# Patient Record
Sex: MALE | Race: WHITE | NOT HISPANIC OR LATINO | URBAN - METROPOLITAN AREA
[De-identification: names, ages, dates, MRNs, and addresses within clinical notes are randomized per-mention and may not be internally consistent; named-entity substitution may affect disease eponyms.]

---

## 2023-08-10 ENCOUNTER — HOSPITAL ENCOUNTER (OUTPATIENT)
Dept: DATA CONVERSION | Facility: HOSPITAL | Age: 58
Discharge: HOME | End: 2023-08-10

## 2023-08-10 DIAGNOSIS — N18.30 CHRONIC KIDNEY DISEASE, STAGE 3 UNSPECIFIED (MULTI): ICD-10-CM

## 2023-08-10 LAB
ALBUMIN SERPL-MCNC: 3.8 GM/DL (ref 3.5–5)
ANION GAP SERPL CALCULATED.3IONS-SCNC: 14 MMOL/L (ref 0–19)
BUN SERPL-MCNC: 26 MG/DL (ref 8–25)
BUN/CREAT SERPL: 13.7 RATIO (ref 8–21)
CALCIUM SERPL-MCNC: 9.6 MG/DL (ref 8.5–10.4)
CHLORIDE SERPL-SCNC: 103 MMOL/L (ref 97–107)
CO2 SERPL-SCNC: 23 MMOL/L (ref 24–31)
CREAT SERPL-MCNC: 1.9 MG/DL (ref 0.4–1.6)
DEPRECATED RDW RBC AUTO: 44.3 FL (ref 37–54)
ERYTHROCYTE [DISTWIDTH] IN BLOOD BY AUTOMATED COUNT: 13.8 % (ref 11.7–15)
GFR SERPL CREATININE-BSD FRML MDRD: 40 ML/MIN/1.73 M2
GLUCOSE SERPL-MCNC: 188 MG/DL (ref 65–99)
HCT VFR BLD AUTO: 43.9 % (ref 41–50)
HGB BLD-MCNC: 14.6 GM/DL (ref 13.5–16.5)
MCH RBC QN AUTO: 29.5 PG (ref 26–34)
MCHC RBC AUTO-ENTMCNC: 33.3 % (ref 31–37)
MCV RBC AUTO: 88.7 FL (ref 80–100)
NRBC BLD-RTO: 0 /100 WBC
PHOSPHATE SERPL-MCNC: 3.4 MG/DL (ref 2.5–4.5)
PLATELET # BLD AUTO: 260 K/UL (ref 150–450)
PMV BLD AUTO: 10.9 CU (ref 7–12.6)
POTASSIUM SERPL-SCNC: 4.6 MMOL/L (ref 3.4–5.1)
PTH-INTACT SERPL-MCNC: 53 PG/ML (ref 15–65)
RBC # BLD AUTO: 4.95 M/UL (ref 4.5–5.5)
SODIUM SERPL-SCNC: 139 MMOL/L (ref 133–145)
WBC # BLD AUTO: 8.4 K/UL (ref 4.5–11)

## 2023-08-31 PROBLEM — M19.029 ARTHRITIS OF ELBOW: Status: ACTIVE | Noted: 2023-08-31

## 2023-08-31 PROBLEM — E11.51 PERIPHERAL CIRCULATORY DISORDER DUE TO TYPE 2 DIABETES MELLITUS (MULTI): Status: ACTIVE | Noted: 2023-08-31

## 2023-08-31 PROBLEM — I20.9 ANGINA PECTORIS (CMS-HCC): Status: ACTIVE | Noted: 2023-08-31

## 2023-08-31 PROBLEM — R19.5 OCCULT BLOOD IN STOOLS: Status: ACTIVE | Noted: 2023-08-31

## 2023-08-31 PROBLEM — E78.2 MIXED HYPERLIPIDEMIA: Status: ACTIVE | Noted: 2023-08-31

## 2023-08-31 PROBLEM — E66.01 SEVERE OBESITY (BMI >= 40) (MULTI): Status: ACTIVE | Noted: 2023-08-31
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
## 2023-08-31 PROBLEM — R60.0 LEG EDEMA: Status: ACTIVE | Noted: 2018-05-22

## 2023-08-31 PROBLEM — R06.00 DYSPNEA: Status: ACTIVE | Noted: 2023-08-31

## 2023-08-31 PROBLEM — Z95.5 HISTORY OF CORONARY ARTERY STENT PLACEMENT: Status: ACTIVE | Noted: 2023-08-31

## 2023-08-31 PROBLEM — E11.65 HYPERGLYCEMIA DUE TO TYPE 2 DIABETES MELLITUS (MULTI): Status: ACTIVE | Noted: 2023-08-31

## 2023-08-31 PROBLEM — M79.603 PAIN IN UPPER LIMB: Status: ACTIVE | Noted: 2023-08-31

## 2023-08-31 PROBLEM — I25.10 ARTERIOSCLEROSIS OF CORONARY ARTERY: Status: ACTIVE | Noted: 2021-11-02

## 2023-08-31 PROBLEM — N18.30 CHRONIC KIDNEY DISEASE, STAGE 3 (MULTI): Status: ACTIVE | Noted: 2023-08-31

## 2023-08-31 PROBLEM — I82.409 DEEP VENOUS THROMBOSIS OF LOWER EXTREMITY (MULTI): Status: ACTIVE | Noted: 2023-08-31

## 2023-08-31 PROBLEM — M54.9 BACKACHE: Status: ACTIVE | Noted: 2023-08-31

## 2023-08-31 PROBLEM — R21 RASH: Status: ACTIVE | Noted: 2023-08-31

## 2023-08-31 PROBLEM — I89.0 LYMPHEDEMA: Status: ACTIVE | Noted: 2018-05-22

## 2023-08-31 PROBLEM — R00.2 PALPITATIONS: Status: ACTIVE | Noted: 2023-08-31

## 2023-08-31 PROBLEM — T14.8XXA BLISTER: Status: ACTIVE | Noted: 2023-08-31

## 2023-08-31 PROBLEM — A41.9 SEPSIS (MULTI): Status: ACTIVE | Noted: 2023-08-31

## 2023-08-31 PROBLEM — E11.621 DIABETIC FOOT ULCER (MULTI): Status: ACTIVE | Noted: 2022-12-01

## 2023-08-31 PROBLEM — R94.4 KIDNEY FUNCTION TEST ABNORMAL: Status: ACTIVE | Noted: 2018-05-22

## 2023-08-31 PROBLEM — L97.509 DIABETIC FOOT ULCER (MULTI): Status: ACTIVE | Noted: 2022-12-01

## 2023-08-31 PROBLEM — E16.2 HYPOGLYCEMIA: Status: ACTIVE | Noted: 2023-08-31

## 2023-08-31 PROBLEM — E87.1 HYPONATREMIA: Status: ACTIVE | Noted: 2023-08-31

## 2023-08-31 PROBLEM — L03.116 CELLULITIS OF LEFT LOWER EXTREMITY: Status: ACTIVE | Noted: 2018-09-14

## 2023-08-31 PROBLEM — Z86.711 HISTORY OF PULMONARY EMBOLISM: Status: ACTIVE | Noted: 2023-08-31

## 2023-08-31 PROBLEM — R07.89 CHEST WALL PAIN: Status: ACTIVE | Noted: 2023-08-31

## 2023-08-31 PROBLEM — S81.809A OPEN WOUND, LOWER LEG: Status: ACTIVE | Noted: 2023-08-31

## 2023-08-31 PROBLEM — L03.213 PERIORBITAL CELLULITIS: Status: ACTIVE | Noted: 2023-08-31

## 2023-08-31 PROBLEM — E87.6 HYPOKALEMIA: Status: ACTIVE | Noted: 2023-08-31

## 2023-08-31 PROBLEM — I25.10 ATHEROSCLEROSIS OF CORONARY ARTERY: Status: ACTIVE | Noted: 2023-08-31

## 2023-08-31 PROBLEM — E78.5 DYSLIPIDEMIA: Status: ACTIVE | Noted: 2023-08-31

## 2023-08-31 PROBLEM — R07.89 ATYPICAL CHEST PAIN: Status: ACTIVE | Noted: 2023-08-31

## 2023-08-31 PROBLEM — E78.1 HYPERTRIGLYCERIDEMIA: Status: ACTIVE | Noted: 2022-05-10

## 2023-08-31 PROBLEM — I10 PRIMARY HYPERTENSION: Status: ACTIVE | Noted: 2018-05-22

## 2023-08-31 PROBLEM — M25.519 SHOULDER PAIN: Status: ACTIVE | Noted: 2023-08-31

## 2023-08-31 PROBLEM — I50.9 HEART FAILURE (MULTI): Status: ACTIVE | Noted: 2023-08-31

## 2023-08-31 PROBLEM — R09.02 HYPOXIA: Status: ACTIVE | Noted: 2023-08-31

## 2023-08-31 PROBLEM — G47.33 OSA TREATED WITH BIPAP: Status: ACTIVE | Noted: 2023-08-31

## 2023-08-31 PROBLEM — I87.2 PERIPHERAL VENOUS INSUFFICIENCY: Status: ACTIVE | Noted: 2023-08-31

## 2023-08-31 PROBLEM — K92.1 HEMATOCHEZIA: Status: ACTIVE | Noted: 2023-08-31

## 2023-08-31 PROBLEM — I25.10 CVD (CARDIOVASCULAR DISEASE): Status: ACTIVE | Noted: 2023-08-31

## 2023-08-31 PROBLEM — E11.610 DIABETIC NEUROPATHIC ARTHROPATHY (MULTI): Status: ACTIVE | Noted: 2023-08-31

## 2023-08-31 PROBLEM — R21 PAPULAR ERUPTION: Status: ACTIVE | Noted: 2023-08-31

## 2023-08-31 PROBLEM — Z98.61 HISTORY OF PERCUTANEOUS TRANSLUMINAL CORONARY ANGIOPLASTY: Status: ACTIVE | Noted: 2023-08-31

## 2023-08-31 PROBLEM — D64.9 ANEMIA: Status: ACTIVE | Noted: 2018-05-22

## 2023-08-31 PROBLEM — I70.91 GENERALIZED ATHEROSCLEROSIS: Status: ACTIVE | Noted: 2023-08-31

## 2023-08-31 PROBLEM — I25.118 ATHEROSCLEROTIC HEART DISEASE OF NATIVE CORONARY ARTERY WITH OTHER FORMS OF ANGINA PECTORIS (CMS-HCC): Status: ACTIVE | Noted: 2023-08-31

## 2023-08-31 PROBLEM — K60.2 ANAL FISSURE: Status: ACTIVE | Noted: 2023-08-31

## 2023-08-31 PROBLEM — M25.529 ELBOW JOINT PAIN: Status: ACTIVE | Noted: 2023-08-31

## 2023-08-31 PROBLEM — N18.9 CHRONIC RENAL FAILURE SYNDROME: Status: ACTIVE | Noted: 2023-08-31

## 2023-08-31 PROBLEM — L02.01 ABSCESS OF FACE: Status: ACTIVE | Noted: 2023-08-31

## 2023-08-31 PROBLEM — I73.9 PERIPHERAL VASCULAR DISEASE (CMS-HCC): Status: ACTIVE | Noted: 2023-08-31

## 2023-08-31 PROBLEM — R79.89 ABNORMAL TSH: Status: ACTIVE | Noted: 2018-05-22

## 2023-08-31 PROBLEM — I63.9 CEREBROVASCULAR ACCIDENT (MULTI): Status: ACTIVE | Noted: 2023-08-31

## 2023-08-31 PROBLEM — M75.50 BURSITIS OF SHOULDER: Status: ACTIVE | Noted: 2023-08-31

## 2023-08-31 PROBLEM — I49.3 VENTRICULAR PREMATURE COMPLEX: Status: ACTIVE | Noted: 2023-08-31

## 2023-08-31 PROBLEM — M10.9 GOUT: Status: ACTIVE | Noted: 2018-05-22

## 2023-08-31 PROBLEM — E11.9 TYPE 2 DIABETES MELLITUS (MULTI): Status: ACTIVE | Noted: 2018-05-22

## 2023-08-31 PROBLEM — R25.1 TREMOR: Status: ACTIVE | Noted: 2023-08-31

## 2023-08-31 RX ORDER — DULAGLUTIDE 0.75 MG/.5ML
INJECTION, SOLUTION SUBCUTANEOUS
COMMUNITY
Start: 2022-09-07

## 2023-08-31 RX ORDER — TORSEMIDE 100 MG/1
0.5 TABLET ORAL EVERY OTHER DAY
COMMUNITY
Start: 2019-02-05

## 2023-08-31 RX ORDER — INSULIN HUMAN 500 [IU]/ML
INJECTION, SOLUTION SUBCUTANEOUS
COMMUNITY
Start: 2020-02-17

## 2023-08-31 RX ORDER — POTASSIUM CHLORIDE 750 MG/1
1 TABLET, FILM COATED, EXTENDED RELEASE ORAL DAILY
COMMUNITY
End: 2024-02-20 | Stop reason: WASHOUT

## 2023-08-31 RX ORDER — POTASSIUM CHLORIDE 20 MEQ/1
1 TABLET, EXTENDED RELEASE ORAL DAILY
COMMUNITY
End: 2024-02-20 | Stop reason: WASHOUT

## 2023-08-31 RX ORDER — POTASSIUM CHLORIDE 750 MG/1
1 TABLET, FILM COATED, EXTENDED RELEASE ORAL DAILY
COMMUNITY
Start: 2020-05-21

## 2023-08-31 RX ORDER — DILTIAZEM HYDROCHLORIDE 240 MG/1
1 CAPSULE, EXTENDED RELEASE ORAL DAILY
COMMUNITY
End: 2023-10-18 | Stop reason: SDUPTHER

## 2023-08-31 RX ORDER — NITROGLYCERIN 0.4 MG/1
1 TABLET SUBLINGUAL AS NEEDED
COMMUNITY
Start: 2019-08-08

## 2023-08-31 RX ORDER — GLUCOSAM/CHONDRO/HERB 149/HYAL 750-100 MG
1 TABLET ORAL 3 TIMES DAILY
COMMUNITY

## 2023-08-31 RX ORDER — PEN NEEDLE, DIABETIC 30 GX3/16"
NEEDLE, DISPOSABLE MISCELLANEOUS 2 TIMES DAILY
COMMUNITY
Start: 2020-11-03

## 2023-08-31 RX ORDER — PROPRANOLOL HYDROCHLORIDE 120 MG/1
1 CAPSULE, EXTENDED RELEASE ORAL DAILY
COMMUNITY
End: 2024-02-21 | Stop reason: SDUPTHER

## 2023-08-31 RX ORDER — ALLOPURINOL 100 MG/1
1.5 TABLET ORAL DAILY
COMMUNITY
Start: 2018-05-22

## 2023-08-31 RX ORDER — CALCITRIOL 0.5 UG/1
1 CAPSULE ORAL DAILY
COMMUNITY
Start: 2018-05-22

## 2023-08-31 RX ORDER — ISOSORBIDE MONONITRATE 10 MG/1
1 TABLET ORAL 2 TIMES DAILY
COMMUNITY
End: 2023-10-02 | Stop reason: DRUGHIGH

## 2023-08-31 RX ORDER — ACETAMINOPHEN 500 MG
1 TABLET ORAL EVERY 4 HOURS PRN
COMMUNITY
Start: 2020-05-21

## 2023-08-31 RX ORDER — DAPAGLIFLOZIN 10 MG/1
1 TABLET, FILM COATED ORAL DAILY
COMMUNITY

## 2023-08-31 RX ORDER — SODIUM BICARBONATE 650 MG/1
1 TABLET ORAL 4 TIMES DAILY
COMMUNITY
Start: 2018-05-22

## 2023-08-31 RX ORDER — DILTIAZEM HYDROCHLORIDE 180 MG/1
1 CAPSULE, EXTENDED RELEASE ORAL DAILY
COMMUNITY
Start: 2019-02-05 | End: 2024-02-20 | Stop reason: WASHOUT

## 2023-08-31 RX ORDER — CLOPIDOGREL BISULFATE 75 MG/1
1 TABLET ORAL DAILY
COMMUNITY
Start: 2020-05-21 | End: 2023-11-27 | Stop reason: SDUPTHER

## 2023-08-31 RX ORDER — ISOSORBIDE MONONITRATE 30 MG/1
1 TABLET, EXTENDED RELEASE ORAL DAILY
COMMUNITY
Start: 2020-05-21 | End: 2023-10-02 | Stop reason: DRUGHIGH

## 2023-08-31 RX ORDER — ISOSORBIDE MONONITRATE 60 MG/1
1 TABLET, EXTENDED RELEASE ORAL DAILY
COMMUNITY
End: 2023-10-02 | Stop reason: DRUGHIGH

## 2023-08-31 RX ORDER — ROSUVASTATIN CALCIUM 20 MG/1
1 TABLET, COATED ORAL DAILY
COMMUNITY
Start: 2020-05-21

## 2023-08-31 RX ORDER — FUROSEMIDE 20 MG/1
1 TABLET ORAL EVERY OTHER DAY
COMMUNITY

## 2023-08-31 RX ORDER — ACETAMINOPHEN 500 MG
1 TABLET ORAL EVERY 6 HOURS
COMMUNITY
End: 2024-02-20 | Stop reason: WASHOUT

## 2023-10-02 DIAGNOSIS — I10 HYPERTENSION, UNSPECIFIED TYPE: Primary | ICD-10-CM

## 2023-10-03 RX ORDER — ISOSORBIDE MONONITRATE 60 MG/1
60 TABLET, EXTENDED RELEASE ORAL DAILY
Qty: 90 TABLET | Refills: 2 | Status: SHIPPED | OUTPATIENT
Start: 2023-10-03 | End: 2023-10-18 | Stop reason: SDUPTHER

## 2023-10-18 DIAGNOSIS — I10 HYPERTENSION, UNSPECIFIED TYPE: ICD-10-CM

## 2023-10-18 DIAGNOSIS — I10 PRIMARY HYPERTENSION: Primary | ICD-10-CM

## 2023-10-18 NOTE — TELEPHONE ENCOUNTER
10/18/2023 PATIENT SAYS HE CALLED ON MONDAY 10/16/23 IN REQUEST FOR ISOSORBIDE, PROPRANOLOL, AND DILTIAZEM.

## 2023-10-19 RX ORDER — DILTIAZEM HYDROCHLORIDE 240 MG/1
240 CAPSULE, EXTENDED RELEASE ORAL DAILY
Qty: 90 CAPSULE | Refills: 3 | Status: SHIPPED | OUTPATIENT
Start: 2023-10-19 | End: 2024-10-13

## 2023-10-19 RX ORDER — ISOSORBIDE MONONITRATE 60 MG/1
60 TABLET, EXTENDED RELEASE ORAL DAILY
Qty: 90 TABLET | Refills: 2 | Status: SHIPPED | OUTPATIENT
Start: 2023-10-19 | End: 2024-07-15

## 2023-11-27 DIAGNOSIS — I25.10 CORONARY ARTERY DISEASE INVOLVING NATIVE CORONARY ARTERY OF NATIVE HEART WITHOUT ANGINA PECTORIS: Primary | ICD-10-CM

## 2023-11-27 RX ORDER — CLOPIDOGREL BISULFATE 75 MG/1
75 TABLET ORAL DAILY
Qty: 90 TABLET | Refills: 3 | Status: SHIPPED | OUTPATIENT
Start: 2023-11-27 | End: 2024-11-21

## 2023-12-11 ENCOUNTER — LAB (OUTPATIENT)
Dept: LAB | Facility: LAB | Age: 58
End: 2023-12-11
Payer: MEDICARE

## 2023-12-11 DIAGNOSIS — I10 ESSENTIAL (PRIMARY) HYPERTENSION: Primary | ICD-10-CM

## 2023-12-11 DIAGNOSIS — N18.32 CHRONIC KIDNEY DISEASE, STAGE 3B (MULTI): ICD-10-CM

## 2023-12-11 LAB
ALBUMIN SERPL-MCNC: 4.1 G/DL (ref 3.5–5)
ANION GAP SERPL CALC-SCNC: 14 MMOL/L
BUN SERPL-MCNC: 26 MG/DL (ref 8–25)
CALCIUM SERPL-MCNC: 9.4 MG/DL (ref 8.5–10.4)
CHLORIDE SERPL-SCNC: 104 MMOL/L (ref 97–107)
CO2 SERPL-SCNC: 24 MMOL/L (ref 24–31)
CREAT SERPL-MCNC: 2.1 MG/DL (ref 0.4–1.6)
ERYTHROCYTE [DISTWIDTH] IN BLOOD BY AUTOMATED COUNT: 13.4 % (ref 11.5–14.5)
GFR SERPL CREATININE-BSD FRML MDRD: 36 ML/MIN/1.73M*2
GLUCOSE SERPL-MCNC: 182 MG/DL (ref 65–99)
HCT VFR BLD AUTO: 47.7 % (ref 41–52)
HGB BLD-MCNC: 15.3 G/DL (ref 13.5–17.5)
MCH RBC QN AUTO: 29.7 PG (ref 26–34)
MCHC RBC AUTO-ENTMCNC: 32.1 G/DL (ref 32–36)
MCV RBC AUTO: 92 FL (ref 80–100)
NRBC BLD-RTO: 0 /100 WBCS (ref 0–0)
PHOSPHATE SERPL-MCNC: 4.2 MG/DL (ref 2.5–4.5)
PLATELET # BLD AUTO: 303 X10*3/UL (ref 150–450)
POTASSIUM SERPL-SCNC: 4.7 MMOL/L (ref 3.4–5.1)
RBC # BLD AUTO: 5.16 X10*6/UL (ref 4.5–5.9)
SODIUM SERPL-SCNC: 142 MMOL/L (ref 133–145)
WBC # BLD AUTO: 10.3 X10*3/UL (ref 4.4–11.3)

## 2023-12-11 PROCEDURE — 85027 COMPLETE CBC AUTOMATED: CPT

## 2023-12-11 PROCEDURE — 36415 COLL VENOUS BLD VENIPUNCTURE: CPT

## 2023-12-11 PROCEDURE — 80069 RENAL FUNCTION PANEL: CPT

## 2023-12-11 PROCEDURE — 83970 ASSAY OF PARATHORMONE: CPT

## 2023-12-12 LAB — PTH-INTACT SERPL-MCNC: 229.4 PG/ML (ref 18.5–88)

## 2024-01-31 RX ORDER — CEPHALEXIN 500 MG/1
CAPSULE ORAL
COMMUNITY
Start: 2023-03-28 | End: 2024-02-20 | Stop reason: WASHOUT

## 2024-02-05 ENCOUNTER — APPOINTMENT (OUTPATIENT)
Dept: CARDIOLOGY | Facility: CLINIC | Age: 59
End: 2024-02-05
Payer: MEDICARE

## 2024-02-20 PROBLEM — M79.89 SYMPTOM OF LEG SWELLING: Status: ACTIVE | Noted: 2024-02-20

## 2024-02-20 PROBLEM — J18.9 PNEUMONIA: Status: ACTIVE | Noted: 2024-02-20

## 2024-02-20 PROBLEM — Z95.5 PRESENCE OF CORONARY ANGIOPLASTY IMPLANT AND GRAFT: Status: ACTIVE | Noted: 2023-03-24

## 2024-02-20 PROBLEM — R73.09 BLOOD GLUCOSE ABNORMAL: Status: ACTIVE | Noted: 2024-02-20

## 2024-02-20 PROBLEM — I26.99 PULMONARY EMBOLISM (MULTI): Status: ACTIVE | Noted: 2023-03-24

## 2024-02-20 PROBLEM — E66.01 MORBID OBESITY (MULTI): Status: ACTIVE | Noted: 2023-03-24

## 2024-02-20 PROBLEM — I26.09 ACUTE COR PULMONALE (MULTI): Status: ACTIVE | Noted: 2024-02-20

## 2024-02-20 PROBLEM — I87.2 VENOUS INSUFFICIENCY (CHRONIC) (PERIPHERAL): Status: ACTIVE | Noted: 2023-03-24

## 2024-02-20 PROBLEM — N28.9 KIDNEY DISEASE: Status: ACTIVE | Noted: 2024-02-20

## 2024-02-20 PROBLEM — E87.20 ACIDOSIS, UNSPECIFIED: Status: ACTIVE | Noted: 2023-03-24

## 2024-02-20 RX ORDER — ALBUTEROL SULFATE 90 UG/1
2 AEROSOL, METERED RESPIRATORY (INHALATION) EVERY 6 HOURS PRN
COMMUNITY

## 2024-02-21 ENCOUNTER — OFFICE VISIT (OUTPATIENT)
Dept: CARDIOLOGY | Facility: CLINIC | Age: 59
End: 2024-02-21
Payer: MEDICARE

## 2024-02-21 ENCOUNTER — APPOINTMENT (OUTPATIENT)
Dept: CARDIOLOGY | Facility: CLINIC | Age: 59
End: 2024-02-21
Payer: MEDICARE

## 2024-02-21 VITALS
OXYGEN SATURATION: 98 % | TEMPERATURE: 98.9 F | WEIGHT: 264 LBS | HEIGHT: 66 IN | BODY MASS INDEX: 42.43 KG/M2 | DIASTOLIC BLOOD PRESSURE: 87 MMHG | HEART RATE: 78 BPM | SYSTOLIC BLOOD PRESSURE: 140 MMHG | RESPIRATION RATE: 18 BRPM

## 2024-02-21 DIAGNOSIS — E78.1 HYPERTRIGLYCERIDEMIA: ICD-10-CM

## 2024-02-21 DIAGNOSIS — E78.5 DYSLIPIDEMIA: ICD-10-CM

## 2024-02-21 DIAGNOSIS — I20.9 ANGINA PECTORIS (CMS-HCC): ICD-10-CM

## 2024-02-21 DIAGNOSIS — I25.10 ARTERIOSCLEROSIS OF CORONARY ARTERY: ICD-10-CM

## 2024-02-21 DIAGNOSIS — I50.23 ACUTE ON CHRONIC SYSTOLIC HEART FAILURE (MULTI): ICD-10-CM

## 2024-02-21 DIAGNOSIS — E78.2 MIXED HYPERLIPIDEMIA: Primary | ICD-10-CM

## 2024-02-21 PROCEDURE — 1036F TOBACCO NON-USER: CPT | Performed by: INTERNAL MEDICINE

## 2024-02-21 PROCEDURE — 3077F SYST BP >= 140 MM HG: CPT | Performed by: INTERNAL MEDICINE

## 2024-02-21 PROCEDURE — 3079F DIAST BP 80-89 MM HG: CPT | Performed by: INTERNAL MEDICINE

## 2024-02-21 PROCEDURE — 99214 OFFICE O/P EST MOD 30 MIN: CPT | Performed by: INTERNAL MEDICINE

## 2024-02-21 ASSESSMENT — PATIENT HEALTH QUESTIONNAIRE - PHQ9
2. FEELING DOWN, DEPRESSED OR HOPELESS: NOT AT ALL
SUM OF ALL RESPONSES TO PHQ9 QUESTIONS 1 AND 2: 0
1. LITTLE INTEREST OR PLEASURE IN DOING THINGS: NOT AT ALL

## 2024-02-21 ASSESSMENT — PAIN SCALES - GENERAL: PAINLEVEL: 0-NO PAIN

## 2024-02-21 NOTE — PROGRESS NOTES
History of present illness:  58 year old male patient here today following up on hx of CAD/HFpEF/atypical chest pains. Patient had saddle PE 02/2018 that was treated with TPA. Nuclear stress test in 04/2018 showed mild inferior wall ischemia and possible lateral ischemia with EF of 73%. Patient had borderline 3 vessels disease, FFR of LAD was negative and we proceeded with cardiac catheterization on 08/05/19 with PCI to LCX with 2 DARA.  Patient returns to my office for follow-up.  Has been feeling overall good.  Denies any chest pain shortness of breath or palpitations.  Has not used any nitroglycerin for several years now.  Past Medical History:   Diagnosis Date    Acute cor pulmonale (CMS/[facility]) 02/20/2024    Angina pectoris (CMS/[facility]) 08/31/2023    Atherosclerotic heart disease of native coronary artery with other forms of angina pectoris (CMS/[facility]) 08/31/2023    Atypical chest pain 08/31/2023    Cerebrovascular accident (CMS/[facility]) 08/31/2023    Chronic kidney disease, stage 3 (CMS/[facility]) 08/31/2023    Deep venous thrombosis of lower extremity (CMS/[facility]) 08/31/2023    Heart failure (CMS/[facility]) 08/31/2023    Lymphedema 05/22/2018    Mixed hyperlipidemia 08/31/2023    Palpitations 08/31/2023    Peripheral vascular disease (CMS/[facility]) 08/31/2023    Presence of coronary angioplasty implant and graft 03/24/2023    Primary hypertension 05/22/2018    Pulmonary embolism (CMS/[facility]) 03/24/2023    Type 2 diabetes mellitus (CMS/[facility]) 05/22/2018    Venous insufficiency (chronic) (peripheral) 03/24/2023    Ventricular premature complex 08/31/2023       History reviewed. No pertinent surgical history.    No Known Allergies     reports that he has never smoked. He has never been exposed to tobacco smoke. He has never used smokeless tobacco. He reports that he does not drink alcohol and does not use drugs.    Family History   Problem Relation Name Age of Onset    Heart disease Father      Other (double bypass with valve replacement) Father          Patient's Medications   New Prescriptions    No medications on file   Previous Medications    ACETAMINOPHEN (TYLENOL) 500 MG TABLET    Take 1 tablet (500 mg) by mouth every 4 hours if needed (pain).    ALBUTEROL 90 MCG/ACTUATION INHALER    Inhale 2 puffs every 6 hours if needed for shortness of breath.    ALLOPURINOL (ZYLOPRIM) 100 MG TABLET    Take 1.5 tablets (150 mg) by mouth once daily.    APIXABAN (ELIQUIS) 5 MG TABLET    Take 1 tablet (5 mg) by mouth 2 times a day.    ASPIRIN-SOD BICARB-CITRIC ACID (JAMILA-SELTZER) 324 MG EFFERVESCENT TABLET    Sodium Bicarbonate (Antacid)    BLOOD SUGAR DIAGNOSTIC STRIP    One Touch Ultra Test strips -  3 times per day sc 3X per meal for 90 days    CALCITRIOL (ROCALTROL) 0.5 MCG CAPSULE    Take 1 capsule (0.5 mcg) by mouth once daily. For 30 days    CLOPIDOGREL (PLAVIX) 75 MG TABLET    Take 1 tablet (75 mg) by mouth once daily. For 90    DAPAGLIFLOZIN PROPANEDIOL (FARXIGA) 10 MG    Take 1 tablet (10 mg) by mouth once daily. For 90    DILTIAZEM ER (TIAZAC) 240 MG 24 HR CAPSULE    Take 1 capsule (240 mg) by mouth once daily. on an empty stomach in the morning Orally Once a day for 90 days    DULAGLUTIDE (TRULICITY) 0.75 MG/0.5 ML PEN INJECTOR    Inject under the skin 1 (one) time per week.  as directed Subcutaneous weekly, 1 box for 30 days    FUROSEMIDE (LASIX) 20 MG TABLET    Take 1 tablet (20 mg) by mouth every other day.    INSULIN REGULAR (HUMULIN R U-500, CONC, KWIKPEN) 500 UNIT/ML (3 ML) CONCENTRATED INJECTION    Inject under the skin.  start 100units bid, may need up to 150 units bid as directed Subcutaneous bid for 30 days    ISOSORBIDE MONONITRATE ER (IMDUR) 60 MG 24 HR TABLET    Take 1 tablet (60 mg) by mouth once daily. For 90    NITROGLYCERIN (NITROSTAT) 0.4 MG SL TABLET    Place 1 tablet (0.4 mg) under the tongue if needed for chest pain. For 30 days    OMEGA 3-DHA-EPA-FISH OIL (FISH OIL) 1,000 MG (120 MG-180 MG) CAPSULE    Take 1 capsule (1,000 mg) by mouth 3  "times a day.    PEN NEEDLE, DIABETIC 32 GAUGE X 5/32\" NEEDLE    2 times a day.  as directed sc bid for 90 days    POTASSIUM CHLORIDE CR 10 MEQ ER TABLET    Take 1 tablet (10 mEq) by mouth once daily. With food    PROPRANOLOL XL (INNOPRAN XL) 120 MG 24 HR CAPSULE    Take 1 capsule (120 mg) by mouth once daily. For 90 days    ROSUVASTATIN (CRESTOR) 20 MG TABLET    Take 1 tablet (20 mg) by mouth once daily. For 90    SODIUM BICARBONATE 650 MG TABLET    Take 1 tablet (650 mg) by mouth 4 times a day. For 30    TORSEMIDE (DEMADEX) 100 MG TABLET    Take 0.5 tablets (50 mg) by mouth every other day. For 90 days   Modified Medications    No medications on file   Discontinued Medications    ACETAMINOPHEN (TYLENOL) 500 MG TABLET    Take 1 tablet (500 mg) by mouth every 6 hours.    CEPHALEXIN (KEFLEX) 500 MG CAPSULE        DILTIAZEM HCL ORAL    Take 1 capsule by mouth once daily. ER Beads 240 MG    DILTIAZEM XR (DILT-XR) 180 MG 24 HR CAPSULE    Take 1 capsule (180 mg) by mouth once daily.    OXYCODONE HCL ER PO    oxyCODONE HCl    POTASSIUM CHLORIDE CR 10 MEQ ER TABLET    Take 1 tablet (10 mEq) by mouth once daily. WF Oral for 30    POTASSIUM CHLORIDE CR 20 MEQ ER TABLET    Take 1 tablet (20 mEq) by mouth once daily. WITH FOOD Orally Once a day for 90 days    PROPRANOLOL LA (INDERAL LA) 120 MG 24 HR CAPSULE    Take 1 capsule (120 mg) by mouth once daily.    VALSARTAN ORAL    Valsartan       Objective   Physical Exam  General: Patient in no acute distress   HEENT: Atraumatic normocephalic.  Neck: Supple, jugular venous pressure within normal limit.  No bruits  Lungs: Clear to auscultation bilaterally  Cardiovascular: Regular rate and rhythm, normal heart sounds, no murmurs rubs or gallops  Abdomen: Soft nontender nondistended.  Normal bowel sounds.  Extremities: Warm to touch, no edema.      Lab Review   No visits with results within 2 Month(s) from this visit.   Latest known visit with results is:   Lab on 12/11/2023 "   Component Date Value    Glucose 12/11/2023 182 (H)     Sodium 12/11/2023 142     Potassium 12/11/2023 4.7     Chloride 12/11/2023 104     Bicarbonate 12/11/2023 24     Urea Nitrogen 12/11/2023 26 (H)     Creatinine 12/11/2023 2.10 (H)     eGFR 12/11/2023 36 (L)     Calcium 12/11/2023 9.4     Phosphorus 12/11/2023 4.2     Albumin 12/11/2023 4.1     Anion Gap 12/11/2023 14     Parathyroid Hormone, Int* 12/11/2023 229.4 (H)     WBC 12/11/2023 10.3     nRBC 12/11/2023 0.0     RBC 12/11/2023 5.16     Hemoglobin 12/11/2023 15.3     Hematocrit 12/11/2023 47.7     MCV 12/11/2023 92     MCH 12/11/2023 29.7     MCHC 12/11/2023 32.1     RDW 12/11/2023 13.4     Platelets 12/11/2023 303         Assessment/Plan   Patient Active Problem List   Diagnosis    Severe obesity (BMI >= 40) (CMS/[facility])    History of coronary artery stent placement    History of pulmonary embolism    Hyperglycemia due to type 2 diabetes mellitus (CMS/HCC)    Peripheral circulatory disorder due to type 2 diabetes mellitus (CMS/HCC)    Hypokalemia    Hyponatremia    Hypoxia    Leg edema    Lymphedema    Occult blood in stools    TANO treated with BiPAP    Mixed hyperlipidemia    Hypertriglyceridemia    Palpitations    Rash    Papular eruption    Periorbital cellulitis    Venous insufficiency (chronic) (peripheral)    CVD (cardiovascular disease)    Primary hypertension    Peripheral vascular disease (CMS/[facility])    Sepsis (CMS/[facility])    Dyspnea    Pain in upper limb    Shoulder pain    Type 2 diabetes mellitus (CMS/HCC)    Hypoglycemia    Ventricular premature complex    Diabetic neuropathic arthropathy (CMS/HCC)    Dyslipidemia    Elbow joint pain    Gout    Heart failure (CMS/HCC)    Hematochezia    History of percutaneous transluminal coronary angioplasty    Generalized atherosclerosis    Arthritis of elbow    Backache    Blister    Bursitis of shoulder    Cellulitis of left lower extremity    Cerebrovascular accident (CMS/HCC)    Chronic kidney disease, stage  3 (CMS/HCC)    Chronic renal failure syndrome    Deep venous thrombosis of lower extremity (CMS/HCC)    Diabetic foot ulcer (CMS/HCC)    Abnormal TSH    Kidney function test abnormal    Abscess of face    Accidental fall    Anal fissure    Anemia, unspecified    Angina pectoris (CMS/HCC)    Atypical chest pain    Chest wall pain    Atherosclerosis of coronary artery    Atherosclerotic heart disease of native coronary artery with other forms of angina pectoris (CMS/HCC)    Arteriosclerosis of coronary artery    Tremor    Open wound, lower leg    Acute cor pulmonale (CMS/HCC)    Blood glucose abnormal    Kidney disease    Morbid obesity (CMS/HCC)    Pneumonia    Pulmonary embolism (CMS/HCC)    Symptom of leg swelling    Acidosis, unspecified    Presence of coronary angioplasty implant and graft      58 year old male patient here today following up on hx of CAD/HFpEF/atypical chest pains. Patient had saddle PE 02/2018 that was treated with TPA. Nuclear stress test in 04/2018 showed mild inferior wall ischemia and possible lateral ischemia with EF of 73%. Patient had borderline 3 vessels disease, FFR of LAD was negative and we proceeded with cardiac catheterization on 08/05/19 with PCI to LCX with 2 DARA.  Patient returns to my office for follow-up.  Has been feeling overall good.  Denies any chest pain shortness of breath or palpitations.  Has not used any nitroglycerin for several years now.  He would like to stay on the clopidogrel Eliquis instead of aspirin Eliquis since it is more protective less risk of GI bleeding and he has been tolerating it fine.  Will continue current management.  Has a stage IIIb chronic kidney disease.  Following up with Dr. Chance.  Will check lipid panel to make sure his LDL at target last LDL was 60 otherwise he stable from my standpoint we will follow-up in 6 months.      Vicki Hendrickson MD

## 2024-02-27 ENCOUNTER — LAB (OUTPATIENT)
Dept: LAB | Facility: LAB | Age: 59
End: 2024-02-27
Payer: MEDICARE

## 2024-02-27 DIAGNOSIS — E78.2 MIXED HYPERLIPIDEMIA: ICD-10-CM

## 2024-02-27 LAB
CHOLEST SERPL-MCNC: 115 MG/DL (ref 133–200)
CHOLEST/HDLC SERPL: 4 {RATIO}
HDLC SERPL-MCNC: 29 MG/DL
LDLC SERPL CALC-MCNC: 55 MG/DL (ref 65–130)
TRIGL SERPL-MCNC: 154 MG/DL (ref 40–150)

## 2024-02-27 PROCEDURE — 80061 LIPID PANEL: CPT

## 2024-02-27 PROCEDURE — 36415 COLL VENOUS BLD VENIPUNCTURE: CPT

## 2024-05-06 ENCOUNTER — LAB (OUTPATIENT)
Dept: LAB | Facility: LAB | Age: 59
End: 2024-05-06
Payer: MEDICARE

## 2024-05-06 DIAGNOSIS — N18.30 CHRONIC KIDNEY DISEASE, STAGE 3 UNSPECIFIED (MULTI): Primary | ICD-10-CM

## 2024-05-06 LAB
ALBUMIN SERPL-MCNC: 3.8 G/DL (ref 3.5–5)
ANION GAP SERPL CALC-SCNC: 17 MMOL/L
BUN SERPL-MCNC: 23 MG/DL (ref 8–25)
CALCIUM SERPL-MCNC: 9.1 MG/DL (ref 8.5–10.4)
CHLORIDE SERPL-SCNC: 104 MMOL/L (ref 97–107)
CO2 SERPL-SCNC: 22 MMOL/L (ref 24–31)
CREAT SERPL-MCNC: 1.8 MG/DL (ref 0.4–1.6)
EGFRCR SERPLBLD CKD-EPI 2021: 43 ML/MIN/1.73M*2
ERYTHROCYTE [DISTWIDTH] IN BLOOD BY AUTOMATED COUNT: 13.3 % (ref 11.5–14.5)
GLUCOSE SERPL-MCNC: 156 MG/DL (ref 65–99)
HCT VFR BLD AUTO: 42.1 % (ref 41–52)
HGB BLD-MCNC: 14.3 G/DL (ref 13.5–17.5)
MCH RBC QN AUTO: 30.2 PG (ref 26–34)
MCHC RBC AUTO-ENTMCNC: 34 G/DL (ref 32–36)
MCV RBC AUTO: 89 FL (ref 80–100)
NRBC BLD-RTO: 0 /100 WBCS (ref 0–0)
PHOSPHATE SERPL-MCNC: 3.3 MG/DL (ref 2.5–4.5)
PLATELET # BLD AUTO: 257 X10*3/UL (ref 150–450)
POTASSIUM SERPL-SCNC: 4.2 MMOL/L (ref 3.4–5.1)
RBC # BLD AUTO: 4.73 X10*6/UL (ref 4.5–5.9)
SODIUM SERPL-SCNC: 143 MMOL/L (ref 133–145)
WBC # BLD AUTO: 7.7 X10*3/UL (ref 4.4–11.3)

## 2024-05-06 PROCEDURE — 85027 COMPLETE CBC AUTOMATED: CPT

## 2024-05-06 PROCEDURE — 80069 RENAL FUNCTION PANEL: CPT

## 2024-05-06 PROCEDURE — 36415 COLL VENOUS BLD VENIPUNCTURE: CPT

## 2024-05-06 PROCEDURE — 83970 ASSAY OF PARATHORMONE: CPT

## 2024-05-07 LAB — PTH-INTACT SERPL-MCNC: 97.2 PG/ML (ref 18.5–88)

## 2024-07-01 DIAGNOSIS — I10 HYPERTENSION, UNSPECIFIED TYPE: ICD-10-CM

## 2024-07-01 RX ORDER — ISOSORBIDE MONONITRATE 60 MG/1
60 TABLET, EXTENDED RELEASE ORAL DAILY
Qty: 90 TABLET | Refills: 3 | Status: SHIPPED | OUTPATIENT
Start: 2024-07-01 | End: 2025-06-26

## 2024-07-01 NOTE — TELEPHONE ENCOUNTER
Refill- 90 days    Pharmacy-  [id number] - [place], [place] - [address] AT [facility] Phone: [phone]   Fax: [phone]        Medication-    Dispensed Days Supply Quantity Provider Pharmacy    ISOSORBIDE MONONITRATE 60MG ER TABS 04/14/2024 90 90 each Vicki Hendrickson MD [facility] #...

## 2024-07-01 NOTE — TELEPHONE ENCOUNTER
Please send the attached prescription to the patient's pharmacy as soon as possible. Thank you!    Requested Prescriptions     Pending Prescriptions Disp Refills    isosorbide mononitrate ER (Imdur) 60 mg 24 hr tablet 90 tablet 3     Sig: Take 1 tablet (60 mg) by mouth once daily. For 90

## 2024-07-18 ENCOUNTER — PRE-ADMISSION TESTING (OUTPATIENT)
Dept: PREADMISSION TESTING | Facility: HOSPITAL | Age: 59
End: 2024-07-18
Payer: MEDICARE

## 2024-07-18 ENCOUNTER — LAB (OUTPATIENT)
Dept: LAB | Facility: LAB | Age: 59
End: 2024-07-18
Payer: MEDICARE

## 2024-07-18 VITALS
HEART RATE: 80 BPM | OXYGEN SATURATION: 99 % | WEIGHT: 287.2 LBS | SYSTOLIC BLOOD PRESSURE: 144 MMHG | HEIGHT: 66 IN | TEMPERATURE: 98.6 F | DIASTOLIC BLOOD PRESSURE: 86 MMHG | BODY MASS INDEX: 46.16 KG/M2

## 2024-07-18 DIAGNOSIS — I50.9 EDEMA DUE TO CONGESTIVE HEART FAILURE (MULTI): ICD-10-CM

## 2024-07-18 DIAGNOSIS — Z01.818 PREOP TESTING: ICD-10-CM

## 2024-07-18 DIAGNOSIS — Z01.818 PREOP TESTING: Primary | ICD-10-CM

## 2024-07-18 LAB
ATRIAL RATE: 71 BPM
BNP SERPL-MCNC: 69 PG/ML (ref 0–99)
P AXIS: 40 DEGREES
P OFFSET: 145 MS
P ONSET: 79 MS
PR INTERVAL: 292 MS
Q ONSET: 225 MS
QRS COUNT: 12 BEATS
QRS DURATION: 74 MS
QT INTERVAL: 382 MS
QTC CALCULATION(BAZETT): 415 MS
QTC FREDERICIA: 404 MS
R AXIS: -21 DEGREES
T AXIS: 37 DEGREES
T OFFSET: 416 MS
VENTRICULAR RATE: 71 BPM

## 2024-07-18 PROCEDURE — 93010 ELECTROCARDIOGRAM REPORT: CPT | Performed by: INTERNAL MEDICINE

## 2024-07-18 PROCEDURE — 93005 ELECTROCARDIOGRAM TRACING: CPT

## 2024-07-18 PROCEDURE — 36415 COLL VENOUS BLD VENIPUNCTURE: CPT

## 2024-07-18 PROCEDURE — 83880 ASSAY OF NATRIURETIC PEPTIDE: CPT

## 2024-07-18 PROCEDURE — 99204 OFFICE O/P NEW MOD 45 MIN: CPT | Performed by: NURSE PRACTITIONER

## 2024-07-18 ASSESSMENT — ENCOUNTER SYMPTOMS
GASTROINTESTINAL NEGATIVE: 1
WOUND: 1
ENDOCRINE NEGATIVE: 1
CONSTITUTIONAL NEGATIVE: 1
CARDIOVASCULAR NEGATIVE: 1
RESPIRATORY NEGATIVE: 1
MUSCULOSKELETAL NEGATIVE: 1
NEUROLOGICAL NEGATIVE: 1
NECK NEGATIVE: 1
EYES NEGATIVE: 1

## 2024-07-18 ASSESSMENT — DUKE ACTIVITY SCORE INDEX (DASI)
CAN YOU DO MODERATE WORK AROUND THE HOUSE LIKE VACUUMING, SWEEPING FLOORS OR CARRYING GROCERIES: YES
CAN YOU DO YARD WORK LIKE RAKING LEAVES, WEEDING OR PUSHING A MOWER: YES
CAN YOU WALK INDOORS, SUCH AS AROUND YOUR HOUSE: YES
CAN YOU DO LIGHT WORK AROUND THE HOUSE LIKE DUSTING OR WASHING DISHES: YES
DASI METS SCORE: 7.3
CAN YOU PARTICIPATE IN STRENOUS SPORTS LIKE SWIMMING, SINGLES TENNIS, FOOTBALL, BASKETBALL, OR SKIING: NO
CAN YOU CLIMB A FLIGHT OF STAIRS OR WALK UP A HILL: YES
CAN YOU HAVE SEXUAL RELATIONS: YES
CAN YOU TAKE CARE OF YOURSELF (EAT, DRESS, BATHE, OR USE TOILET): YES
TOTAL_SCORE: 36.7
CAN YOU DO HEAVY WORK AROUND THE HOUSE LIKE SCRUBBING FLOORS OR LIFTING AND MOVING HEAVY FURNITURE: YES
CAN YOU RUN A SHORT DISTANCE: NO
CAN YOU WALK A BLOCK OR TWO ON LEVEL GROUND: YES
CAN YOU PARTICIPATE IN MODERATE RECREATIONAL ACTIVITIES LIKE GOLF, BOWLING, DANCING, DOUBLES TENNIS OR THROWING A BASEBALL OR FOOTBALL: NO

## 2024-07-18 ASSESSMENT — PAIN - FUNCTIONAL ASSESSMENT: PAIN_FUNCTIONAL_ASSESSMENT: 0-10

## 2024-07-18 ASSESSMENT — PAIN SCALES - GENERAL: PAINLEVEL_OUTOF10: 0 - NO PAIN

## 2024-07-18 NOTE — PREPROCEDURE INSTRUCTIONS
"   Medication List            Accurate as of July 18, 2024  9:35 AM. Always use your most recent med list.                acetaminophen 500 mg tablet  Commonly known as: Tylenol     allopurinol 100 mg tablet  Commonly known as: Zyloprim  Medication Adjustments for Surgery: Take morning of surgery with sip of water, no other fluids     aspirin-sod bicarb-citric acid 324 mg effervescent tablet  Commonly known as: Alva-Frontier  Medication Adjustments for Surgery: Stop 7 days before surgery     blood sugar diagnostic strip     calcitriol 0.5 mcg capsule  Commonly known as: Rocaltrol     clopidogrel 75 mg tablet  Commonly known as: Plavix  Take 1 tablet (75 mg) by mouth once daily. For 90  Notes to patient: STOP 5 DAYS BEFORE PROCEDURE PER DR. LUNA INSTRUCTIONS     dilTIAZem  mg 24 hr capsule  Commonly known as: Tiazac  Take 1 capsule (240 mg) by mouth once daily. on an empty stomach in the morning Orally Once a day for 90 days  Medication Adjustments for Surgery: Take morning of surgery with sip of water, no other fluids     Eliquis 5 mg tablet  Generic drug: apixaban  Medication Adjustments for Surgery: Stop 2 days before surgery     HumuLIN R U-500 (Conc) Kwikpen 500 unit/mL (3 mL) CONCENTRATED injection  Generic drug: insulin regular  Medication Adjustments for Surgery: Continue until night before surgery     isosorbide mononitrate ER 60 mg 24 hr tablet  Commonly known as: Imdur  Take 1 tablet (60 mg) by mouth once daily. For 90  Medication Adjustments for Surgery: Take morning of surgery with sip of water, no other fluids     nitroglycerin 0.4 mg SL tablet  Commonly known as: Nitrostat     pen needle, diabetic 32 gauge x 5/32\" needle     potassium chloride CR 10 mEq ER tablet  Commonly known as: Klor-Con  Medication Adjustments for Surgery: Continue until night before surgery     propranolol  mg 24 hr capsule  Commonly known as: Innopran XL  Take 1 capsule (120 mg) by mouth once daily. For 90 " days  Medication Adjustments for Surgery: Take morning of surgery with sip of water, no other fluids     rosuvastatin 20 mg tablet  Commonly known as: Crestor  Medication Adjustments for Surgery: Continue until night before surgery     sodium bicarbonate 650 mg tablet  Medication Adjustments for Surgery: Continue until night before surgery     torsemide 100 mg tablet  Commonly known as: Demadex  Medication Adjustments for Surgery: Continue until night before surgery                FOLLOW PRINTED INSTRUCTIONS PROVIDED BY DR. LUNA              NPO Instructions:    Do not eat any food after midnight the night before your surgery/procedure.    Additional Instructions:     Day of Surgery:  If you have diabetes, please check your fasting blood sugar upon awakening.  If fasting blood sugar is <80 mg/dl, drink 100 ml of apple juice, time limit of 2 hours before  Wear  comfortable loose fitting clothing  Do not use moisturizers, creams, lotions or perfume  All jewelry and valuables should be left at home    PAT DISCHARGE INSTRUCTIONS    Please call the Same Day Surgery (SDS) Department of the hospital where your procedure will be performed after 2:00 PM the day before your surgery. If you are scheduled on a Monday, or a Tuesday following a Monday holiday, you will need to call on the last business day prior to your surgery.    [facility]  [id number] [place] [address], [id number] [phone]  Second Floor      Please let your surgeon know if:      You develop any open sores, shingles, burning or painful urination as these may increase your risk of an infection.   You no longer wish to have the surgery.   Any other personal circumstances change that may lead to the need to cancel or defer this surgery-such as being sick or getting admitted to any hospital within one week of your planned procedure.    Your contact details change, such as a change of address or phone number.    Starting  now:     Please DO NOT drink alcohol or smoke for 24 hours before surgery. It is well known that quitting smoking can make a huge difference to your health and recovery from surgery. The longer you abstain from smoking, the better your chances of a healthy recovery. If you need help with quitting, call 1-800-QUIT-NOW to be connected to a trained counselor who will discuss the best methods to help you quit.     Before your surgery:    Please stop all supplements 7 days prior to surgery. Or as directed by your surgeon.   Please stop taking NSAID pain medicine such as Advil and Motrin 7 days before surgery.    If you develop any fever, cough, cold, rashes, cuts, scratches, scrapes, urinary symptoms or infection anywhere on your body (including teeth and gums) prior to surgery, please call your surgeon’s office as soon as possible. This may require treatment to reduce the chance of cancellation on the day of surgery.    The day before your surgery:   DIET- Please follow the diet instructions at the top of your packet.   Get a good night’s rest.  Use the special soap for bathing if you have been instructed to use one.    Scheduled surgery times may change and you will be notified if this occurs - please check your personal voicemail for any updates.     On the morning of surgery:   Wear comfortable, loose fitting clothes which open in the front. Please do not wear moisturizers, creams, lotions, makeup or perfume.    Please bring with you to surgery:   Photo ID and insurance card   Current list of medicines and allergies   Pacemaker/ Defibrillator/Heart stent cards   CPAP machine and mask    Slings/ splints/ crutches   A copy of your complete advanced directive/DHPOA.    Please do NOT bring with you to surgery:   All jewelry and valuables should be left at home.   Prosthetic devices such as contact lenses, hearing aids, dentures, eyelash extensions, hairpins and body piercings must be removed prior to going in to the  surgical suite.    After outpatient surgery:   A responsible adult MUST accompany you at the time of discharge and stay with you for 24 hours after your surgery. You may NOT drive yourself home after surgery.    Do not drive, operate machinery, make critical decisions or do activities that require co-ordination or balance until after a night’s sleep.   Do not drink alcoholic beverages for 24 hours.   Instructions for resuming your medications will be provided by your surgeon.    CALL YOUR DOCTOR AFTER SURGERY IF YOU HAVE:     Chills and/or a fever of 101° F or higher.    Redness, swelling, pus or drainage from your surgical wound or a bad smell from the wound.    Lightheadedness, fainting or confusion.    Persistent vomiting (throwing up) and are not able to eat or drink for 12 hours.    Three or more loose, watery bowel movements in 24 hours (diarrhea).   Difficulty or pain while urinating( after non-urological surgery)    Pain and swelling in your legs, especially if it is only on one side.    Difficulty breathing or are breathing faster than normal.    Any new concerning symptoms.

## 2024-07-18 NOTE — CPM/PAT H&P
CPM/PAT Evaluation       Name: Kevin Dc (Kevin Dc)  /Age: 1965/59 y.o.     Visit Type:   In-Person       Chief Complaint: Routine colonoscopy, history of hematochezia, anal fissure    HPI 59-year-old male referred to preadmission testing for evaluation in advance of his colonoscopy scheduled for 2024 with Dr. Pearce.     Past Medical History:   Diagnosis Date    Acute cor pulmonale (Multi) 2024    Angina pectoris (CMS-HCC) 2023    Atherosclerotic heart disease of native coronary artery with other forms of angina pectoris (CMS-HCC) 2023    Atypical chest pain 2023    Cerebrovascular accident (Multi) 2023    Chronic kidney disease, stage 3 (Multi) 2023    Deep venous thrombosis of lower extremity (Multi) 2023    Heart failure (Multi) 2023    Lymphedema 2018    Mixed hyperlipidemia 2023    Palpitations 2023    Peripheral vascular disease (CMS-HCC) 2023    Presence of coronary angioplasty implant and graft 2023    Primary hypertension 2018    Pulmonary embolism (Multi) 2023    Type 2 diabetes mellitus (Multi) 2018    Venous insufficiency (chronic) (peripheral) 2023    Ventricular premature complex 2023       History reviewed. No pertinent surgical history.    Patient  reports that he is not currently sexually active.    Family History   Problem Relation Name Age of Onset    Heart disease Father      Other (double bypass with valve replacement) Father         No Known Allergies    Prior to Admission medications    Medication Sig Start Date End Date Taking? Authorizing Provider   acetaminophen (Tylenol) 500 mg tablet Take 1 tablet (500 mg) by mouth every 4 hours if needed (pain). 20   Historical Provider, MD   allopurinol (Zyloprim) 100 mg tablet Take 1.5 tablets (150 mg) by mouth once daily. 18   Historical Provider, MD   apixaban (Eliquis) 5 mg tablet Take 1 tablet (5 mg)  "by mouth 2 times a day. 5/22/18   Historical Provider, MD   aspirin-sod bicarb-citric acid ([place]) 324 mg effervescent tablet Sodium Bicarbonate (Antacid)    Historical Provider, MD   blood sugar diagnostic strip One Touch Ultra Test strips -  3 times per day sc 3X per meal for 90 days 8/3/20   Historical Provider, MD   calcitriol (Rocaltrol) 0.5 mcg capsule Take 1 capsule (0.5 mcg) by mouth once daily. For 30 days 5/22/18   Historical Provider, MD   clopidogrel (Plavix) 75 mg tablet Take 1 tablet (75 mg) by mouth once daily. For 90 11/27/23 11/21/24  Vicki Hendrickson MD   dapagliflozin propanediol (Farxiga) 10 mg Take 1 tablet (10 mg) by mouth once daily. For 90    Historical Provider, MD   dilTIAZem ER (Tiazac) 240 mg 24 hr capsule Take 1 capsule (240 mg) by mouth once daily. on an empty stomach in the morning Orally Once a day for 90 days 10/19/23 10/13/24  Vicki Hendrickson MD   dulaglutide (Trulicity) 0.75 mg/0.5 mL pen injector Inject under the skin 1 (one) time per week.  as directed Subcutaneous weekly, 1 box for 30 days 9/7/22   Historical Provider, MD   furosemide (Lasix) 20 mg tablet Take 1 tablet (20 mg) by mouth every other day.    Historical Provider, MD   insulin regular (HumuLIN R U-500, Conc, Kwikpen) 500 unit/mL (3 mL) CONCENTRATED injection Inject under the skin.  start 100units bid, may need up to 150 units bid as directed Subcutaneous bid for 30 days 2/17/20   Historical Provider, MD   isosorbide mononitrate ER (Imdur) 60 mg 24 hr tablet Take 1 tablet (60 mg) by mouth once daily. For 90 7/1/24 6/26/25  Vicki Hendrickson MD   nitroglycerin (Nitrostat) 0.4 mg SL tablet Place 1 tablet (0.4 mg) under the tongue if needed for chest pain. For 30 days 8/8/19   Historical Provider, MD   pen needle, diabetic 32 gauge x 5/32\" needle 2 times a day.  as directed sc bid for 90 days 11/3/20   Historical Provider, MD   potassium chloride CR 10 mEq ER tablet Take 1 tablet (10 mEq) by mouth once daily. With " food 5/21/20   Historical Provider, MD   propranolol XL (Innopran XL) 120 mg 24 hr capsule Take 1 capsule (120 mg) by mouth once daily. For 90 days 10/19/23 10/13/24  Vicki Hendrickson MD   rosuvastatin (Crestor) 20 mg tablet Take 1 tablet (20 mg) by mouth once daily. For 90 5/21/20   Historical Provider, MD   sodium bicarbonate 650 mg tablet Take 1 tablet (650 mg) by mouth 4 times a day. For 30 5/22/18   Historical Provider, MD   torsemide (Demadex) 100 mg tablet Take 0.5 tablets (50 mg) by mouth every other day. For 90 days 2/5/19   Historical Provider, MD   albuterol 90 mcg/actuation inhaler Inhale 2 puffs every 6 hours if needed for shortness of breath.  7/18/24  Historical Provider, MD   omega 3-dha-epa-fish oil (Fish OiL) 1,000 mg (120 mg-180 mg) capsule Take 1 capsule (1,000 mg) by mouth 3 times a day.  7/18/24  Historical Provider, MD        PAT ROS:   Constitutional:   neg    Neuro/Psych:   neg    Eyes:   neg    Ears:   neg    Nose:   neg    Mouth:   neg    Throat:   neg    Neck:   neg    Cardio:    Lymphedema  neg    Respiratory:   neg    Endocrine:   neg    GI:   neg    :   neg    Musculoskeletal:   neg    Hematologic:   Skin:   sores/wound   rash  Posterior neck, bilateral lower extremity lymphedema-currently not under treatment due to cost, has not open ulcer right lower extremity     Physical Exam  Constitutional:       Appearance: He is obese.   HENT:      Head: Normocephalic.      Nose: Nose normal.      Mouth/Throat:      Mouth: Mucous membranes are dry.      Pharynx: Oropharynx is clear.   Eyes:      Extraocular Movements: Extraocular movements intact.      Conjunctiva/sclera: Conjunctivae normal.      Pupils: Pupils are equal, round, and reactive to light.   Cardiovascular:      Rate and Rhythm: Normal rate.      Comments: Unable to palpate dorsalis pedis or posterior tibialis pulses due to severe lymphedema  Pulmonary:      Effort: Pulmonary effort is normal.      Breath sounds: Normal breath  sounds.   Abdominal:      General: Bowel sounds are normal.      Palpations: Abdomen is soft.   Musculoskeletal:      Cervical back: Normal range of motion and neck supple.      Right lower leg: Edema present.      Left lower leg: Edema present.      Comments: Bilateral lower extremity lymphedema,    Skin:     General: Skin is warm and dry.      Capillary Refill: Capillary refill takes 2 to 3 seconds.      Findings: Lesion and rash present.   Neurological:      Mental Status: He is oriented to person, place, and time.   Psychiatric:         Mood and Affect: Mood normal.     Right lower extremity anterior open stage I weepy ulcer with surrounding erythema, bilateral lower extremities with orange peel skin and venous stasis demarcation,    PAT AIRWAY:   Airway:     Mallampati::  III    TM distance::  >3 FB    Neck ROM::  Full      Visit Vitals  /86   Pulse 80   Temp 37 °C (98.6 °F) (Temporal)     Vitals:    07/18/24 0914   Weight: 130 kg (287 lb 3.2 oz)       DASI Risk Score      Flowsheet Row Most Recent Value   DASI SCORE 36.7   METS Score (Will be calculated only when all the questions are answered) 7.3          Caprini DVT Assessment    No data to display       Modified Frailty Index    No data to display       CHADS2 Stroke Risk  Current as of 19 minutes ago        N/A 3 to 100%: High Risk   2 to < 3%: Medium Risk   0 to < 2%: Low Risk     Last Change: N/A          This score determines the patient's risk of having a stroke if the patient has atrial fibrillation.        This score is not applicable to this patient. Components are not calculated.          Revised Cardiac Risk Index    No data to display       Apfel Simplified Score    No data to display       Risk Analysis Index Results This Encounter    No data found in the last 1 encounters.       Stop Bang Score      Flowsheet Row Most Recent Value   Do you snore loudly? 1   Do you often feel tired or fatigued after your sleep? 0   Has anyone ever  observed you stop breathing in your sleep? 1   Do you have or are you being treated for high blood pressure? 1   Recent BMI (Calculated) 46.4   Is BMI greater than 35 kg/m2? 1=Yes   Age older than 50 years old? 1=Yes   Is your neck circumference greater than 17 inches (Male) or 16 inches (Female)? 1   Gender - Male 1=Yes   STOP-BANG Total Score 7            Assessment and Plan   59-year-old male with multiple comorbidities presents to preadmission testing for evaluation.  He has a past medical history of coronary artery disease status post coronary stents times 2/2018, hyperlipidemia, palpitation, venous insufficiency hypertension, peripheral vascular disease, dyslipidemia, heart failure, angina, acute cor pulmonale, pulmonary embolism, DVT, severe obesity, type 2 diabetes, hyponatremia, diabetic foot ulcer, hematochezia, anal fissure, stage III chronic kidney failure, anemia, sepsis, cellulitis lower extremity, abscess of face, gout, CVA, tremor, pneumonia, TANO treated with BiPAP, lymphedema,    Neuro:  No diagnosis or significant findings on chart review or clinical presentation and evaluation.    HEENT/Airway:  No diagnosis or significant findings on chart review or clinical presentation and evaluation.     Cardiovascular:  Denies dizziness, chest pain, pressure, palpitations, SOB, lightheadedness, LE swelling  Blood pressure is stable today  History of congestive heart failure his weight is up 10 kg from 5 months ago.  He has chronic bilateral lower extremity lymphedema and currently has an ulcer on the right lower extremity  Hypertension-continue Imdur 60 mg daily, Nitrostat as instructed if needed, propranolol  mg daily, diltiazem  mg daily,  Hyperlipidemia-continue Crestor 20 mg as prescribed  Congestive heart failure-torsemide 100 mg as prescribed  CAD status post coronary stents-continue Plavix 75 mg but hold 5 to 7 days prior to surgery advised to call his cardiologist for  recommendations  Pulmonary embolism/DVT continue Eliquis as prescribed, instructed to hold for 3 days prior to surgery  Venous insufficiency      WAGNER: 1.4% risk for postoperative MACE  EKG -July 18, 2024 sinus rhythm with first-degree AV block and inferior infarct of unknown age    Pulmonary:  TANO uses BiPAP encouraged to bring day of procedure    ARISCAT: <26 points, 1.6% risk of in-hospital postoperative pulmonary complication  PRODIGY: High risk for opioid induced respiratory depression  Discussed smoking cessation and deep breathing handout given    Renal:   Stage III chronic kidney disease-followed by Dr. Chance nephrologist    Pt at High risk for perioperative ODILON based on Dynamic Predictive Scoring Tool for Perioperative ODILON  Lab Results   Component Value Date    GLUCOSE 156 (H) 05/06/2024    CALCIUM 9.1 05/06/2024     05/06/2024    K 4.2 05/06/2024    CO2 22 (L) 05/06/2024     05/06/2024    BUN 23 05/06/2024    CREATININE 1.80 (H) 05/06/2024       Endocrine:  Diabetes-continue to check blood sugars, continue insulin as prescribed  States blood sugar at home today was 140 and yesterday was 155    Lab Results   Component Value Date    HGBA1C 11.2 (H) 03/25/2023       Hematologic:  No diagnosis or significant findings on chart review or clinical presentation and evaluation.  Lab Results   Component Value Date    WBC 7.7 05/06/2024    HGB 14.3 05/06/2024    HCT 42.1 05/06/2024    MCV 89 05/06/2024     05/06/2024     Pt supplied education/VTE handout    Gastrointestinal:   No diagnosis or significant findings on chart review or clinical presentation and evaluation.     :  No diagnosis or significant findings on chart review or clinical presentation and evaluation.     Infectious disease:   No diagnosis or significant findings on chart review or clinical presentation and evaluation.     Musculoskeletal:   No diagnosis or significant findings on chart review or clinical presentation and  evaluation.     Preoperative risk assessment  ASA III  NSQIP - Predicted length of stay days 0  PAT Testing -EKG , bnp  5/2004 renal panel and CBC reviewed  Preoperative medications reviewed and patient instructed by registered nurse    *CLEARED FOR SURGERY pending cardiology evaluation and PENDING LABS/EKG-LABS REVIEWED, STABLE. OK TO PROCEED    Anesthesia:  No anesthesia complications    Denies- dental issues  Smoker-denies  Drugs-denies  ETOH-denies  Denies personal/family issues with Anesthesia    Secure messaging:  July 18, 2024   Hi Dr Hendrickson, I had the opportunity to evaluate Mr. Redmond today and preadmission testing in advance of his colonoscopy scheduled for 725.  I know he last saw you in February 2024 and he is due to see you again in September.  Today blood pressure is 144/86, heart rate is 80.  He is up 10 kilograms from February 2024.  His EKG is showing sinus rhythm with first-degree AV block and inferior infarct of unknown age. I have added on Karen for scheduling with you ASAP for cardiac risk assessment and recommendations. Thanks Kaylee    Face to Face patient contact time 45 minutes    ULYSSES Johnson 7/18/2024 10:33 AM

## 2024-07-25 ENCOUNTER — ANESTHESIA (OUTPATIENT)
Dept: GASTROENTEROLOGY | Facility: HOSPITAL | Age: 59
End: 2024-07-25
Payer: MEDICARE

## 2024-07-25 ENCOUNTER — HOSPITAL ENCOUNTER (OUTPATIENT)
Dept: GASTROENTEROLOGY | Facility: HOSPITAL | Age: 59
Discharge: HOME | End: 2024-07-25
Payer: MEDICARE

## 2024-07-25 ENCOUNTER — ANESTHESIA EVENT (OUTPATIENT)
Dept: GASTROENTEROLOGY | Facility: HOSPITAL | Age: 59
End: 2024-07-25
Payer: MEDICARE

## 2024-07-25 VITALS
SYSTOLIC BLOOD PRESSURE: 117 MMHG | RESPIRATION RATE: 18 BRPM | TEMPERATURE: 97 F | OXYGEN SATURATION: 95 % | DIASTOLIC BLOOD PRESSURE: 64 MMHG | HEART RATE: 72 BPM

## 2024-07-25 DIAGNOSIS — Z86.010 PERSONAL HISTORY OF COLONIC POLYPS: ICD-10-CM

## 2024-07-25 DIAGNOSIS — Z80.0 FAMILY HISTORY OF MALIGNANT NEOPLASM OF GASTROINTESTINAL TRACT: ICD-10-CM

## 2024-07-25 DIAGNOSIS — Z79.01 LONG TERM (CURRENT) USE OF ANTICOAGULANTS: ICD-10-CM

## 2024-07-25 LAB — GLUCOSE BLD MANUAL STRIP-MCNC: 141 MG/DL (ref 74–99)

## 2024-07-25 PROCEDURE — A45380 PR COLONOSCOPY,BIOPSY: Performed by: ANESTHESIOLOGIST ASSISTANT

## 2024-07-25 PROCEDURE — 7100000009 HC PHASE TWO TIME - INITIAL BASE CHARGE

## 2024-07-25 PROCEDURE — 7100000002 HC RECOVERY ROOM TIME - EACH INCREMENTAL 1 MINUTE

## 2024-07-25 PROCEDURE — 3700000002 HC GENERAL ANESTHESIA TIME - EACH INCREMENTAL 1 MINUTE

## 2024-07-25 PROCEDURE — 2500000004 HC RX 250 GENERAL PHARMACY W/ HCPCS (ALT 636 FOR OP/ED): Performed by: ANESTHESIOLOGIST ASSISTANT

## 2024-07-25 PROCEDURE — 7100000010 HC PHASE TWO TIME - EACH INCREMENTAL 1 MINUTE

## 2024-07-25 PROCEDURE — 82947 ASSAY GLUCOSE BLOOD QUANT: CPT

## 2024-07-25 PROCEDURE — A45380 PR COLONOSCOPY,BIOPSY: Performed by: ANESTHESIOLOGY

## 2024-07-25 PROCEDURE — 3700000001 HC GENERAL ANESTHESIA TIME - INITIAL BASE CHARGE

## 2024-07-25 PROCEDURE — 45378 DIAGNOSTIC COLONOSCOPY: CPT | Performed by: INTERNAL MEDICINE

## 2024-07-25 PROCEDURE — 7100000001 HC RECOVERY ROOM TIME - INITIAL BASE CHARGE

## 2024-07-25 RX ORDER — LIDOCAINE HYDROCHLORIDE 10 MG/ML
0.1 INJECTION INFILTRATION; PERINEURAL ONCE
OUTPATIENT
Start: 2024-07-25 | End: 2024-07-25

## 2024-07-25 RX ORDER — SODIUM CHLORIDE, SODIUM LACTATE, POTASSIUM CHLORIDE, CALCIUM CHLORIDE 600; 310; 30; 20 MG/100ML; MG/100ML; MG/100ML; MG/100ML
100 INJECTION, SOLUTION INTRAVENOUS CONTINUOUS
OUTPATIENT
Start: 2024-07-25

## 2024-07-25 RX ORDER — HYDRALAZINE HYDROCHLORIDE 20 MG/ML
5 INJECTION INTRAMUSCULAR; INTRAVENOUS EVERY 30 MIN PRN
OUTPATIENT
Start: 2024-07-25

## 2024-07-25 RX ORDER — SODIUM CHLORIDE, SODIUM LACTATE, POTASSIUM CHLORIDE, CALCIUM CHLORIDE 600; 310; 30; 20 MG/100ML; MG/100ML; MG/100ML; MG/100ML
INJECTION, SOLUTION INTRAVENOUS CONTINUOUS PRN
Status: DISCONTINUED | OUTPATIENT
Start: 2024-07-25 | End: 2024-07-25

## 2024-07-25 RX ORDER — ONDANSETRON HYDROCHLORIDE 2 MG/ML
4 INJECTION, SOLUTION INTRAVENOUS ONCE AS NEEDED
OUTPATIENT
Start: 2024-07-25

## 2024-07-25 RX ORDER — ALBUTEROL SULFATE 0.83 MG/ML
2.5 SOLUTION RESPIRATORY (INHALATION) ONCE AS NEEDED
OUTPATIENT
Start: 2024-07-25

## 2024-07-25 RX ORDER — PROPOFOL 10 MG/ML
INJECTION, EMULSION INTRAVENOUS AS NEEDED
Status: DISCONTINUED | OUTPATIENT
Start: 2024-07-25 | End: 2024-07-25

## 2024-07-25 SDOH — HEALTH STABILITY: MENTAL HEALTH: CURRENT SMOKER: 0

## 2024-07-25 ASSESSMENT — PAIN - FUNCTIONAL ASSESSMENT
PAIN_FUNCTIONAL_ASSESSMENT: 0-10

## 2024-07-25 ASSESSMENT — PAIN SCALES - GENERAL
PAINLEVEL_OUTOF10: 0 - NO PAIN

## 2024-07-25 NOTE — DISCHARGE INSTRUCTIONS
Instructions  Patient Instructions after a Colonoscopy      The anesthetics, sedatives or narcotics which were given to you today will be acting in your body for the next 24 hours, so you might feel a little sleepy or groggy.  This feeling should slowly wear off. Carefully read and follow the instructions.      You received sedation today:  - Do not drive or operate any machinery or power tools of any kind.   - No alcoholic beverages today, not even beer or wine.  - Do not make any important decisions or sign any legal documents.  - No over the counter medications that contain alcohol or that may cause drowsiness.  - Do not make any important decisions or sign any legal documents.     While it is common to experience mild to moderate abdominal distention, gas, or belching after your procedure, if any of these symptoms occur following discharge from the GI Lab or within one week of having your procedure, call the [facility] to be advised whether a visit to your nearest Urgent Care or Emergency Department is indicated.  Take this paper with you if you go.      - If you develop an allergic reaction to the medications that were given during your procedure such as difficulty breathing, rash, hives, severe nausea, vomiting or lightheadedness.- If you experience chest pain, shortness of breath, severe abdominal pain, fevers and chills.     -If you develop signs and symptoms of bleeding such as blood in your spit, if your stools turn black, tarry, or bloody     - If you have not urinated within 8 hours following your procedure.- If your IV site becomes painful, red, inflamed, or looks infected.     If you received a biopsy/polypectomy/sphincterotomy the following instructions apply below:     - Do not use Aspirin containing products, non-steroidal medications or anti-coagulants for one week following your procedure. (Examples of these types of medications are: Advil, Arthrotec, Aleve, Coumadin, Ecotrin,  Heparin, Ibuprofen, Indocin, Motrin, Naprosyn, Nuprin, Plavix, Vioxx, and Voltarin, or their generic forms.  This list is not all-inclusive.  Check with your physician or pharmacist before resuming medications.)      - Eat a soft diet today.  Avoid foods that are poorly digested for the next 24 hours.  These foods would include: nuts, beans, lettuce, red meats, and fried foods.       - Start with liquids and advance your diet as tolerated, gradually work up to eating solids.      - Do not have a Barium Study or Enema for one week.     Your physician recommends the additional following instructions:     Colonoscopy: Resume your previous diet, continue your present medications, a repeated colonoscopy will be determined based on pathology result. Return to normal activity tomorrow.

## 2024-07-25 NOTE — ANESTHESIA PREPROCEDURE EVALUATION
"Patient: Kevin Dc \"partha\"    Procedure Information       Date/Time: 07/25/24 1430    Scheduled providers: Jorge A Post MD; Johnny Fishman DO    Procedure: COLONOSCOPY    Location: [facility]          Past Medical History:   Diagnosis Date    Acute cor pulmonale (Multi) 02/20/2024    Anal fissure     Angina pectoris (CMS-HCC) 08/31/2023    Atherosclerotic heart disease of native coronary artery with other forms of angina pectoris (CMS-HCC) 08/31/2023    Atypical chest pain 08/31/2023    Cerebral vascular accident (Multi)     Cerebrovascular accident (Multi) 08/31/2023    CHF (congestive heart failure) (Multi)     Chronic kidney disease, stage 3 (Multi) 08/31/2023    CKD (chronic kidney disease)     Clotting disorder (Multi)     Deep venous thrombosis of lower extremity (Multi) 08/31/2023    Facial abscess     Heart failure (Multi) 08/31/2023    Hematochezia     Lymphedema 05/22/2018    Mixed hyperlipidemia 08/31/2023    Obesity     Palpitations 08/31/2023    Peripheral vascular disease (CMS-HCC) 08/31/2023    Peripheral vascular disease (CMS-[facility])     Presence of coronary angioplasty implant and graft 03/24/2023    Primary hypertension 05/22/2018    Pulmonary embolism (Multi) 03/24/2023    Sleep apnea     Type 2 diabetes mellitus (Multi) 05/22/2018    Venous insufficiency     Venous insufficiency (chronic) (peripheral) 03/24/2023    Ventricular premature complex 08/31/2023      Relevant Problems   Cardiac   (+) Acute cor pulmonale (Multi)   (+) Angina pectoris (CMS-HCC)   (+) Arteriosclerosis of coronary artery   (+) Atherosclerosis of coronary artery   (+) Atherosclerotic heart disease of native coronary artery with other forms of angina pectoris (CMS-HCC)   (+) Atypical chest pain   (+) Chest wall pain   (+) Hypertriglyceridemia   (+) Mixed hyperlipidemia   (+) Peripheral circulatory disorder due to type 2 diabetes mellitus (Multi)   (+) Peripheral vascular disease (CMS-HCC)   (+) " Primary hypertension   (+) Stented coronary artery   (+) Ventricular premature complex      Pulmonary   (+) TANO treated with BiPAP   (+) Pneumonia   (+) Pulmonary embolism (Multi)      Neuro   (+) Cerebrovascular accident (Multi)   (+) Diabetic neuropathic arthropathy (Multi)      /Renal   (+) Chronic renal impairment, stage 3 (moderate) (Multi)   (+) Hyponatremia      Endocrine   (+) Diabetic neuropathic arthropathy (Multi)   (+) Morbid obesity (Multi)   (+) Type 2 diabetes mellitus (Multi)      Hematology   (+) Anemia, unspecified   (+) Anticoagulant long-term use   (+) Deep venous thrombosis of lower extremity (Multi)      Skin   (+) Papular eruption   (+) Rash     Past Surgical History:   Procedure Laterality Date    CARDIAC CATHETERIZATION      CAROTID STENT      COLONOSCOPY        Clinical information reviewed:   Tobacco  Allergies  Meds   Med Hx  Surg Hx   Fam Hx  Soc Hx        NPO Detail:  No data recorded     Physical Exam    Airway  Mallampati: III  TM distance: >3 FB  Neck ROM: full     Cardiovascular    Dental    Pulmonary    Abdominal            Anesthesia Plan    History of general anesthesia?: yes  History of complications of general anesthesia?: no    ASA 4     MAC     The patient is not a current smoker.  Patient was not previously instructed to abstain from smoking on day of procedure.  Patient did not smoke on day of procedure.    intravenous induction   Anesthetic plan and risks discussed with patient.    Plan discussed with attending.

## 2024-07-25 NOTE — POST-PROCEDURE NOTE
VSS. IV DISCONTINUED AS PER PROTOCOL.  NO N/V AT THIS TIME.WILL CALL FAMILY TO PICK HIM UP. DISCHARGE INSTRUCTIONS REVIEWED AND UNDERSTOOD. UMU. PO WELL.  1600 IV D/C'D . DISCHARGE INSTRUCTIONS REVIEWED AND UNDERSTOOD.  NO NOTED DISTRESS.

## 2024-07-28 ENCOUNTER — HOSPITAL ENCOUNTER (INPATIENT)
Facility: HOSPITAL | Age: 59
LOS: 3 days | Discharge: HOME | End: 2024-08-03
Attending: STUDENT IN AN ORGANIZED HEALTH CARE EDUCATION/TRAINING PROGRAM | Admitting: INTERNAL MEDICINE
Payer: MEDICARE

## 2024-07-28 DIAGNOSIS — I89.0 LYMPHEDEMA: ICD-10-CM

## 2024-07-28 DIAGNOSIS — L03.119 CELLULITIS OF LOWER EXTREMITY, UNSPECIFIED LATERALITY: Primary | ICD-10-CM

## 2024-07-28 DIAGNOSIS — R22.2 LOCALIZED SWELLING, MASS AND LUMP, TRUNK: ICD-10-CM

## 2024-07-28 DIAGNOSIS — L03.116 CELLULITIS OF LEFT LOWER LIMB: ICD-10-CM

## 2024-07-28 DIAGNOSIS — Z79.01 ANTICOAGULANT LONG-TERM USE: ICD-10-CM

## 2024-07-28 DIAGNOSIS — Z86.718 PERSONAL HISTORY OF OTHER VENOUS THROMBOSIS AND EMBOLISM: ICD-10-CM

## 2024-07-28 DIAGNOSIS — I20.9 ANGINA PECTORIS (CMS-HCC): ICD-10-CM

## 2024-07-28 DIAGNOSIS — M79.605 ACUTE LEG PAIN, LEFT: ICD-10-CM

## 2024-07-28 DIAGNOSIS — R09.89 OTHER SPECIFIED SYMPTOMS AND SIGNS INVOLVING THE CIRCULATORY AND RESPIRATORY SYSTEMS: ICD-10-CM

## 2024-07-28 DIAGNOSIS — S81.802A WOUND OF LEFT LOWER EXTREMITY, INITIAL ENCOUNTER: ICD-10-CM

## 2024-07-28 DIAGNOSIS — L03.115 CELLULITIS OF RIGHT LOWER LIMB: ICD-10-CM

## 2024-07-28 DIAGNOSIS — Z79.4 TYPE 2 DIABETES MELLITUS WITH OTHER SKIN ULCER, WITH LONG-TERM CURRENT USE OF INSULIN (MULTI): ICD-10-CM

## 2024-07-28 DIAGNOSIS — E11.622 TYPE 2 DIABETES MELLITUS WITH OTHER SKIN ULCER, WITH LONG-TERM CURRENT USE OF INSULIN (MULTI): ICD-10-CM

## 2024-07-28 DIAGNOSIS — R22.43 LOCALIZED SWELLING, MASS AND LUMP, LOWER LIMB, BILATERAL: ICD-10-CM

## 2024-07-28 DIAGNOSIS — Z78.9 DECREASED ACTIVITIES OF DAILY LIVING (ADL): ICD-10-CM

## 2024-07-28 PROBLEM — L03.90 CELLULITIS: Status: ACTIVE | Noted: 2024-07-28

## 2024-07-28 LAB
ALBUMIN SERPL-MCNC: 3.1 G/DL (ref 3.5–5)
ALP BLD-CCNC: 101 U/L (ref 35–125)
ALT SERPL-CCNC: 40 U/L (ref 5–40)
ANION GAP BLDV CALCULATED.4IONS-SCNC: 11 MMOL/L (ref 10–25)
ANION GAP SERPL CALC-SCNC: 14 MMOL/L
APTT PPP: 34.5 SECONDS (ref 22–32.5)
AST SERPL-CCNC: 27 U/L (ref 5–40)
BASE EXCESS BLDV CALC-SCNC: -2.1 MMOL/L (ref -2–3)
BASOPHILS # BLD AUTO: 0.03 X10*3/UL (ref 0–0.1)
BASOPHILS NFR BLD AUTO: 0.3 %
BILIRUB SERPL-MCNC: 0.8 MG/DL (ref 0.1–1.2)
BODY TEMPERATURE: 37 DEGREES CELSIUS
BUN SERPL-MCNC: 38 MG/DL (ref 8–25)
CA-I BLDV-SCNC: 1.17 MMOL/L (ref 1.1–1.33)
CALCIUM SERPL-MCNC: 8.8 MG/DL (ref 8.5–10.4)
CHLORIDE BLDV-SCNC: 104 MMOL/L (ref 98–107)
CHLORIDE SERPL-SCNC: 101 MMOL/L (ref 97–107)
CO2 SERPL-SCNC: 20 MMOL/L (ref 24–31)
CREAT SERPL-MCNC: 2.2 MG/DL (ref 0.4–1.6)
EGFRCR SERPLBLD CKD-EPI 2021: 33 ML/MIN/1.73M*2
EOSINOPHIL # BLD AUTO: 0.23 X10*3/UL (ref 0–0.7)
EOSINOPHIL NFR BLD AUTO: 2.3 %
ERYTHROCYTE [DISTWIDTH] IN BLOOD BY AUTOMATED COUNT: 13.6 % (ref 11.5–14.5)
GLUCOSE BLDV-MCNC: 280 MG/DL (ref 74–99)
GLUCOSE SERPL-MCNC: 252 MG/DL (ref 65–99)
HCO3 BLDV-SCNC: 22.3 MMOL/L (ref 22–26)
HCT VFR BLD AUTO: 36.4 % (ref 41–52)
HCT VFR BLD EST: 35 % (ref 41–52)
HGB BLD-MCNC: 12.3 G/DL (ref 13.5–17.5)
HGB BLDV-MCNC: 11.8 G/DL (ref 13.5–17.5)
IMM GRANULOCYTES # BLD AUTO: 0.24 X10*3/UL (ref 0–0.7)
IMM GRANULOCYTES NFR BLD AUTO: 2.4 % (ref 0–0.9)
INHALED O2 CONCENTRATION: 21 %
INR PPP: 1.2 (ref 0.9–1.2)
LACTATE BLDV-SCNC: 1.4 MMOL/L (ref 0.4–2)
LYMPHOCYTES # BLD AUTO: 1.39 X10*3/UL (ref 1.2–4.8)
LYMPHOCYTES NFR BLD AUTO: 13.8 %
MCH RBC QN AUTO: 29.9 PG (ref 26–34)
MCHC RBC AUTO-ENTMCNC: 33.8 G/DL (ref 32–36)
MCV RBC AUTO: 88 FL (ref 80–100)
MONOCYTES # BLD AUTO: 0.93 X10*3/UL (ref 0.1–1)
MONOCYTES NFR BLD AUTO: 9.3 %
NEUTROPHILS # BLD AUTO: 7.22 X10*3/UL (ref 1.2–7.7)
NEUTROPHILS NFR BLD AUTO: 71.9 %
NRBC BLD-RTO: 0 /100 WBCS (ref 0–0)
OXYHGB MFR BLDV: 61.9 % (ref 45–75)
PCO2 BLDV: 36 MM HG (ref 41–51)
PH BLDV: 7.4 PH (ref 7.33–7.43)
PLATELET # BLD AUTO: 476 X10*3/UL (ref 150–450)
PO2 BLDV: 41 MM HG (ref 35–45)
POTASSIUM BLDV-SCNC: 3.5 MMOL/L (ref 3.5–5.3)
POTASSIUM SERPL-SCNC: 3.4 MMOL/L (ref 3.4–5.1)
PROT SERPL-MCNC: 7.4 G/DL (ref 5.9–7.9)
PROTHROMBIN TIME: 12.2 SECONDS (ref 9.3–12.7)
RBC # BLD AUTO: 4.12 X10*6/UL (ref 4.5–5.9)
SAO2 % BLDV: 64 % (ref 45–75)
SODIUM BLDV-SCNC: 134 MMOL/L (ref 136–145)
SODIUM SERPL-SCNC: 135 MMOL/L (ref 133–145)
WBC # BLD AUTO: 10 X10*3/UL (ref 4.4–11.3)

## 2024-07-28 PROCEDURE — 85730 THROMBOPLASTIN TIME PARTIAL: CPT | Performed by: STUDENT IN AN ORGANIZED HEALTH CARE EDUCATION/TRAINING PROGRAM

## 2024-07-28 PROCEDURE — 84132 ASSAY OF SERUM POTASSIUM: CPT | Performed by: STUDENT IN AN ORGANIZED HEALTH CARE EDUCATION/TRAINING PROGRAM

## 2024-07-28 PROCEDURE — 96366 THER/PROPH/DIAG IV INF ADDON: CPT

## 2024-07-28 PROCEDURE — 2500000004 HC RX 250 GENERAL PHARMACY W/ HCPCS (ALT 636 FOR OP/ED): Mod: JZ | Performed by: STUDENT IN AN ORGANIZED HEALTH CARE EDUCATION/TRAINING PROGRAM

## 2024-07-28 PROCEDURE — 85610 PROTHROMBIN TIME: CPT | Performed by: STUDENT IN AN ORGANIZED HEALTH CARE EDUCATION/TRAINING PROGRAM

## 2024-07-28 PROCEDURE — 99285 EMERGENCY DEPT VISIT HI MDM: CPT | Mod: 25

## 2024-07-28 PROCEDURE — 85025 COMPLETE CBC W/AUTO DIFF WBC: CPT | Performed by: STUDENT IN AN ORGANIZED HEALTH CARE EDUCATION/TRAINING PROGRAM

## 2024-07-28 PROCEDURE — 36415 COLL VENOUS BLD VENIPUNCTURE: CPT | Performed by: STUDENT IN AN ORGANIZED HEALTH CARE EDUCATION/TRAINING PROGRAM

## 2024-07-28 PROCEDURE — 96365 THER/PROPH/DIAG IV INF INIT: CPT

## 2024-07-28 RX ORDER — CEFAZOLIN SODIUM 2 G/100ML
2 INJECTION, SOLUTION INTRAVENOUS ONCE
Status: COMPLETED | OUTPATIENT
Start: 2024-07-28 | End: 2024-07-28

## 2024-07-28 RX ADMIN — CEFAZOLIN SODIUM 2 G: 2 INJECTION, SOLUTION INTRAVENOUS at 21:15

## 2024-07-28 ASSESSMENT — PAIN - FUNCTIONAL ASSESSMENT: PAIN_FUNCTIONAL_ASSESSMENT: 0-10

## 2024-07-28 ASSESSMENT — PAIN SCALES - GENERAL: PAINLEVEL_OUTOF10: 0 - NO PAIN

## 2024-07-29 ENCOUNTER — APPOINTMENT (OUTPATIENT)
Dept: RADIOLOGY | Facility: HOSPITAL | Age: 59
End: 2024-07-29
Payer: MEDICARE

## 2024-07-29 ENCOUNTER — APPOINTMENT (OUTPATIENT)
Dept: CARDIOLOGY | Facility: HOSPITAL | Age: 59
End: 2024-07-29
Payer: MEDICARE

## 2024-07-29 LAB
ANION GAP SERPL CALC-SCNC: 10 MMOL/L
BUN SERPL-MCNC: 36 MG/DL (ref 8–25)
CALCIUM SERPL-MCNC: 8.3 MG/DL (ref 8.5–10.4)
CHLORIDE SERPL-SCNC: 108 MMOL/L (ref 97–107)
CK SERPL-CCNC: 57 U/L (ref 24–195)
CO2 SERPL-SCNC: 20 MMOL/L (ref 24–31)
CREAT SERPL-MCNC: 2.1 MG/DL (ref 0.4–1.6)
EGFRCR SERPLBLD CKD-EPI 2021: 35 ML/MIN/1.73M*2
ERYTHROCYTE [DISTWIDTH] IN BLOOD BY AUTOMATED COUNT: 13.6 % (ref 11.5–14.5)
EST. AVERAGE GLUCOSE BLD GHB EST-MCNC: 151 MG/DL
GLUCOSE BLD MANUAL STRIP-MCNC: 156 MG/DL (ref 74–99)
GLUCOSE BLD MANUAL STRIP-MCNC: 187 MG/DL (ref 74–99)
GLUCOSE BLD MANUAL STRIP-MCNC: 192 MG/DL (ref 74–99)
GLUCOSE BLD MANUAL STRIP-MCNC: 193 MG/DL (ref 74–99)
GLUCOSE SERPL-MCNC: 181 MG/DL (ref 65–99)
HBA1C MFR BLD: 6.9 %
HCT VFR BLD AUTO: 33.6 % (ref 41–52)
HGB BLD-MCNC: 10.9 G/DL (ref 13.5–17.5)
MCH RBC QN AUTO: 29.2 PG (ref 26–34)
MCHC RBC AUTO-ENTMCNC: 32.4 G/DL (ref 32–36)
MCV RBC AUTO: 90 FL (ref 80–100)
NRBC BLD-RTO: 0 /100 WBCS (ref 0–0)
PLATELET # BLD AUTO: 416 X10*3/UL (ref 150–450)
POTASSIUM SERPL-SCNC: 3.5 MMOL/L (ref 3.4–5.1)
RBC # BLD AUTO: 3.73 X10*6/UL (ref 4.5–5.9)
SODIUM SERPL-SCNC: 138 MMOL/L (ref 133–145)
WBC # BLD AUTO: 9.5 X10*3/UL (ref 4.4–11.3)

## 2024-07-29 PROCEDURE — 93306 TTE W/DOPPLER COMPLETE: CPT | Performed by: INTERNAL MEDICINE

## 2024-07-29 PROCEDURE — 93971 EXTREMITY STUDY: CPT | Performed by: RADIOLOGY

## 2024-07-29 PROCEDURE — 82374 ASSAY BLOOD CARBON DIOXIDE: CPT | Performed by: STUDENT IN AN ORGANIZED HEALTH CARE EDUCATION/TRAINING PROGRAM

## 2024-07-29 PROCEDURE — 93922 UPR/L XTREMITY ART 2 LEVELS: CPT

## 2024-07-29 PROCEDURE — 87040 BLOOD CULTURE FOR BACTERIA: CPT | Mod: TRILAB | Performed by: NURSE PRACTITIONER

## 2024-07-29 PROCEDURE — 36415 COLL VENOUS BLD VENIPUNCTURE: CPT | Performed by: NURSE PRACTITIONER

## 2024-07-29 PROCEDURE — 93306 TTE W/DOPPLER COMPLETE: CPT

## 2024-07-29 PROCEDURE — 36415 COLL VENOUS BLD VENIPUNCTURE: CPT | Performed by: STUDENT IN AN ORGANIZED HEALTH CARE EDUCATION/TRAINING PROGRAM

## 2024-07-29 PROCEDURE — 2500000001 HC RX 250 WO HCPCS SELF ADMINISTERED DRUGS (ALT 637 FOR MEDICARE OP): Performed by: STUDENT IN AN ORGANIZED HEALTH CARE EDUCATION/TRAINING PROGRAM

## 2024-07-29 PROCEDURE — 2500000002 HC RX 250 W HCPCS SELF ADMINISTERED DRUGS (ALT 637 FOR MEDICARE OP, ALT 636 FOR OP/ED): Performed by: STUDENT IN AN ORGANIZED HEALTH CARE EDUCATION/TRAINING PROGRAM

## 2024-07-29 PROCEDURE — 83036 HEMOGLOBIN GLYCOSYLATED A1C: CPT | Performed by: STUDENT IN AN ORGANIZED HEALTH CARE EDUCATION/TRAINING PROGRAM

## 2024-07-29 PROCEDURE — 93970 EXTREMITY STUDY: CPT

## 2024-07-29 PROCEDURE — 87205 SMEAR GRAM STAIN: CPT | Mod: TRILAB,WESLAB | Performed by: NURSE PRACTITIONER

## 2024-07-29 PROCEDURE — 2500000004 HC RX 250 GENERAL PHARMACY W/ HCPCS (ALT 636 FOR OP/ED): Performed by: STUDENT IN AN ORGANIZED HEALTH CARE EDUCATION/TRAINING PROGRAM

## 2024-07-29 PROCEDURE — G0378 HOSPITAL OBSERVATION PER HR: HCPCS

## 2024-07-29 PROCEDURE — 85027 COMPLETE CBC AUTOMATED: CPT | Performed by: STUDENT IN AN ORGANIZED HEALTH CARE EDUCATION/TRAINING PROGRAM

## 2024-07-29 PROCEDURE — 82550 ASSAY OF CK (CPK): CPT | Performed by: NURSE PRACTITIONER

## 2024-07-29 PROCEDURE — 80048 BASIC METABOLIC PNL TOTAL CA: CPT | Performed by: STUDENT IN AN ORGANIZED HEALTH CARE EDUCATION/TRAINING PROGRAM

## 2024-07-29 PROCEDURE — 87070 CULTURE OTHR SPECIMN AEROBIC: CPT | Mod: TRILAB,WESLAB | Performed by: NURSE PRACTITIONER

## 2024-07-29 PROCEDURE — 82947 ASSAY GLUCOSE BLOOD QUANT: CPT

## 2024-07-29 RX ORDER — DILTIAZEM HYDROCHLORIDE 120 MG/1
240 CAPSULE, COATED, EXTENDED RELEASE ORAL DAILY
Status: DISCONTINUED | OUTPATIENT
Start: 2024-07-29 | End: 2024-08-03 | Stop reason: HOSPADM

## 2024-07-29 RX ORDER — CEFAZOLIN SODIUM 2 G/100ML
2 INJECTION, SOLUTION INTRAVENOUS ONCE
Status: DISCONTINUED | OUTPATIENT
Start: 2024-07-29 | End: 2024-07-29

## 2024-07-29 RX ORDER — SODIUM BICARBONATE 650 MG/1
650 TABLET ORAL 4 TIMES DAILY
Status: DISCONTINUED | OUTPATIENT
Start: 2024-07-29 | End: 2024-08-03 | Stop reason: HOSPADM

## 2024-07-29 RX ORDER — DEXTROSE 50 % IN WATER (D50W) INTRAVENOUS SYRINGE
25
Status: DISCONTINUED | OUTPATIENT
Start: 2024-07-29 | End: 2024-08-03 | Stop reason: HOSPADM

## 2024-07-29 RX ORDER — CLOPIDOGREL BISULFATE 75 MG/1
75 TABLET ORAL DAILY
Status: DISCONTINUED | OUTPATIENT
Start: 2024-07-29 | End: 2024-08-03 | Stop reason: HOSPADM

## 2024-07-29 RX ORDER — TALC
3 POWDER (GRAM) TOPICAL NIGHTLY PRN
Status: DISCONTINUED | OUTPATIENT
Start: 2024-07-29 | End: 2024-08-03 | Stop reason: HOSPADM

## 2024-07-29 RX ORDER — GUAIFENESIN 600 MG/1
600 TABLET, EXTENDED RELEASE ORAL EVERY 12 HOURS PRN
Status: DISCONTINUED | OUTPATIENT
Start: 2024-07-29 | End: 2024-08-03 | Stop reason: HOSPADM

## 2024-07-29 RX ORDER — INSULIN LISPRO 100 [IU]/ML
0-15 INJECTION, SOLUTION INTRAVENOUS; SUBCUTANEOUS
Status: DISCONTINUED | OUTPATIENT
Start: 2024-07-29 | End: 2024-08-03 | Stop reason: HOSPADM

## 2024-07-29 RX ORDER — ACETAMINOPHEN 160 MG/5ML
650 SOLUTION ORAL EVERY 4 HOURS PRN
Status: DISCONTINUED | OUTPATIENT
Start: 2024-07-29 | End: 2024-08-03 | Stop reason: HOSPADM

## 2024-07-29 RX ORDER — ROSUVASTATIN CALCIUM 20 MG/1
20 TABLET, COATED ORAL DAILY
Status: DISCONTINUED | OUTPATIENT
Start: 2024-07-29 | End: 2024-08-03 | Stop reason: HOSPADM

## 2024-07-29 RX ORDER — POLYETHYLENE GLYCOL 3350 17 G/17G
17 POWDER, FOR SOLUTION ORAL DAILY
Status: DISCONTINUED | OUTPATIENT
Start: 2024-07-29 | End: 2024-08-03 | Stop reason: HOSPADM

## 2024-07-29 RX ORDER — INSULIN GLARGINE 100 [IU]/ML
50 INJECTION, SOLUTION SUBCUTANEOUS EVERY 24 HOURS
Status: DISCONTINUED | OUTPATIENT
Start: 2024-07-29 | End: 2024-07-29

## 2024-07-29 RX ORDER — ALLOPURINOL 300 MG/1
150 TABLET ORAL DAILY
Status: DISCONTINUED | OUTPATIENT
Start: 2024-07-29 | End: 2024-08-03 | Stop reason: HOSPADM

## 2024-07-29 RX ORDER — POTASSIUM CHLORIDE 750 MG/1
10 TABLET, FILM COATED, EXTENDED RELEASE ORAL
Status: DISCONTINUED | OUTPATIENT
Start: 2024-07-29 | End: 2024-08-03 | Stop reason: HOSPADM

## 2024-07-29 RX ORDER — DEXTROSE 50 % IN WATER (D50W) INTRAVENOUS SYRINGE
12.5
Status: DISCONTINUED | OUTPATIENT
Start: 2024-07-29 | End: 2024-08-03 | Stop reason: HOSPADM

## 2024-07-29 RX ORDER — ACETAMINOPHEN 500 MG
500 TABLET ORAL EVERY 4 HOURS PRN
Status: DISCONTINUED | OUTPATIENT
Start: 2024-07-29 | End: 2024-07-29 | Stop reason: SDUPTHER

## 2024-07-29 RX ORDER — ISOSORBIDE MONONITRATE 60 MG/1
60 TABLET, EXTENDED RELEASE ORAL DAILY
Status: DISCONTINUED | OUTPATIENT
Start: 2024-07-29 | End: 2024-08-03 | Stop reason: HOSPADM

## 2024-07-29 RX ORDER — TORSEMIDE 100 MG/1
50 TABLET ORAL EVERY OTHER DAY
Status: DISCONTINUED | OUTPATIENT
Start: 2024-07-29 | End: 2024-08-03 | Stop reason: HOSPADM

## 2024-07-29 RX ORDER — INSULIN GLARGINE 100 [IU]/ML
50 INJECTION, SOLUTION SUBCUTANEOUS DAILY
Status: DISCONTINUED | OUTPATIENT
Start: 2024-07-31 | End: 2024-08-03 | Stop reason: HOSPADM

## 2024-07-29 RX ORDER — ACETAMINOPHEN 650 MG/1
650 SUPPOSITORY RECTAL EVERY 4 HOURS PRN
Status: DISCONTINUED | OUTPATIENT
Start: 2024-07-29 | End: 2024-08-03 | Stop reason: HOSPADM

## 2024-07-29 RX ORDER — ACETAMINOPHEN 325 MG/1
650 TABLET ORAL EVERY 4 HOURS PRN
Status: DISCONTINUED | OUTPATIENT
Start: 2024-07-29 | End: 2024-08-03 | Stop reason: HOSPADM

## 2024-07-29 RX ORDER — NITROGLYCERIN 0.4 MG/1
0.4 TABLET SUBLINGUAL EVERY 5 MIN PRN
Status: DISCONTINUED | OUTPATIENT
Start: 2024-07-29 | End: 2024-08-03 | Stop reason: HOSPADM

## 2024-07-29 RX ORDER — INSULIN GLARGINE 100 [IU]/ML
50 INJECTION, SOLUTION SUBCUTANEOUS NIGHTLY
Status: DISCONTINUED | OUTPATIENT
Start: 2024-07-29 | End: 2024-07-29

## 2024-07-29 RX ORDER — CALCITRIOL 0.25 UG/1
0.5 CAPSULE ORAL DAILY
Status: DISCONTINUED | OUTPATIENT
Start: 2024-07-29 | End: 2024-08-03 | Stop reason: HOSPADM

## 2024-07-29 RX ADMIN — APIXABAN 5 MG: 5 TABLET, FILM COATED ORAL at 17:07

## 2024-07-29 RX ADMIN — INSULIN LISPRO 3 UNITS: 100 INJECTION, SOLUTION INTRAVENOUS; SUBCUTANEOUS at 09:43

## 2024-07-29 RX ADMIN — ALLOPURINOL 150 MG: 300 TABLET ORAL at 09:17

## 2024-07-29 RX ADMIN — SODIUM BICARBONATE 650 MG TABLET 650 MG: at 13:07

## 2024-07-29 RX ADMIN — SODIUM BICARBONATE 650 MG TABLET 650 MG: at 21:42

## 2024-07-29 RX ADMIN — PIPERACILLIN SODIUM AND TAZOBACTAM SODIUM 3.38 G: 3; .375 INJECTION, SOLUTION INTRAVENOUS at 02:47

## 2024-07-29 RX ADMIN — CALCITRIOL CAPSULES 0.25 MCG 0.5 MCG: 0.25 CAPSULE ORAL at 09:18

## 2024-07-29 RX ADMIN — INSULIN LISPRO 3 UNITS: 100 INJECTION, SOLUTION INTRAVENOUS; SUBCUTANEOUS at 17:07

## 2024-07-29 RX ADMIN — PIPERACILLIN SODIUM AND TAZOBACTAM SODIUM 3.38 G: 3; .375 INJECTION, SOLUTION INTRAVENOUS at 21:42

## 2024-07-29 RX ADMIN — CLOPIDOGREL BISULFATE 75 MG: 75 TABLET ORAL at 09:18

## 2024-07-29 RX ADMIN — TORSEMIDE 50 MG: 100 TABLET ORAL at 09:18

## 2024-07-29 RX ADMIN — APIXABAN 5 MG: 5 TABLET, FILM COATED ORAL at 06:11

## 2024-07-29 RX ADMIN — INSULIN LISPRO 3 UNITS: 100 INJECTION, SOLUTION INTRAVENOUS; SUBCUTANEOUS at 13:07

## 2024-07-29 RX ADMIN — ISOSORBIDE MONONITRATE 60 MG: 60 TABLET, EXTENDED RELEASE ORAL at 09:17

## 2024-07-29 RX ADMIN — SODIUM BICARBONATE 650 MG TABLET 650 MG: at 06:11

## 2024-07-29 RX ADMIN — ROSUVASTATIN CALCIUM 20 MG: 20 TABLET, COATED ORAL at 09:17

## 2024-07-29 RX ADMIN — POTASSIUM CHLORIDE 10 MEQ: 750 TABLET, EXTENDED RELEASE ORAL at 11:15

## 2024-07-29 RX ADMIN — DILTIAZEM HYDROCHLORIDE 240 MG: 120 CAPSULE, COATED, EXTENDED RELEASE ORAL at 09:17

## 2024-07-29 RX ADMIN — PIPERACILLIN SODIUM AND TAZOBACTAM SODIUM 3.38 G: 3; .375 INJECTION, SOLUTION INTRAVENOUS at 15:46

## 2024-07-29 RX ADMIN — PIPERACILLIN SODIUM AND TAZOBACTAM SODIUM 3.38 G: 3; .375 INJECTION, SOLUTION INTRAVENOUS at 09:18

## 2024-07-29 RX ADMIN — PROPRANOLOL HYDROCHLORIDE 60 MG: 40 TABLET ORAL at 06:25

## 2024-07-29 RX ADMIN — SODIUM BICARBONATE 650 MG TABLET 650 MG: at 17:07

## 2024-07-29 SDOH — SOCIAL STABILITY: SOCIAL INSECURITY: ARE THERE ANY APPARENT SIGNS OF INJURIES/BEHAVIORS THAT COULD BE RELATED TO ABUSE/NEGLECT?: NO

## 2024-07-29 SDOH — ECONOMIC STABILITY: HOUSING INSECURITY: AT ANY TIME IN THE PAST 12 MONTHS, WERE YOU HOMELESS OR LIVING IN A SHELTER (INCLUDING NOW)?: NO

## 2024-07-29 SDOH — SOCIAL STABILITY: SOCIAL INSECURITY
WITHIN THE LAST YEAR, HAVE YOU BEEN RAPED OR FORCED TO HAVE ANY KIND OF SEXUAL ACTIVITY BY YOUR PARTNER OR EX-PARTNER?: NO

## 2024-07-29 SDOH — HEALTH STABILITY: PHYSICAL HEALTH
HOW OFTEN DO YOU NEED TO HAVE SOMEONE HELP YOU WHEN YOU READ INSTRUCTIONS, PAMPHLETS, OR OTHER WRITTEN MATERIAL FROM YOUR DOCTOR OR PHARMACY?: NEVER

## 2024-07-29 SDOH — SOCIAL STABILITY: SOCIAL INSECURITY: ARE YOU OR HAVE YOU BEEN THREATENED OR ABUSED PHYSICALLY, EMOTIONALLY, OR SEXUALLY BY ANYONE?: NO

## 2024-07-29 SDOH — ECONOMIC STABILITY: FOOD INSECURITY: WITHIN THE PAST 12 MONTHS, THE FOOD YOU BOUGHT JUST DIDN'T LAST AND YOU DIDN'T HAVE MONEY TO GET MORE.: NEVER TRUE

## 2024-07-29 SDOH — ECONOMIC STABILITY: FOOD INSECURITY: WITHIN THE PAST 12 MONTHS, YOU WORRIED THAT YOUR FOOD WOULD RUN OUT BEFORE YOU GOT MONEY TO BUY MORE.: NEVER TRUE

## 2024-07-29 SDOH — ECONOMIC STABILITY: INCOME INSECURITY: HOW HARD IS IT FOR YOU TO PAY FOR THE VERY BASICS LIKE FOOD, HOUSING, MEDICAL CARE, AND HEATING?: NOT HARD AT ALL

## 2024-07-29 SDOH — ECONOMIC STABILITY: HOUSING INSECURITY: IN THE LAST 12 MONTHS, WAS THERE A TIME WHEN YOU WERE NOT ABLE TO PAY THE MORTGAGE OR RENT ON TIME?: NO

## 2024-07-29 SDOH — SOCIAL STABILITY: SOCIAL INSECURITY: WITHIN THE LAST YEAR, HAVE YOU BEEN AFRAID OF YOUR PARTNER OR EX-PARTNER?: NO

## 2024-07-29 SDOH — ECONOMIC STABILITY: INCOME INSECURITY: IN THE LAST 12 MONTHS, WAS THERE A TIME WHEN YOU WERE NOT ABLE TO PAY THE MORTGAGE OR RENT ON TIME?: NO

## 2024-07-29 SDOH — ECONOMIC STABILITY: FOOD INSECURITY: WITHIN THE PAST 12 MONTHS, YOU WORRIED THAT YOUR FOOD WOULD RUN OUT BEFORE YOU GOT THE MONEY TO BUY MORE.: NEVER TRUE

## 2024-07-29 SDOH — SOCIAL STABILITY: SOCIAL INSECURITY: WITHIN THE LAST YEAR, HAVE YOU BEEN HUMILIATED OR EMOTIONALLY ABUSED IN OTHER WAYS BY YOUR PARTNER OR EX-PARTNER?: NO

## 2024-07-29 SDOH — ECONOMIC STABILITY: INCOME INSECURITY: IN THE PAST 12 MONTHS HAS THE ELECTRIC, GAS, OIL, OR WATER COMPANY THREATENED TO SHUT OFF SERVICES IN YOUR HOME?: NO

## 2024-07-29 SDOH — SOCIAL STABILITY: SOCIAL INSECURITY
WITHIN THE LAST YEAR, HAVE TO BEEN RAPED OR FORCED TO HAVE ANY KIND OF SEXUAL ACTIVITY BY YOUR PARTNER OR EX-PARTNER?: NO

## 2024-07-29 SDOH — SOCIAL STABILITY: SOCIAL INSECURITY
WITHIN THE LAST YEAR, HAVE YOU BEEN KICKED, HIT, SLAPPED, OR OTHERWISE PHYSICALLY HURT BY YOUR PARTNER OR EX-PARTNER?: NO

## 2024-07-29 SDOH — ECONOMIC STABILITY: FOOD INSECURITY: HOW HARD IS IT FOR YOU TO PAY FOR THE VERY BASICS LIKE FOOD, HOUSING, MEDICAL CARE, AND HEATING?: NOT HARD AT ALL

## 2024-07-29 SDOH — SOCIAL STABILITY: SOCIAL INSECURITY: DO YOU FEEL ANYONE HAS EXPLOITED OR TAKEN ADVANTAGE OF YOU FINANCIALLY OR OF YOUR PERSONAL PROPERTY?: NO

## 2024-07-29 SDOH — ECONOMIC STABILITY: HOUSING INSECURITY: IN THE PAST 12 MONTHS, HOW MANY TIMES HAVE YOU MOVED WHERE YOU WERE LIVING?: 0

## 2024-07-29 SDOH — SOCIAL STABILITY: SOCIAL INSECURITY: ABUSE: ADULT

## 2024-07-29 SDOH — ECONOMIC STABILITY: INCOME INSECURITY: IN THE PAST 12 MONTHS, HAS THE ELECTRIC, GAS, OIL, OR WATER COMPANY THREATENED TO SHUT OFF SERVICE IN YOUR HOME?: NO

## 2024-07-29 SDOH — SOCIAL STABILITY: SOCIAL INSECURITY: HAS ANYONE EVER THREATENED TO HURT YOUR FAMILY OR YOUR PETS?: NO

## 2024-07-29 SDOH — SOCIAL STABILITY: SOCIAL INSECURITY: HAVE YOU HAD ANY THOUGHTS OF HARMING ANYONE ELSE?: NO

## 2024-07-29 SDOH — ECONOMIC STABILITY: TRANSPORTATION INSECURITY
IN THE PAST 12 MONTHS, HAS LACK OF TRANSPORTATION KEPT YOU FROM MEETINGS, WORK, OR FROM GETTING THINGS NEEDED FOR DAILY LIVING?: NO

## 2024-07-29 SDOH — ECONOMIC STABILITY: TRANSPORTATION INSECURITY: IN THE PAST 12 MONTHS, HAS LACK OF TRANSPORTATION KEPT YOU FROM MEDICAL APPOINTMENTS OR FROM GETTING MEDICATIONS?: NO

## 2024-07-29 SDOH — SOCIAL STABILITY: SOCIAL INSECURITY: DO YOU FEEL UNSAFE GOING BACK TO THE PLACE WHERE YOU ARE LIVING?: NO

## 2024-07-29 SDOH — ECONOMIC STABILITY: TRANSPORTATION INSECURITY
IN THE PAST 12 MONTHS, HAS THE LACK OF TRANSPORTATION KEPT YOU FROM MEDICAL APPOINTMENTS OR FROM GETTING MEDICATIONS?: NO

## 2024-07-29 SDOH — SOCIAL STABILITY: SOCIAL INSECURITY: DOES ANYONE TRY TO KEEP YOU FROM HAVING/CONTACTING OTHER FRIENDS OR DOING THINGS OUTSIDE YOUR HOME?: NO

## 2024-07-29 SDOH — SOCIAL STABILITY: SOCIAL INSECURITY: HAVE YOU HAD THOUGHTS OF HARMING ANYONE ELSE?: NO

## 2024-07-29 ASSESSMENT — COGNITIVE AND FUNCTIONAL STATUS - GENERAL
PATIENT BASELINE BEDBOUND: NO
MOBILITY SCORE: 23
DAILY ACTIVITIY SCORE: 24
CLIMB 3 TO 5 STEPS WITH RAILING: A LITTLE

## 2024-07-29 ASSESSMENT — PAIN SCALES - GENERAL
PAINLEVEL_OUTOF10: 0 - NO PAIN
PAINLEVEL_OUTOF10: 6

## 2024-07-29 ASSESSMENT — LIFESTYLE VARIABLES
SKIP TO QUESTIONS 9-10: 1
PRESCIPTION_ABUSE_PAST_12_MONTHS: NO
AUDIT-C TOTAL SCORE: 0
AUDIT-C TOTAL SCORE: 0
HOW OFTEN DO YOU HAVE 6 OR MORE DRINKS ON ONE OCCASION: NEVER
HOW OFTEN DO YOU HAVE A DRINK CONTAINING ALCOHOL: NEVER
SUBSTANCE_ABUSE_PAST_12_MONTHS: NO
HOW MANY STANDARD DRINKS CONTAINING ALCOHOL DO YOU HAVE ON A TYPICAL DAY: PATIENT DOES NOT DRINK

## 2024-07-29 ASSESSMENT — ACTIVITIES OF DAILY LIVING (ADL)
ADEQUATE_TO_COMPLETE_ADL: YES
BATHING: INDEPENDENT
FEEDING YOURSELF: INDEPENDENT
PATIENT'S MEMORY ADEQUATE TO SAFELY COMPLETE DAILY ACTIVITIES?: YES
TOILETING: INDEPENDENT
WALKS IN HOME: INDEPENDENT
JUDGMENT_ADEQUATE_SAFELY_COMPLETE_DAILY_ACTIVITIES: YES
HEARING - LEFT EAR: FUNCTIONAL
GROOMING: INDEPENDENT
LACK_OF_TRANSPORTATION: NO
HEARING - RIGHT EAR: FUNCTIONAL
LACK_OF_TRANSPORTATION: NO
DRESSING YOURSELF: INDEPENDENT

## 2024-07-29 ASSESSMENT — PATIENT HEALTH QUESTIONNAIRE - PHQ9
2. FEELING DOWN, DEPRESSED OR HOPELESS: NOT AT ALL
1. LITTLE INTEREST OR PLEASURE IN DOING THINGS: NOT AT ALL
SUM OF ALL RESPONSES TO PHQ9 QUESTIONS 1 & 2: 0

## 2024-07-29 ASSESSMENT — PAIN - FUNCTIONAL ASSESSMENT: PAIN_FUNCTIONAL_ASSESSMENT: 0-10

## 2024-07-29 ASSESSMENT — ENCOUNTER SYMPTOMS
EYES NEGATIVE: 1
MUSCULOSKELETAL NEGATIVE: 1
RESPIRATORY NEGATIVE: 1
CONSTITUTIONAL NEGATIVE: 1
ENDOCRINE NEGATIVE: 1
PSYCHIATRIC NEGATIVE: 1
COLOR CHANGE: 1
GASTROINTESTINAL NEGATIVE: 1
NEUROLOGICAL NEGATIVE: 1

## 2024-07-29 ASSESSMENT — PAIN DESCRIPTION - DESCRIPTORS: DESCRIPTORS: ACHING

## 2024-07-29 NOTE — H&P
"History Of Present Illness  Kevin Dc \"partha\" is a 59 y.o. male with past medical history of T2DM, pulmonary embolism, DVT, CVA, on Eliquis, cellulitis of the lower extremities, CAD with stents, lymphedema of the lower extremities and other comorbidities presented to the ED with complaints of worsening edema, redness and pain of the lower extremities.  He reports couple months of lower extremity swelling which has worsened in the recent weeks.  He endorses intermittent lower extremity severe pain.  He discussed financial constraints making him unable to get care for his lower extremity edema.  Patient reports recently he did take a break from Eliquis due to getting a colonoscopy but he is back on it.  He reports partial compliance with his diabetes medication.  Home meds include long-acting insulin U-500 110 units a.m., 115 units p.m., he does report becoming hypoglycemic requiring medical attention in the past.  He does take propranolol 120 daily he is unsure why.  Patient denies fever, chills, nausea, vomiting, diarrhea, chest pain, shortness of breath.    In the ED on admission vitals unremarkable, labs notable for creatinine 2.2, BUN 38, GFR 33, glucose 252, hemoglobin 12.3, platelets 476, no imaging or cultures were drawn.  He was given 2 g of Ancef in the ED.    Patient admitted for bilateral lower extremity cellulitis.       Past Medical History  Past Medical History:   Diagnosis Date    Acute cor pulmonale (Multi) 02/20/2024    Anal fissure     Angina pectoris (CMS-[facility]) 08/31/2023    Atherosclerotic heart disease of native coronary artery with other forms of angina pectoris (CMS-HCC) 08/31/2023    Atypical chest pain 08/31/2023    Cerebral vascular accident (Multi)     Cerebrovascular accident (Multi) 08/31/2023    CHF (congestive heart failure) (Multi)     Chronic kidney disease, stage 3 (Multi) 08/31/2023    CKD (chronic kidney disease)     Clotting disorder (Multi)     Deep venous thrombosis of " lower extremity (Multi) 08/31/2023    Facial abscess     Heart failure (Multi) 08/31/2023    Hematochezia     Lymphedema 05/22/2018    Mixed hyperlipidemia 08/31/2023    Obesity     Palpitations 08/31/2023    Peripheral vascular disease (CMS-HCC) 08/31/2023    Peripheral vascular disease (CMS-[facility])     Presence of coronary angioplasty implant and graft 03/24/2023    Primary hypertension 05/22/2018    Pulmonary embolism (Multi) 03/24/2023    Sleep apnea     Type 2 diabetes mellitus (Multi) 05/22/2018    Venous insufficiency     Venous insufficiency (chronic) (peripheral) 03/24/2023    Ventricular premature complex 08/31/2023       Surgical History  Past Surgical History:   Procedure Laterality Date    CARDIAC CATHETERIZATION      CAROTID STENT      COLONOSCOPY          Social History  He reports that he has never smoked. He has never been exposed to tobacco smoke. He has never used smokeless tobacco. He reports that he does not drink alcohol and does not use drugs.    Family History  Family History   Problem Relation Name Age of Onset    Heart disease Father      Other (double bypass with valve replacement) Father          Allergies  Patient has no known allergies.    Review of Systems  Bilateral lower extremity swelling, pain and redness  Denies chest pain, shortness of breath, fever, chills, nausea, vomiting, diarrhea, headache.  Physical Exam  HENT:      Head: Normocephalic and atraumatic.      Nose: Nose normal.      Mouth/Throat:      Mouth: Mucous membranes are moist.   Eyes:      Extraocular Movements: Extraocular movements intact.      Pupils: Pupils are equal, round, and reactive to light.   Cardiovascular:      Rate and Rhythm: Normal rate and regular rhythm.      Pulses: Normal pulses.      Heart sounds: Normal heart sounds. No murmur heard.     No gallop.   Pulmonary:      Effort: No respiratory distress.      Breath sounds: No wheezing or rales.   Abdominal:      Tenderness: There is no abdominal  "tenderness. There is no guarding.   Musculoskeletal:         General: Swelling and tenderness present.      Cervical back: Normal range of motion and neck supple.      Right lower leg: Edema present.      Left lower leg: Edema present.      Comments: Severe lymphedema, pitting edema of the bilateral lower extremities with erythema, scaling.   Skin:     General: Skin is warm and dry.   Neurological:      Mental Status: He is alert and oriented to person, place, and time.   Psychiatric:         Mood and Affect: Mood normal.         Behavior: Behavior normal.          Last Recorded Vitals  Blood pressure 151/85, pulse 84, temperature 36.7 °C (98.1 °F), temperature source Temporal, resp. rate 17, height 1.676 m (5' 6\"), weight 122 kg (269 lb 6.4 oz), SpO2 92%.    Relevant Results  Scheduled medications  allopurinol, 150 mg, oral, Daily  apixaban, 5 mg, oral, BID  calcitriol, 0.5 mcg, oral, Daily  clopidogrel, 75 mg, oral, Daily  dilTIAZem ER, 240 mg, oral, Daily  isosorbide mononitrate ER, 60 mg, oral, Daily  piperacillin-tazobactam, 3.375 g, intravenous, q6h  polyethylene glycol, 17 g, oral, Daily  potassium chloride CR, 10 mEq, oral, Daily with breakfast  rosuvastatin, 20 mg, oral, Daily  sodium bicarbonate, 650 mg, oral, 4x daily  torsemide, 50 mg, oral, Every other day      Continuous medications     PRN medications  PRN medications: acetaminophen **OR** acetaminophen **OR** acetaminophen, guaiFENesin, melatonin, nitroglycerin    Results for orders placed or performed during the hospital encounter of 07/28/24 (from the past 24 hour(s))   CBC and Auto Differential   Result Value Ref Range    WBC 10.0 4.4 - 11.3 x10*3/uL    nRBC 0.0 0.0 - 0.0 /100 WBCs    RBC 4.12 (L) 4.50 - 5.90 x10*6/uL    Hemoglobin 12.3 (L) 13.5 - 17.5 g/dL    Hematocrit 36.4 (L) 41.0 - 52.0 %    MCV 88 80 - 100 fL    MCH 29.9 26.0 - 34.0 pg    MCHC 33.8 32.0 - 36.0 g/dL    RDW 13.6 11.5 - 14.5 %    Platelets 476 (H) 150 - 450 x10*3/uL    " Neutrophils % 71.9 40.0 - 80.0 %    Immature Granulocytes %, Automated 2.4 (H) 0.0 - 0.9 %    Lymphocytes % 13.8 13.0 - 44.0 %    Monocytes % 9.3 2.0 - 10.0 %    Eosinophils % 2.3 0.0 - 6.0 %    Basophils % 0.3 0.0 - 2.0 %    Neutrophils Absolute 7.22 1.20 - 7.70 x10*3/uL    Immature Granulocytes Absolute, Automated 0.24 0.00 - 0.70 x10*3/uL    Lymphocytes Absolute 1.39 1.20 - 4.80 x10*3/uL    Monocytes Absolute 0.93 0.10 - 1.00 x10*3/uL    Eosinophils Absolute 0.23 0.00 - 0.70 x10*3/uL    Basophils Absolute 0.03 0.00 - 0.10 x10*3/uL   Comprehensive metabolic panel   Result Value Ref Range    Glucose 252 (H) 65 - 99 mg/dL    Sodium 135 133 - 145 mmol/L    Potassium 3.4 3.4 - 5.1 mmol/L    Chloride 101 97 - 107 mmol/L    Bicarbonate 20 (L) 24 - 31 mmol/L    Urea Nitrogen 38 (H) 8 - 25 mg/dL    Creatinine 2.20 (H) 0.40 - 1.60 mg/dL    eGFR 33 (L) >60 mL/min/1.73m*2    Calcium 8.8 8.5 - 10.4 mg/dL    Albumin 3.1 (L) 3.5 - 5.0 g/dL    Alkaline Phosphatase 101 35 - 125 U/L    Total Protein 7.4 5.9 - 7.9 g/dL    AST 27 5 - 40 U/L    Bilirubin, Total 0.8 0.1 - 1.2 mg/dL    ALT 40 5 - 40 U/L    Anion Gap 14 <=19 mmol/L   APTT   Result Value Ref Range    aPTT 34.5 (H) 22.0 - 32.5 seconds   Protime-INR   Result Value Ref Range    Protime 12.2 9.3 - 12.7 seconds    INR 1.2 0.9 - 1.2   BLOOD GAS VENOUS FULL PANEL   Result Value Ref Range    POCT pH, Venous 7.40 7.33 - 7.43 pH    POCT pCO2, Venous 36 (L) 41 - 51 mm Hg    POCT pO2, Venous 41 35 - 45 mm Hg    POCT SO2, Venous 64 45 - 75 %    POCT Oxy Hemoglobin, Venous 61.9 45.0 - 75.0 %    POCT Hematocrit Calculated, Venous 35.0 (L) 41.0 - 52.0 %    POCT Sodium, Venous 134 (L) 136 - 145 mmol/L    POCT Potassium, Venous 3.5 3.5 - 5.3 mmol/L    POCT Chloride, Venous 104 98 - 107 mmol/L    POCT Ionized Calicum, Venous 1.17 1.10 - 1.33 mmol/L    POCT Glucose, Venous 280 (H) 74 - 99 mg/dL    POCT Lactate, Venous 1.4 0.4 - 2.0 mmol/L    POCT Base Excess, Venous -2.1 (L) -2.0 - 3.0  mmol/L    POCT HCO3 Calculated, Venous 22.3 22.0 - 26.0 mmol/L    POCT Hemoglobin, Venous 11.8 (L) 13.5 - 17.5 g/dL    POCT Anion Gap, Venous 11.0 10.0 - 25.0 mmol/L    Patient Temperature 37.0 degrees Celsius    FiO2 21 %   CBC   Result Value Ref Range    WBC 9.5 4.4 - 11.3 x10*3/uL    nRBC 0.0 0.0 - 0.0 /100 WBCs    RBC 3.73 (L) 4.50 - 5.90 x10*6/uL    Hemoglobin 10.9 (L) 13.5 - 17.5 g/dL    Hematocrit 33.6 (L) 41.0 - 52.0 %    MCV 90 80 - 100 fL    MCH 29.2 26.0 - 34.0 pg    MCHC 32.4 32.0 - 36.0 g/dL    RDW 13.6 11.5 - 14.5 %    Platelets 416 150 - 450 x10*3/uL     Colonoscopy Screening; High Risk Patient; mother with colon cancer    Result Date: 7/25/2024  Table formatting from the original result was not included. Impression Scattered diverticulosis in the descending colon and sigmoid colon Findings Few small and medium, scattered diverticula in the descending colon and sigmoid colon  Recommendation Follow up with PCP No further screening colonoscopies necessary Indication Personal history of colonic polyps, Family history of malignant neoplasm of gastrointestinal tract, Long term (current) use of anticoagulants Staff Staff Role Jorge A Post MD Proceduralist Medications See Anesthesia Record. Preprocedure A history and physical has been performed, and patient medication allergies have been reviewed. The patient's tolerance of previous anesthesia has been reviewed. The risks and benefits of the procedure and the sedation options and risks were discussed with the Patient All questions were answered and informed consent obtained. Details of the Procedure The patient underwent monitored anesthesia care, which was administered by an anesthesia professional. The patient's blood pressure, ECG, ETCO2, heart rate, level of consciousness, oxygen and respirations were monitored throughout the procedure. A digital rectal exam was not performed. The scope was introduced through the anus and advanced to the terminal ileum.  Retroflexion was performed in the rectum. Bowel prep was adequate. The procedure was not difficult. The patient tolerated the procedure well. There were no apparent adverse events. Events Procedure Events Event Event Time ENDO SCOPE IN TIME 7/25/2024  2:43 PM ENDO CECUM REACHED 7/25/2024  2:49 PM ENDO SCOPE OUT TIME 7/25/2024  2:59 PM Specimens No specimens collected Procedure Location [facility] [address] [address] [facility] [id number] [phone] Referring Provider Jorge A Post MD Procedure Provider Jorge A Post MD     ECG 12 Lead    Result Date: 7/18/2024  Sinus rhythm with 1st degree AV block Inferior infarct , age undetermined Abnormal ECG No previous ECGs available Confirmed by Channing Calderon ([id number]) on 7/18/2024 10:52:21 AM       Echo from 8/2/2019   Summary     Normal left ventricular systolic function. Estimated ejection  fraction is about 55-60%. Trace TR.     Moderate left ventricular hypertrophy seen .  Grade 1 diastolic  dysfunction seen. Mild  mitral    leaflet calcification seen.  Signed 08/02/2019 03:51 PM  Channing Calderon MD         Assessment/Plan   Principal Problem:    Cellulitis  Active Problems:    History of coronary artery stent placement    History of pulmonary embolism    Lymphedema    Type 2 diabetes mellitus (Multi)    Chronic renal impairment, stage 3 (moderate) (Multi)    Cellulitis of the bilateral lower extremities  Lymphedema of the bilateral lower extremities  Status post 2 g Ancef ED  Started Zosyn  Recommend checking echo.  Ordered and pending  Accu-Cheks  Follow blood cultures  JERED lower extremity  Ultrasound lower extremity  Infectious disease consult  Wound consult  Consider diuretics    Type 2 diabetes mellitus  Reports noncompliance  Diabetes education, Diabetic diet  A1c pending  Home long-acting insulin reported per patient does not make sense  He has reported multiple episodes of hypoglycemia requiring medical attention  Starting Lantus  50 units nightly  Insulin sliding scale  Monitor closely    CKD stage III  Creatinine 2.2, EGFR 33  Avoid/caution nephrotoxic agents  Continue to monitor    Reported history of CAD with stents  Not on GDMT  Propranolol 120 mg held, starting 60 mg  Patient unsure why he is taking propranolol    History of DVT, PE, CVA  History of CVA  DVT prophylaxis  Continue Eliquis    GI prophylaxis: PPI    CODE STATUS full code    I spent 45 minutes in the professional and overall care of this patient.        Zackery Sue MD

## 2024-07-29 NOTE — ED PROVIDER NOTES
HPI   Chief Complaint   Patient presents with    Leg Swelling     Pt presents to the ED via ems for c/o bilateral LLE.  Pt states this is ongoing for the past 3 years and started to get worse over the past few days.  Pt also noticed weeping, denies fevers or pain.        Patient presents with chronic BLE edema/lymphedema with worsening redness and pain of LLE.  Notes he tries to take care of the wounds himself as best he can.  Notes he is able to walk with increased pain of LLE.  No appreciable fever/chills.  No recent falls or injuries.  Notes he has had infections in his legs before.  Notes increased weeping but does not appreciate any hemorrhage or purulent discharge.  States he gets infrequent sharp/stabbing pains throughout his distal LLE.  Denies any other significant systemic/make symptoms or complaints.              Patient History   Past Medical History:   Diagnosis Date    Acute cor pulmonale (Multi) 02/20/2024    Anal fissure     Angina pectoris (CMS-HCC) 08/31/2023    Atherosclerotic heart disease of native coronary artery with other forms of angina pectoris (CMS-HCC) 08/31/2023    Atypical chest pain 08/31/2023    Cerebral vascular accident (Multi)     Cerebrovascular accident ([facility]) 08/31/2023    CHF (congestive heart failure) (Multi)     Chronic kidney disease, stage 3 (Multi) 08/31/2023    CKD (chronic kidney disease)     Clotting disorder (Multi)     Deep venous thrombosis of lower extremity (Multi) 08/31/2023    Facial abscess     Heart failure (Multi) 08/31/2023    Hematochezia     Lymphedema 05/22/2018    Mixed hyperlipidemia 08/31/2023    Obesity     Palpitations 08/31/2023    Peripheral vascular disease (CMS-HCC) 08/31/2023    Peripheral vascular disease (CMS-HCC)     Presence of coronary angioplasty implant and graft 03/24/2023    Primary hypertension 05/22/2018    Pulmonary embolism (Multi) 03/24/2023    Sleep apnea     Type 2 diabetes mellitus (Multi) 05/22/2018    Venous insufficiency      Venous insufficiency (chronic) (peripheral) 03/24/2023    Ventricular premature complex 08/31/2023     Past Surgical History:   Procedure Laterality Date    CARDIAC CATHETERIZATION      CAROTID STENT      COLONOSCOPY       Family History   Problem Relation Name Age of Onset    Heart disease Father      Other (double bypass with valve replacement) Father       Social History     Tobacco Use    Smoking status: Never     Passive exposure: Never    Smokeless tobacco: Never   Vaping Use    Vaping status: Never Used   Substance Use Topics    Alcohol use: Never    Drug use: Never       Physical Exam   ED Triage Vitals [07/28/24 2017]   Temperature Heart Rate Respirations BP   36.3 °C (97.4 °F) 89 18 117/70      Pulse Ox Temp src Heart Rate Source Patient Position   96 % -- -- --      BP Location FiO2 (%)     -- --       Physical Exam  Vitals and nursing note reviewed.   Constitutional:       General: He is not in acute distress.     Appearance: Normal appearance. He is normal weight. He is not ill-appearing.   HENT:      Nose: Nose normal.      Mouth/Throat:      Mouth: Mucous membranes are moist.      Pharynx: Oropharynx is clear.   Eyes:      General: No scleral icterus.     Pupils: Pupils are equal, round, and reactive to light.   Cardiovascular:      Rate and Rhythm: Normal rate.      Pulses:           Radial pulses are 2+ on the right side and 2+ on the left side.      Comments: BLE edema without any appreciable pitting, warm to touch, difficult to assess DP/PT due to body habitus and pain of L foot  Pulmonary:      Effort: Pulmonary effort is normal.   Musculoskeletal:         General: Tenderness present.      Right lower leg: Swelling present. No tenderness. Edema present.      Left lower leg: Swelling and tenderness present. Edema present.      Right ankle: Swelling present. No deformity. No tenderness.      Left ankle: Swelling present. No deformity. Tenderness present.      Right foot: Swelling present. No  tenderness or bony tenderness.      Left foot: Swelling and tenderness present. No deformity or bony tenderness.      Comments: Severe bilateral lymphedema with erythema, worsening erythema and skin breakdown with clear bullae and serous weeping, moderate tenderness palpation of distal LLE, left foot erythematous and moderately tenderness to palpation, no appreciable gross deformities or traumatic injuries, neurovascular intact distally   Skin:     General: Skin is warm and dry.      Findings: Erythema and wound present.      Comments: Clear bullae with rupture and weeping of LLE, scattered superficial cutaneous wounds   Neurological:      General: No focal deficit present.      Mental Status: He is alert and oriented to person, place, and time.           ED Course & MDM   ED Course as of 07/28/24 2355   Sun Jul 28, 2024 2030 ESS on presentation in setting of BLE edema and pain of LLE with weeping wounds [BC]   2055 CBC and Auto Differential(!)  2 g drop hemoglobin compared to prior labs 2 months ago, no leukocytosis [BC]   2055 BLOOD GAS VENOUS FULL PANEL(!)  unremarkable and noncontributory to Patient condition/symptoms   [BC]   2056 Protime-INR  No coagulopathy in the setting of AC medications.   [BC]   2156 Comprehensive metabolic panel(!)  Hyperglycemia without evidence of AGMA concerning for DKA, moderate ODILON [BC]   2157 Protime-INR  No coagulopathy in the setting of sepsis assessment.   [BC]   2157 APTT(!)  Pancho elevated without concern for significant coagulopathy in the setting of sepsis assessment [BC]      ED Course User Index  [BC] Calvin Thomson MD         Diagnoses as of 07/28/24 2355   Cellulitis of lower extremity, unspecified laterality   Wound of left lower extremity, initial encounter   Acute leg pain, left   Lymphedema   Type 2 diabetes mellitus with other skin ulcer, with long-term current use of insulin (Multi)                       [place] Coma Scale Score: 15                         Medical Decision Making  Patient presented to the ED for worsening BLE lymphedema with overlying skin changes with weeping wounds and clear bullae, pain of LLE with concerning PMHx of BLE lymphedema, PE on Eliquis, DM 2, CVD, HTN, HLD, CKD 3, DVT.  I personally reviewed and interpreted VS and labs which are as stated above in the ED course.    Assessment/evaluation cellulitis of baseline BLE lymphedema with weeping wounds, poor wound care.  No concerning history, clinical evidence/work-up, or exam findings for the concerning differentials of necrotizing fasciitis, deep space infection/abscess, trauma/fracture.  These conditions have been thoroughly evaluated and determined to be sufficiently unlikely to be the etiology of patient's presenting symptoms.    After receiving an appropriate exam, clinical work-up, and necessary interventions/treatment, Patient is appropriate for hospital admission to [facility] at this time due to pain control/management, further interrogation/management of symptoms, and unable to care for self or manage presenting symptoms/conditions at home/living situation.  Patient was encouraged to ask any questions or for clarification of today's ED encounter.  Patient is agreeable to plan of care.    Per Chart Review: Nothing pertinent to this ED encounter.      Parts of this chart have been completed using voice recognition software. Please excuse any errors of transcription.    Problems Addressed:  Acute leg pain, left: acute illness or injury  Cellulitis of lower extremity, unspecified laterality: acute illness or injury  Lymphedema: chronic illness or injury  Wound of left lower extremity, initial encounter: chronic illness or injury    Amount and/or Complexity of Data Reviewed  Labs: ordered. Decision-making details documented in ED Course.        Procedure  Procedures     Calvin Thomson MD  07/28/24 5950

## 2024-07-29 NOTE — CONSULTS
"Inpatient consult to Infectious Diseases  Consult performed by: Ana Rodriguez, APRN-CNP  Consult ordered by: Zackery Sue MD  Reason for consult: left leg cellulitis        Primary MD: No primary care provider on file.        History Of Present Illness  Kevin Dc \"partha\" is a 59 y.o. male past medical history of type 2 diabetes mellitus, bilateral lower extremity lymphedema.  He presented to the emergency room with worsening bilateral lower extremity edema, erythema, pain.  He reports slow progressive worsening over the past few months.  He reports he used to see specialist at lymphedema clinic however unable to afford.  He is afebrile, denies chills.  He denies shortness of breath cough or chest pain.  Labs with no leukocytosis.  Chronic Kidney disease.  ReSound with no evidence of DVT.  He Was admitted for further evaluation and management.  He is on IV Zosyn.    Past Medical History  He has a past medical history of Acute cor pulmonale (Multi) (02/20/2024), Anal fissure, Angina pectoris (CMS-HCC) (08/31/2023), Atherosclerotic heart disease of native coronary artery with other forms of angina pectoris (CMS-HCC) (08/31/2023), Atypical chest pain (08/31/2023), Cerebral vascular accident (Multi), Cerebrovascular accident ([facility]) (08/31/2023), CHF (congestive heart failure) (Multi), Chronic kidney disease, stage 3 (Multi) (08/31/2023), CKD (chronic kidney disease), Clotting disorder (Multi), Deep venous thrombosis of lower extremity (Multi) (08/31/2023), Facial abscess, Heart failure (Multi) (08/31/2023), Hematochezia, Lymphedema (05/22/2018), Mixed hyperlipidemia (08/31/2023), Obesity, Palpitations (08/31/2023), Peripheral vascular disease (CMS-HCC) (08/31/2023), Peripheral vascular disease (CMS-HCC), Presence of coronary angioplasty implant and graft (03/24/2023), Primary hypertension (05/22/2018), Pulmonary embolism (Multi) (03/24/2023), Sleep apnea, Type 2 diabetes mellitus (Multi) (05/22/2018), Venous " insufficiency, Venous insufficiency (chronic) (peripheral) (03/24/2023), and Ventricular premature complex (08/31/2023).    Surgical History  He has a past surgical history that includes Cardiac catheterization; Carotid stent; and Colonoscopy.     Social History     Occupational History    Not on file   Tobacco Use    Smoking status: Never     Passive exposure: Never    Smokeless tobacco: Never   Vaping Use    Vaping status: Never Used   Substance and Sexual Activity    Alcohol use: Never    Drug use: Never    Sexual activity: Not Currently     Travel History   Travel since 06/29/24    No documented travel since 06/29/24              Family History  Family History   Problem Relation Name Age of Onset    Heart disease Father      Other (double bypass with valve replacement) Father       Allergies  Patient has no known allergies.     Immunization History   Administered Date(s) Administered    Influenza, injectable, quadrivalent 11/06/2018    Influenza, seasonal, injectable 10/14/2014    Pfizer Purple Cap SARS-CoV-2 06/08/2021, 06/29/2021    Pneumococcal polysaccharide vaccine, 23-valent, age 2 years and older (PNEUMOVAX 23) 05/11/2020, 05/21/2020     Medications  Home medications:  Medications Prior to Admission   Medication Sig Dispense Refill Last Dose    acetaminophen (Tylenol) 500 mg tablet Take 1 tablet (500 mg) by mouth every 4 hours if needed (pain).       allopurinol (Zyloprim) 100 mg tablet Take 1.5 tablets (150 mg) by mouth once daily.       apixaban (Eliquis) 5 mg tablet Take 1 tablet (5 mg) by mouth 2 times a day.       aspirin-sod bicarb-citric acid (Alva-Coleman) 324 mg effervescent tablet Take 1 tablet (324 mg) by mouth every 8 hours if needed for headaches or mild pain (1 - 3).       blood sugar diagnostic strip One Touch Ultra Test strips -  3 times per day sc 3X per meal for 90 days       calcitriol (Rocaltrol) 0.5 mcg capsule Take 1 capsule (0.5 mcg) by mouth once daily. For 30 days        "clopidogrel (Plavix) 75 mg tablet Take 1 tablet (75 mg) by mouth once daily. For 90 90 tablet 3     dilTIAZem ER (Tiazac) 240 mg 24 hr capsule Take 1 capsule (240 mg) by mouth once daily. on an empty stomach in the morning Orally Once a day for 90 days 90 capsule 3     insulin regular (HumuLIN R U-500, Conc, Kwikpen) 500 unit/mL (3 mL) CONCENTRATED injection Inject 110 Units under the skin 2 times a day. 110 UNITS AT LUNCH  AT DINNER       isosorbide mononitrate ER (Imdur) 60 mg 24 hr tablet Take 1 tablet (60 mg) by mouth once daily. For 90 90 tablet 3     nitroglycerin (Nitrostat) 0.4 mg SL tablet Place 1 tablet (0.4 mg) under the tongue if needed for chest pain. For 30 days       pen needle, diabetic 32 gauge x 5/32\" needle 2 times a day.  as directed sc bid for 90 days       potassium chloride CR 10 mEq ER tablet Take 1 tablet (10 mEq) by mouth once daily. With food       propranolol XL (Innopran XL) 120 mg 24 hr capsule Take 1 capsule (120 mg) by mouth once daily. For 90 days 90 capsule 3     rosuvastatin (Crestor) 20 mg tablet Take 1 tablet (20 mg) by mouth once daily. For 90       sodium bicarbonate 650 mg tablet Take 1 tablet (650 mg) by mouth 4 times a day. For 30       torsemide (Demadex) 100 mg tablet Take 0.5 tablets (50 mg) by mouth every other day. For 90 days        Current medications:  Scheduled medications  allopurinol, 150 mg, oral, Daily  apixaban, 5 mg, oral, BID  calcitriol, 0.5 mcg, oral, Daily  clopidogrel, 75 mg, oral, Daily  dilTIAZem ER, 240 mg, oral, Daily  insulin glargine, 50 Units, subcutaneous, Nightly  insulin lispro, 0-15 Units, subcutaneous, TID  isosorbide mononitrate ER, 60 mg, oral, Daily  piperacillin-tazobactam, 3.375 g, intravenous, q6h  polyethylene glycol, 17 g, oral, Daily  potassium chloride CR, 10 mEq, oral, Daily with breakfast  propranolol, 60 mg, oral, Daily  rosuvastatin, 20 mg, oral, Daily  sodium bicarbonate, 650 mg, oral, 4x daily  torsemide, 50 mg, oral, " "Every other day      Continuous medications     PRN medications  PRN medications: acetaminophen **OR** acetaminophen **OR** acetaminophen, dextrose, dextrose, glucagon, guaiFENesin, melatonin, nitroglycerin    Review of Systems   Constitutional: Negative.    HENT: Negative.     Eyes: Negative.    Respiratory: Negative.     Cardiovascular:  Positive for leg swelling.   Gastrointestinal: Negative.    Endocrine: Negative.    Genitourinary: Negative.    Musculoskeletal: Negative.    Skin:  Positive for color change and rash.   Neurological: Negative.    Psychiatric/Behavioral: Negative.          Objective  Range of Vitals (last 24 hours)  Heart Rate:  [74-89]   Temp:  [36.3 °C (97.4 °F)-37.1 °C (98.8 °F)]   Resp:  [17-18]   BP: (117-151)/(60-85)   Height:  [167.6 cm (5' 6\")-170 cm (5' 6.93\")]   Weight:  [122 kg (269 lb 6.4 oz)-126 kg (278 lb)]   SpO2:  [92 %-97 %]   Daily Weight  07/29/24 : 122 kg (269 lb 6.4 oz)    Body mass index is 43.48 kg/m².     Physical Exam  Constitutional:       Appearance: Normal appearance.   HENT:      Head: Normocephalic and atraumatic.      Nose: Nose normal.      Mouth/Throat:      Mouth: Mucous membranes are moist.      Pharynx: Oropharynx is clear.   Eyes:      General: No scleral icterus.  Cardiovascular:      Rate and Rhythm: Normal rate and regular rhythm.   Pulmonary:      Effort: Pulmonary effort is normal.      Breath sounds: Normal breath sounds.   Abdominal:      General: Bowel sounds are normal.      Palpations: Abdomen is soft.   Musculoskeletal:         General: Normal range of motion.      Cervical back: Normal range of motion and neck supple.      Right lower leg: Edema present.      Left lower leg: Edema present.   Skin:     General: Skin is warm and dry.      Comments: Bilateral leg erythema, weeping   Neurological:      General: No focal deficit present.      Mental Status: He is alert and oriented to person, place, and time. Mental status is at baseline.   Psychiatric:  " "       Mood and Affect: Mood normal.         Behavior: Behavior normal.          Relevant Results  Outside Hospital Results  No  Labs  Results from last 72 hours   Lab Units 07/29/24  0404 07/28/24 2028   WBC AUTO x10*3/uL 9.5 10.0   HEMOGLOBIN g/dL 10.9* 12.3*   HEMATOCRIT % 33.6* 36.4*   PLATELETS AUTO x10*3/uL 416 476*   NEUTROS PCT AUTO %  --  71.9   LYMPHS PCT AUTO %  --  13.8   MONOS PCT AUTO %  --  9.3   EOS PCT AUTO %  --  2.3     Results from last 72 hours   Lab Units 07/29/24  0404 07/28/24 2028   SODIUM mmol/L 138 135   POTASSIUM mmol/L 3.5 3.4   CHLORIDE mmol/L 108* 101   CO2 mmol/L 20* 20*   BUN mg/dL 36* 38*   CREATININE mg/dL 2.10* 2.20*   GLUCOSE mg/dL 181* 252*   CALCIUM mg/dL 8.3* 8.8   ANION GAP mmol/L 10 14   EGFR mL/min/1.73m*2 35* 33*     Results from last 72 hours   Lab Units 07/28/24 2028   ALK PHOS U/L 101   BILIRUBIN TOTAL mg/dL 0.8   PROTEIN TOTAL g/dL 7.4   ALT U/L 40   AST U/L 27   ALBUMIN g/dL 3.1*     Estimated Creatinine Clearance: 46.1 mL/min (A) (by C-G formula based on SCr of 2.1 mg/dL (H)).  CRP   Date Value Ref Range Status   10/16/2021 3.2 (H) 0 - 2.0 MG/DL Final     Comment:     Performed at [address] [address]     No results found for: \"HIV1X2\", \"HIVCONF\", \"VVFLVN8CA\"  No results found for: \"HEPCABINIT\", \"HEPCAB\", \"HCVPCRQUANT\"  Microbiology  Reviewed        Imaging  Reviewed  Assessment/Plan   Type 2 diabetes mellitus  Lymphedema-risk factor for cellulitis  Bilateral  lower extremity cellulitis   Acute kidney injury on CKD stage III      IV Zosyn  Monitor temperature and WBC  Monitor renal function  CK level  Monitor response to therapy  Leg elevation  Supportive care  Leg Wound culture   Follow up blood culture   Podiatry consulted  Further recommendations based on workup    Total time spent caring for the patient today was 35 minutes. This includes time spent before the visit reviewing the chart, time spent during the visit, and time spent after the " visit on documentation.           Ana Rodriguez, APRN-CNP

## 2024-07-29 NOTE — PROGRESS NOTES
07/29/24 0941   Discharge Planning   Living Arrangements Alone   Support Systems Family members;Friends/neighbors   Assistance Needed Patient reports he is independent of ADLs and IADLs.   Type of Residence Private residence   Number of Stairs to Enter Residence 3   Number of Stairs Within Residence 0   Do you have animals or pets at home? No   Who is requesting discharge planning? Provider   Home or Post Acute Services None   Expected Discharge Disposition Home   Does the patient need discharge transport arranged? No   Financial Resource Strain   How hard is it for you to pay for the very basics like food, housing, medical care, and heating? Not hard   Housing Stability   In the last 12 months, was there a time when you were not able to pay the mortgage or rent on time? N   In the past 12 months, how many times have you moved where you were living? 0   At any time in the past 12 months, were you homeless or living in a shelter (including now)? N   Transportation Needs   In the past 12 months, has lack of transportation kept you from medical appointments or from getting medications? no   In the past 12 months, has lack of transportation kept you from meetings, work, or from getting things needed for daily living? No     Patient reports he lives at home alone and is independent of all ADLs and IADLs. He currently denies any home going needs or concerns at this time. Patient plans to return home upon discharge. However, is open to home care if indicated upon discharge. Care Transitions is available as needed.

## 2024-07-29 NOTE — PROGRESS NOTES
"Occupational Therapy                 Therapy Communication Note    Patient Name: Kevin Dc \"partha\"  MRN: [id number]  Today's Date: 7/29/2024     Discipline: Occupational Therapy    Missed Visit Reason:      Missed Time: Cancel    Comment: Per RN, podiatry consult to be placed for wound on foot. Will cancel OT eval for AM.  "

## 2024-07-29 NOTE — CARE PLAN
The patient's goals for the shift include pain    The clinical goals for the shift include antibiotics, reduce swelling    Problem: Pain - Adult  Goal: Verbalizes/displays adequate comfort level or baseline comfort level  Outcome: Progressing     Problem: Safety - Adult  Goal: Free from fall injury  Outcome: Progressing     Problem: Discharge Planning  Goal: Discharge to home or other facility with appropriate resources  Outcome: Progressing     Problem: Chronic Conditions and Co-morbidities  Goal: Patient's chronic conditions and co-morbidity symptoms are monitored and maintained or improved  Outcome: Progressing     Problem: Diabetes  Goal: Achieve decreasing blood glucose levels by end of shift  Outcome: Progressing  Goal: Increase stability of blood glucose readings by end of shift  Outcome: Progressing  Goal: Decrease in ketones present in urine by end of shift  Outcome: Progressing  Goal: Maintain electrolyte levels within acceptable range throughout shift  Outcome: Progressing  Goal: Maintain glucose levels >70mg/dl to <250mg/dl throughout shift  Outcome: Progressing  Goal: No changes in neurological exam by end of shift  Outcome: Progressing  Goal: Learn about and adhere to nutrition recommendations by end of shift  Outcome: Progressing  Goal: Vital signs within normal range for age by end of shift  Outcome: Progressing  Goal: Increase self care and/or family involovement by end of shift  Outcome: Progressing  Goal: Receive DSME education by end of shift  Outcome: Progressing

## 2024-07-29 NOTE — CARE PLAN
Problem: Pain - Adult  Goal: Verbalizes/displays adequate comfort level or baseline comfort level  Outcome: Progressing  Flowsheets (Taken 7/29/2024 0209)  Verbalizes/displays adequate comfort level or baseline comfort level:   Assess pain using appropriate pain scale   Encourage patient to monitor pain and request assistance   Administer analgesics based on type and severity of pain and evaluate response   The patient's goals for the shift include pain    The clinical goals for the shift include vss

## 2024-07-29 NOTE — PROGRESS NOTES
partha Dc is a 59 y.o. male on day 0 of admission presenting with Cellulitis.      Subjective   Patient seen examined. Patient is resting in litter A&Ox4. No documented concerns/events overnight from nursing. Patient is afebrile. Patient reports intermittent pain in bilateral lower extremities and left heel. No wound to left heel noted during physical exam. Denies CP, SOB,  Nausea, Fever, Chills, Night sweats.        Objective     Last Recorded Vitals  /68 (BP Location: Left arm, Patient Position: Lying)   Pulse 81   Temp 37 °C (98.6 °F) (Oral)   Resp 18   Wt 122 kg (269 lb 6.4 oz)   SpO2 96%   Intake/Output last 3 Shifts:    Intake/Output Summary (Last 24 hours) at 7/29/2024 1111  Last data filed at 7/29/2024 1058  Gross per 24 hour   Intake 150 ml   Output 600 ml   Net -450 ml       Admission Weight  Weight: 126 kg (278 lb) (07/28/24 2017)    Daily Weight  07/29/24 : 122 kg (269 lb 6.4 oz)    Image Results  Lower extremity venous duplex bilateral  Narrative: Interpreted By:  Eusebio Souza,   STUDY:  [facility] LOWER EXTREMITY VENOUS DUPLEX BILATERAL;  7/29/2024 7:55 am      INDICATION:  Signs/Symptoms:Bilateral lower extremity lymphedema with cellulitis.      COMPARISON:  None.      ACCESSION NUMBER(S):  [id number]      ORDERING CLINICIAN:  GIOVANNA ARECHIGA      TECHNIQUE:  Vascular ultrasound of the bilateral lower extremities was performed.  Real-time compression views as well as Gray scale, color Doppler and  spectral Doppler waveform analysis was performed.      FINDINGS:  Evaluation of the visualized portions of the bilateral common femoral  vein, proximal, mid, and distal femoral vein, and popliteal vein were  performed.  Calf veins could not be adequately evaluated secondary to  swelling and limited acoustic windows.      The evaluated veins demonstrate normal compressibility. There is  intact venous flow demonstrating normal respiratory variability and  normal augmentation of flow with calf  compression. Therefore, there  is no ultrasonographic evidence for deep vein thrombosis within the  evaluated veins.      Impression: No sonographic evidence for deep vein thrombosis within the evaluated  veins of the lower extremities bilaterally.      MACRO:  None.      Signed by: Eusebio Souza 7/29/2024 8:27 AM  Dictation workstation:   [id number]  JERED without exercise  Preliminary Cardiology Report                 [facility]  [address], [place] [address]              Phone [phone]                 Preliminary Vascular Lab Report     VASC US ANKLE BRACHIAL INDEX (JERED) WITHOUT EXERCISE       Patient Name:     ADRIENNE Pradhan Physician:  [id number] Arely Guillen MD  Study Date:       7/29/2024          Ordering Provider:  [id number] ARECHIGA  MRN/PID:          [id number]           Fellow:  Accession#:       [id number]       Technologist:       SURAJ LOOMIS RDMS  YOB: 1965          Technologist 2:  Gender:           M                  Encounter#:         [id number]  Admission Status: Inpatient          Location Performed: [facility]       Diagnosis/ICD: Lymphedema, not elsewhere classified-I89.0; Cellulitus of right                 lower limb-L03.115; Cellulitus of left lower limb-L03.116       PRELIMINARY CONCLUSIONS:     Right Lower PVR: Normal digital perfusion noted. Multiphasic flow is noted in the right common femoral artery, right popliteal artery, right posterior tibial artery and right dorsalis pedis artery. Extreme swelling to lower legs.  Left Lower PVR: Normal digital perfusion noted. Multiphasic flow is noted in the left common femoral artery, left popliteal artery, left posterior tibial artery and left dorsalis pedis artery. Extreme swelling to lower legs.     Imaging & Doppler Findings:     RIGHT Lower PVR                Pressures Ratios  Right Posterior Tibial (Ankle) 255 mmHg  1.71  Right Dorsalis Pedis (Ankle)   255 mmHg   1.71  Right Digit (Great Toe)        255 mmHg  1.71          LEFT Lower PVR                Pressures Ratios  Left Posterior Tibial (Ankle) 255 mmHg  1.71  Left Dorsalis Pedis (Ankle)   255 mmHg  1.71  Left Digit (Great Toe)        172 mmHg  1.15                             Right     Left  Brachial Pressure 149 mmHg 139 mmHg          VASCULAR PRELIMINARY REPORT  completed by Ling Warner on 7/29/2024 at 8:24:25 AM       ** Final **      Physical Exam  Constitutional:       General: He is not in acute distress.  HENT:      Head: Normocephalic.      Mouth/Throat:      Mouth: Mucous membranes are moist.      Pharynx: Oropharynx is clear.   Cardiovascular:      Rate and Rhythm: Normal rate and regular rhythm.      Pulses: Normal pulses.      Heart sounds: Normal heart sounds.   Pulmonary:      Effort: Pulmonary effort is normal.      Breath sounds: Normal breath sounds.   Abdominal:      General: Bowel sounds are normal.      Palpations: Abdomen is soft.   Musculoskeletal:         General: Swelling and tenderness present.      Cervical back: Normal range of motion.      Right lower leg: Edema present.      Left lower leg: Edema present.      Comments: Severe lymphedema with pitting edema of the bilateral lower extremities with erythema, and scaling    Skin:     Capillary Refill: Capillary refill takes less than 2 seconds.   Neurological:      Mental Status: He is alert and oriented to person, place, and time.   Psychiatric:         Mood and Affect: Mood normal.       Relevant Results  Scheduled medications  allopurinol, 150 mg, oral, Daily  apixaban, 5 mg, oral, BID  calcitriol, 0.5 mcg, oral, Daily  clopidogrel, 75 mg, oral, Daily  dilTIAZem ER, 240 mg, oral, Daily  insulin glargine, 50 Units, subcutaneous, Nightly  insulin lispro, 0-15 Units, subcutaneous, TID  isosorbide mononitrate ER, 60 mg, oral, Daily  piperacillin-tazobactam, 3.375 g, intravenous, q6h  polyethylene glycol, 17 g, oral, Daily  potassium chloride  CR, 10 mEq, oral, Daily with breakfast  propranolol, 60 mg, oral, Daily  rosuvastatin, 20 mg, oral, Daily  sodium bicarbonate, 650 mg, oral, 4x daily  torsemide, 50 mg, oral, Every other day      Continuous medications     PRN medications  PRN medications: acetaminophen **OR** acetaminophen **OR** acetaminophen, dextrose, dextrose, glucagon, guaiFENesin, melatonin, nitroglycerin     Results for orders placed or performed during the hospital encounter of 07/28/24 (from the past 24 hour(s))   CBC and Auto Differential   Result Value Ref Range    WBC 10.0 4.4 - 11.3 x10*3/uL    nRBC 0.0 0.0 - 0.0 /100 WBCs    RBC 4.12 (L) 4.50 - 5.90 x10*6/uL    Hemoglobin 12.3 (L) 13.5 - 17.5 g/dL    Hematocrit 36.4 (L) 41.0 - 52.0 %    MCV 88 80 - 100 fL    MCH 29.9 26.0 - 34.0 pg    MCHC 33.8 32.0 - 36.0 g/dL    RDW 13.6 11.5 - 14.5 %    Platelets 476 (H) 150 - 450 x10*3/uL    Neutrophils % 71.9 40.0 - 80.0 %    Immature Granulocytes %, Automated 2.4 (H) 0.0 - 0.9 %    Lymphocytes % 13.8 13.0 - 44.0 %    Monocytes % 9.3 2.0 - 10.0 %    Eosinophils % 2.3 0.0 - 6.0 %    Basophils % 0.3 0.0 - 2.0 %    Neutrophils Absolute 7.22 1.20 - 7.70 x10*3/uL    Immature Granulocytes Absolute, Automated 0.24 0.00 - 0.70 x10*3/uL    Lymphocytes Absolute 1.39 1.20 - 4.80 x10*3/uL    Monocytes Absolute 0.93 0.10 - 1.00 x10*3/uL    Eosinophils Absolute 0.23 0.00 - 0.70 x10*3/uL    Basophils Absolute 0.03 0.00 - 0.10 x10*3/uL   Comprehensive metabolic panel   Result Value Ref Range    Glucose 252 (H) 65 - 99 mg/dL    Sodium 135 133 - 145 mmol/L    Potassium 3.4 3.4 - 5.1 mmol/L    Chloride 101 97 - 107 mmol/L    Bicarbonate 20 (L) 24 - 31 mmol/L    Urea Nitrogen 38 (H) 8 - 25 mg/dL    Creatinine 2.20 (H) 0.40 - 1.60 mg/dL    eGFR 33 (L) >60 mL/min/1.73m*2    Calcium 8.8 8.5 - 10.4 mg/dL    Albumin 3.1 (L) 3.5 - 5.0 g/dL    Alkaline Phosphatase 101 35 - 125 U/L    Total Protein 7.4 5.9 - 7.9 g/dL    AST 27 5 - 40 U/L    Bilirubin, Total 0.8 0.1 - 1.2  mg/dL    ALT 40 5 - 40 U/L    Anion Gap 14 <=19 mmol/L   APTT   Result Value Ref Range    aPTT 34.5 (H) 22.0 - 32.5 seconds   Protime-INR   Result Value Ref Range    Protime 12.2 9.3 - 12.7 seconds    INR 1.2 0.9 - 1.2   BLOOD GAS VENOUS FULL PANEL   Result Value Ref Range    POCT pH, Venous 7.40 7.33 - 7.43 pH    POCT pCO2, Venous 36 (L) 41 - 51 mm Hg    POCT pO2, Venous 41 35 - 45 mm Hg    POCT SO2, Venous 64 45 - 75 %    POCT Oxy Hemoglobin, Venous 61.9 45.0 - 75.0 %    POCT Hematocrit Calculated, Venous 35.0 (L) 41.0 - 52.0 %    POCT Sodium, Venous 134 (L) 136 - 145 mmol/L    POCT Potassium, Venous 3.5 3.5 - 5.3 mmol/L    POCT Chloride, Venous 104 98 - 107 mmol/L    POCT Ionized Calicum, Venous 1.17 1.10 - 1.33 mmol/L    POCT Glucose, Venous 280 (H) 74 - 99 mg/dL    POCT Lactate, Venous 1.4 0.4 - 2.0 mmol/L    POCT Base Excess, Venous -2.1 (L) -2.0 - 3.0 mmol/L    POCT HCO3 Calculated, Venous 22.3 22.0 - 26.0 mmol/L    POCT Hemoglobin, Venous 11.8 (L) 13.5 - 17.5 g/dL    POCT Anion Gap, Venous 11.0 10.0 - 25.0 mmol/L    Patient Temperature 37.0 degrees Celsius    FiO2 21 %   CBC   Result Value Ref Range    WBC 9.5 4.4 - 11.3 x10*3/uL    nRBC 0.0 0.0 - 0.0 /100 WBCs    RBC 3.73 (L) 4.50 - 5.90 x10*6/uL    Hemoglobin 10.9 (L) 13.5 - 17.5 g/dL    Hematocrit 33.6 (L) 41.0 - 52.0 %    MCV 90 80 - 100 fL    MCH 29.2 26.0 - 34.0 pg    MCHC 32.4 32.0 - 36.0 g/dL    RDW 13.6 11.5 - 14.5 %    Platelets 416 150 - 450 x10*3/uL   Basic metabolic panel   Result Value Ref Range    Glucose 181 (H) 65 - 99 mg/dL    Sodium 138 133 - 145 mmol/L    Potassium 3.5 3.4 - 5.1 mmol/L    Chloride 108 (H) 97 - 107 mmol/L    Bicarbonate 20 (L) 24 - 31 mmol/L    Urea Nitrogen 36 (H) 8 - 25 mg/dL    Creatinine 2.10 (H) 0.40 - 1.60 mg/dL    eGFR 35 (L) >60 mL/min/1.73m*2    Calcium 8.3 (L) 8.5 - 10.4 mg/dL    Anion Gap 10 <=19 mmol/L   Hemoglobin A1c   Result Value Ref Range    Hemoglobin A1C 6.9 (H) See below %    Estimated Average Glucose  151 Not Established mg/dL   JERED without exercise   Result Value Ref Range    BSA 2.39 m2   POCT GLUCOSE   Result Value Ref Range    POCT Glucose 187 (H) 74 - 99 mg/dL           Assessment/Plan        Principal Problem:    Cellulitis  Active Problems:    History of coronary artery stent placement    History of pulmonary embolism    Lymphedema    Type 2 diabetes mellitus (Multi)    Chronic renal impairment, stage 3 (moderate) (Multi)    Cellulitis of the bilateral lower extremities  Lymphedema of the bilateral lower extremities  -Status post 2 g Ancef ED  -IV Zosyn  -blood cxs  -F/U JERED lower extremity  -U/S negative for DVT  -ID consult  -Wound consult     Type 2 diabetes mellitus  -Reports noncompliance, Home long-acting insulin reported per patient does not make sense  -Diabetic educator consult   -Diabetic diet  -A1c 6.9  -Lantus 50 units nightly  -SSI  -Monitor closely     CKD stage III  -Creatinine 2.2, EGFR 33  -Avoid/caution nephrotoxic agents  -Continue to monitor  -50 mg Torsemide PO    Reported history of CAD with stents  -Not on GDMT  -Cardiology consult  -Patient unsure why he is taking propranolol, Propranolol 120 mg held, starting 60 mg  -Continue home medications   -Echo scheduled for today    DVT prophylaxis  -Continue Eliquis     GI prophylaxis  -PPI      Plan  Plan discussed with patient and collaborating physician and they are in agreement.             MELANIE BRANTLEY

## 2024-07-30 ENCOUNTER — APPOINTMENT (OUTPATIENT)
Dept: RADIOLOGY | Facility: HOSPITAL | Age: 59
End: 2024-07-30
Payer: MEDICARE

## 2024-07-30 PROBLEM — Z78.9 DECREASED ACTIVITIES OF DAILY LIVING (ADL): Status: ACTIVE | Noted: 2024-07-30

## 2024-07-30 LAB
ANION GAP SERPL CALC-SCNC: 11 MMOL/L
AORTIC VALVE MEAN GRADIENT: 7 MMHG
AORTIC VALVE PEAK VELOCITY: 1.7 M/S
AV PEAK GRADIENT: 11.6 MMHG
AVA (PEAK VEL): 1.98 CM2
AVA (VTI): 1.76 CM2
BUN SERPL-MCNC: 37 MG/DL (ref 8–25)
CALCIUM SERPL-MCNC: 8.5 MG/DL (ref 8.5–10.4)
CHLORIDE SERPL-SCNC: 108 MMOL/L (ref 97–107)
CO2 SERPL-SCNC: 22 MMOL/L (ref 24–31)
CREAT SERPL-MCNC: 2.1 MG/DL (ref 0.4–1.6)
EGFRCR SERPLBLD CKD-EPI 2021: 35 ML/MIN/1.73M*2
EJECTION FRACTION APICAL 4 CHAMBER: 68.5
EJECTION FRACTION: 63 %
ERYTHROCYTE [DISTWIDTH] IN BLOOD BY AUTOMATED COUNT: 13.5 % (ref 11.5–14.5)
GLUCOSE BLD MANUAL STRIP-MCNC: 153 MG/DL (ref 74–99)
GLUCOSE BLD MANUAL STRIP-MCNC: 194 MG/DL (ref 74–99)
GLUCOSE BLD MANUAL STRIP-MCNC: 210 MG/DL (ref 74–99)
GLUCOSE BLD MANUAL STRIP-MCNC: 215 MG/DL (ref 74–99)
GLUCOSE SERPL-MCNC: 225 MG/DL (ref 65–99)
HCT VFR BLD AUTO: 32 % (ref 41–52)
HGB BLD-MCNC: 10.5 G/DL (ref 13.5–17.5)
LEFT VENTRICLE INTERNAL DIMENSION DIASTOLE: 4.33 CM (ref 3.5–6)
LEFT VENTRICULAR OUTFLOW TRACT DIAMETER: 2 CM
LV EJECTION FRACTION BIPLANE: 65 %
MCH RBC QN AUTO: 29.8 PG (ref 26–34)
MCHC RBC AUTO-ENTMCNC: 32.8 G/DL (ref 32–36)
MCV RBC AUTO: 91 FL (ref 80–100)
MITRAL VALVE E/A RATIO: 0.82
NRBC BLD-RTO: 0 /100 WBCS (ref 0–0)
PLATELET # BLD AUTO: 421 X10*3/UL (ref 150–450)
POTASSIUM SERPL-SCNC: 3.6 MMOL/L (ref 3.4–5.1)
RBC # BLD AUTO: 3.52 X10*6/UL (ref 4.5–5.9)
RIGHT VENTRICLE FREE WALL PEAK S': 17.6 CM/S
SODIUM SERPL-SCNC: 141 MMOL/L (ref 133–145)
TRICUSPID ANNULAR PLANE SYSTOLIC EXCURSION: 2.8 CM
WBC # BLD AUTO: 10.9 X10*3/UL (ref 4.4–11.3)

## 2024-07-30 PROCEDURE — 2500000002 HC RX 250 W HCPCS SELF ADMINISTERED DRUGS (ALT 637 FOR MEDICARE OP, ALT 636 FOR OP/ED): Performed by: STUDENT IN AN ORGANIZED HEALTH CARE EDUCATION/TRAINING PROGRAM

## 2024-07-30 PROCEDURE — 97166 OT EVAL MOD COMPLEX 45 MIN: CPT | Mod: GO

## 2024-07-30 PROCEDURE — G0378 HOSPITAL OBSERVATION PER HR: HCPCS

## 2024-07-30 PROCEDURE — 97535 SELF CARE MNGMENT TRAINING: CPT | Mod: GO

## 2024-07-30 PROCEDURE — 73630 X-RAY EXAM OF FOOT: CPT | Mod: LT

## 2024-07-30 PROCEDURE — 2500000004 HC RX 250 GENERAL PHARMACY W/ HCPCS (ALT 636 FOR OP/ED): Performed by: INTERNAL MEDICINE

## 2024-07-30 PROCEDURE — 80048 BASIC METABOLIC PNL TOTAL CA: CPT | Performed by: STUDENT IN AN ORGANIZED HEALTH CARE EDUCATION/TRAINING PROGRAM

## 2024-07-30 PROCEDURE — 97162 PT EVAL MOD COMPLEX 30 MIN: CPT | Mod: GP

## 2024-07-30 PROCEDURE — 2500000004 HC RX 250 GENERAL PHARMACY W/ HCPCS (ALT 636 FOR OP/ED): Performed by: STUDENT IN AN ORGANIZED HEALTH CARE EDUCATION/TRAINING PROGRAM

## 2024-07-30 PROCEDURE — 2500000001 HC RX 250 WO HCPCS SELF ADMINISTERED DRUGS (ALT 637 FOR MEDICARE OP): Performed by: STUDENT IN AN ORGANIZED HEALTH CARE EDUCATION/TRAINING PROGRAM

## 2024-07-30 PROCEDURE — 82947 ASSAY GLUCOSE BLOOD QUANT: CPT

## 2024-07-30 PROCEDURE — 85027 COMPLETE CBC AUTOMATED: CPT | Performed by: STUDENT IN AN ORGANIZED HEALTH CARE EDUCATION/TRAINING PROGRAM

## 2024-07-30 PROCEDURE — 99221 1ST HOSP IP/OBS SF/LOW 40: CPT | Performed by: INTERNAL MEDICINE

## 2024-07-30 PROCEDURE — 73630 X-RAY EXAM OF FOOT: CPT | Mod: LEFT SIDE | Performed by: RADIOLOGY

## 2024-07-30 PROCEDURE — 36415 COLL VENOUS BLD VENIPUNCTURE: CPT | Performed by: STUDENT IN AN ORGANIZED HEALTH CARE EDUCATION/TRAINING PROGRAM

## 2024-07-30 PROCEDURE — 93922 UPR/L XTREMITY ART 2 LEVELS: CPT | Performed by: INTERNAL MEDICINE

## 2024-07-30 RX ADMIN — APIXABAN 5 MG: 5 TABLET, FILM COATED ORAL at 17:38

## 2024-07-30 RX ADMIN — DILTIAZEM HYDROCHLORIDE 240 MG: 120 CAPSULE, COATED, EXTENDED RELEASE ORAL at 08:11

## 2024-07-30 RX ADMIN — ISOSORBIDE MONONITRATE 60 MG: 60 TABLET, EXTENDED RELEASE ORAL at 08:11

## 2024-07-30 RX ADMIN — ACETAMINOPHEN 650 MG: 325 TABLET ORAL at 01:09

## 2024-07-30 RX ADMIN — PIPERACILLIN SODIUM AND TAZOBACTAM SODIUM 3.38 G: 3; .375 INJECTION, SOLUTION INTRAVENOUS at 03:38

## 2024-07-30 RX ADMIN — PROPRANOLOL HYDROCHLORIDE 60 MG: 40 TABLET ORAL at 06:36

## 2024-07-30 RX ADMIN — SODIUM BICARBONATE 650 MG TABLET 650 MG: at 12:00

## 2024-07-30 RX ADMIN — ROSUVASTATIN CALCIUM 20 MG: 20 TABLET, COATED ORAL at 08:11

## 2024-07-30 RX ADMIN — SODIUM BICARBONATE 650 MG TABLET 650 MG: at 06:36

## 2024-07-30 RX ADMIN — APIXABAN 5 MG: 5 TABLET, FILM COATED ORAL at 06:35

## 2024-07-30 RX ADMIN — PIPERACILLIN SODIUM AND TAZOBACTAM SODIUM 3.38 G: 3; .375 INJECTION, SOLUTION INTRAVENOUS at 17:38

## 2024-07-30 RX ADMIN — INSULIN LISPRO 6 UNITS: 100 INJECTION, SOLUTION INTRAVENOUS; SUBCUTANEOUS at 08:16

## 2024-07-30 RX ADMIN — CALCITRIOL CAPSULES 0.25 MCG 0.5 MCG: 0.25 CAPSULE ORAL at 08:12

## 2024-07-30 RX ADMIN — INSULIN LISPRO 3 UNITS: 100 INJECTION, SOLUTION INTRAVENOUS; SUBCUTANEOUS at 17:42

## 2024-07-30 RX ADMIN — ALLOPURINOL 150 MG: 300 TABLET ORAL at 08:11

## 2024-07-30 RX ADMIN — SODIUM BICARBONATE 650 MG TABLET 650 MG: at 17:38

## 2024-07-30 RX ADMIN — SODIUM BICARBONATE 650 MG TABLET 650 MG: at 21:50

## 2024-07-30 RX ADMIN — CLOPIDOGREL BISULFATE 75 MG: 75 TABLET ORAL at 08:11

## 2024-07-30 RX ADMIN — Medication 3 MG: at 01:09

## 2024-07-30 RX ADMIN — PIPERACILLIN SODIUM AND TAZOBACTAM SODIUM 3.38 G: 3; .375 INJECTION, SOLUTION INTRAVENOUS at 09:32

## 2024-07-30 RX ADMIN — POTASSIUM CHLORIDE 10 MEQ: 750 TABLET, EXTENDED RELEASE ORAL at 08:11

## 2024-07-30 RX ADMIN — INSULIN LISPRO 6 UNITS: 100 INJECTION, SOLUTION INTRAVENOUS; SUBCUTANEOUS at 11:56

## 2024-07-30 ASSESSMENT — COGNITIVE AND FUNCTIONAL STATUS - GENERAL
TURNING FROM BACK TO SIDE WHILE IN FLAT BAD: A LITTLE
DRESSING REGULAR LOWER BODY CLOTHING: A LOT
MOVING FROM LYING ON BACK TO SITTING ON SIDE OF FLAT BED WITH BEDRAILS: A LITTLE
PERSONAL GROOMING: A LITTLE
WALKING IN HOSPITAL ROOM: TOTAL
CLIMB 3 TO 5 STEPS WITH RAILING: A LITTLE
CLIMB 3 TO 5 STEPS WITH RAILING: TOTAL
TOILETING: A LITTLE
STANDING UP FROM CHAIR USING ARMS: A LOT
MOBILITY SCORE: 12
MOBILITY SCORE: 23
DAILY ACTIVITIY SCORE: 24
DRESSING REGULAR UPPER BODY CLOTHING: A LITTLE
EATING MEALS: A LITTLE
MOVING TO AND FROM BED TO CHAIR: A LOT
HELP NEEDED FOR BATHING: A LOT
DAILY ACTIVITIY SCORE: 16

## 2024-07-30 ASSESSMENT — PAIN SCALES - GENERAL
PAINLEVEL_OUTOF10: 7
PAINLEVEL_OUTOF10: 8
PAINLEVEL_OUTOF10: 2
PAINLEVEL_OUTOF10: 0 - NO PAIN

## 2024-07-30 ASSESSMENT — ACTIVITIES OF DAILY LIVING (ADL)
BATHING_ASSISTANCE: MODERATE
HOME_MANAGEMENT_TIME_ENTRY: 19
ADL_ASSISTANCE: INDEPENDENT
ADL_ASSISTANCE: INDEPENDENT

## 2024-07-30 ASSESSMENT — PAIN - FUNCTIONAL ASSESSMENT
PAIN_FUNCTIONAL_ASSESSMENT: 0-10
PAIN_FUNCTIONAL_ASSESSMENT: 0-10

## 2024-07-30 NOTE — PROGRESS NOTES
"Occupational Therapy                 Therapy Communication Note    Patient Name: Kevin Dc \"partha\"  MRN: [id number]  Today's Date: 7/30/2024     Discipline: Occupational Therapy    Missed Visit Reason:      Missed Time: Cancel    Comment:  AM cancel pending podiatry consult.  "

## 2024-07-30 NOTE — PROGRESS NOTES
"Occupational Therapy    Evaluation    Patient Name: Kevin Dc \"partha\"  MRN: [id number]  Today's Date: 7/30/2024  Time Calculation  Start Time: 1353  Stop Time: 1442  Time Calculation (min): 49 min    Assessment  IP OT Assessment  OT Assessment: Pt is 60 y/o male admitted for cellulitis . Pt presents w/ decresed ADL skills/ functional mobility, decreased activity tolerance, decreased balance. Recommend OT services to address above deficits.  Prognosis: Good  Barriers to Discharge:  (lives alone, assist of 2 for transfers/ mobility, limited support)  Evaluation/Treatment Tolerance: Patient limited by pain  Medical Staff Made Aware: Yes  End of Session Communication: Bedside nurse  End of Session Patient Position: Up in chair, Alarm on  Plan:  Treatment Interventions: ADL retraining, Functional transfer training, Endurance training, Patient/family training, Equipment evaluation/education, Compensatory technique education  OT Frequency: 3 times per week  OT Discharge Recommendations: Moderate intensity level of continued care  Equipment Recommended upon Discharge: Wheeled walker  OT Recommended Transfer Status: Assist of 2  OT - OK to Discharge: Yes    Subjective   Current Problem:  1. Cellulitis of lower extremity, unspecified laterality  Lower extremity venous duplex bilateral    Lower extremity venous duplex bilateral      2. Wound of left lower extremity, initial encounter        3. Acute leg pain, left        4. Lymphedema  Transthoracic Echo (TTE) Complete    JERED without exercise    Transthoracic Echo (TTE) Complete    JERED without exercise    CANCELED: Lower extremity venous insufficiency bilateral    CANCELED: Lower extremity venous insufficiency bilateral      5. Type 2 diabetes mellitus with other skin ulcer, with long-term current use of insulin (Multi)        6. Localized swelling, mass and lump, trunk  Transthoracic Echo (TTE) Complete    Transthoracic Echo (TTE) Complete      7. Personal history of " other venous thrombosis and embolism  CANCELED: Lower extremity venous insufficiency bilateral    CANCELED: Lower extremity venous insufficiency bilateral      8. Other specified symptoms and signs involving the circulatory and respiratory systems  JERED without exercise    JERED without exercise      9. Anticoagulant long-term use  Lower extremity venous duplex bilateral    Lower extremity venous duplex bilateral      10. Localized swelling, mass and lump, lower limb, bilateral  Lower extremity venous duplex bilateral    Lower extremity venous duplex bilateral      11. Cellulitis of right lower limb  JERED without exercise      12. Cellulitis of left lower limb  JERED without exercise        General:  General  Reason for Referral: decreased ADL's  Referred By: Dr. Zackery Sue  Past Medical History Relevant to Rehab: Lymphedema, DMII, Cellulitis BLE, PE, DVT,CVA, CAD w/ stents  Family/Caregiver Present: No  Co-Treatment: PT  Co-Treatment Reason: Partial co-treat for safety in mobility  Prior to Session Communication: Bedside nurse  Patient Position Received: Bed, 3 rail up, Alarm on  Preferred Learning Style: verbal, visual  General Comment: Cleared to see for OT eval, pt agreeable to therapy.  Precautions:  Hearing/Visual Limitations: reading glasses  Medical Precautions: Fall precautions  Vital Signs:     Pain:  Pain Assessment  Pain Assessment: 0-10  0-10 (Numeric) Pain Score: 8  Pain Type: Acute pain  Pain Location: Leg  Pain Orientation: Right, Left, Lower    Objective   Cognition:  Overall Cognitive Status: Within Functional Limits  Orientation Level: Oriented X4           Home Living:  Type of Home: Mobile home  Lives With: Alone  Home Adaptive Equipment: Cane  Home Layout: One level  Home Access: Stairs to enter with rails  Entrance Stairs-Rails: Both  Entrance Stairs-Number of Steps: 3  Bathroom Shower/Tub: Walk-in shower  Bathroom Toilet: Standard  Bathroom Equipment: Grab bars in shower (suction cup style)    Prior Function:  Level of [place]: Independent with ADLs and functional transfers, Independent with homemaking with ambulation  Receives Help From: Friends (limited support from friends)  ADL Assistance: Independent  Homemaking Assistance: Independent  Ambulatory Assistance: Independent  Hand Dominance: Right  Prior Function Comments: PTA pt indep. w/ ADL's, cooks, cleans and drives  IADL History:     ADL:  Eating Assistance: Stand by  Eating Deficit: Setup (per clinical judgement)  Grooming Assistance: Stand by  Grooming Deficit: Setup, Wash/dry face, Teeth care, Brushing hair (seated EOB)  Bathing Assistance: Moderate  Bathing Deficit: Perineal area, Buttocks, Right lower leg including foot, Left lower leg including foot (per clinical judgement)  UE Dressing Assistance: Stand by  UE Dressing Deficit: Setup (per clinical judgement)  LE Dressing Assistance: Maximal  LE Dressing Deficit: Thread RLE into pants, Thread LLE into pants, Thread RLE into underwear, Thread LLE into underwear, Pull up over hips (per clinical judgement)  Toileting Assistance with Device: Stand by  Toileting Deficit: Setup, Use of bedpan/urinal setup  Functional Assistance: Not performed  Activity Tolerance:  Endurance: Decreased tolerance for upright activites  Activity Tolerance Comments: decreased activity [place] for mobility  Bed Mobility/Transfers: Bed Mobility  Bed Mobility: Yes  Bed Mobility 1  Bed Mobility 1: Supine to sitting  Level of Assistance 1: Minimum assistance  Bed Mobility Comments 1: for LLE, pt able to complete trunk up, scoot to EOB w/ increased effort.    Transfers  Transfer: Yes  Transfer 1  Transfer From 1: Bed to  Transfer to 1: Stand  Technique 1: Sit to stand, Stand to sit  Transfer Device 1: Walker (HDRW)  Transfer Level of Assistance 1: Minimum assistance, +2  Trials/Comments 1: Pt unsteady on his feet, significant pain LLE, reluctant to place weight on LLE. Pulled up on stabilized walker (x2  trials)  Transfers 2  Transfer From 2: Bed to  Transfer to 2: Chair with arms  Technique 2: Stand pivot  Transfer Device 2: Walker (HDRW)  Transfer Level of Assistance 2: Minimum assistance, +2  Trials/Comments 2: Assist for unsteadiness, controlled descent into chair, verbal cues for hand placement, assist for walker management  Modalities:     IADL's:      Vision: Vision - Basic Assessment  Current Vision: Wears glasses only for reading  Sensation:  Light Touch: No apparent deficits  Strength:  Strength Comments: 4/5 BUE's  Perception:     Coordination:  Movements are Fluid and Coordinated: Yes   Hand Function:  Hand Function  Gross Grasp: Functional  Coordination: Functional  Extremities: RUE   RUE : Within Functional Limits and LUE   LUE: Within Functional Limits    Treatment: See ADL/transfer section    Outcome Measures: [facility] Daily Activity  Putting on and taking off regular lower body clothing: A lot  Bathing (including washing, rinsing, drying): A lot  Putting on and taking off regular upper body clothing: A little  Toileting, which includes using toilet, bedpan or urinal: A little  Taking care of personal grooming such as brushing teeth: A little  Eating Meals: A little  Daily Activity - Total Score: 16      Education Documentation  ADL Training, taught by Niharika Huffman, OT at 7/30/2024  3:22 PM.  Learner: Patient  Readiness: Acceptance  Method: Explanation  Response: Needs Reinforcement    Education Comments  No comments found.      Goals:   Encounter Problems       Encounter Problems (Active)       OT Goals       ADL's (Progressing)       Start:  07/30/24    Expected End:  08/13/24       Pt will complete bathing, dressing , toileting and grooming tasks w/ close supervision w/ AE/ compensatory tech for safety and increased independence in self care tasks.         Functional transfers (Progressing)       Start:  07/30/24    Expected End:  08/13/24       Pt will demon bed, chair and toilet/ BSC transfers  w/ close supervision w/ LRD, elev seat heights using B arm supports for safety and increased independence in daily tasks and functional mobility.         Functional endurance (Progressing)       Start:  07/30/24    Expected End:  08/13/24       Pt will tolerate 20 minutes of functional activity to improve functional endurance for daily tasks and functional mobility.

## 2024-07-30 NOTE — CARE PLAN
The patient's goals for the shift include pain    The clinical goals for the shift include atb    Problem: Pain - Adult  Goal: Verbalizes/displays adequate comfort level or baseline comfort level  Outcome: Progressing     Problem: Safety - Adult  Goal: Free from fall injury  Outcome: Progressing     Problem: Discharge Planning  Goal: Discharge to home or other facility with appropriate resources  Outcome: Progressing     Problem: Chronic Conditions and Co-morbidities  Goal: Patient's chronic conditions and co-morbidity symptoms are monitored and maintained or improved  Outcome: Progressing     Problem: Diabetes  Goal: Achieve decreasing blood glucose levels by end of shift  Outcome: Progressing  Goal: Increase stability of blood glucose readings by end of shift  Outcome: Progressing  Goal: Decrease in ketones present in urine by end of shift  Outcome: Progressing  Goal: Maintain electrolyte levels within acceptable range throughout shift  Outcome: Progressing  Goal: Maintain glucose levels >70mg/dl to <250mg/dl throughout shift  Outcome: Progressing  Goal: No changes in neurological exam by end of shift  Outcome: Progressing  Goal: Learn about and adhere to nutrition recommendations by end of shift  Outcome: Progressing  Goal: Vital signs within normal range for age by end of shift  Outcome: Progressing  Goal: Increase self care and/or family involovement by end of shift  Outcome: Progressing  Goal: Receive DSME education by end of shift  Outcome: Progressing

## 2024-07-30 NOTE — CONSULTS
"  Consults  History Of Present Illness:    Kevin Dc \"partha\" is a 59 y.o. male patient came to  reporting left lower extremity edema, lymphedema.  Worsening cellulitis symptoms and complain.  Increasing pain with walking.  Admitted with underlying symptoms.  Consulted for underlying possible CHF.  Patient also has multiple comorbid condition including diabetes type 2, history of PE, CKD stage III.  Now admitted with acute cellulitis of the lower extremities with lymphedema.  No active chest pain tightness.  Patient found having creatinine is about 2.1.  Last Recorded Vitals:  Vitals:    07/29/24 1538 07/29/24 2336 07/30/24 0636 07/30/24 0800   BP: 120/60 128/64 126/68 132/66   BP Location: Left arm Left arm  Left arm   Patient Position: Lying Lying  Lying   Pulse: 77 86 72 73   Resp: 18 16  18   Temp: 37.1 °C (98.8 °F) 36.7 °C (98.1 °F)  36.5 °C (97.7 °F)   TempSrc: Oral Oral  Oral   SpO2: 95% 95%  95%   Weight:       Height:           Past Medical History:  He has a past medical history of Acute cor pulmonale (Multi) (02/20/2024), Anal fissure, Angina pectoris (CMS-HCC) (08/31/2023), Atherosclerotic heart disease of native coronary artery with other forms of angina pectoris (CMS-HCC) (08/31/2023), Atypical chest pain (08/31/2023), Cerebral vascular accident (Multi), Cerebrovascular accident ([facility]) (08/31/2023), CHF (congestive heart failure) (Multi), Chronic kidney disease, stage 3 (Multi) (08/31/2023), CKD (chronic kidney disease), Clotting disorder (Multi), Deep venous thrombosis of lower extremity (Multi) (08/31/2023), Facial abscess, Heart failure (Multi) (08/31/2023), Hematochezia, Lymphedema (05/22/2018), Mixed hyperlipidemia (08/31/2023), Obesity, Palpitations (08/31/2023), Peripheral vascular disease (CMS-HCC) (08/31/2023), Peripheral vascular disease (CMS-HCC), Presence of coronary angioplasty implant and graft (03/24/2023), Primary hypertension (05/22/2018), Pulmonary embolism " (Multi) (03/24/2023), Sleep apnea, Type 2 diabetes mellitus (Multi) (05/22/2018), Venous insufficiency, Venous insufficiency (chronic) (peripheral) (03/24/2023), and Ventricular premature complex (08/31/2023).    Past Surgical History:  He has a past surgical history that includes Cardiac catheterization; Carotid stent; and Colonoscopy.      Social History:  He reports that he has never smoked. He has never been exposed to tobacco smoke. He has never used smokeless tobacco. He reports that he does not drink alcohol and does not use drugs.    Family History:  Family History   Problem Relation Name Age of Onset    Heart disease Father      Other (double bypass with valve replacement) Father          Allergies:  Patient has no known allergies.    Inpatient Medications:  Scheduled medications   Medication Dose Route Frequency    allopurinol  150 mg oral Daily    apixaban  5 mg oral BID    calcitriol  0.5 mcg oral Daily    clopidogrel  75 mg oral Daily    dilTIAZem ER  240 mg oral Daily    [START ON 7/31/2024] insulin glargine  50 Units subcutaneous Daily    insulin lispro  0-15 Units subcutaneous TID    isosorbide mononitrate ER  60 mg oral Daily    piperacillin-tazobactam  3.375 g intravenous q8h    polyethylene glycol  17 g oral Daily    potassium chloride CR  10 mEq oral Daily with breakfast    propranolol  60 mg oral Daily    rosuvastatin  20 mg oral Daily    sodium bicarbonate  650 mg oral 4x daily    torsemide  50 mg oral Every other day     Outpatient Medications:  Current Outpatient Medications   Medication Instructions    acetaminophen (Tylenol) 500 mg tablet 1 tablet, oral, Every 4 hours PRN    allopurinol (Zyloprim) 100 mg tablet 1.5 tablets, oral, Daily    apixaban (Eliquis) 5 mg tablet 1 tablet, oral, 2 times daily    aspirin-sod bicarb-citric acid ([place]) 324 mg effervescent tablet Take 1 tablet (324 mg) by mouth every 8 hours if needed for headaches or mild pain (1 - 3).    blood sugar diagnostic  "strip One Touch Ultra Test strips -  3 times per day sc 3X per meal for 90 days    calcitriol (Rocaltrol) 0.5 mcg capsule 1 capsule, oral, Daily, For 30 days    clopidogrel (PLAVIX) 75 mg, oral, Daily, For 90    dilTIAZem ER (TIAZAC) 240 mg, oral, Daily, on an empty stomach in the morning Orally Once a day for 90 days    HumuLIN R U-500 (Conc) Kwikpen 110 Units, subcutaneous, 2 times daily, 110 UNITS AT LUNCH  AT DINNER<BR>    isosorbide mononitrate ER (IMDUR) 60 mg, oral, Daily, For 90    nitroglycerin (Nitrostat) 0.4 mg SL tablet 1 tablet, sublingual, As needed, For 30 days    pen needle, diabetic 32 gauge x 5/32\" needle 2 times daily,  as directed sc bid for 90 days    potassium chloride CR 10 mEq ER tablet 1 tablet, oral, Daily, With food    propranolol XL (INNOPRAN XL) 120 mg, oral, Daily, For 90 days    rosuvastatin (Crestor) 20 mg tablet 1 tablet, oral, Daily, For 90    sodium bicarbonate 650 mg tablet 1 tablet, oral, 4 times daily, For 30    torsemide (Demadex) 100 mg tablet 0.5 tablets, oral, Every other day, For 90 days       Physical Exam:  HEENT: Normocephalic/atraumatic pupils equal react light  Neck exam mild JVD, no bruit  Lung exam clear to auscultation, few crackles at the bases  Cardiac exam is regular rhythm S1-S2, soft systolic murmur heard.   Abdomen soft nontender, nondistended  Extremities diffuse both leg edema with history of lymphedema.    Neuro exam grossly intact.  Last Labs:  CBC - 7/30/2024:  4:24 AM  10.9 10.5 421    32.0      CMP - 7/30/2024:  4:24 AM  8.5 7.4 27 --- 0.8   3.3 3.1 40 101      PTT - 7/28/2024:  8:28 PM  1.2   12.2 34.5     BNP   Date/Time Value Ref Range Status   07/18/2024 11:02 AM 69 0 - 99 pg/mL Final     LDL Calculated   Date/Time Value Ref Range Status   02/27/2024 02:15 PM 55 (L) 65 - 130 mg/dL Final   11/30/2022 02:35 PM 65 65 - 130 MG/DL Final   10/03/2022 03:21 PM 66 65 - 130 MG/DL Final   05/06/2022 03:05 PM 36 (L) 65 - 130 MG/DL Final          XR foot " left 3+ views  Narrative: Interpreted By:  Michelle Moseley,   STUDY:  XR FOOT LEFT 3+ VIEWS 7/30/2024 10:12 am      INDICATION:  Swelling and tenderness      COMPARISON:  10/16/2021      ACCESSION NUMBER(S):  [id number]      ORDERING CLINICIAN:  TRAMAINE PATE      TECHNIQUE:  Three views of left foot      FINDINGS:  There is narrowing of the tibiotalar articulation appearing unchanged  since the prior examination with narrowing of the joint spaces of the  midfoot and at the hindfoot/midfoot junction. There is exuberant  callus formation about the proximal to mid 3rd metatarsal due to old  healed traumatic injury. A plantar calcaneal spur is present.      There is severe edema of the distal left lower leg and ankle  extending into the foot.      No obvious bony destruction is seen.      Impression: No plain film evidence for osteomyelitis is observed. If clinically  indicated, MRI of the foot could be performed for further assessment.      Signed by: Michelle Moseley 7/30/2024 11:55 AM  Dictation workstation:   [id number]  Transthoracic Echo (TTE) [place] [address]              Phone [phone]    TRANSTHORACIC ECHOCARDIOGRAM REPORT    Patient Name:     ADRIENNE CEDILLO   Reading Physician:   [id number] Vicki Hendrickson MD  Study Date:       7/29/2024            Ordering Provider:   [id number] ARECHIGA  MRN/PID:          [id number]             Fellow:  Accession#:       [id number]         Nurse:  Date of           1965 / 59 years Sonographer:         Kamille Doe RDCS  Birth/Age:  Gender:           M                    Additional Staff:  Height:           167.64 cm            Admit Date:  Weight:           122.02 kg            Admission Status:    Inpatient - Routine  BSA / BMI:        2.27 m2 / 43.42      Department Location: [facility]                    kg/m2  Blood Pressure: 151 /85  mmHg    Study Type:    TRANSTHORACIC ECHO (TTE) COMPLETE  Diagnosis/ICD: Localized swelling, mass and lump, trunk-R22.2  Indication:    Lymphedema  CPT Codes:     Echo Complete w Full Doppler-[id number]    Patient History:  Pertinent History: Hyperlipidemia, CHF and Chest Pain. DM, venous insufficiency,                     CVD, CVA, DVT, CP, PE.    Study Detail: The following Echo studies were performed: M-Mode, Doppler, 2D and                color flow. Technically challenging study due to prominent lung                artifact.       PHYSICIAN INTERPRETATION:  Left Ventricle: The left ventricular systolic function is normal, with a visually estimated ejection fraction of 60-65%. There are no regional wall motion abnormalities. The left ventricular cavity size is normal. Spectral Doppler shows a normal pattern of left ventricular diastolic filling.  Left Atrium: The left atrium is normal in size.  Right Ventricle: The right ventricle is normal in size. There is normal right ventricular global systolic function.  Right Atrium: The right atrium is normal in size.  Aortic Valve: The aortic valve appears structurally normal. The aortic valve dimensionless index is 0.56. There is no evidence of aortic valve regurgitation. The peak instantaneous gradient of the aortic valve is 11.6 mmHg. The mean gradient of the aortic valve is 7.0 mmHg.  Mitral Valve: The mitral valve is abnormal. There is mild mitral annular calcification. There is no evidence of mitral valve regurgitation.  Tricuspid Valve: The tricuspid valve is structurally normal. There is trace tricuspid regurgitation. The right ventricular systolic pressure is unable to be estimated.  Pulmonic Valve: The pulmonic valve is structurally normal. There is physiologic pulmonic valve regurgitation.  Pericardium: There is a trivial pericardial effusion.  Aorta: The aortic root is normal.  Systemic Veins: The inferior vena cava appears mildly dilated. There is less than 50%  IVC collapse with inspiration.       CONCLUSIONS:   1. The left ventricular systolic function is normal, with a visually estimated ejection fraction of 60-65%.   2. There is normal right ventricular global systolic function.   3. No evidence of mitral valve regurgitation.   4. Trace tricuspid regurgitation is visualized.    QUANTITATIVE DATA SUMMARY:  2D MEASUREMENTS:                           Normal Ranges:  LAs:           3.40 cm   (2.7-4.0cm)  IVSd:          1.04 cm   (0.6-1.1cm)  LVPWd:         1.24 cm   (0.6-1.1cm)  LVIDd:         4.33 cm   (3.9-5.9cm)  LVIDs:         3.01 cm  LV Mass Index: 76.4 g/m2  LV % FS        30.5 %    LA VOLUME:                               Normal Ranges:  LA Vol A4C:        41.9 ml   (22+/-6mL/m2)  LA Vol Index A4C:  18.5ml/m2  LA Area A4C:       16.5 cm2  LA Major Axis A4C: 5.5 cm  LA Vol A4C:        39.2 ml    RA VOLUME BY A/L METHOD:                                Normal Ranges:  RA Vol A4C:        27.0 ml    (8.3-19.5ml)  RA Vol Index A4C:  11.9 ml/m2  RA Area A4C:       13.1 cm2  RA Major Axis A4C: 5.4 cm    M-MODE MEASUREMENTS:                   Normal Ranges:  Ao Root: 3.20 cm (2.0-3.7cm)    AORTA MEASUREMENTS:                     Normal Ranges:  Asc Ao, d: 2.80 cm (2.1-3.4cm)    LV SYSTOLIC FUNCTION BY 2D PLANIMETRY (MOD):                       Normal Ranges:  EF-A4C View:    68 % (>=55%)  EF-A2C View:    61 %  EF-Biplane:     65 %  EF-Visual:      63 %  LV EF Reported: 63 %    LV DIASTOLIC FUNCTION:                          Normal Ranges:  MV Peak E:    0.95 m/s  (0.7-1.2 m/s)  MV Peak A:    1.15 m/s  (0.42-0.7 m/s)  E/A Ratio:    0.82      (1.0-2.2)  MV e'         0.076 m/s (>8.0)  MV lateral e' 0.07 m/s  MV medial e'  0.08 m/s  E/e' Ratio:   12.51     (<8.0)    MITRAL VALVE:                  Normal Ranges:  MV DT: 263 msec (150-240msec)    AORTIC VALVE:                                     Normal Ranges:  AoV Vmax:                1.70 m/s  (<=1.7m/s)  AoV Peak PG:              11.6 mmHg (<20mmHg)  AoV Mean P.0 mmHg  (1.7-11.5mmHg)  LVOT Max Cristobal:            1.07 m/s  (<=1.1m/s)  AoV VTI:                 32.00 cm  (18-25cm)  LVOT VTI:                17.90 cm  LVOT Diameter:           2.00 cm   (1.8-2.4cm)  AoV Area, VTI:           1.76 cm2  (2.5-5.5cm2)  AoV Area,Vmax:           1.98 cm2  (2.5-4.5cm2)  AoV Dimensionless Index: 0.56       RIGHT VENTRICLE:  TAPSE: 27.5 mm  RV s'  0.18 m/s    TRICUSPID VALVE/RVSP:                    Normal Ranges:  IVC Diam: 2.37 cm       [id number] Vicki Hendrickson MD  Electronically signed on 2024 at 9:34:56 AM       ** Final **      Principal Problem:    Cellulitis  Active Problems:    History of coronary artery stent placement    History of pulmonary embolism    Lymphedema    Type 2 diabetes mellitus (Multi)    Chronic renal impairment, stage 3 (moderate) (Multi)    Assessment/Plan   59-year-old male patient with a multiple comorbid condition, history of hypertension hyperlipidemia Stru[place] CAD, PE in the past with a cellulitis and lymphedema.  Also CKD and diabetes.  1.  Cellulitis of both lower extremity: Continue current antibiotic also needs a lymphedema pump or management or wound management.  2.  CHF: Will check 2D echocardiogram.  Continue current diuretics.  Start patient on torsemide about 50 mg p.o. daily.  Also continue potassium.   3.  Hypertension: Continue current Imdur 60 mg daily.  Able to Cardizem  mg tablet once a day.  4.  History of PE: Continue current Eliquis.  Also continue Plavix.  4 hyperlipidemia: Continue current Crestor 20 mg once a day.    Critical care time is spent at bedside includes review of diagnostic tests, labs, and radiographs, serial assessments and management of hemodynamics, EKGs, old echoes, cardiac work-up and coordination of care.  Assessment, impression and plans are reflected in the note above as well as the orders.    Code Status:  Full Code  I spent 60 minutes in the professional and overall  care of this patient.  Channing Calderon MD

## 2024-07-30 NOTE — PROGRESS NOTES
Therapy Communication Note    Patient Name: Kevin Dc  MRN: [id number]  Today's Date: 7/30/2024    Discipline: Physical Therapy    Missed Visit Reason:      Missed Time: Cancel    Comment AM Cancel: Pending podiatry consult at this time.

## 2024-07-30 NOTE — PROGRESS NOTES
07/30/24 1106   Discharge Planning   Living Arrangements Alone   Support Systems Family members;Friends/neighbors   Type of Residence Private residence   Home or Post Acute Services None   Expected Discharge Disposition Home   Does the patient need discharge transport arranged? No     Cellulitis of the bilateral lower extremities  Lymphedema of the bilateral lower extremities  Patient is on IV ATB, to be determined PO or IV ATB after discharge. Patient is open to HHC if needed. Wounds: need to be seen by podiatry consult.     PLAN Home with possible HHC if needed.

## 2024-07-30 NOTE — CONSULTS
".Reason For Consult  Kidney disease stage III    History Of Present Illness  Kevin Dc \"partha\" is a 59 y.o. male is very well-known to my practice with underlying chronic kidney disease stage III baseline creatinine usually around 1.8 mg/dL, he is also known to have atherosclerotic heart disease, history of cerebrovascular accident, lymphedema, presented to the emergency room with pain and swelling as well as erythema in both lower extremities especially the left leg he was diagnosed with cellulitis of both lower extremities I was asked see the patient in consultation for his underlying kidney disease patient denies any fever chills denies any nausea vomiting diarrhea shortness of breath     Review of systems  10 Points review of system was done all negative except was positive with history of present illness    Past Medical History  He has a past medical history of Acute cor pulmonale (Multi) (02/20/2024), Anal fissure, Angina pectoris (CMS-HCC) (08/31/2023), Atherosclerotic heart disease of native coronary artery with other forms of angina pectoris (CMS-HCC) (08/31/2023), Atypical chest pain (08/31/2023), Cerebral vascular accident (Multi), Cerebrovascular accident (Multi) (08/31/2023), CHF (congestive heart failure) (Multi), Chronic kidney disease, stage 3 (Multi) (08/31/2023), CKD (chronic kidney disease), Clotting disorder (Multi), Deep venous thrombosis of lower extremity (Multi) (08/31/2023), Facial abscess, Heart failure (Multi) (08/31/2023), Hematochezia, Lymphedema (05/22/2018), Mixed hyperlipidemia (08/31/2023), Obesity, Palpitations (08/31/2023), Peripheral vascular disease (CMS-HCC) (08/31/2023), Peripheral vascular disease (CMS-HCC), Presence of coronary angioplasty implant and graft (03/24/2023), Primary hypertension (05/22/2018), Pulmonary embolism (Multi) (03/24/2023), Sleep apnea, Type 2 diabetes mellitus (Multi) (05/22/2018), Venous insufficiency, Venous insufficiency (chronic) (peripheral) " (03/24/2023), and Ventricular premature complex (08/31/2023).    Surgical History  He has a past surgical history that includes Cardiac catheterization; Carotid stent; and Colonoscopy.     Social History  He reports that he has never smoked. He has never been exposed to tobacco smoke. He has never used smokeless tobacco. He reports that he does not drink alcohol and does not use drugs.    Family History  Family History   Problem Relation Name Age of Onset    Heart disease Father      Other (double bypass with valve replacement) Father          Current Facility-Administered Medications:     acetaminophen (Tylenol) tablet 650 mg, 650 mg, oral, q4h PRN, 650 mg at 07/30/24 0109 **OR** acetaminophen (Tylenol) oral liquid 650 mg, 650 mg, oral, q4h PRN **OR** acetaminophen (Tylenol) suppository 650 mg, 650 mg, rectal, q4h PRN, Zackery Sue MD    allopurinol (Zyloprim) tablet 150 mg, 150 mg, oral, Daily, Zackery Sue MD, 150 mg at 07/30/24 0811    apixaban (Eliquis) tablet 5 mg, 5 mg, oral, BID, Zackery Sue MD, 5 mg at 07/30/24 0635    calcitriol (Rocaltrol) capsule 0.5 mcg, 0.5 mcg, oral, Daily, Zackery Sue MD, 0.5 mcg at 07/30/24 0812    clopidogrel (Plavix) tablet 75 mg, 75 mg, oral, Daily, Zackery Sue MD, 75 mg at 07/30/24 0811    dextrose 50 % injection 12.5 g, 12.5 g, intravenous, q15 min PRN, Zackery Sue MD    dextrose 50 % injection 25 g, 25 g, intravenous, q15 min PRN, Zackery Sue MD    dilTIAZem CD (Cardizem CD) 24 hr capsule 240 mg, 240 mg, oral, Daily, Zackery Sue MD, 240 mg at 07/30/24 0811    glucagon (Glucagen) injection 1 mg, 1 mg, intramuscular, q15 min PRN, Zackery Sue MD    guaiFENesin (Mucinex) 12 hr tablet 600 mg, 600 mg, oral, q12h PRN, Zackery Sue MD    [START ON 7/31/2024] insulin glargine (Lantus) injection 50 Units, 50 Units, subcutaneous, Daily, Zackery Sue MD    insulin lispro (HumaLOG) injection 0-15 Units, 0-15 Units, subcutaneous, TID, Zackery Sue MD, 6 Units at 07/30/24  0816    isosorbide mononitrate ER (Imdur) 24 hr tablet 60 mg, 60 mg, oral, Daily, Zackery Sue MD, 60 mg at 07/30/24 0811    melatonin tablet 3 mg, 3 mg, oral, Nightly PRN, Zackery Sue MD, 3 mg at 07/30/24 0109    nitroglycerin (Nitrostat) SL tablet 0.4 mg, 0.4 mg, sublingual, q5 min PRN, Zackeyr Sue MD    piperacillin-tazobactam (Zosyn) 3.375 g in dextrose (iso) IV 50 mL, 3.375 g, intravenous, q6h, Zackery Sue MD, Stopped at 07/30/24 1002    polyethylene glycol (Glycolax, Miralax) packet 17 g, 17 g, oral, Daily, Zackery Sue MD    potassium chloride CR (Klor-Con) ER tablet 10 mEq, 10 mEq, oral, Daily with breakfast, Zackery Sue MD, 10 mEq at 07/30/24 0811    propranolol (Inderal) tablet 60 mg, 60 mg, oral, Daily, Zackery Sue MD, 60 mg at 07/30/24 0636    rosuvastatin (Crestor) tablet 20 mg, 20 mg, oral, Daily, Zackery Sue MD, 20 mg at 07/30/24 0811    sodium bicarbonate tablet 650 mg, 650 mg, oral, 4x daily, Zackery Sue MD, 650 mg at 07/30/24 0636    torsemide (Demadex) tablet 50 mg, 50 mg, oral, Every other day, Zackery Sue MD, 50 mg at 07/29/24 0918   Allergies  Patient has no known allergies.         Physical Exam  Physical Exam  Constitutional:       General: He is not in acute distress.     Appearance: He is not toxic-appearing.   HENT:      Head: Normocephalic and atraumatic.   Eyes:      Extraocular Movements: Extraocular movements intact.      Pupils: Pupils are equal, round, and reactive to light.   Neck:      Vascular: No carotid bruit.   Cardiovascular:      Rate and Rhythm: Normal rate and regular rhythm.   Pulmonary:      Effort: No respiratory distress.      Breath sounds: No stridor. No wheezing, rhonchi or rales.   Chest:      Chest wall: No tenderness.   Abdominal:      General: There is no distension.      Palpations: There is no mass.      Tenderness: There is no abdominal tenderness. There is no right CVA tenderness, left CVA tenderness or guarding.      Hernia: No hernia is  present.   Musculoskeletal:         General: Swelling and tenderness present.      Cervical back: No rigidity.      Right lower leg: Edema (I believe this is mainly lymphedema) present.      Left lower leg: Edema (I believe this is mainly lymphedema) present.   Lymphadenopathy:      Cervical: No cervical adenopathy.   Skin:     General: Skin is warm and dry.      Coloration: Skin is not jaundiced or pale.      Findings: No bruising or erythema.   Neurological:      General: No focal deficit present.      Mental Status: He is alert and oriented to person, place, and time.   Psychiatric:         Mood and Affect: Mood normal.         Behavior: Behavior normal.              I&O 24HR    Intake/Output Summary (Last 24 hours) at 7/30/2024 1137  Last data filed at 7/30/2024 0151  Gross per 24 hour   Intake 740 ml   Output 1650 ml   Net -910 ml       Vitals 24HR  Heart Rate:  [72-86]   Temp:  [36.5 °C (97.7 °F)-37.1 °C (98.8 °F)]   Resp:  [16-18]   BP: (120-132)/(60-68)   SpO2:  [95 %]     Relevant Results        Results for orders placed or performed during the hospital encounter of 07/28/24 (from the past 96 hour(s))   CBC and Auto Differential   Result Value Ref Range    WBC 10.0 4.4 - 11.3 x10*3/uL    nRBC 0.0 0.0 - 0.0 /100 WBCs    RBC 4.12 (L) 4.50 - 5.90 x10*6/uL    Hemoglobin 12.3 (L) 13.5 - 17.5 g/dL    Hematocrit 36.4 (L) 41.0 - 52.0 %    MCV 88 80 - 100 fL    MCH 29.9 26.0 - 34.0 pg    MCHC 33.8 32.0 - 36.0 g/dL    RDW 13.6 11.5 - 14.5 %    Platelets 476 (H) 150 - 450 x10*3/uL    Neutrophils % 71.9 40.0 - 80.0 %    Immature Granulocytes %, Automated 2.4 (H) 0.0 - 0.9 %    Lymphocytes % 13.8 13.0 - 44.0 %    Monocytes % 9.3 2.0 - 10.0 %    Eosinophils % 2.3 0.0 - 6.0 %    Basophils % 0.3 0.0 - 2.0 %    Neutrophils Absolute 7.22 1.20 - 7.70 x10*3/uL    Immature Granulocytes Absolute, Automated 0.24 0.00 - 0.70 x10*3/uL    Lymphocytes Absolute 1.39 1.20 - 4.80 x10*3/uL    Monocytes Absolute 0.93 0.10 - 1.00 x10*3/uL     Eosinophils Absolute 0.23 0.00 - 0.70 x10*3/uL    Basophils Absolute 0.03 0.00 - 0.10 x10*3/uL   Comprehensive metabolic panel   Result Value Ref Range    Glucose 252 (H) 65 - 99 mg/dL    Sodium 135 133 - 145 mmol/L    Potassium 3.4 3.4 - 5.1 mmol/L    Chloride 101 97 - 107 mmol/L    Bicarbonate 20 (L) 24 - 31 mmol/L    Urea Nitrogen 38 (H) 8 - 25 mg/dL    Creatinine 2.20 (H) 0.40 - 1.60 mg/dL    eGFR 33 (L) >60 mL/min/1.73m*2    Calcium 8.8 8.5 - 10.4 mg/dL    Albumin 3.1 (L) 3.5 - 5.0 g/dL    Alkaline Phosphatase 101 35 - 125 U/L    Total Protein 7.4 5.9 - 7.9 g/dL    AST 27 5 - 40 U/L    Bilirubin, Total 0.8 0.1 - 1.2 mg/dL    ALT 40 5 - 40 U/L    Anion Gap 14 <=19 mmol/L   APTT   Result Value Ref Range    aPTT 34.5 (H) 22.0 - 32.5 seconds   Protime-INR   Result Value Ref Range    Protime 12.2 9.3 - 12.7 seconds    INR 1.2 0.9 - 1.2   BLOOD GAS VENOUS FULL PANEL   Result Value Ref Range    POCT pH, Venous 7.40 7.33 - 7.43 pH    POCT pCO2, Venous 36 (L) 41 - 51 mm Hg    POCT pO2, Venous 41 35 - 45 mm Hg    POCT SO2, Venous 64 45 - 75 %    POCT Oxy Hemoglobin, Venous 61.9 45.0 - 75.0 %    POCT Hematocrit Calculated, Venous 35.0 (L) 41.0 - 52.0 %    POCT Sodium, Venous 134 (L) 136 - 145 mmol/L    POCT Potassium, Venous 3.5 3.5 - 5.3 mmol/L    POCT Chloride, Venous 104 98 - 107 mmol/L    POCT Ionized Calicum, Venous 1.17 1.10 - 1.33 mmol/L    POCT Glucose, Venous 280 (H) 74 - 99 mg/dL    POCT Lactate, Venous 1.4 0.4 - 2.0 mmol/L    POCT Base Excess, Venous -2.1 (L) -2.0 - 3.0 mmol/L    POCT HCO3 Calculated, Venous 22.3 22.0 - 26.0 mmol/L    POCT Hemoglobin, Venous 11.8 (L) 13.5 - 17.5 g/dL    POCT Anion Gap, Venous 11.0 10.0 - 25.0 mmol/L    Patient Temperature 37.0 degrees Celsius    FiO2 21 %   CBC   Result Value Ref Range    WBC 9.5 4.4 - 11.3 x10*3/uL    nRBC 0.0 0.0 - 0.0 /100 WBCs    RBC 3.73 (L) 4.50 - 5.90 x10*6/uL    Hemoglobin 10.9 (L) 13.5 - 17.5 g/dL    Hematocrit 33.6 (L) 41.0 - 52.0 %    MCV 90 80 -  100 fL    MCH 29.2 26.0 - 34.0 pg    MCHC 32.4 32.0 - 36.0 g/dL    RDW 13.6 11.5 - 14.5 %    Platelets 416 150 - 450 x10*3/uL   Basic metabolic panel   Result Value Ref Range    Glucose 181 (H) 65 - 99 mg/dL    Sodium 138 133 - 145 mmol/L    Potassium 3.5 3.4 - 5.1 mmol/L    Chloride 108 (H) 97 - 107 mmol/L    Bicarbonate 20 (L) 24 - 31 mmol/L    Urea Nitrogen 36 (H) 8 - 25 mg/dL    Creatinine 2.10 (H) 0.40 - 1.60 mg/dL    eGFR 35 (L) >60 mL/min/1.73m*2    Calcium 8.3 (L) 8.5 - 10.4 mg/dL    Anion Gap 10 <=19 mmol/L   Hemoglobin A1c   Result Value Ref Range    Hemoglobin A1C 6.9 (H) See below %    Estimated Average Glucose 151 Not Established mg/dL   Creatine Kinase   Result Value Ref Range    Creatine Kinase 57 24 - 195 U/L   JERED without exercise   Result Value Ref Range    BSA 2.39 m2   POCT GLUCOSE   Result Value Ref Range    POCT Glucose 187 (H) 74 - 99 mg/dL   POCT GLUCOSE   Result Value Ref Range    POCT Glucose 192 (H) 74 - 99 mg/dL   Blood Culture    Specimen: Peripheral Venipuncture; Blood culture   Result Value Ref Range    Blood Culture Loaded on Instrument - Culture in progress    Transthoracic Echo (TTE) Complete   Result Value Ref Range    AV pk hansel 1.70 m/s    LVOT diam 2.00 cm    AV mn grad 7.0 mmHg    MV E/A ratio 0.82     Tricuspid annular plane systolic excursion 2.8 cm    LV Biplane EF 65 %    LV EF 63 %    RV free wall pk S' 17.60 cm/s    LVIDd 4.33 cm    AV pk grad 11.6 mmHg    Aortic Valve Area by Continuity of VTI 1.76 cm2    Aortic Valve Area by Continuity of Peak Velocity 1.98 cm2    LV A4C EF 68.5    POCT GLUCOSE   Result Value Ref Range    POCT Glucose 193 (H) 74 - 99 mg/dL   Tissue/Wound Culture/Smear    Specimen: Wound/Tissue; Tissue/Biopsy   Result Value Ref Range    Gram Stain No polymorphonuclear leukocytes seen     Gram Stain No organisms seen    POCT GLUCOSE   Result Value Ref Range    POCT Glucose 156 (H) 74 - 99 mg/dL   CBC   Result Value Ref Range    WBC 10.9 4.4 - 11.3 x10*3/uL     nRBC 0.0 0.0 - 0.0 /100 WBCs    RBC 3.52 (L) 4.50 - 5.90 x10*6/uL    Hemoglobin 10.5 (L) 13.5 - 17.5 g/dL    Hematocrit 32.0 (L) 41.0 - 52.0 %    MCV 91 80 - 100 fL    MCH 29.8 26.0 - 34.0 pg    MCHC 32.8 32.0 - 36.0 g/dL    RDW 13.5 11.5 - 14.5 %    Platelets 421 150 - 450 x10*3/uL   Basic metabolic panel   Result Value Ref Range    Glucose 225 (H) 65 - 99 mg/dL    Sodium 141 133 - 145 mmol/L    Potassium 3.6 3.4 - 5.1 mmol/L    Chloride 108 (H) 97 - 107 mmol/L    Bicarbonate 22 (L) 24 - 31 mmol/L    Urea Nitrogen 37 (H) 8 - 25 mg/dL    Creatinine 2.10 (H) 0.40 - 1.60 mg/dL    eGFR 35 (L) >60 mL/min/1.73m*2    Calcium 8.5 8.5 - 10.4 mg/dL    Anion Gap 11 <=19 mmol/L   POCT GLUCOSE   Result Value Ref Range    POCT Glucose 215 (H) 74 - 99 mg/dL   POCT GLUCOSE   Result Value Ref Range    POCT Glucose 210 (H) 74 - 99 mg/dL          Assessment/Plan     Transthoracic Echo (TTE) Complete    Result Date: 7/30/2024            [place] [address]             Phone [phone] TRANSTHORACIC ECHOCARDIOGRAM REPORT Patient Name:     ADRIENNE Pradhan Physician:   [id number] Vicki Hendrickson MD Study Date:       7/29/2024            Ordering Provider:   [id number] ARECHIGA MRN/PID:          [id number]             Fellow: Accession#:       [id number]         Nurse: Date of           1965 / 59 years Sonographer:         Kamille Doe RDCS Birth/Age: Gender:           M                    Additional Staff: Height:           167.64 cm            Admit Date: Weight:           122.02 kg            Admission Status:    Inpatient - Routine BSA / BMI:        2.27 m2 / 43.42      Department Location: [facility]                   kg/m2 Blood Pressure: 151 /85 mmHg Study Type:    TRANSTHORACIC ECHO (TTE) COMPLETE Diagnosis/ICD: Localized swelling, mass and lump, trunk-R22.2 Indication:    Lymphedema CPT Codes:     Echo  Complete w Full [id number] Patient History: Pertinent History: Hyperlipidemia, CHF and Chest Pain. DM, venous insufficiency,                    CVD, CVA, DVT, CP, PE. Study Detail: The following Echo studies were performed: M-Mode, Doppler, 2D and               color flow. Technically challenging study due to prominent lung               artifact.  PHYSICIAN INTERPRETATION: Left Ventricle: The left ventricular systolic function is normal, with a visually estimated ejection fraction of 60-65%. There are no regional wall motion abnormalities. The left ventricular cavity size is normal. Spectral Doppler shows a normal pattern of left ventricular diastolic filling. Left Atrium: The left atrium is normal in size. Right Ventricle: The right ventricle is normal in size. There is normal right ventricular global systolic function. Right Atrium: The right atrium is normal in size. Aortic Valve: The aortic valve appears structurally normal. The aortic valve dimensionless index is 0.56. There is no evidence of aortic valve regurgitation. The peak instantaneous gradient of the aortic valve is 11.6 mmHg. The mean gradient of the aortic valve is 7.0 mmHg. Mitral Valve: The mitral valve is abnormal. There is mild mitral annular calcification. There is no evidence of mitral valve regurgitation. Tricuspid Valve: The tricuspid valve is structurally normal. There is trace tricuspid regurgitation. The right ventricular systolic pressure is unable to be estimated. Pulmonic Valve: The pulmonic valve is structurally normal. There is physiologic pulmonic valve regurgitation. Pericardium: There is a trivial pericardial effusion. Aorta: The aortic root is normal. Systemic Veins: The inferior vena cava appears mildly dilated. There is less than 50% IVC collapse with inspiration.  CONCLUSIONS:  1. The left ventricular systolic function is normal, with a visually estimated ejection fraction of 60-65%.  2. There is normal right ventricular  global systolic function.  3. No evidence of mitral valve regurgitation.  4. Trace tricuspid regurgitation is visualized. QUANTITATIVE DATA SUMMARY: 2D MEASUREMENTS:                          Normal Ranges: LAs:           3.40 cm   (2.7-4.0cm) IVSd:          1.04 cm   (0.6-1.1cm) LVPWd:         1.24 cm   (0.6-1.1cm) LVIDd:         4.33 cm   (3.9-5.9cm) LVIDs:         3.01 cm LV Mass Index: 76.4 g/m2 LV % FS        30.5 % LA VOLUME:                              Normal Ranges: LA Vol A4C:        41.9 ml   (22+/-6mL/m2) LA Vol Index A4C:  18.5ml/m2 LA Area A4C:       16.5 cm2 LA Major Axis A4C: 5.5 cm LA Vol A4C:        39.2 ml RA VOLUME BY A/L METHOD:                               Normal Ranges: RA Vol A4C:        27.0 ml    (8.3-19.5ml) RA Vol Index A4C:  11.9 ml/m2 RA Area A4C:       13.1 cm2 RA Major Axis A4C: 5.4 cm M-MODE MEASUREMENTS:                  Normal Ranges: Ao Root: 3.20 cm (2.0-3.7cm) AORTA MEASUREMENTS:                    Normal Ranges: Asc Ao, d: 2.80 cm (2.1-3.4cm) LV SYSTOLIC FUNCTION BY 2D PLANIMETRY (MOD):                      Normal Ranges: EF-A4C View:    68 % (>=55%) EF-A2C View:    61 % EF-Biplane:     65 % EF-Visual:      63 % LV EF Reported: 63 % LV DIASTOLIC FUNCTION:                         Normal Ranges: MV Peak E:    0.95 m/s  (0.7-1.2 m/s) MV Peak A:    1.15 m/s  (0.42-0.7 m/s) E/A Ratio:    0.82      (1.0-2.2) MV e'         0.076 m/s (>8.0) MV lateral e' 0.07 m/s MV medial e'  0.08 m/s E/e' Ratio:   12.51     (<8.0) MITRAL VALVE:                 Normal Ranges: MV DT: 263 msec (150-240msec) AORTIC VALVE:                                    Normal Ranges: AoV Vmax:                1.70 m/s  (<=1.7m/s) AoV Peak P.6 mmHg (<20mmHg) AoV Mean P.0 mmHg  (1.7-11.5mmHg) LVOT Max Cristobal:            1.07 m/s  (<=1.1m/s) AoV VTI:                 32.00 cm  (18-25cm) LVOT VTI:                17.90 cm LVOT Diameter:           2.00 cm   (1.8-2.4cm) AoV Area, VTI:            1.76 cm2  (2.5-5.5cm2) AoV Area,Vmax:           1.98 cm2  (2.5-4.5cm2) AoV Dimensionless Index: 0.56  RIGHT VENTRICLE: TAPSE: 27.5 mm RV s'  0.18 m/s TRICUSPID VALVE/RVSP:                   Normal Ranges: IVC Diam: 2.37 cm  [id number] Vicki Hendrickson MD Electronically signed on 7/30/2024 at 9:34:56 AM  ** Final **     Lower extremity venous duplex bilateral    Result Date: 7/29/2024  Interpreted By:  Eusebio Souza, STUDY: [facility] US LOWER EXTREMITY VENOUS DUPLEX BILATERAL;  7/29/2024 7:55 am   INDICATION: Signs/Symptoms:Bilateral lower extremity lymphedema with cellulitis.   COMPARISON: None.   ACCESSION NUMBER(S): [id number]   ORDERING CLINICIAN: GIOVANNA ARECHIGA   TECHNIQUE: Vascular ultrasound of the bilateral lower extremities was performed. Real-time compression views as well as Gray scale, color Doppler and spectral Doppler waveform analysis was performed.   FINDINGS: Evaluation of the visualized portions of the bilateral common femoral vein, proximal, mid, and distal femoral vein, and popliteal vein were performed.  Calf veins could not be adequately evaluated secondary to swelling and limited acoustic windows.   The evaluated veins demonstrate normal compressibility. There is intact venous flow demonstrating normal respiratory variability and normal augmentation of flow with calf compression. Therefore, there is no ultrasonographic evidence for deep vein thrombosis within the evaluated veins.       No sonographic evidence for deep vein thrombosis within the evaluated veins of the lower extremities bilaterally.   MACRO: None.   Signed by: Eusebio Souza 7/29/2024 8:27 AM Dictation workstation:   [id number]    JERED without exercise    Result Date: 7/29/2024  Preliminary Cardiology Report            [facility] [address], [place] [id number]             Phone [phone]            Preliminary Vascular Lab Report  [facility] ANKLE BRACHIAL INDEX (JERED) WITHOUT EXERCISE  Patient Name:     ADRIENNE CEDILLO  Reading Physician:  [id number] Arely Guillen MD Study Date:       7/29/2024          Ordering Provider:  [id number] GIOVANNA ARECHIGA MRN/PID:          [id number]           Fellow: Accession#:       [id number]       Technologist:       LING LOOMIS RDMS YOB: 1965          Technologist 2: Gender:           M                  Encounter#:         [id number] Admission Status: Inpatient          Location Performed: [facility]  Diagnosis/ICD: Lymphedema, not elsewhere classified-I89.0; Cellulitus of right                lower limb-L03.115; Cellulitus of left lower limb-L03.116  PRELIMINARY CONCLUSIONS:  Right Lower PVR: Normal digital perfusion noted. Multiphasic flow is noted in the right common femoral artery, right popliteal artery, right posterior tibial artery and right dorsalis pedis artery. Extreme swelling to lower legs. Left Lower PVR: Normal digital perfusion noted. Multiphasic flow is noted in the left common femoral artery, left popliteal artery, left posterior tibial artery and left dorsalis pedis artery. Extreme swelling to lower legs.  Imaging & Doppler Findings:  RIGHT Lower PVR                Pressures Ratios Right Posterior Tibial (Ankle) 255 mmHg  1.71 Right Dorsalis Pedis (Ankle)   255 mmHg  1.71 Right Digit (Great Toe)        255 mmHg  1.71   LEFT Lower PVR                Pressures Ratios Left Posterior Tibial (Ankle) 255 mmHg  1.71 Left Dorsalis Pedis (Ankle)   255 mmHg  1.71 Left Digit (Great Toe)        172 mmHg  1.15                      Right     Left Brachial Pressure 149 mmHg 139 mmHg   VASCULAR PRELIMINARY REPORT completed by Ling Warner on 7/29/2024 at 8:24:25 AM  ** Final **      Impression;  Chronic kidney disease stage III to me that renal function is at baseline at this time  Cellulitis lower extremities  Lymphedema  Diabetes mellitus type 2  Hypertension  Coronary artery disease with stent placement  Mild anemia chronic kidney disease    Recommendations:  With the  antibiotic therapy with Zosyn however reduce the frequency to every 8 hours because of the low GFR  Remove fluid restriction  Avoid nephrotoxins  Monitor renal function very closely no indication for dialysis therapy  Thank you for your consultation    Hernán Chance MDInpatient consult to Nephrology  Consult performed by: Hernán Chance MD  Consult ordered by: LIANA Mcfarlane-CNP

## 2024-07-30 NOTE — PROGRESS NOTES
"Physical Therapy Evaluation    Patient Name: Kevin Dc \"partha\"  MRN: [id number]  Today's Date: 7/30/2024   Time Calculation  Start Time: 1423  Stop Time: 1442  Time Calculation (min): 19 min    Assessment/Plan   PT Assessment  PT Assessment Results: Decreased strength, Impaired balance, Decreased endurance, Decreased mobility, Decreased safety awareness, Obesity, Decreased skin integrity, Orthopedic restrictions, Pain  Rehab Prognosis: Good  Barriers to Discharge: assist x2 transfer, unable to ambulate, lives alone and is without added help at home  Evaluation/Treatment Tolerance: Patient limited by pain, Patient limited by fatigue  Medical Staff Made Aware: Yes  End of Session Communication: Bedside nurse  Assessment Comment: 59 year old male admits with BLE cellulitis, BLE lymphedema, DB, CKD, CHF, HTN, and HLD. On eval, he demonstrates impaired safe mobility warranting skilled PT care. At DC, moderate intensity rehab is recommended.  End of Session Patient Position: Up in chair, Alarm on  IP OR SWING BED PT PLAN  Inpatient or Swing Bed: Inpatient  PT Plan  Treatment/Interventions: Bed mobility, Transfer training, Gait training, Balance training, Strengthening, Endurance training, Range of motion, Therapeutic exercise, Home exercise program, Therapeutic activity, Stair training  PT Plan: Ongoing PT  PT Frequency: 4 times per week  PT Discharge Recommendations: Moderate intensity level of continued care  Equipment Recommended upon Discharge: Wheeled walker  PT Recommended Transfer Status: Assist x2, Assistive device  PT - OK to Discharge: Yes      Subjective   General Visit Information:  General  Reason for Referral: Impaired Mobility  Referred By: Dr. Sue  Past Medical History Relevant to Rehab: DB, CKD, HTN, HLD  Family/Caregiver Present: No  Prior to Session Communication: Bedside nurse  Patient Position Received:  (Sitting at EOB with OT)  Preferred Learning Style: verbal  General Comment: 59 year old " male admits with BLE cellulitis, BLE lymphedema, DB, CKD, CHF, HTN, and HLD  Home Living:  Home Living  Type of Home: Mobile home  Lives With: Alone  Home Adaptive Equipment: Crutches  Home Layout: One level  Home Access: Stairs to enter with rails  Entrance Stairs-Rails: Both  Entrance Stairs-Number of Steps: 3  Bathroom Shower/Tub: Walk-in shower  Bathroom Equipment: Grab bars in shower  Prior Level of Function:  Prior Function Per Pt/Caregiver Report  Level of Bartholomew: Independent with ADLs and functional transfers, Independent with homemaking with ambulation  ADL Assistance: Independent  Homemaking Assistance: Independent  Ambulatory Assistance: Independent  Prior Function Comments: Denies falls/ last 6 months  Precautions:  Precautions  Medical Precautions: Fall precautions    Objective   Pain:  Pain Assessment  Pain Assessment: 0-10  0-10 (Numeric) Pain Score: 7  Pain Type: Acute pain  Pain Location: Foot  Pain Orientation: Left  Pain Interventions: Ambulation/increased activity  Response to Interventions: unchanged with WB and transfers  Cognition:  Cognition  Overall Cognitive Status: Within Functional Limits    General Assessments:  Activity Tolerance  Endurance: Decreased tolerance for upright activites  Activity Tolerance Comments: Fatigues quickly in standing    Sensation  Light Touch: No apparent deficits    Functional Assessments:  Transfers  Transfer: Yes  Transfer 1  Transfer From 1: Bed to  Transfer to 1: Stand  Technique 1: Sit to stand, Stand to sit  Transfer Device 1: Walker  Transfer Level of Assistance 1: Minimum assistance, +2  Trials/Comments 1: tolerates standing for 30-45s. Very unsteady on feet requiring Min A x2 continuous to steady+steady AD. On initial standing, he stands essentially NWB by his choice on on LLE due to pain  Transfers 2  Transfer From 2: Bed to  Transfer to 2: Chair with arms  Technique 2: Stand pivot  Transfer Device 2: Walker  Transfer Level of Assistance 2:  Minimum assistance, +2  Trials/Comments 2: assist to upright, control unsteadiness, control AD, pivot, and lower safely back down into chair without plopping. Cues on technique    Ambulation/Gait Training  Ambulation/Gait Training Performed: Yes  Ambulation/Gait Training 1  Surface 1: Level tile  Device 1: Rolling walker  Assistance 1: Minimum assistance (x2)  Quality of Gait 1:  (slow, very shuffled and minimal steps. Very antalgic. Max tolerance to gait at this time)  Comments/Distance (ft) 1: 2' sideways to R    Outcome Measures:  [facility] Basic Mobility  Turning from your back to your side while in a flat bed without using bedrails: A little  Moving from lying on your back to sitting on the side of a flat bed without using bedrails: A little  Moving to and from bed to chair (including a wheelchair): A lot  Standing up from a chair using your arms (e.g. wheelchair or bedside chair): A lot  To walk in hospital room: Total  Climbing 3-5 steps with railing: Total  Basic Mobility - Total Score: 12    Encounter Problems       Encounter Problems (Active)       Balance       Sitting Balance (Progressing)       Start:  07/30/24    Expected End:  08/13/24       Pt will demonstrate good sitting balance to promote safe engagement with out of bed activities           Standing Balance (Progressing)       Start:  07/30/24    Expected End:  08/13/24       Pt will demonstrate good static standing balance to promote safe participation with out of bed activity, transfers, and mobility              Mobility       Ambulation (Progressing)       Start:  07/30/24    Expected End:  08/13/24       Pt will ambulate 50' modified independent assist with walker to promote safe home mobility           Entry Stair Negotiation (Progressing)       Start:  07/30/24    Expected End:  08/13/24       Pt will ascend/descend 3 stairs with rail(s) on B and modified independent assist to promote safe entry and exit in home environment                PT  Transfers       Supine to sit (Progressing)       Start:  07/30/24    Expected End:  08/13/24       Pt will transfer supine to sitting at edge of bed with modified independent assist to promote acute care out of bed activity           Sit to stand (Progressing)       Start:  07/30/24    Expected End:  08/13/24       Pt will transfer sit to standing position with modified independent assist and walker to promote safe out of bed activity           Bed to chair (Progressing)       Start:  07/30/24    Expected End:  08/13/24       Pt will transfer from sitting edge of bed to the chair with modified independent assist and walker to promote out of bed activity and reduce the risks of prolonged acute care bedrest              Pain - Adult          Safety       Safe Mobility Techniques (Progressing)       Start:  07/30/24    Expected End:  08/13/24       Pt will correctly identify and demonstrate safe mobility techniques to reduce their risks for falls during their acute care stay                   Education Documentation  Mobility Training, taught by Enrique Carbajal, PT at 7/30/2024  3:07 PM.  Learner: Patient  Readiness: Acceptance  Method: Explanation, Demonstration  Response: Needs Reinforcement  Comment: safe mobility techniques    Education Comments  No comments found.

## 2024-07-30 NOTE — CONSULTS
Inpatient consult to Podiatry  Consult performed by: Barrett Causey DPM  Consult ordered by: LIANA Mcfarlane-JEANINE          Reason For Consult  Bilateral lower extremity edema    History Of Present Illness  partha Dc is a 59 y.o. male presenting with bilateral lower extremity edema.  He says this has been ongoing for years.  In the past he has gone to wound care clinic.  He has also gone to a lymphedema clinic at [facility].  Unfortunately he was unable to continue care at the lymphedema clinic secondary to the cost.  He did feel that the wrapping of his legs was helpful when he was having that done.  He denies any nausea vomiting fever chills today.     Past Medical History  He has a past medical history of Acute cor pulmonale (Multi) (02/20/2024), Anal fissure, Angina pectoris (CMS-HCC) (08/31/2023), Atherosclerotic heart disease of native coronary artery with other forms of angina pectoris (CMS-HCC) (08/31/2023), Atypical chest pain (08/31/2023), Cerebral vascular accident (Multi), Cerebrovascular accident ([facility]) (08/31/2023), CHF (congestive heart failure) (Multi), Chronic kidney disease, stage 3 (Multi) (08/31/2023), CKD (chronic kidney disease), Clotting disorder (Multi), Deep venous thrombosis of lower extremity (Multi) (08/31/2023), Facial abscess, Heart failure (Multi) (08/31/2023), Hematochezia, Lymphedema (05/22/2018), Mixed hyperlipidemia (08/31/2023), Obesity, Palpitations (08/31/2023), Peripheral vascular disease (CMS-HCC) (08/31/2023), Peripheral vascular disease (CMS-HCC), Presence of coronary angioplasty implant and graft (03/24/2023), Primary hypertension (05/22/2018), Pulmonary embolism (Multi) (03/24/2023), Sleep apnea, Type 2 diabetes mellitus (Multi) (05/22/2018), Venous insufficiency, Venous insufficiency (chronic) (peripheral) (03/24/2023), and Ventricular premature complex (08/31/2023).    Surgical History  He has a past surgical history that includes Cardiac catheterization;  Carotid stent; and Colonoscopy.     Social History  He reports that he has never smoked. He has never been exposed to tobacco smoke. He has never used smokeless tobacco. He reports that he does not drink alcohol and does not use drugs.    Family History  Family History   Problem Relation Name Age of Onset    Heart disease Father      Other (double bypass with valve replacement) Father          Allergies  Patient has no known allergies.    Review of Systems    REVIEW OF SYSTEMS  GENERAL:  Negative for malaise, significant weight loss, fever  HEENT:  No changes in hearing or vision, no nose bleeds or other nasal problems and Negative for frequent or significant headaches  NECK:  Negative for lumps, goiter, pain and significant neck swelling  RESPIRATORY:  Negative for cough, wheezing and shortness of breath  CARDIOVASCULAR: Significant bilateral lower extremity edema.  GI:  Negative for abdominal discomfort, blood in stools or black stools and change in bowel habits  :  Negative for dysuria, frequency and incontinence  MUSCULOSKELETAL:  Negative for joint pain or swelling, back pain, and muscle pain.  SKIN: Venous stasis dermatitis bilateral lower legs.  PSYCH:  Negative for sleep disturbance, mood disorder and recent psychosocial stressors  HEMATOLOGY/LYMPHOLOGY:  Negative for prolonged bleeding, bruising easily, and swollen nodes.  ENDOCRINE:  Negative for cold or heat intolerance, polyuria, polydipsia and goiter  NEURO: Negative, denies any burning, tingling or numbness     Objective:   Vasc: Unable to palpate pedal pulses bilaterally secondary to edema.  However CFT less than 3 seconds bilaterally.  There is venous stasis dermatitis bilateral lower legs with associated erythema and scaling.    Neuro:  light touch sensation intact.  Protective sensation intact.    Derm: Bilaterally there is significant venous stasis dermatitis with associated erythema.  Significant lymphedema 4+ pitting edema to bilateral lower  "extremity between the knee to the ankle and including the foot.  There is some scaling associated with the dermatitis.  There is a macerated area on the posterior heel like a stage 0 pressure sore.  No decubitus area.  There is no other ulcerations or wounds appreciated.    Ortho: Significantly increased contour to the bilateral lower legs secondary to edema.     Physical Exam     Last Recorded Vitals  Blood pressure 132/66, pulse 73, temperature 36.5 °C (97.7 °F), temperature source Oral, resp. rate 18, height 1.676 m (5' 6\"), weight 122 kg (269 lb 6.4 oz), SpO2 95%.    Relevant Results  Radiographs left foot: No evidence of osteomyelitis or soft tissue gas forming organism.    Venous duplex: No evidence of DVT within the evaluated veins.  Technically difficult study.  PVR: Multiphasic flow to PT and DP bilaterally.       Assessment/Plan     1.  Lymphedema bilateral lower extremities  2.  Venous stasis dermatitis bilateral lower extremity    -No need for surgical intervention.  -Compression wrap with Ace bandages from the toes to the knee.  I will change out again tomorrow.  -Agree with empiric IV antibiotics given cellulitis.    -No further podiatric intervention this admission.  I will continue to wrap his legs while in-house.  I recommend he follow-up at a lymphedema clinic however it does seem this may be cost prohibitive.    I spent 50 minutes in the professional and overall care of this patient.      Barrett Causey D.P.M.    "

## 2024-07-30 NOTE — PROGRESS NOTES
partha Dc is a 59 y.o. male on day 0 of admission presenting with Cellulitis.      Subjective   Patient seen and examined. Patient resting in litter A&Ox4. No documented concerns/events overnight from nursing. He is afebrile. He reports left heel pain that is better when his foot is elevated. Upon inspection, there is small blanching of the lateral portion of the heel approximately 1 cm x 1 inch where he has indicated the pain is. He denies CP, SOB, Nausea, Fever, Chill, and night sweats.        Objective     Last Recorded Vitals  /66 (BP Location: Left arm, Patient Position: Lying)   Pulse 73   Temp 36.5 °C (97.7 °F) (Oral)   Resp 18   Wt 122 kg (269 lb 6.4 oz)   SpO2 95%   Intake/Output last 3 Shifts:    Intake/Output Summary (Last 24 hours) at 7/30/2024 1221  Last data filed at 7/30/2024 0151  Gross per 24 hour   Intake 740 ml   Output 950 ml   Net -210 ml       Admission Weight  Weight: 126 kg (278 lb) (07/28/24 2017)    Daily Weight  07/29/24 : 122 kg (269 lb 6.4 oz)        Physical Exam  Constitutional:       General: He is not in acute distress.     Appearance: He is ill-appearing.   HENT:      Head: Normocephalic.      Mouth/Throat:      Mouth: Mucous membranes are moist.      Pharynx: Oropharynx is clear.   Cardiovascular:      Rate and Rhythm: Normal rate and regular rhythm.      Pulses: Normal pulses.      Heart sounds: Normal heart sounds.   Pulmonary:      Effort: Pulmonary effort is normal.      Breath sounds: Normal breath sounds.   Abdominal:      General: Bowel sounds are normal.      Palpations: Abdomen is soft.   Musculoskeletal:         General: Swelling and tenderness present. Normal range of motion.      Cervical back: Normal range of motion.      Right lower leg: Edema present.      Left lower leg: Edema present.      Comments: Severe lymphedema with pitting edema of the bilateral lower extremities with erythema, and scaling. Erythema L>R   Skin:     General: Skin is warm and  dry.      Capillary Refill: Capillary refill takes less than 2 seconds.      Comments: blanching of the lateral portion of the heel approximately 1 cm x 1 inch    Neurological:      Mental Status: He is alert and oriented to person, place, and time.   Psychiatric:         Mood and Affect: Mood normal.         Relevant Results  Scheduled medications  allopurinol, 150 mg, oral, Daily  apixaban, 5 mg, oral, BID  calcitriol, 0.5 mcg, oral, Daily  clopidogrel, 75 mg, oral, Daily  dilTIAZem ER, 240 mg, oral, Daily  [START ON 7/31/2024] insulin glargine, 50 Units, subcutaneous, Daily  insulin lispro, 0-15 Units, subcutaneous, TID  isosorbide mononitrate ER, 60 mg, oral, Daily  piperacillin-tazobactam, 3.375 g, intravenous, q8h  polyethylene glycol, 17 g, oral, Daily  potassium chloride CR, 10 mEq, oral, Daily with breakfast  propranolol, 60 mg, oral, Daily  rosuvastatin, 20 mg, oral, Daily  sodium bicarbonate, 650 mg, oral, 4x daily  torsemide, 50 mg, oral, Every other day      Continuous medications     PRN medications  PRN medications: acetaminophen **OR** acetaminophen **OR** acetaminophen, dextrose, dextrose, glucagon, guaiFENesin, melatonin, nitroglycerin     Results for orders placed or performed during the hospital encounter of 07/28/24 (from the past 24 hour(s))   Transthoracic Echo (TTE) Complete   Result Value Ref Range    AV pk hansel 1.70 m/s    LVOT diam 2.00 cm    AV mn grad 7.0 mmHg    MV E/A ratio 0.82     Tricuspid annular plane systolic excursion 2.8 cm    LV Biplane EF 65 %    LV EF 63 %    RV free wall pk S' 17.60 cm/s    LVIDd 4.33 cm    AV pk grad 11.6 mmHg    Aortic Valve Area by Continuity of VTI 1.76 cm2    Aortic Valve Area by Continuity of Peak Velocity 1.98 cm2    LV A4C EF 68.5    POCT GLUCOSE   Result Value Ref Range    POCT Glucose 193 (H) 74 - 99 mg/dL   Tissue/Wound Culture/Smear    Specimen: Wound/Tissue; Tissue/Biopsy   Result Value Ref Range    Gram Stain No polymorphonuclear leukocytes  seen     Gram Stain No organisms seen    POCT GLUCOSE   Result Value Ref Range    POCT Glucose 156 (H) 74 - 99 mg/dL   CBC   Result Value Ref Range    WBC 10.9 4.4 - 11.3 x10*3/uL    nRBC 0.0 0.0 - 0.0 /100 WBCs    RBC 3.52 (L) 4.50 - 5.90 x10*6/uL    Hemoglobin 10.5 (L) 13.5 - 17.5 g/dL    Hematocrit 32.0 (L) 41.0 - 52.0 %    MCV 91 80 - 100 fL    MCH 29.8 26.0 - 34.0 pg    MCHC 32.8 32.0 - 36.0 g/dL    RDW 13.5 11.5 - 14.5 %    Platelets 421 150 - 450 x10*3/uL   Basic metabolic panel   Result Value Ref Range    Glucose 225 (H) 65 - 99 mg/dL    Sodium 141 133 - 145 mmol/L    Potassium 3.6 3.4 - 5.1 mmol/L    Chloride 108 (H) 97 - 107 mmol/L    Bicarbonate 22 (L) 24 - 31 mmol/L    Urea Nitrogen 37 (H) 8 - 25 mg/dL    Creatinine 2.10 (H) 0.40 - 1.60 mg/dL    eGFR 35 (L) >60 mL/min/1.73m*2    Calcium 8.5 8.5 - 10.4 mg/dL    Anion Gap 11 <=19 mmol/L   POCT GLUCOSE   Result Value Ref Range    POCT Glucose 215 (H) 74 - 99 mg/dL   POCT GLUCOSE   Result Value Ref Range    POCT Glucose 210 (H) 74 - 99 mg/dL      XR foot left 3+ views    Result Date: 7/30/2024  Interpreted By:  Michelle Moseley, STUDY: XR FOOT LEFT 3+ VIEWS 7/30/2024 10:12 am   INDICATION: Swelling and tenderness   COMPARISON: 10/16/2021   ACCESSION NUMBER(S): [id number]   ORDERING CLINICIAN: TRAMAINE PATE   TECHNIQUE: Three views of left foot   FINDINGS: There is narrowing of the tibiotalar articulation appearing unchanged since the prior examination with narrowing of the joint spaces of the midfoot and at the hindfoot/midfoot junction. There is exuberant callus formation about the proximal to mid 3rd metatarsal due to old healed traumatic injury. A plantar calcaneal spur is present.   There is severe edema of the distal left lower leg and ankle extending into the foot.   No obvious bony destruction is seen.       No plain film evidence for osteomyelitis is observed. If clinically indicated, MRI of the foot could be performed for further assessment.   Signed  by: Michelle Moseley 7/30/2024 11:55 AM Dictation workstation:   [id number]    Transthoracic Echo (TTE) Complete    Result Date: 7/30/2024            [place] [id number] , [place] [id number]             Phone [phone] TRANSTHORACIC ECHOCARDIOGRAM REPORT Patient Name:     ADRIENNE CEDILLO   Reading Physician:   [id number] Vicki Hendrickson MD Study Date:       7/29/2024            Ordering Provider:   [id number] ARECHIGA MRN/PID:          [id number]             Fellow: Accession#:       [id number]         Nurse: Date of           1965 / 59 years Sonographer:         Kamille Doe RDCS Birth/Age: Gender:           M                    Additional Staff: Height:           167.64 cm            Admit Date: Weight:           122.02 kg            Admission Status:    Inpatient - Routine BSA / BMI:        2.27 m2 / 43.42      [facility]                   kg/m2 Blood Pressure: 151 /85 mmHg Study Type:    TRANSTHORACIC ECHO (TTE) COMPLETE Diagnosis/ICD: Localized swelling, mass and lump, trunk-R22.2 Indication:    Lymphedema CPT Codes:     Echo Complete w Full [id number] Patient History: Pertinent History: Hyperlipidemia, CHF and Chest Pain. DM, venous insufficiency,                    CVD, CVA, DVT, CP, PE. Study Detail: The following Echo studies were performed: M-Mode, Doppler, 2D and               color flow. Technically challenging study due to prominent lung               artifact.  PHYSICIAN INTERPRETATION: Left Ventricle: The left ventricular systolic function is normal, with a visually estimated ejection fraction of 60-65%. There are no regional wall motion abnormalities. The left ventricular cavity size is normal. Spectral Doppler shows a normal pattern of left ventricular diastolic filling. Left Atrium: The left atrium is normal in size. Right Ventricle: The right ventricle is normal in size. There is normal  right ventricular global systolic function. Right Atrium: The right atrium is normal in size. Aortic Valve: The aortic valve appears structurally normal. The aortic valve dimensionless index is 0.56. There is no evidence of aortic valve regurgitation. The peak instantaneous gradient of the aortic valve is 11.6 mmHg. The mean gradient of the aortic valve is 7.0 mmHg. Mitral Valve: The mitral valve is abnormal. There is mild mitral annular calcification. There is no evidence of mitral valve regurgitation. Tricuspid Valve: The tricuspid valve is structurally normal. There is trace tricuspid regurgitation. The right ventricular systolic pressure is unable to be estimated. Pulmonic Valve: The pulmonic valve is structurally normal. There is physiologic pulmonic valve regurgitation. Pericardium: There is a trivial pericardial effusion. Aorta: The aortic root is normal. Systemic Veins: The inferior vena cava appears mildly dilated. There is less than 50% IVC collapse with inspiration.  CONCLUSIONS:  1. The left ventricular systolic function is normal, with a visually estimated ejection fraction of 60-65%.  2. There is normal right ventricular global systolic function.  3. No evidence of mitral valve regurgitation.  4. Trace tricuspid regurgitation is visualized. QUANTITATIVE DATA SUMMARY: 2D MEASUREMENTS:                          Normal Ranges: LAs:           3.40 cm   (2.7-4.0cm) IVSd:          1.04 cm   (0.6-1.1cm) LVPWd:         1.24 cm   (0.6-1.1cm) LVIDd:         4.33 cm   (3.9-5.9cm) LVIDs:         3.01 cm LV Mass Index: 76.4 g/m2 LV % FS        30.5 % LA VOLUME:                              Normal Ranges: LA Vol A4C:        41.9 ml   (22+/-6mL/m2) LA Vol Index A4C:  18.5ml/m2 LA Area A4C:       16.5 cm2 LA Major Axis A4C: 5.5 cm LA Vol A4C:        39.2 ml RA VOLUME BY A/L METHOD:                               Normal Ranges: RA Vol A4C:        27.0 ml    (8.3-19.5ml) RA Vol Index A4C:  11.9 ml/m2 RA Area A4C:        13.1 cm2 RA Major Axis A4C: 5.4 cm M-MODE MEASUREMENTS:                  Normal Ranges: Ao Root: 3.20 cm (2.0-3.7cm) AORTA MEASUREMENTS:                    Normal Ranges: Asc Ao, d: 2.80 cm (2.1-3.4cm) LV SYSTOLIC FUNCTION BY 2D PLANIMETRY (MOD):                      Normal Ranges: EF-A4C View:    68 % (>=55%) EF-A2C View:    61 % EF-Biplane:     65 % EF-Visual:      63 % LV EF Reported: 63 % LV DIASTOLIC FUNCTION:                         Normal Ranges: MV Peak E:    0.95 m/s  (0.7-1.2 m/s) MV Peak A:    1.15 m/s  (0.42-0.7 m/s) E/A Ratio:    0.82      (1.0-2.2) MV e'         0.076 m/s (>8.0) MV lateral e' 0.07 m/s MV medial e'  0.08 m/s E/e' Ratio:   12.51     (<8.0) MITRAL VALVE:                 Normal Ranges: MV DT: 263 msec (150-240msec) AORTIC VALVE:                                    Normal Ranges: AoV Vmax:                1.70 m/s  (<=1.7m/s) AoV Peak P.6 mmHg (<20mmHg) AoV Mean P.0 mmHg  (1.7-11.5mmHg) LVOT Max Cristobal:            1.07 m/s  (<=1.1m/s) AoV VTI:                 32.00 cm  (18-25cm) LVOT VTI:                17.90 cm LVOT Diameter:           2.00 cm   (1.8-2.4cm) AoV Area, VTI:           1.76 cm2  (2.5-5.5cm2) AoV Area,Vmax:           1.98 cm2  (2.5-4.5cm2) AoV Dimensionless Index: 0.56  RIGHT VENTRICLE: TAPSE: 27.5 mm RV s'  0.18 m/s TRICUSPID VALVE/RVSP:                   Normal Ranges: IVC Diam: 2.37 cm  [id number] Vicki Hendrickson MD Electronically signed on 2024 at 9:34:56 AM  ** Final **     Lower extremity venous duplex bilateral    Result Date: 2024  Interpreted By:  Eusebio Souza, STUDY: [facility] US LOWER EXTREMITY VENOUS DUPLEX BILATERAL;  2024 7:55 am   INDICATION: Signs/Symptoms:Bilateral lower extremity lymphedema with cellulitis.   COMPARISON: None.   ACCESSION NUMBER(S): [id number]   ORDERING CLINICIAN: GIOVANNA ARECHIGA   TECHNIQUE: Vascular ultrasound of the bilateral lower extremities was performed. Real-time compression views as well as Gray scale,  color Doppler and spectral Doppler waveform analysis was performed.   FINDINGS: Evaluation of the visualized portions of the bilateral common femoral vein, proximal, mid, and distal femoral vein, and popliteal vein were performed.  Calf veins could not be adequately evaluated secondary to swelling and limited acoustic windows.   The evaluated veins demonstrate normal compressibility. There is intact venous flow demonstrating normal respiratory variability and normal augmentation of flow with calf compression. Therefore, there is no ultrasonographic evidence for deep vein thrombosis within the evaluated veins.       No sonographic evidence for deep vein thrombosis within the evaluated veins of the lower extremities bilaterally.   MACRO: None.   Signed by: Eusebio Souza 7/29/2024 8:27 AM Dictation workstation:   [id number]    JERED without exercise    Result Date: 7/29/2024  Preliminary Cardiology Report            [facility] [address], [place] [id number]             Phone [phone]            Preliminary Vascular Lab Report  [facility] ANKLE BRACHIAL INDEX (JERED) WITHOUT EXERCISE  Patient Name:     ADRIENNE Pradhan Physician:  [id number] Arely Guillen MD Study Date:       7/29/2024          Ordering Provider:  [id number] ARECHIGA MRN/PID:          [id number]           Fellow: Accession#:       [id number]       Technologist:       SURAJ LOOMIS RDMS YOB: 1965          Technologist 2: Gender:           M                  Encounter#:         [id number] Admission Status: Inpatient          Location Performed: [facility]  Diagnosis/ICD: Lymphedema, not elsewhere classified-I89.0; Cellulitus of right                lower limb-L03.115; Cellulitus of left lower limb-L03.116  PRELIMINARY CONCLUSIONS:  Right Lower PVR: Normal digital perfusion noted. Multiphasic flow is noted in the right common femoral artery, right popliteal artery, right posterior tibial artery and  right dorsalis pedis artery. Extreme swelling to lower legs. Left Lower PVR: Normal digital perfusion noted. Multiphasic flow is noted in the left common femoral artery, left popliteal artery, left posterior tibial artery and left dorsalis pedis artery. Extreme swelling to lower legs.  Imaging & Doppler Findings:  RIGHT Lower PVR                Pressures Ratios Right Posterior Tibial (Ankle) 255 mmHg  1.71 Right Dorsalis Pedis (Ankle)   255 mmHg  1.71 Right Digit (Great Toe)        255 mmHg  1.71   LEFT Lower PVR                Pressures Ratios Left Posterior Tibial (Ankle) 255 mmHg  1.71 Left Dorsalis Pedis (Ankle)   255 mmHg  1.71 Left Digit (Great Toe)        172 mmHg  1.15                      Right     Left Brachial Pressure 149 mmHg 139 mmHg   VASCULAR PRELIMINARY REPORT completed by Ling Warner on 7/29/2024 at 8:24:25 AM  ** Final **            Assessment/Plan          Principal Problem:    Cellulitis  Active Problems:    History of coronary artery stent placement    History of pulmonary embolism    Lymphedema    Type 2 diabetes mellitus (Multi)    Chronic renal impairment, stage 3 (moderate) (Multi)    Cellulitis of the bilateral lower extremities  Lymphedema of the bilateral lower extremities  -Status post 2 g Ancef ED  -IV Zosyn, reduce to every 8 hours  -F/U blood cxs  -F/U JERED lower extremity  -U/S negative for DVT  -ID following  -Wound consult  -Podiatry signed off, compression wrap with Ace bandages from the toes to the knee  -Pain management  -XR L foot neg for OM  -Wound consult    Type 2 diabetes mellitus  -Reports noncompliance, Home long-acting insulin reported per patient does not make sense  -Diabetic educator consult   -Diabetic diet  -A1c 6.9  -Lantus 50 units nightly  -SSI  -Monitor closely     CKD stage III  -Creatinine 2.1, EGFR 35  -Avoid/caution nephrotoxic agents  -Continue to monitor  -50 mg Torsemide PO     Reported history of CAD with stents  -Not on GDMT  -Cardiology  following  -Patient unsure why he is taking propranolol, Propranolol 120 mg held, started 60 mg  -Continue home medications   -Echo, EF 60-65%      DVT prophylaxis  -Continue Eliquis     GI prophylaxis  -PPI        Plan  Plan discussed with patient and collaborating physician and they are in agreement.               MELANIE BRANTLEY

## 2024-07-30 NOTE — PROGRESS NOTES
partha Dc is a 59 y.o. male on day 0 of admission presenting with Cellulitis.    Subjective   Interval History:   Afebrile, no chills  Denies shortness of breath cough or chest pain  Denies nausea vomiting or diarrhea  Interval evaluation by podiatry           Objective   Range of Vitals (last 24 hours)  Heart Rate:  [72-86]   Temp:  [36.5 °C (97.7 °F)-36.8 °C (98.2 °F)]   Resp:  [16-18]   BP: (124-132)/(64-68)   SpO2:  [95 %]   Daily Weight  07/29/24 : 122 kg (269 lb 6.4 oz)    Body mass index is 43.48 kg/m².    Physical Exam  Constitutional:       Appearance: Normal appearance.   HENT:      Head: Normocephalic and atraumatic.      Nose: Nose normal.      Mouth/Throat:      Mouth: Mucous membranes are moist.      Pharynx: Oropharynx is clear.   Eyes:      General: No scleral icterus.  Cardiovascular:      Rate and Rhythm: Normal rate and regular rhythm.   Pulmonary:      Effort: Pulmonary effort is normal.      Breath sounds: Normal breath sounds.   Abdominal:      General: Bowel sounds are normal.      Palpations: Abdomen is soft.   Musculoskeletal:         General: Normal range of motion.      Cervical back: Normal range of motion and neck supple.      Right lower leg: Edema present.      Left lower leg: Edema present.   Skin:     General: Skin is warm and dry.      Comments: Bilateral leg erythema, weeping   Neurological:      General: No focal deficit present.      Mental Status: He is alert and oriented to person, place, and time. Mental status is at baseline.   Psychiatric:         Mood and Affect: Mood normal.         Behavior: Behavior normal.     Antibiotics  piperacillin-tazobactam - 3.375 gram/50 mL    Relevant Results  Labs  Results from last 72 hours   Lab Units 07/30/24  0424 07/29/24  0404 07/28/24 2028   WBC AUTO x10*3/uL 10.9 9.5 10.0   HEMOGLOBIN g/dL 10.5* 10.9* 12.3*   HEMATOCRIT % 32.0* 33.6* 36.4*   PLATELETS AUTO x10*3/uL 421 416 476*   NEUTROS PCT AUTO %  --   --  71.9   LYMPHS PCT  AUTO %  --   --  13.8   MONOS PCT AUTO %  --   --  9.3   EOS PCT AUTO %  --   --  2.3     Results from last 72 hours   Lab Units 07/30/24  0424 07/29/24 0404 07/28/24 2028   SODIUM mmol/L 141 138 135   POTASSIUM mmol/L 3.6 3.5 3.4   CHLORIDE mmol/L 108* 108* 101   CO2 mmol/L 22* 20* 20*   BUN mg/dL 37* 36* 38*   CREATININE mg/dL 2.10* 2.10* 2.20*   GLUCOSE mg/dL 225* 181* 252*   CALCIUM mg/dL 8.5 8.3* 8.8   ANION GAP mmol/L 11 10 14   EGFR mL/min/1.73m*2 35* 35* 33*     Results from last 72 hours   Lab Units 07/28/24 2028   ALK PHOS U/L 101   BILIRUBIN TOTAL mg/dL 0.8   PROTEIN TOTAL g/dL 7.4   ALT U/L 40   AST U/L 27   ALBUMIN g/dL 3.1*     Estimated Creatinine Clearance: 46.1 mL/min (A) (by C-G formula based on SCr of 2.1 mg/dL (H)).  CRP   Date Value Ref Range Status   10/16/2021 3.2 (H) 0 - 2.0 MG/DL Final     Comment:     Performed at [address] [address]     Microbiology  Wound culture pending  Blood culture pending  Imaging  JERED without exercise    Result Date: 7/30/2024            [address] [address]             Phone [phone]  Vascular Lab Report  [facility] US ANKLE BRACHIAL INDEX (JERED) WITHOUT EXERCISE Patient Name:      ADRIENNE Pradhan Physician: [id number] Arely Guillen MD Study Date:        7/29/2024           Ordering Provider: [id number] ARECHIGA MRN/PID:           [id number]            Fellow: Accession#:        [id number]        Technologist:      SURAJ LOOMIS RDMS Date of Birth/Age: 1965 / 59      Technologist 2:                    years Gender:            M                   Encounter#:        [id number] Admission Status:  Inpatient           Location           [facility]                                        Performed:  Diagnosis/ICD: Lymphedema, not elsewhere classified-I89.0; Cellulitus  of right                lower limb-L03.115; Cellulitus of left lower limb-L03.116  CONCLUSIONS: Right Lower PVR: No evidence of arterial occlusive disease in the right lower extremity at rest. Normal digital perfusion noted. Multiphasic flow is noted in the right common femoral artery, right popliteal artery, right posterior tibial artery and right dorsalis pedis artery. Called by tracings due to non-compressible vessels. Left Lower PVR: No evidence of arterial occlusive disease in the left lower extremity at rest. Normal digital perfusion noted. Multiphasic flow is noted in the left common femoral artery, left popliteal artery, left posterior tibial artery and left dorsalis pedis artery. Called by tracings due to non-compressible vessels. Additional Findings: Technically difficult exam due to edema.  Imaging & Doppler Findings:  RIGHT Lower PVR                Pressures Ratios Right Posterior Tibial (Ankle) 255 mmHg  1.71 Right Dorsalis Pedis (Ankle)   255 mmHg  1.71 Right Digit (Great Toe)        255 mmHg  1.71   LEFT Lower PVR                Pressures Ratios Left Posterior Tibial (Ankle) 255 mmHg  1.71 Left Dorsalis Pedis (Ankle)   255 mmHg  1.71 Left Digit (Great Toe)        172 mmHg  1.15                      Right     Left Brachial Pressure 149 mmHg 139 mmHg   [id number] Arely Guillen MD Electronically signed by [id number] Arely Guillen MD on 7/30/2024 at 1:29:26 PM  ** Final **     XR foot left 3+ views    Result Date: 7/30/2024  Interpreted By:  Michelle Moseley, STUDY: XR FOOT LEFT 3+ VIEWS 7/30/2024 10:12 am   INDICATION: Swelling and tenderness   COMPARISON: 10/16/2021   ACCESSION NUMBER(S): [id number]   ORDERING CLINICIAN: TRAMAINE PATE   TECHNIQUE: Three views of left foot   FINDINGS: There is narrowing of the tibiotalar articulation appearing unchanged since the prior examination with narrowing of the joint spaces of the midfoot and at the hindfoot/midfoot junction. There is exuberant callus formation about the  proximal to mid 3rd metatarsal due to old healed traumatic injury. A plantar calcaneal spur is present.   There is severe edema of the distal left lower leg and ankle extending into the foot.   No obvious bony destruction is seen.       No plain film evidence for osteomyelitis is observed. If clinically indicated, MRI of the foot could be performed for further assessment.   Signed by: Michelle Moseley 7/30/2024 11:55 AM Dictation workstation:   [id number]    Transthoracic Echo (TTE) Complete    Result Date: 7/30/2024            [facility] [address], [place] [id number]             Phone [phone] TRANSTHORACIC ECHOCARDIOGRAM REPORT Patient Name:     ADRIENNE CEDILLO   Reading Physician:   [id number] Vicki Hendrickson MD Study Date:       7/29/2024            Ordering Provider:   [id number] ARECHIGA MRN/PID:          [id number]             Fellow: Accession#:       [id number]         Nurse: Date of           1965 / 59 years Sonographer:         Kamille Doe RDCS Birth/Age: Gender:           M                    Additional Staff: Height:           167.64 cm            Admit Date: Weight:           122.02 kg            Admission Status:    Inpatient - Routine BSA / BMI:        2.27 m2 / 43.42      [facility]                   kg/m2 Blood Pressure: 151 /85 mmHg Study Type:    TRANSTHORACIC ECHO (TTE) COMPLETE Diagnosis/ICD: Localized swelling, mass and lump, trunk-R22.2 Indication:    Lymphedema CPT Codes:     Echo Complete w Full Doppler-[id number] Patient History: Pertinent History: Hyperlipidemia, CHF and Chest Pain. DM, venous insufficiency,                    CVD, CVA, DVT, CP, PE. Study Detail: The following Echo studies were performed: M-Mode, Doppler, 2D and               color flow. Technically challenging study due to prominent lung               artifact.  PHYSICIAN INTERPRETATION: Left Ventricle: The left  ventricular systolic function is normal, with a visually estimated ejection fraction of 60-65%. There are no regional wall motion abnormalities. The left ventricular cavity size is normal. Spectral Doppler shows a normal pattern of left ventricular diastolic filling. Left Atrium: The left atrium is normal in size. Right Ventricle: The right ventricle is normal in size. There is normal right ventricular global systolic function. Right Atrium: The right atrium is normal in size. Aortic Valve: The aortic valve appears structurally normal. The aortic valve dimensionless index is 0.56. There is no evidence of aortic valve regurgitation. The peak instantaneous gradient of the aortic valve is 11.6 mmHg. The mean gradient of the aortic valve is 7.0 mmHg. Mitral Valve: The mitral valve is abnormal. There is mild mitral annular calcification. There is no evidence of mitral valve regurgitation. Tricuspid Valve: The tricuspid valve is structurally normal. There is trace tricuspid regurgitation. The right ventricular systolic pressure is unable to be estimated. Pulmonic Valve: The pulmonic valve is structurally normal. There is physiologic pulmonic valve regurgitation. Pericardium: There is a trivial pericardial effusion. Aorta: The aortic root is normal. Systemic Veins: The inferior vena cava appears mildly dilated. There is less than 50% IVC collapse with inspiration.  CONCLUSIONS:  1. The left ventricular systolic function is normal, with a visually estimated ejection fraction of 60-65%.  2. There is normal right ventricular global systolic function.  3. No evidence of mitral valve regurgitation.  4. Trace tricuspid regurgitation is visualized. QUANTITATIVE DATA SUMMARY: 2D MEASUREMENTS:                          Normal Ranges: LAs:           3.40 cm   (2.7-4.0cm) IVSd:          1.04 cm   (0.6-1.1cm) LVPWd:         1.24 cm   (0.6-1.1cm) LVIDd:         4.33 cm   (3.9-5.9cm) LVIDs:         3.01 cm LV Mass Index: 76.4 g/m2 LV %  FS        30.5 % LA VOLUME:                              Normal Ranges: LA Vol A4C:        41.9 ml   (22+/-6mL/m2) LA Vol Index A4C:  18.5ml/m2 LA Area A4C:       16.5 cm2 LA Major Axis A4C: 5.5 cm LA Vol A4C:        39.2 ml RA VOLUME BY A/L METHOD:                               Normal Ranges: RA Vol A4C:        27.0 ml    (8.3-19.5ml) RA Vol Index A4C:  11.9 ml/m2 RA Area A4C:       13.1 cm2 RA Major Axis A4C: 5.4 cm M-MODE MEASUREMENTS:                  Normal Ranges: Ao Root: 3.20 cm (2.0-3.7cm) AORTA MEASUREMENTS:                    Normal Ranges: Asc Ao, d: 2.80 cm (2.1-3.4cm) LV SYSTOLIC FUNCTION BY 2D PLANIMETRY (MOD):                      Normal Ranges: EF-A4C View:    68 % (>=55%) EF-A2C View:    61 % EF-Biplane:     65 % EF-Visual:      63 % LV EF Reported: 63 % LV DIASTOLIC FUNCTION:                         Normal Ranges: MV Peak E:    0.95 m/s  (0.7-1.2 m/s) MV Peak A:    1.15 m/s  (0.42-0.7 m/s) E/A Ratio:    0.82      (1.0-2.2) MV e'         0.076 m/s (>8.0) MV lateral e' 0.07 m/s MV medial e'  0.08 m/s E/e' Ratio:   12.51     (<8.0) MITRAL VALVE:                 Normal Ranges: MV DT: 263 msec (150-240msec) AORTIC VALVE:                                    Normal Ranges: AoV Vmax:                1.70 m/s  (<=1.7m/s) AoV Peak P.6 mmHg (<20mmHg) AoV Mean P.0 mmHg  (1.7-11.5mmHg) LVOT Max Cristobal:            1.07 m/s  (<=1.1m/s) AoV VTI:                 32.00 cm  (18-25cm) LVOT VTI:                17.90 cm LVOT Diameter:           2.00 cm   (1.8-2.4cm) AoV Area, VTI:           1.76 cm2  (2.5-5.5cm2) AoV Area,Vmax:           1.98 cm2  (2.5-4.5cm2) AoV Dimensionless Index: 0.56  RIGHT VENTRICLE: TAPSE: 27.5 mm RV s'  0.18 m/s TRICUSPID VALVE/RVSP:                   Normal Ranges: IVC Diam: 2.37 cm  [id number] Vicki Hendrickson MD Electronically signed on 2024 at 9:34:56 AM  ** Final **     Lower extremity venous duplex bilateral    Result Date: 2024  Interpreted By:  Eusebio Souza,  STUDY: [facility] US LOWER EXTREMITY VENOUS DUPLEX BILATERAL;  7/29/2024 7:55 am   INDICATION: Signs/Symptoms:Bilateral lower extremity lymphedema with cellulitis.   COMPARISON: None.   ACCESSION NUMBER(S): [id number]   ORDERING CLINICIAN: GIOVANNA ARECHIGA   TECHNIQUE: Vascular ultrasound of the bilateral lower extremities was performed. Real-time compression views as well as Gray scale, color Doppler and spectral Doppler waveform analysis was performed.   FINDINGS: Evaluation of the visualized portions of the bilateral common femoral vein, proximal, mid, and distal femoral vein, and popliteal vein were performed.  Calf veins could not be adequately evaluated secondary to swelling and limited acoustic windows.   The evaluated veins demonstrate normal compressibility. There is intact venous flow demonstrating normal respiratory variability and normal augmentation of flow with calf compression. Therefore, there is no ultrasonographic evidence for deep vein thrombosis within the evaluated veins.       No sonographic evidence for deep vein thrombosis within the evaluated veins of the lower extremities bilaterally.   MACRO: None.   Signed by: Eusebio Souza 7/29/2024 8:27 AM Dictation workstation:   [id number]    Colonoscopy Screening; High Risk Patient; mother with colon cancer    Result Date: 7/25/2024  Table formatting from the original result was not included. Impression Scattered diverticulosis in the descending colon and sigmoid colon Findings Few small and medium, scattered diverticula in the descending colon and sigmoid colon  Recommendation Follow up with PCP No further screening colonoscopies necessary Indication Personal history of colonic polyps, Family history of malignant neoplasm of gastrointestinal tract, Long term (current) use of anticoagulants Staff Staff Role Jorge A Post MD Proceduralist Medications See Anesthesia Record. Preprocedure A history and physical has been performed, and patient medication allergies  have been reviewed. The patient's tolerance of previous anesthesia has been reviewed. The risks and benefits of the procedure and the sedation options and risks were discussed with the Patient All questions were answered and informed consent obtained. Details of the Procedure The patient underwent monitored anesthesia care, which was administered by an anesthesia professional. The patient's blood pressure, ECG, ETCO2, heart rate, level of consciousness, oxygen and respirations were monitored throughout the procedure. A digital rectal exam was not performed. The scope was introduced through the anus and advanced to the terminal ileum. Retroflexion was performed in the rectum. Bowel prep was adequate. The procedure was not difficult. The patient tolerated the procedure well. There were no apparent adverse events. Events Procedure Events Event Event Time ENDO SCOPE IN TIME 7/25/2024  2:43 PM ENDO CECUM REACHED 7/25/2024  2:49 PM ENDO SCOPE OUT TIME 7/25/2024  2:59 PM Specimens No specimens collected Procedure Location [facility] [address] [facility] [address] [phone] Referring Provider Jorge A Post MD Procedure Provider Jorge A Post MD     ECG 12 Lead    Result Date: 7/18/2024  Sinus rhythm with 1st degree AV block Inferior infarct , age undetermined Abnormal ECG No previous ECGs available Confirmed by Channing Calderon ([id number]) on 7/18/2024 10:52:21 AM        Assessment/Plan   Type 2 diabetes mellitus  Lymphedema-risk factor for cellulitis  Bilateral  lower extremity cellulitis   Acute kidney injury on CKD stage III        IV Zosyn  Monitor temperature and WBC  Monitor renal function  Monitor response to therapy  Leg elevation  Supportive care  Follow up Leg Wound culture   Follow up blood culture   Podiatry consulted  Further recommendations based on workup     Total time spent caring for the patient today was 20 minutes. This includes time spent before the visit  reviewing the chart, time spent during the visit, and time spent after the visit on documentation.       Ana Rodriguez, APRN-CNP

## 2024-07-31 PROBLEM — L03.119 CELLULITIS OF LOWER EXTREMITY, UNSPECIFIED LATERALITY: Status: ACTIVE | Noted: 2024-07-31

## 2024-07-31 LAB
ALBUMIN SERPL-MCNC: 2.4 G/DL (ref 3.5–5)
ANION GAP SERPL CALC-SCNC: 10 MMOL/L
BUN SERPL-MCNC: 29 MG/DL (ref 8–25)
CALCIUM SERPL-MCNC: 8.5 MG/DL (ref 8.5–10.4)
CHLORIDE SERPL-SCNC: 107 MMOL/L (ref 97–107)
CO2 SERPL-SCNC: 23 MMOL/L (ref 24–31)
CREAT SERPL-MCNC: 2 MG/DL (ref 0.4–1.6)
EGFRCR SERPLBLD CKD-EPI 2021: 38 ML/MIN/1.73M*2
ERYTHROCYTE [DISTWIDTH] IN BLOOD BY AUTOMATED COUNT: 13.5 % (ref 11.5–14.5)
GLUCOSE BLD MANUAL STRIP-MCNC: 140 MG/DL (ref 74–99)
GLUCOSE BLD MANUAL STRIP-MCNC: 179 MG/DL (ref 74–99)
GLUCOSE BLD MANUAL STRIP-MCNC: 189 MG/DL (ref 74–99)
GLUCOSE BLD MANUAL STRIP-MCNC: 219 MG/DL (ref 74–99)
GLUCOSE SERPL-MCNC: 149 MG/DL (ref 65–99)
HCT VFR BLD AUTO: 34.8 % (ref 41–52)
HGB BLD-MCNC: 11.3 G/DL (ref 13.5–17.5)
MCH RBC QN AUTO: 29.4 PG (ref 26–34)
MCHC RBC AUTO-ENTMCNC: 32.5 G/DL (ref 32–36)
MCV RBC AUTO: 91 FL (ref 80–100)
NRBC BLD-RTO: 0 /100 WBCS (ref 0–0)
PHOSPHATE SERPL-MCNC: 3.8 MG/DL (ref 2.5–4.5)
PLATELET # BLD AUTO: 435 X10*3/UL (ref 150–450)
POTASSIUM SERPL-SCNC: 3.8 MMOL/L (ref 3.4–5.1)
RBC # BLD AUTO: 3.84 X10*6/UL (ref 4.5–5.9)
SODIUM SERPL-SCNC: 140 MMOL/L (ref 133–145)
WBC # BLD AUTO: 11.6 X10*3/UL (ref 4.4–11.3)

## 2024-07-31 PROCEDURE — 97530 THERAPEUTIC ACTIVITIES: CPT | Mod: GP

## 2024-07-31 PROCEDURE — 85027 COMPLETE CBC AUTOMATED: CPT | Performed by: STUDENT IN AN ORGANIZED HEALTH CARE EDUCATION/TRAINING PROGRAM

## 2024-07-31 PROCEDURE — 97116 GAIT TRAINING THERAPY: CPT | Mod: GP

## 2024-07-31 PROCEDURE — 99232 SBSQ HOSP IP/OBS MODERATE 35: CPT | Performed by: INTERNAL MEDICINE

## 2024-07-31 PROCEDURE — 2500000001 HC RX 250 WO HCPCS SELF ADMINISTERED DRUGS (ALT 637 FOR MEDICARE OP): Performed by: STUDENT IN AN ORGANIZED HEALTH CARE EDUCATION/TRAINING PROGRAM

## 2024-07-31 PROCEDURE — 80069 RENAL FUNCTION PANEL: CPT | Performed by: INTERNAL MEDICINE

## 2024-07-31 PROCEDURE — 2500000004 HC RX 250 GENERAL PHARMACY W/ HCPCS (ALT 636 FOR OP/ED): Performed by: STUDENT IN AN ORGANIZED HEALTH CARE EDUCATION/TRAINING PROGRAM

## 2024-07-31 PROCEDURE — 82947 ASSAY GLUCOSE BLOOD QUANT: CPT

## 2024-07-31 PROCEDURE — 97110 THERAPEUTIC EXERCISES: CPT | Mod: GP

## 2024-07-31 PROCEDURE — 2500000002 HC RX 250 W HCPCS SELF ADMINISTERED DRUGS (ALT 637 FOR MEDICARE OP, ALT 636 FOR OP/ED): Performed by: STUDENT IN AN ORGANIZED HEALTH CARE EDUCATION/TRAINING PROGRAM

## 2024-07-31 PROCEDURE — 36415 COLL VENOUS BLD VENIPUNCTURE: CPT | Performed by: INTERNAL MEDICINE

## 2024-07-31 PROCEDURE — 2500000004 HC RX 250 GENERAL PHARMACY W/ HCPCS (ALT 636 FOR OP/ED): Performed by: INTERNAL MEDICINE

## 2024-07-31 PROCEDURE — 1100000001 HC PRIVATE ROOM DAILY

## 2024-07-31 PROCEDURE — G0378 HOSPITAL OBSERVATION PER HR: HCPCS

## 2024-07-31 RX ADMIN — ACETAMINOPHEN 650 MG: 325 TABLET ORAL at 00:36

## 2024-07-31 RX ADMIN — INSULIN GLARGINE 50 UNITS: 100 INJECTION, SOLUTION SUBCUTANEOUS at 09:00

## 2024-07-31 RX ADMIN — PIPERACILLIN SODIUM AND TAZOBACTAM SODIUM 3.38 G: 3; .375 INJECTION, SOLUTION INTRAVENOUS at 00:29

## 2024-07-31 RX ADMIN — PROPRANOLOL HYDROCHLORIDE 60 MG: 40 TABLET ORAL at 05:49

## 2024-07-31 RX ADMIN — ROSUVASTATIN CALCIUM 20 MG: 20 TABLET, COATED ORAL at 08:44

## 2024-07-31 RX ADMIN — ALLOPURINOL 150 MG: 300 TABLET ORAL at 08:44

## 2024-07-31 RX ADMIN — POTASSIUM CHLORIDE 10 MEQ: 750 TABLET, EXTENDED RELEASE ORAL at 08:44

## 2024-07-31 RX ADMIN — APIXABAN 5 MG: 5 TABLET, FILM COATED ORAL at 16:47

## 2024-07-31 RX ADMIN — SODIUM BICARBONATE 650 MG TABLET 650 MG: at 16:46

## 2024-07-31 RX ADMIN — APIXABAN 5 MG: 5 TABLET, FILM COATED ORAL at 05:49

## 2024-07-31 RX ADMIN — PIPERACILLIN SODIUM AND TAZOBACTAM SODIUM 3.38 G: 3; .375 INJECTION, SOLUTION INTRAVENOUS at 10:13

## 2024-07-31 RX ADMIN — PIPERACILLIN SODIUM AND TAZOBACTAM SODIUM 3.38 G: 3; .375 INJECTION, SOLUTION INTRAVENOUS at 16:46

## 2024-07-31 RX ADMIN — SODIUM BICARBONATE 650 MG TABLET 650 MG: at 12:06

## 2024-07-31 RX ADMIN — CALCITRIOL CAPSULES 0.25 MCG 0.5 MCG: 0.25 CAPSULE ORAL at 08:44

## 2024-07-31 RX ADMIN — SODIUM BICARBONATE 650 MG TABLET 650 MG: at 20:08

## 2024-07-31 RX ADMIN — ACETAMINOPHEN 650 MG: 325 TABLET ORAL at 10:12

## 2024-07-31 RX ADMIN — SODIUM BICARBONATE 650 MG TABLET 650 MG: at 07:00

## 2024-07-31 RX ADMIN — DILTIAZEM HYDROCHLORIDE 240 MG: 120 CAPSULE, COATED, EXTENDED RELEASE ORAL at 08:44

## 2024-07-31 RX ADMIN — INSULIN LISPRO 3 UNITS: 100 INJECTION, SOLUTION INTRAVENOUS; SUBCUTANEOUS at 16:44

## 2024-07-31 RX ADMIN — INSULIN LISPRO 3 UNITS: 100 INJECTION, SOLUTION INTRAVENOUS; SUBCUTANEOUS at 12:05

## 2024-07-31 RX ADMIN — TORSEMIDE 50 MG: 100 TABLET ORAL at 08:44

## 2024-07-31 RX ADMIN — CLOPIDOGREL BISULFATE 75 MG: 75 TABLET ORAL at 08:44

## 2024-07-31 RX ADMIN — ISOSORBIDE MONONITRATE 60 MG: 60 TABLET, EXTENDED RELEASE ORAL at 08:44

## 2024-07-31 ASSESSMENT — COGNITIVE AND FUNCTIONAL STATUS - GENERAL
CLIMB 3 TO 5 STEPS WITH RAILING: TOTAL
MOBILITY SCORE: 16
MOVING FROM LYING ON BACK TO SITTING ON SIDE OF FLAT BED WITH BEDRAILS: A LITTLE
WALKING IN HOSPITAL ROOM: TOTAL
HELP NEEDED FOR BATHING: A LOT
TOILETING: A LITTLE
STANDING UP FROM CHAIR USING ARMS: A LOT
EATING MEALS: A LITTLE
DAILY ACTIVITIY SCORE: 16
TURNING FROM BACK TO SIDE WHILE IN FLAT BAD: A LITTLE
TOILETING: A LITTLE
DRESSING REGULAR UPPER BODY CLOTHING: A LITTLE
MOVING FROM LYING ON BACK TO SITTING ON SIDE OF FLAT BED WITH BEDRAILS: A LITTLE
MOVING FROM LYING ON BACK TO SITTING ON SIDE OF FLAT BED WITH BEDRAILS: A LITTLE
MOBILITY SCORE: 16
MOBILITY SCORE: 12
MOVING TO AND FROM BED TO CHAIR: A LITTLE
MOBILITY SCORE: 12
PERSONAL GROOMING: A LITTLE
STANDING UP FROM CHAIR USING ARMS: A LOT
STANDING UP FROM CHAIR USING ARMS: A LITTLE
TURNING FROM BACK TO SIDE WHILE IN FLAT BAD: A LITTLE
HELP NEEDED FOR BATHING: A LOT
DRESSING REGULAR UPPER BODY CLOTHING: A LITTLE
MOVING TO AND FROM BED TO CHAIR: A LOT
STANDING UP FROM CHAIR USING ARMS: A LITTLE
WALKING IN HOSPITAL ROOM: A LITTLE
CLIMB 3 TO 5 STEPS WITH RAILING: TOTAL
CLIMB 3 TO 5 STEPS WITH RAILING: TOTAL
WALKING IN HOSPITAL ROOM: TOTAL
WALKING IN HOSPITAL ROOM: A LITTLE
MOVING TO AND FROM BED TO CHAIR: A LOT
MOVING FROM LYING ON BACK TO SITTING ON SIDE OF FLAT BED WITH BEDRAILS: A LITTLE
EATING MEALS: A LITTLE
TURNING FROM BACK TO SIDE WHILE IN FLAT BAD: A LITTLE
EATING MEALS: A LITTLE
DAILY ACTIVITIY SCORE: 16
DRESSING REGULAR LOWER BODY CLOTHING: A LOT
TURNING FROM BACK TO SIDE WHILE IN FLAT BAD: A LITTLE
PERSONAL GROOMING: A LITTLE
DAILY ACTIVITIY SCORE: 16
MOVING TO AND FROM BED TO CHAIR: A LITTLE
DRESSING REGULAR UPPER BODY CLOTHING: A LITTLE
CLIMB 3 TO 5 STEPS WITH RAILING: TOTAL
HELP NEEDED FOR BATHING: A LOT
TOILETING: A LITTLE
PERSONAL GROOMING: A LITTLE

## 2024-07-31 ASSESSMENT — PAIN DESCRIPTION - ORIENTATION
ORIENTATION: LEFT
ORIENTATION: RIGHT;LEFT

## 2024-07-31 ASSESSMENT — PAIN SCALES - GENERAL
PAINLEVEL_OUTOF10: 0 - NO PAIN
PAINLEVEL_OUTOF10: 0 - NO PAIN
PAINLEVEL_OUTOF10: 7
PAINLEVEL_OUTOF10: 0 - NO PAIN
PAINLEVEL_OUTOF10: 3
PAINLEVEL_OUTOF10: 3
PAINLEVEL_OUTOF10: 0 - NO PAIN

## 2024-07-31 ASSESSMENT — PAIN - FUNCTIONAL ASSESSMENT
PAIN_FUNCTIONAL_ASSESSMENT: 0-10
PAIN_FUNCTIONAL_ASSESSMENT: 0-10
PAIN_FUNCTIONAL_ASSESSMENT: WONG-BAKER FACES
PAIN_FUNCTIONAL_ASSESSMENT: 0-10
PAIN_FUNCTIONAL_ASSESSMENT: 0-10

## 2024-07-31 ASSESSMENT — ACTIVITIES OF DAILY LIVING (ADL): EFFECT OF PAIN ON DAILY ACTIVITIES: IMPAIRED MOBILITY

## 2024-07-31 ASSESSMENT — PAIN DESCRIPTION - LOCATION
LOCATION: LEG
LOCATION: LEG

## 2024-07-31 ASSESSMENT — PAIN DESCRIPTION - DESCRIPTORS: DESCRIPTORS: ACHING

## 2024-07-31 NOTE — NURSING NOTE
End of shift note, orders reviewed, no needs voiced at this time, no s/s of pain, discomfort or respiratory distress, call light within reach safety measures remain in place.

## 2024-07-31 NOTE — PROGRESS NOTES
Subjective Data:  Patient currently is laying in bed asymptomatic.  No chest pain or tightness.  Both lower leg cellulitis.  Overnight Events:    Unchanged  Objective   Last Recorded Vitals  /75 (BP Location: Left arm, Patient Position: Lying)   Pulse 80   Temp 36.8 °C (98.2 °F) (Oral)   Resp 18   Wt 122 kg (269 lb 6.4 oz)   SpO2 94%     Intake/Output Summary (Last 24 hours) at 7/31/2024 1015  Last data filed at 7/31/2024 0059  Gross per 24 hour   Intake 100 ml   Output 1350 ml   Net -1250 ml     Physical Exam:  HEENT: Normocephalic/atraumatic pupils equal react light  Neck exam mild JVD, no bruit  Lung exam clear to auscultation, few crackles at the bases  Cardiac exam is regular rhythm S1-S2, soft systolic murmur heard.   Abdomen soft nontender, nondistended  Extremities both lower leg extremity cellulitis changes.  Neuro exam grossly intact.  Image Results  JERED without exercise              [facility]  [address], [place] [address]              Phone [phone]       Vascular Lab Report     [facility] ANKLE BRACHIAL INDEX (JERED) WITHOUT EXERCISE    Patient Name:      ADRIENNE Pradhan Physician: [id number] Arely Guillen MD  Study Date:        7/29/2024           Ordering Provider: [id number] ARECHIGA  MRN/PID:           [id number]            Fellow:  Accession#:        [id number]        Technologist:      SURAJ LOOMIS RDMS  Date of Birth/Age: 1965 / 59      Technologist 2:                     years  Gender:            M                   Encounter#:        [id number]  Admission Status:  Inpatient           Location           [facility]                                         Performed:       Diagnosis/ICD: Lymphedema, not elsewhere classified-I89.0; Cellulitus of right                 lower limb-L03.115; Cellulitus of left lower  limb-L03.116       CONCLUSIONS:  Right Lower PVR: No evidence of arterial occlusive disease in the right lower extremity at rest. Normal digital perfusion noted. Multiphasic flow is noted in the right common femoral artery, right popliteal artery, right posterior tibial artery and right dorsalis pedis artery. Called by tracings due to non-compressible vessels.  Left Lower PVR: No evidence of arterial occlusive disease in the left lower extremity at rest. Normal digital perfusion noted. Multiphasic flow is noted in the left common femoral artery, left popliteal artery, left posterior tibial artery and left dorsalis pedis artery. Called by tracings due to non-compressible vessels.  Additional Findings:  Technically difficult exam due to edema.       Imaging & Doppler Findings:     RIGHT Lower PVR                Pressures Ratios  Right Posterior Tibial (Ankle) 255 mmHg  1.71  Right Dorsalis Pedis (Ankle)   255 mmHg  1.71  Right Digit (Great Toe)        255 mmHg  1.71          LEFT Lower PVR                Pressures Ratios  Left Posterior Tibial (Ankle) 255 mmHg  1.71  Left Dorsalis Pedis (Ankle)   255 mmHg  1.71  Left Digit (Great Toe)        172 mmHg  1.15                             Right     Left  Brachial Pressure 149 mmHg 139 mmHg          [id number] Arely Guillen MD  Electronically signed by [id number] Arely Guillen MD on 7/30/2024 at 1:29:26 PM       ** Final **  XR foot left 3+ views  Narrative: Interpreted By:  Michelle Moseley,   STUDY:  XR FOOT LEFT 3+ VIEWS 7/30/2024 10:12 am      INDICATION:  Swelling and tenderness      COMPARISON:  10/16/2021      ACCESSION NUMBER(S):  [id number]      ORDERING CLINICIAN:  TRAMAINE PATE      TECHNIQUE:  Three views of left foot      FINDINGS:  There is narrowing of the tibiotalar articulation appearing unchanged  since the prior examination with narrowing of the joint spaces of the  midfoot and at the hindfoot/midfoot junction. There is exuberant  callus formation about the proximal  to mid 3rd metatarsal due to old  healed traumatic injury. A plantar calcaneal spur is present.      There is severe edema of the distal left lower leg and ankle  extending into the foot.      No obvious bony destruction is seen.      Impression: No plain film evidence for osteomyelitis is observed. If clinically  indicated, MRI of the foot could be performed for further assessment.      Signed by: Michelle Moseley 7/30/2024 11:55 AM  Dictation workstation:   [id number]  Transthoracic Echo (TTE) Complete              [facility]  [address], [place] [id number]              Phone [phone]    TRANSTHORACIC ECHOCARDIOGRAM REPORT    Patient Name:     ADRIENNE CEDILLO   Reading Physician:   [id number] Vicki Hendrickson MD  Study Date:       7/29/2024            Ordering Provider:   [id number] ARECHIGA  MRN/PID:          [id number]             Fellow:  Accession#:       [id number]         Nurse:  Date of           1965 / 59 years Sonographer:         Kamille Doe RDCS  Birth/Age:  Gender:           M                    Additional Staff:  Height:           167.64 cm            Admit Date:  Weight:           122.02 kg            Admission Status:    Inpatient - Routine  BSA / BMI:        2.27 m2 / 43.42      [facility]                    kg/m2  Blood Pressure: 151 /85 mmHg    Study Type:    TRANSTHORACIC ECHO (TTE) COMPLETE  Diagnosis/ICD: Localized swelling, mass and lump, trunk-R22.2  Indication:    Lymphedema  CPT Codes:     Echo Complete w Full Doppler-[id number]    Patient History:  Pertinent History: Hyperlipidemia, CHF and Chest Pain. DM, venous insufficiency,                     CVD, CVA, DVT, CP, PE.    Study Detail: The following Echo studies were performed: M-Mode, Doppler, 2D and                color flow. Technically challenging study due to prominent lung                artifact.       PHYSICIAN  INTERPRETATION:  Left Ventricle: The left ventricular systolic function is normal, with a visually estimated ejection fraction of 60-65%. There are no regional wall motion abnormalities. The left ventricular cavity size is normal. Spectral Doppler shows a normal pattern of left ventricular diastolic filling.  Left Atrium: The left atrium is normal in size.  Right Ventricle: The right ventricle is normal in size. There is normal right ventricular global systolic function.  Right Atrium: The right atrium is normal in size.  Aortic Valve: The aortic valve appears structurally normal. The aortic valve dimensionless index is 0.56. There is no evidence of aortic valve regurgitation. The peak instantaneous gradient of the aortic valve is 11.6 mmHg. The mean gradient of the aortic valve is 7.0 mmHg.  Mitral Valve: The mitral valve is abnormal. There is mild mitral annular calcification. There is no evidence of mitral valve regurgitation.  Tricuspid Valve: The tricuspid valve is structurally normal. There is trace tricuspid regurgitation. The right ventricular systolic pressure is unable to be estimated.  Pulmonic Valve: The pulmonic valve is structurally normal. There is physiologic pulmonic valve regurgitation.  Pericardium: There is a trivial pericardial effusion.  Aorta: The aortic root is normal.  Systemic Veins: The inferior vena cava appears mildly dilated. There is less than 50% IVC collapse with inspiration.       CONCLUSIONS:   1. The left ventricular systolic function is normal, with a visually estimated ejection fraction of 60-65%.   2. There is normal right ventricular global systolic function.   3. No evidence of mitral valve regurgitation.   4. Trace tricuspid regurgitation is visualized.    QUANTITATIVE DATA SUMMARY:  2D MEASUREMENTS:                           Normal Ranges:  LAs:           3.40 cm   (2.7-4.0cm)  IVSd:          1.04 cm   (0.6-1.1cm)  LVPWd:         1.24 cm   (0.6-1.1cm)  LVIDd:         4.33  cm   (3.9-5.9cm)  LVIDs:         3.01 cm  LV Mass Index: 76.4 g/m2  LV % FS        30.5 %    LA VOLUME:                               Normal Ranges:  LA Vol A4C:        41.9 ml   (22+/-6mL/m2)  LA Vol Index A4C:  18.5ml/m2  LA Area A4C:       16.5 cm2  LA Major Axis A4C: 5.5 cm  LA Vol A4C:        39.2 ml    RA VOLUME BY A/L METHOD:                                Normal Ranges:  RA Vol A4C:        27.0 ml    (8.3-19.5ml)  RA Vol Index A4C:  11.9 ml/m2  RA Area A4C:       13.1 cm2  RA Major Axis A4C: 5.4 cm    M-MODE MEASUREMENTS:                   Normal Ranges:  Ao Root: 3.20 cm (2.0-3.7cm)    AORTA MEASUREMENTS:                     Normal Ranges:  Asc Ao, d: 2.80 cm (2.1-3.4cm)    LV SYSTOLIC FUNCTION BY 2D PLANIMETRY (MOD):                       Normal Ranges:  EF-A4C View:    68 % (>=55%)  EF-A2C View:    61 %  EF-Biplane:     65 %  EF-Visual:      63 %  LV EF Reported: 63 %    LV DIASTOLIC FUNCTION:                          Normal Ranges:  MV Peak E:    0.95 m/s  (0.7-1.2 m/s)  MV Peak A:    1.15 m/s  (0.42-0.7 m/s)  E/A Ratio:    0.82      (1.0-2.2)  MV e'         0.076 m/s (>8.0)  MV lateral e' 0.07 m/s  MV medial e'  0.08 m/s  E/e' Ratio:   12.51     (<8.0)    MITRAL VALVE:                  Normal Ranges:  MV DT: 263 msec (150-240msec)    AORTIC VALVE:                                     Normal Ranges:  AoV Vmax:                1.70 m/s  (<=1.7m/s)  AoV Peak P.6 mmHg (<20mmHg)  AoV Mean P.0 mmHg  (1.7-11.5mmHg)  LVOT Max Cristobal:            1.07 m/s  (<=1.1m/s)  AoV VTI:                 32.00 cm  (18-25cm)  LVOT VTI:                17.90 cm  LVOT Diameter:           2.00 cm   (1.8-2.4cm)  AoV Area, VTI:           1.76 cm2  (2.5-5.5cm2)  AoV Area,Vmax:           1.98 cm2  (2.5-4.5cm2)  AoV Dimensionless Index: 0.56       RIGHT VENTRICLE:  TAPSE: 27.5 mm  RV s'  0.18 m/s    TRICUSPID VALVE/RVSP:                    Normal Ranges:  IVC Diam: 2.37 cm       [id number] [facility]  MD  Electronically signed on 7/30/2024 at 9:34:56 AM       ** Final **    Last Labs:  CBC - 7/31/2024:  5:34 AM  11.6 11.3 435    34.8      CMP - 7/31/2024:  5:34 AM  8.5 7.4 27 --- 0.8   3.8 2.4 40 101      PTT - 7/28/2024:  8:28 PM  1.2   12.2 34.5     Inpatient Medications:  Scheduled medications   Medication Dose Route Frequency    allopurinol  150 mg oral Daily    apixaban  5 mg oral BID    calcitriol  0.5 mcg oral Daily    clopidogrel  75 mg oral Daily    dilTIAZem ER  240 mg oral Daily    insulin glargine  50 Units subcutaneous Daily    insulin lispro  0-15 Units subcutaneous TID    isosorbide mononitrate ER  60 mg oral Daily    piperacillin-tazobactam  3.375 g intravenous q8h    polyethylene glycol  17 g oral Daily    potassium chloride CR  10 mEq oral Daily with breakfast    propranolol  60 mg oral Daily    rosuvastatin  20 mg oral Daily    sodium bicarbonate  650 mg oral 4x daily    torsemide  50 mg oral Every other day     Principal Problem:    Cellulitis  Active Problems:    History of coronary artery stent placement    History of pulmonary embolism    Lymphedema    Type 2 diabetes mellitus (Multi)    Chronic renal impairment, stage 3 (moderate) (Multi)    Decreased activities of daily living (ADL)    Assessment/Plan   59-year-old patient history of diabetes type 2, hypertension hyperlipidemia patient with a lymphedema with both lower leg cellulitis.  Continue antibiotic..  1.  Hypertension: Continue current propranolol with Imdur.  Also continue Cardizem for rate control medication.  2.  Acute on chronic diastolic CHF: Continue current diuretics with the beta-blocker.  CHF education was done.  Will follow-up as needed.  Will sign off.    Code Status:  Full Code  I spent 50 minutes in the professional and overall care of this patient.  Channing Calderon MD

## 2024-07-31 NOTE — PROGRESS NOTES
"Physical Therapy Treatment    Patient Name: Kevin Dc \"partha\"  MRN: [id number]  Today's Date: 7/31/2024  Time Calculation  Start Time: 1025  Stop Time: 1103  Time Calculation (min): 38 min    Assessment/Plan   PT Assessment  PT Assessment Results: Decreased strength, Impaired balance, Decreased endurance, Decreased mobility, Decreased safety awareness, Obesity, Decreased skin integrity, Orthopedic restrictions, Pain  Rehab Prognosis: Good  Barriers to Discharge: assist x2 transfer, unable to ambulate, lives alone and is without added help at home  Evaluation/Treatment Tolerance: Patient tolerated treatment well  Medical Staff Made Aware: Yes  End of Session Communication: Bedside nurse  Assessment Comment: Continues to demonstrate weakness warranting skilled PT care. Per Pt, he is still interested in SNF and unsure he can take care of himself at home. Good progress with rehab thus far  End of Session Patient Position: Up in chair, Alarm on     PT Plan  Treatment/Interventions: Bed mobility, Transfer training, Gait training, Balance training, Strengthening, Endurance training, Range of motion, Therapeutic exercise, Home exercise program, Therapeutic activity, Stair training  PT Plan: Ongoing PT  PT Frequency: 4 times per week  PT Discharge Recommendations: Moderate intensity level of continued care  Equipment Recommended upon Discharge: Wheeled walker  PT Recommended Transfer Status: Assist x1, Assistive device  PT - OK to Discharge: Yes      General Visit Information:   PT  Visit  PT Received On: 07/31/24  Response to Previous Treatment: Patient with no complaints from previous session.  General  Reason for Referral: Impaired Mobility  Referred By: Dr. Sue  Past Medical History Relevant to Rehab: DB, CKD, HTN, HLD  Family/Caregiver Present: No  Prior to Session Communication: Bedside nurse  Patient Position Received: Bed, 3 rail up, Alarm on  Preferred Learning Style: verbal  General Comment: Agreeable to PT " follow upi    Subjective   Precautions:  Precautions  Medical Precautions: Fall precautions    Objective   Pain:  Pain Assessment  Pain Assessment: 0-10  0-10 (Numeric) Pain Score: 7  Pain Type: Acute pain  Pain Location: Leg  Pain Orientation: Right, Left (but L>R. RN aware)  Pain Interventions: Ambulation/increased activity  Response to Interventions: Unchanged with ambulation. Decrease with sitting in chair  Cognition:  Cognition  Overall Cognitive Status: Within Functional Limits    Activity Tolerance:  Activity Tolerance  Endurance: Decreased tolerance for upright activites  Activity Tolerance Comments: Fatigues quickly in standing  Treatments:  Therapeutic Exercise  Therapeutic Exercise Performed: Yes  Therapeutic Exercise Activity 1: Pt educated on and completes: B ankle pumps x20, Quad Sets x10, Heel Slides x10, Resisted Hip Abd x10, Resisted Hip Add x10, Straight Leg Raise x10, and Glute sets x10    Bed Mobility  Bed Mobility: Yes  Bed Mobility 1  Bed Mobility 1: Supine to sitting  Level of Assistance 1: Close supervision  Bed Mobility Comments 1: Focus on technique. CS for safety and cues for technique. Very laborous but self completed with increased time    Ambulation/Gait Training  Ambulation/Gait Training Performed: Yes  Ambulation/Gait Training 1  Surface 1: Level tile  Device 1: Rolling walker  Assistance 1: Contact guard  Quality of Gait 1:  (cga for safety due to mild unsteadiness and increased lateral sway)  Comments/Distance (ft) 1: 20'x2  Transfers  Transfer: Yes  Transfer 1  Transfer From 1: Bed to  Transfer to 1: Stand  Technique 1: Sit to stand, Stand to sit  Transfer Device 1: Walker  Transfer Level of Assistance 1: Contact guard  Trials/Comments 1: cues on technique including setup, safety, anterior weight shifting, proper AD use, and hand placement/pushoff (x2 trials)  Transfers 2  Transfer From 2: Toilet to  Transfer to 2: Stand  Technique 2: Sit to stand, Stand to sit  Transfer Device 2:  Walker  Transfer Level of Assistance 2: Contact guard  Trials/Comments 2: CGA for safety. Cues as above  Transfers 3  Transfer From 3: Chair with arms to  Transfer to 3: Stand  Technique 3: Sit to stand, Stand to sit  Transfer Device 3: Walker  Transfer Level of Assistance 3: Contact guard  Trials/Comments 3: CGA for safety cues as above    Outcome Measures:  [facility] Basic Mobility  Turning from your back to your side while in a flat bed without using bedrails: A little  Moving from lying on your back to sitting on the side of a flat bed without using bedrails: A little  Moving to and from bed to chair (including a wheelchair): A little  Standing up from a chair using your arms (e.g. wheelchair or bedside chair): A little  To walk in hospital room: A little  Climbing 3-5 steps with railing: Total  Basic Mobility - Total Score: 16    Education Documentation  Home Exercise Program, taught by Enrique Carbajal, PT at 7/31/2024 11:09 AM.  Learner: Patient  Readiness: Acceptance  Method: Explanation, Demonstration  Response: Verbalizes Understanding  Comment: safe mobility techniques, HEP    Mobility Training, taught by Enrique Carbajal PT at 7/31/2024 11:09 AM.  Learner: Patient  Readiness: Acceptance  Method: Explanation, Demonstration  Response: Verbalizes Understanding  Comment: safe mobility techniques, HEP    Education Comments  No comments found.        OP EDUCATION:       Encounter Problems       Encounter Problems (Active)       Balance       Sitting Balance (Progressing)       Start:  07/30/24    Expected End:  08/13/24       Pt will demonstrate good sitting balance to promote safe engagement with out of bed activities           Standing Balance (Progressing)       Start:  07/30/24    Expected End:  08/13/24       Pt will demonstrate good static standing balance to promote safe participation with out of bed activity, transfers, and mobility              Mobility       Ambulation (Progressing)       Start:   07/30/24    Expected End:  08/13/24       Pt will ambulate 50' modified independent assist with walker to promote safe home mobility           Entry Stair Negotiation (Progressing)       Start:  07/30/24    Expected End:  08/13/24       Pt will ascend/descend 3 stairs with rail(s) on B and modified independent assist to promote safe entry and exit in home environment                PT Transfers       Supine to sit (Progressing)       Start:  07/30/24    Expected End:  08/13/24       Pt will transfer supine to sitting at edge of bed with modified independent assist to promote acute care out of bed activity           Sit to stand (Progressing)       Start:  07/30/24    Expected End:  08/13/24       Pt will transfer sit to standing position with modified independent assist and walker to promote safe out of bed activity           Bed to chair (Progressing)       Start:  07/30/24    Expected End:  08/13/24       Pt will transfer from sitting edge of bed to the chair with modified independent assist and walker to promote out of bed activity and reduce the risks of prolonged acute care bedrest              Pain - Adult          Safety       Safe Mobility Techniques (Progressing)       Start:  07/30/24    Expected End:  08/13/24       Pt will correctly identify and demonstrate safe mobility techniques to reduce their risks for falls during their acute care stay

## 2024-07-31 NOTE — CARE PLAN
The patient's goals for the shift include pain control.     The clinical goals for the shift include Safety maintenance, monitor vitals, administer antibiotics, monitor for infection, and manage pain.     Over the shift, the patient did not make progress toward the following goals. Barriers to progression include recurring bilateral lower extremity pain. Recommendations to address these barriers include frequent repositioning, elevate legs, and medicate as needed per patient request.

## 2024-07-31 NOTE — NURSING NOTE
Patient pleasant and compliant with fluid restriction, no s/s of hyper or hypoglycemia noted this shift, dressing to ble CDI, no shadowing noted to dressings, patient continent with urinal , NSR on tele, medicated patient x 1 for report of BLE discomfort unrelieved with non pharmacological interventions, medication reported as effective, no further report of pain or discomfort call light within reach safety measures remain in place.

## 2024-07-31 NOTE — PROGRESS NOTES
"Kevin Dc \"miky" is a 59 y.o. male on day 0 of admission presenting with Cellulitis.    Subjective   Was seen yesterday for chronic disease stage III he was admitted with bilateral lower extremity cellulitis on IV antibiotics he feels better no complaints today       Objective     Physical Exam  Neck:      Vascular: No carotid bruit.   Cardiovascular:      Rate and Rhythm: Normal rate and regular rhythm.      Heart sounds: No murmur heard.     No friction rub. No gallop.   Pulmonary:      Breath sounds: No wheezing, rhonchi or rales.   Chest:      Chest wall: No tenderness.   Abdominal:      General: There is no distension.      Tenderness: There is no abdominal tenderness. There is no guarding or rebound.   Musculoskeletal:         General: No swelling or tenderness.      Cervical back: Neck supple.      Right lower leg: Edema present.      Left lower leg: Edema present.   Lymphadenopathy:      Cervical: No cervical adenopathy.         Last Recorded Vitals  Blood pressure 140/75, pulse 80, temperature 36.8 °C (98.2 °F), temperature source Oral, resp. rate 18, height 1.676 m (5' 6\"), weight 122 kg (269 lb 6.4 oz), SpO2 94%.    Intake/Output last 3 Shifts:  I/O last 3 completed shifts:  In: 340 (2.8 mL/kg) [P.O.:240; IV Piggyback:100]  Out: 2550 (20.9 mL/kg) [Urine:2550 (0.6 mL/kg/hr)]  Weight: 122.2 kg     Current Facility-Administered Medications:     acetaminophen (Tylenol) tablet 650 mg, 650 mg, oral, q4h PRN, 650 mg at 07/31/24 1012 **OR** acetaminophen (Tylenol) oral liquid 650 mg, 650 mg, oral, q4h PRN **OR** acetaminophen (Tylenol) suppository 650 mg, 650 mg, rectal, q4h PRN, Zackery Sue MD    allopurinol (Zyloprim) tablet 150 mg, 150 mg, oral, Daily, Zackery Sue MD, 150 mg at 07/31/24 0833    apixaban (Eliquis) tablet 5 mg, 5 mg, oral, BID, Zackery Sue MD, 5 mg at 07/31/24 0595    calcitriol (Rocaltrol) capsule 0.5 mcg, 0.5 mcg, oral, Daily, Zackery Sue MD, 0.5 mcg at 07/31/24 0844    " clopidogrel (Plavix) tablet 75 mg, 75 mg, oral, Daily, Zackery Sue MD, 75 mg at 07/31/24 0844    dextrose 50 % injection 12.5 g, 12.5 g, intravenous, q15 min PRN, Zackery Sue MD    dextrose 50 % injection 25 g, 25 g, intravenous, q15 min PRN, Zackery Sue MD    dilTIAZem CD (Cardizem CD) 24 hr capsule 240 mg, 240 mg, oral, Daily, Zackery Sue MD, 240 mg at 07/31/24 0844    glucagon (Glucagen) injection 1 mg, 1 mg, intramuscular, q15 min PRN, Zackery Sue MD    guaiFENesin (Mucinex) 12 hr tablet 600 mg, 600 mg, oral, q12h PRN, Zackery Sue MD    insulin glargine (Lantus) injection 50 Units, 50 Units, subcutaneous, Daily, Zackery Sue MD, 50 Units at 07/31/24 0900    insulin lispro (HumaLOG) injection 0-15 Units, 0-15 Units, subcutaneous, TID, Zackery Sue MD, 3 Units at 07/30/24 1742    isosorbide mononitrate ER (Imdur) 24 hr tablet 60 mg, 60 mg, oral, Daily, Zackery Sue MD, 60 mg at 07/31/24 0844    melatonin tablet 3 mg, 3 mg, oral, Nightly PRN, Zackery Sue MD, 3 mg at 07/30/24 0109    nitroglycerin (Nitrostat) SL tablet 0.4 mg, 0.4 mg, sublingual, q5 min PRN, Zackery Sue MD    piperacillin-tazobactam (Zosyn) 3.375 g in dextrose (iso) IV 50 mL, 3.375 g, intravenous, q8h, Hernán Chance MD, Stopped at 07/31/24 1043    polyethylene glycol (Glycolax, Miralax) packet 17 g, 17 g, oral, Daily, Zackery Sue MD    potassium chloride CR (Klor-Con) ER tablet 10 mEq, 10 mEq, oral, Daily with breakfast, Zackery Sue MD, 10 mEq at 07/31/24 0844    propranolol (Inderal) tablet 60 mg, 60 mg, oral, Daily, Zackery Sue MD, 60 mg at 07/31/24 0549    rosuvastatin (Crestor) tablet 20 mg, 20 mg, oral, Daily, Zackery Sue MD, 20 mg at 07/31/24 0844    sodium bicarbonate tablet 650 mg, 650 mg, oral, 4x daily, Zackery Sue MD, 650 mg at 07/31/24 0700    torsemide (Demadex) tablet 50 mg, 50 mg, oral, Every other day, Zackery Sue MD, 50 mg at 07/31/24 0844   Relevant Results    Results for orders placed or performed  during the hospital encounter of 07/28/24 (from the past 96 hour(s))   CBC and Auto Differential   Result Value Ref Range    WBC 10.0 4.4 - 11.3 x10*3/uL    nRBC 0.0 0.0 - 0.0 /100 WBCs    RBC 4.12 (L) 4.50 - 5.90 x10*6/uL    Hemoglobin 12.3 (L) 13.5 - 17.5 g/dL    Hematocrit 36.4 (L) 41.0 - 52.0 %    MCV 88 80 - 100 fL    MCH 29.9 26.0 - 34.0 pg    MCHC 33.8 32.0 - 36.0 g/dL    RDW 13.6 11.5 - 14.5 %    Platelets 476 (H) 150 - 450 x10*3/uL    Neutrophils % 71.9 40.0 - 80.0 %    Immature Granulocytes %, Automated 2.4 (H) 0.0 - 0.9 %    Lymphocytes % 13.8 13.0 - 44.0 %    Monocytes % 9.3 2.0 - 10.0 %    Eosinophils % 2.3 0.0 - 6.0 %    Basophils % 0.3 0.0 - 2.0 %    Neutrophils Absolute 7.22 1.20 - 7.70 x10*3/uL    Immature Granulocytes Absolute, Automated 0.24 0.00 - 0.70 x10*3/uL    Lymphocytes Absolute 1.39 1.20 - 4.80 x10*3/uL    Monocytes Absolute 0.93 0.10 - 1.00 x10*3/uL    Eosinophils Absolute 0.23 0.00 - 0.70 x10*3/uL    Basophils Absolute 0.03 0.00 - 0.10 x10*3/uL   Comprehensive metabolic panel   Result Value Ref Range    Glucose 252 (H) 65 - 99 mg/dL    Sodium 135 133 - 145 mmol/L    Potassium 3.4 3.4 - 5.1 mmol/L    Chloride 101 97 - 107 mmol/L    Bicarbonate 20 (L) 24 - 31 mmol/L    Urea Nitrogen 38 (H) 8 - 25 mg/dL    Creatinine 2.20 (H) 0.40 - 1.60 mg/dL    eGFR 33 (L) >60 mL/min/1.73m*2    Calcium 8.8 8.5 - 10.4 mg/dL    Albumin 3.1 (L) 3.5 - 5.0 g/dL    Alkaline Phosphatase 101 35 - 125 U/L    Total Protein 7.4 5.9 - 7.9 g/dL    AST 27 5 - 40 U/L    Bilirubin, Total 0.8 0.1 - 1.2 mg/dL    ALT 40 5 - 40 U/L    Anion Gap 14 <=19 mmol/L   APTT   Result Value Ref Range    aPTT 34.5 (H) 22.0 - 32.5 seconds   Protime-INR   Result Value Ref Range    Protime 12.2 9.3 - 12.7 seconds    INR 1.2 0.9 - 1.2   BLOOD GAS VENOUS FULL PANEL   Result Value Ref Range    POCT pH, Venous 7.40 7.33 - 7.43 pH    POCT pCO2, Venous 36 (L) 41 - 51 mm Hg    POCT pO2, Venous 41 35 - 45 mm Hg    POCT SO2, Venous 64 45 - 75 %     POCT Oxy Hemoglobin, Venous 61.9 45.0 - 75.0 %    POCT Hematocrit Calculated, Venous 35.0 (L) 41.0 - 52.0 %    POCT Sodium, Venous 134 (L) 136 - 145 mmol/L    POCT Potassium, Venous 3.5 3.5 - 5.3 mmol/L    POCT Chloride, Venous 104 98 - 107 mmol/L    POCT Ionized Calicum, Venous 1.17 1.10 - 1.33 mmol/L    POCT Glucose, Venous 280 (H) 74 - 99 mg/dL    POCT Lactate, Venous 1.4 0.4 - 2.0 mmol/L    POCT Base Excess, Venous -2.1 (L) -2.0 - 3.0 mmol/L    POCT HCO3 Calculated, Venous 22.3 22.0 - 26.0 mmol/L    POCT Hemoglobin, Venous 11.8 (L) 13.5 - 17.5 g/dL    POCT Anion Gap, Venous 11.0 10.0 - 25.0 mmol/L    Patient Temperature 37.0 degrees Celsius    FiO2 21 %   CBC   Result Value Ref Range    WBC 9.5 4.4 - 11.3 x10*3/uL    nRBC 0.0 0.0 - 0.0 /100 WBCs    RBC 3.73 (L) 4.50 - 5.90 x10*6/uL    Hemoglobin 10.9 (L) 13.5 - 17.5 g/dL    Hematocrit 33.6 (L) 41.0 - 52.0 %    MCV 90 80 - 100 fL    MCH 29.2 26.0 - 34.0 pg    MCHC 32.4 32.0 - 36.0 g/dL    RDW 13.6 11.5 - 14.5 %    Platelets 416 150 - 450 x10*3/uL   Basic metabolic panel   Result Value Ref Range    Glucose 181 (H) 65 - 99 mg/dL    Sodium 138 133 - 145 mmol/L    Potassium 3.5 3.4 - 5.1 mmol/L    Chloride 108 (H) 97 - 107 mmol/L    Bicarbonate 20 (L) 24 - 31 mmol/L    Urea Nitrogen 36 (H) 8 - 25 mg/dL    Creatinine 2.10 (H) 0.40 - 1.60 mg/dL    eGFR 35 (L) >60 mL/min/1.73m*2    Calcium 8.3 (L) 8.5 - 10.4 mg/dL    Anion Gap 10 <=19 mmol/L   Hemoglobin A1c   Result Value Ref Range    Hemoglobin A1C 6.9 (H) See below %    Estimated Average Glucose 151 Not Established mg/dL   Creatine Kinase   Result Value Ref Range    Creatine Kinase 57 24 - 195 U/L   POCT GLUCOSE   Result Value Ref Range    POCT Glucose 187 (H) 74 - 99 mg/dL   POCT GLUCOSE   Result Value Ref Range    POCT Glucose 192 (H) 74 - 99 mg/dL   Blood Culture    Specimen: Peripheral Venipuncture; Blood culture   Result Value Ref Range    Blood Culture No growth at 1 day    Transthoracic Echo (TTE) Complete    Result Value Ref Range    AV pk hansel 1.70 m/s    LVOT diam 2.00 cm    AV mn grad 7.0 mmHg    MV E/A ratio 0.82     Tricuspid annular plane systolic excursion 2.8 cm    LV Biplane EF 65 %    LV EF 63 %    RV free wall pk S' 17.60 cm/s    LVIDd 4.33 cm    AV pk grad 11.6 mmHg    Aortic Valve Area by Continuity of VTI 1.76 cm2    Aortic Valve Area by Continuity of Peak Velocity 1.98 cm2    LV A4C EF 68.5    POCT GLUCOSE   Result Value Ref Range    POCT Glucose 193 (H) 74 - 99 mg/dL   Tissue/Wound Culture/Smear    Specimen: Wound/Tissue; Tissue/Biopsy   Result Value Ref Range    Tissue/Wound Culture/Smear (2+) Few Mixed Gram-Positive Bacteria     Gram Stain No polymorphonuclear leukocytes seen     Gram Stain No organisms seen    POCT GLUCOSE   Result Value Ref Range    POCT Glucose 156 (H) 74 - 99 mg/dL   CBC   Result Value Ref Range    WBC 10.9 4.4 - 11.3 x10*3/uL    nRBC 0.0 0.0 - 0.0 /100 WBCs    RBC 3.52 (L) 4.50 - 5.90 x10*6/uL    Hemoglobin 10.5 (L) 13.5 - 17.5 g/dL    Hematocrit 32.0 (L) 41.0 - 52.0 %    MCV 91 80 - 100 fL    MCH 29.8 26.0 - 34.0 pg    MCHC 32.8 32.0 - 36.0 g/dL    RDW 13.5 11.5 - 14.5 %    Platelets 421 150 - 450 x10*3/uL   Basic metabolic panel   Result Value Ref Range    Glucose 225 (H) 65 - 99 mg/dL    Sodium 141 133 - 145 mmol/L    Potassium 3.6 3.4 - 5.1 mmol/L    Chloride 108 (H) 97 - 107 mmol/L    Bicarbonate 22 (L) 24 - 31 mmol/L    Urea Nitrogen 37 (H) 8 - 25 mg/dL    Creatinine 2.10 (H) 0.40 - 1.60 mg/dL    eGFR 35 (L) >60 mL/min/1.73m*2    Calcium 8.5 8.5 - 10.4 mg/dL    Anion Gap 11 <=19 mmol/L   POCT GLUCOSE   Result Value Ref Range    POCT Glucose 215 (H) 74 - 99 mg/dL   POCT GLUCOSE   Result Value Ref Range    POCT Glucose 210 (H) 74 - 99 mg/dL   POCT GLUCOSE   Result Value Ref Range    POCT Glucose 153 (H) 74 - 99 mg/dL   POCT GLUCOSE   Result Value Ref Range    POCT Glucose 194 (H) 74 - 99 mg/dL   CBC   Result Value Ref Range    WBC 11.6 (H) 4.4 - 11.3 x10*3/uL    nRBC 0.0 0.0  - 0.0 /100 WBCs    RBC 3.84 (L) 4.50 - 5.90 x10*6/uL    Hemoglobin 11.3 (L) 13.5 - 17.5 g/dL    Hematocrit 34.8 (L) 41.0 - 52.0 %    MCV 91 80 - 100 fL    MCH 29.4 26.0 - 34.0 pg    MCHC 32.5 32.0 - 36.0 g/dL    RDW 13.5 11.5 - 14.5 %    Platelets 435 150 - 450 x10*3/uL   Renal Function Panel   Result Value Ref Range    Glucose 149 (H) 65 - 99 mg/dL    Sodium 140 133 - 145 mmol/L    Potassium 3.8 3.4 - 5.1 mmol/L    Chloride 107 97 - 107 mmol/L    Bicarbonate 23 (L) 24 - 31 mmol/L    Urea Nitrogen 29 (H) 8 - 25 mg/dL    Creatinine 2.00 (H) 0.40 - 1.60 mg/dL    eGFR 38 (L) >60 mL/min/1.73m*2    Calcium 8.5 8.5 - 10.4 mg/dL    Phosphorus 3.8 2.5 - 4.5 mg/dL    Albumin 2.4 (L) 3.5 - 5.0 g/dL    Anion Gap 10 <=19 mmol/L   POCT GLUCOSE   Result Value Ref Range    POCT Glucose 140 (H) 74 - 99 mg/dL   POCT GLUCOSE   Result Value Ref Range    POCT Glucose 179 (H) 74 - 99 mg/dL       Assessment/Plan   Chronic kidney disease stage III to me that renal function is at baseline at this time creatinine is [id number] is down to 2.0 will continue to monitor  Cellulitis lower extremities to continue antibiotic therapy  Lymphedema  Diabetes mellitus type 2  Hypertension  Coronary artery disease with stent placement  Mild anemia chronic kidney disease             Hernán Chance MD

## 2024-07-31 NOTE — PROGRESS NOTES
07/31/24 1206   Discharge Planning   Living Arrangements Alone   Support Systems Family members;Friends/neighbors   Type of Residence Private residence  (mobile home)   Who is requesting discharge planning? Provider   Home or Post Acute Services Post acute facilities (Rehab/SNF/etc)   Type of Post Acute Facility Services Rehab;Skilled nursing   Expected Discharge Disposition SNF   Does the patient need discharge transport arranged? Yes   RoundTrip coordination needed? Yes   Has discharge transport been arranged? No     Per PT Notes: continues to demonstrate weakness warranting skilled PT care. Per Pt, he is still interested in SNF and unsure he can take care of himself at home.   PT Discharge Recommendations: Moderate intensity level of continued care  Equipment Recommended upon Discharge: Wheeled walker    Patient was given a list of Skilled Rehab Facilities.     Patient said he has been to [facility] before and would like to to  that facility, referral was placed in [facility].

## 2024-07-31 NOTE — PROGRESS NOTES
partha Cedillo is a 59 y.o. male on day 0 of admission presenting with Cellulitis.      Subjective   Patient resting in bed comfortably, denies pain or sob. He does endorse difficulty moving with therapy and feels he may need to go to rehab.        Objective     Last Recorded Vitals  /75 (BP Location: Left arm, Patient Position: Lying)   Pulse 80   Temp 36.8 °C (98.2 °F) (Oral)   Resp 18   Wt 122 kg (269 lb 6.4 oz)   SpO2 94%   Intake/Output last 3 Shifts:    Intake/Output Summary (Last 24 hours) at 7/31/2024 1412  Last data filed at 7/31/2024 1013  Gross per 24 hour   Intake 100 ml   Output 1050 ml   Net -950 ml       Admission Weight  Weight: 126 kg (278 lb) (07/28/24 2017)    Daily Weight  07/29/24 : 122 kg (269 lb 6.4 oz)    Image Results  JERED without exercise              [facility]  [id number], [place] [id number]              Phone [phone]       Vascular Lab Report     [facility] ANKLE BRACHIAL INDEX (JERED) WITHOUT EXERCISE    Patient Name:      ADRIENNE CEDILLO  Reading Physician: [id number] Arely Guillen MD  Study Date:        7/29/2024           Ordering Provider: [id number] GIOVANNA ARECHIGA  MRN/PID:           [id number]            Fellow:  Accession#:        [id number]        Technologist:      SURAJ LOOMIS RDMS  Date of Birth/Age: 1965 / 59      Technologist 2:                     years  Gender:            M                   Encounter#:        [id number]  Admission Status:  Inpatient           Location           [facility]                                         Performed:       Diagnosis/ICD: Lymphedema, not elsewhere classified-I89.0; Cellulitus of right                 lower limb-L03.115; Cellulitus of left lower limb-L03.116       CONCLUSIONS:  Right Lower PVR: No evidence of arterial occlusive disease in the right lower extremity at  rest. Normal digital perfusion noted. Multiphasic flow is noted in the right common femoral artery, right popliteal artery, right posterior tibial artery and right dorsalis pedis artery. Called by tracings due to non-compressible vessels.  Left Lower PVR: No evidence of arterial occlusive disease in the left lower extremity at rest. Normal digital perfusion noted. Multiphasic flow is noted in the left common femoral artery, left popliteal artery, left posterior tibial artery and left dorsalis pedis artery. Called by tracings due to non-compressible vessels.  Additional Findings:  Technically difficult exam due to edema.       Imaging & Doppler Findings:     RIGHT Lower PVR                Pressures Ratios  Right Posterior Tibial (Ankle) 255 mmHg  1.71  Right Dorsalis Pedis (Ankle)   255 mmHg  1.71  Right Digit (Great Toe)        255 mmHg  1.71          LEFT Lower PVR                Pressures Ratios  Left Posterior Tibial (Ankle) 255 mmHg  1.71  Left Dorsalis Pedis (Ankle)   255 mmHg  1.71  Left Digit (Great Toe)        172 mmHg  1.15                             Right     Left  Brachial Pressure 149 mmHg 139 mmHg          [id number] Arely Guillen MD  Electronically signed by [id number] Arely Guillen MD on 7/30/2024 at 1:29:26 PM       ** Final **  XR foot left 3+ views  Narrative: Interpreted By:  Michelle Moseley,   STUDY:  XR FOOT LEFT 3+ VIEWS 7/30/2024 10:12 am      INDICATION:  Swelling and tenderness      COMPARISON:  10/16/2021      ACCESSION NUMBER(S):  [id number]      ORDERING CLINICIAN:  TRAMAINE PATE      TECHNIQUE:  Three views of left foot      FINDINGS:  There is narrowing of the tibiotalar articulation appearing unchanged  since the prior examination with narrowing of the joint spaces of the  midfoot and at the hindfoot/midfoot junction. There is exuberant  callus formation about the proximal to mid 3rd metatarsal due to old  healed traumatic injury. A plantar calcaneal spur is present.      There is severe edema  of the distal left lower leg and ankle  extending into the foot.      No obvious bony destruction is seen.      Impression: No plain film evidence for osteomyelitis is observed. If clinically  indicated, MRI of the foot could be performed for further assessment.      Signed by: Michelle Moseley 7/30/2024 11:55 AM  Dictation workstation:   [id number]  Transthoracic Echo (TTE) Complete              [facility]  [address], [place] [id number]              Phone [phone]    TRANSTHORACIC ECHOCARDIOGRAM REPORT    Patient Name:     ADRIENNE CEDILLO   Reading Physician:   [id number] Vicki Hendrickson MD  Study Date:       7/29/2024            Ordering Provider:   [id number] ARECHIGA  MRN/PID:          [id number]             Fellow:  Accession#:       [id number]         Nurse:  Date of           1965 / 59 years Sonographer:         Kamille Doe RDCS  Birth/Age:  Gender:           M                    Additional Staff:  Height:           167.64 cm            Admit Date:  Weight:           122.02 kg            Admission Status:    Inpatient - Routine  BSA / BMI:        2.27 m2 / 43.42      Department Location: [facility]                    kg/m2  Blood Pressure: 151 /85 mmHg    Study Type:    TRANSTHORACIC ECHO (TTE) COMPLETE  Diagnosis/ICD: Localized swelling, mass and lump, trunk-R22.2  Indication:    Lymphedema  CPT Codes:     Echo Complete w Full Doppler-09920    Patient History:  Pertinent History: Hyperlipidemia, CHF and Chest Pain. DM, venous insufficiency,                     CVD, CVA, DVT, CP, PE.    Study Detail: The following Echo studies were performed: M-Mode, Doppler, 2D and                color flow. Technically challenging study due to prominent lung                artifact.       PHYSICIAN INTERPRETATION:  Left Ventricle: The left ventricular systolic function is normal, with a visually estimated ejection fraction of 60-65%.  There are no regional wall motion abnormalities. The left ventricular cavity size is normal. Spectral Doppler shows a normal pattern of left ventricular diastolic filling.  Left Atrium: The left atrium is normal in size.  Right Ventricle: The right ventricle is normal in size. There is normal right ventricular global systolic function.  Right Atrium: The right atrium is normal in size.  Aortic Valve: The aortic valve appears structurally normal. The aortic valve dimensionless index is 0.56. There is no evidence of aortic valve regurgitation. The peak instantaneous gradient of the aortic valve is 11.6 mmHg. The mean gradient of the aortic valve is 7.0 mmHg.  Mitral Valve: The mitral valve is abnormal. There is mild mitral annular calcification. There is no evidence of mitral valve regurgitation.  Tricuspid Valve: The tricuspid valve is structurally normal. There is trace tricuspid regurgitation. The right ventricular systolic pressure is unable to be estimated.  Pulmonic Valve: The pulmonic valve is structurally normal. There is physiologic pulmonic valve regurgitation.  Pericardium: There is a trivial pericardial effusion.  Aorta: The aortic root is normal.  Systemic Veins: The inferior vena cava appears mildly dilated. There is less than 50% IVC collapse with inspiration.       CONCLUSIONS:   1. The left ventricular systolic function is normal, with a visually estimated ejection fraction of 60-65%.   2. There is normal right ventricular global systolic function.   3. No evidence of mitral valve regurgitation.   4. Trace tricuspid regurgitation is visualized.    QUANTITATIVE DATA SUMMARY:  2D MEASUREMENTS:                           Normal Ranges:  LAs:           3.40 cm   (2.7-4.0cm)  IVSd:          1.04 cm   (0.6-1.1cm)  LVPWd:         1.24 cm   (0.6-1.1cm)  LVIDd:         4.33 cm   (3.9-5.9cm)  LVIDs:         3.01 cm  LV Mass Index: 76.4 g/m2  LV % FS        30.5 %    LA VOLUME:                                Normal Ranges:  LA Vol A4C:        41.9 ml   (22+/-6mL/m2)  LA Vol Index A4C:  18.5ml/m2  LA Area A4C:       16.5 cm2  LA Major Axis A4C: 5.5 cm  LA Vol A4C:        39.2 ml    RA VOLUME BY A/L METHOD:                                Normal Ranges:  RA Vol A4C:        27.0 ml    (8.3-19.5ml)  RA Vol Index A4C:  11.9 ml/m2  RA Area A4C:       13.1 cm2  RA Major Axis A4C: 5.4 cm    M-MODE MEASUREMENTS:                   Normal Ranges:  Ao Root: 3.20 cm (2.0-3.7cm)    AORTA MEASUREMENTS:                     Normal Ranges:  Asc Ao, d: 2.80 cm (2.1-3.4cm)    LV SYSTOLIC FUNCTION BY 2D PLANIMETRY (MOD):                       Normal Ranges:  EF-A4C View:    68 % (>=55%)  EF-A2C View:    61 %  EF-Biplane:     65 %  EF-Visual:      63 %  LV EF Reported: 63 %    LV DIASTOLIC FUNCTION:                          Normal Ranges:  MV Peak E:    0.95 m/s  (0.7-1.2 m/s)  MV Peak A:    1.15 m/s  (0.42-0.7 m/s)  E/A Ratio:    0.82      (1.0-2.2)  MV e'         0.076 m/s (>8.0)  MV lateral e' 0.07 m/s  MV medial e'  0.08 m/s  E/e' Ratio:   12.51     (<8.0)    MITRAL VALVE:                  Normal Ranges:  MV DT: 263 msec (150-240msec)    AORTIC VALVE:                                     Normal Ranges:  AoV Vmax:                1.70 m/s  (<=1.7m/s)  AoV Peak P.6 mmHg (<20mmHg)  AoV Mean P.0 mmHg  (1.7-11.5mmHg)  LVOT Max Cristobal:            1.07 m/s  (<=1.1m/s)  AoV VTI:                 32.00 cm  (18-25cm)  LVOT VTI:                17.90 cm  LVOT Diameter:           2.00 cm   (1.8-2.4cm)  AoV Area, VTI:           1.76 cm2  (2.5-5.5cm2)  AoV Area,Vmax:           1.98 cm2  (2.5-4.5cm2)  AoV Dimensionless Index: 0.56       RIGHT VENTRICLE:  TAPSE: 27.5 mm  RV s'  0.18 m/s    TRICUSPID VALVE/RVSP:                    Normal Ranges:  IVC Diam: 2.37 cm       [id number] Vicki Hendrickson MD  Electronically signed on 2024 at 9:34:56 AM       ** Final **      Physical Exam  Constitutional:       Appearance: He is obese. He is  ill-appearing.   Cardiovascular:      Rate and Rhythm: Normal rate and regular rhythm.      Pulses: Normal pulses.      Heart sounds: Normal heart sounds.   Pulmonary:      Effort: Pulmonary effort is normal.      Breath sounds: Normal breath sounds.   Musculoskeletal:      Right lower leg: Edema present.      Left lower leg: Edema present.      Comments: +2-3 with lyphedema   Skin:     General: Skin is warm and dry.   Neurological:      Mental Status: He is alert and oriented to person, place, and time.         Relevant Results             Results for orders placed or performed during the hospital encounter of 07/28/24 (from the past 24 hour(s))   POCT GLUCOSE   Result Value Ref Range    POCT Glucose 153 (H) 74 - 99 mg/dL   POCT GLUCOSE   Result Value Ref Range    POCT Glucose 194 (H) 74 - 99 mg/dL   CBC   Result Value Ref Range    WBC 11.6 (H) 4.4 - 11.3 x10*3/uL    nRBC 0.0 0.0 - 0.0 /100 WBCs    RBC 3.84 (L) 4.50 - 5.90 x10*6/uL    Hemoglobin 11.3 (L) 13.5 - 17.5 g/dL    Hematocrit 34.8 (L) 41.0 - 52.0 %    MCV 91 80 - 100 fL    MCH 29.4 26.0 - 34.0 pg    MCHC 32.5 32.0 - 36.0 g/dL    RDW 13.5 11.5 - 14.5 %    Platelets 435 150 - 450 x10*3/uL   Renal Function Panel   Result Value Ref Range    Glucose 149 (H) 65 - 99 mg/dL    Sodium 140 133 - 145 mmol/L    Potassium 3.8 3.4 - 5.1 mmol/L    Chloride 107 97 - 107 mmol/L    Bicarbonate 23 (L) 24 - 31 mmol/L    Urea Nitrogen 29 (H) 8 - 25 mg/dL    Creatinine 2.00 (H) 0.40 - 1.60 mg/dL    eGFR 38 (L) >60 mL/min/1.73m*2    Calcium 8.5 8.5 - 10.4 mg/dL    Phosphorus 3.8 2.5 - 4.5 mg/dL    Albumin 2.4 (L) 3.5 - 5.0 g/dL    Anion Gap 10 <=19 mmol/L   POCT GLUCOSE   Result Value Ref Range    POCT Glucose 140 (H) 74 - 99 mg/dL   POCT GLUCOSE   Result Value Ref Range    POCT Glucose 179 (H) 74 - 99 mg/dL       Assessment/Plan                  Principal Problem:    Cellulitis  Active Problems:    History of coronary artery stent placement    History of pulmonary embolism     Lymphedema    Type 2 diabetes mellitus (Multi)    Chronic renal impairment, stage 3 (moderate) (Multi)    Decreased activities of daily living (ADL)    Cellulitis of lower extremity, unspecified laterality    Cellulitis of the bilateral lower extremities  Lymphedema of the bilateral lower extremities  IV Zosyn, reduce to every 8 hours  Blood cxs  JERED lower extremity  U/S negative for DVT  ID following  Wound consult  Podiatry signed off, compression wrap with Ace bandages from the toes to the knee  Pain management  XR L foot neg for OM  Wound consult     Type 2 diabetes mellitus  Reports noncompliance, Home long-acting insulin reported per patient does not make sense  -Diabetic educator consult   -Diabetic diet  -A1c 6.9  -Lantus 50 units nightly  -SSI  -Monitor closely     CKD stage III  Creatinine 2.1, EGFR 35  Avoid/caution nephrotoxic agents  Continue to monitor  50 mg Torsemide PO     Reported history of CAD with stents  Cardiology following  Patient unsure why he is taking propranolol, Propranolol 120 mg held, started 60 mg  Continue home medications   Echo, EF 60-65%      DVT prophylaxis  Continue Eliquis     GI prophylaxis  PPI        Plan  Patient lives at home byhomself and will be discharged to SNF for rehab.       Plan discussed with patient and collaborating physician, Dr. Burch.               Kate Wallace, APRN-CNP

## 2024-07-31 NOTE — PROGRESS NOTES
partha Dc is a 59 y.o. male on day 0 of admission presenting with Cellulitis.    Subjective   Interval History:   Afebrile, no chills  Denies shortness of breath cough or chest pain  Denies nausea vomiting or diarrhea         Objective   Range of Vitals (last 24 hours)  Heart Rate:  [73-86]   Temp:  [36.8 °C (98.2 °F)-37.2 °C (99 °F)]   Resp:  [16-18]   BP: (124-140)/(64-77)   SpO2:  [94 %-96 %]   Daily Weight  07/29/24 : 122 kg (269 lb 6.4 oz)    Body mass index is 43.48 kg/m².    Physical Exam  Constitutional:       Appearance: Normal appearance.   HENT:      Head: Normocephalic and atraumatic.      Nose: Nose normal.      Mouth/Throat:      Mouth: Mucous membranes are moist.      Pharynx: Oropharynx is clear.   Eyes:      General: No scleral icterus.  Cardiovascular:      Rate and Rhythm: Normal rate and regular rhythm.   Pulmonary:      Effort: Pulmonary effort is normal.      Breath sounds: Normal breath sounds.   Abdominal:      General: Bowel sounds are normal.      Palpations: Abdomen is soft.   Musculoskeletal:         General: Normal range of motion.      Cervical back: Normal range of motion and neck supple.      Right lower leg: Edema present.      Left lower leg: Edema present.   Skin:     General: Skin is warm and dry.      Comments: Bilateral leg erythema, weeping   Neurological:      General: No focal deficit present.      Mental Status: He is alert and oriented to person, place, and time. Mental status is at baseline.   Psychiatric:         Mood and Affect: Mood normal.         Behavior: Behavior normal.     Antibiotics  piperacillin-tazobactam - 3.375 gram/50 mL    Relevant Results  Labs  Results from last 72 hours   Lab Units 07/31/24  0534 07/30/24  0424 07/29/24  0404 07/28/24  2028   WBC AUTO x10*3/uL 11.6* 10.9 9.5 10.0   HEMOGLOBIN g/dL 11.3* 10.5* 10.9* 12.3*   HEMATOCRIT % 34.8* 32.0* 33.6* 36.4*   PLATELETS AUTO x10*3/uL 435 421 416 476*   NEUTROS PCT AUTO %  --   --   --  71.9    LYMPHS PCT AUTO %  --   --   --  13.8   MONOS PCT AUTO %  --   --   --  9.3   EOS PCT AUTO %  --   --   --  2.3     Results from last 72 hours   Lab Units 07/31/24  0534 07/30/24  0424 07/29/24  0404   SODIUM mmol/L 140 141 138   POTASSIUM mmol/L 3.8 3.6 3.5   CHLORIDE mmol/L 107 108* 108*   CO2 mmol/L 23* 22* 20*   BUN mg/dL 29* 37* 36*   CREATININE mg/dL 2.00* 2.10* 2.10*   GLUCOSE mg/dL 149* 225* 181*   CALCIUM mg/dL 8.5 8.5 8.3*   ANION GAP mmol/L 10 11 10   EGFR mL/min/1.73m*2 38* 35* 35*   PHOSPHORUS mg/dL 3.8  --   --      Results from last 72 hours   Lab Units 07/31/24  0534 07/28/24 2028   ALK PHOS U/L  --  101   BILIRUBIN TOTAL mg/dL  --  0.8   PROTEIN TOTAL g/dL  --  7.4   ALT U/L  --  40   AST U/L  --  27   ALBUMIN g/dL 2.4* 3.1*     Estimated Creatinine Clearance: 48.4 mL/min (A) (by C-G formula based on SCr of 2 mg/dL (H)).  CRP   Date Value Ref Range Status   10/16/2021 3.2 (H) 0 - 2.0 MG/DL Final     Comment:     Performed at [facility] [address] [place] [id number]     Microbiology  Wound culture pending  Blood culture pending  Imaging  JERED without exercise    Result Date: 7/30/2024            [address] [address]             Phone [phone]  Vascular Lab Report  [facility] ANKLE BRACHIAL INDEX (JERED) WITHOUT EXERCISE Patient Name:      ADRIENNE Pradhan Physician: [id number] Arely Guillen MD Study Date:        7/29/2024           Ordering Provider: [id number] ARECHIGA MRN/PID:           [id number]            Fellow: Accession#:        [id number]        Technologist:      SURAJ LOOMIS RDMS Date of Birth/Age: 1965 / 59      Technologist 2:                    years Gender:            M                   Encounter#:        [id number] Admission Status:  Inpatient           Location           [facility]                                         Performed:  Diagnosis/ICD: Lymphedema, not elsewhere classified-I89.0; Cellulitus of right                lower limb-L03.115; Cellulitus of left lower limb-L03.116  CONCLUSIONS: Right Lower PVR: No evidence of arterial occlusive disease in the right lower extremity at rest. Normal digital perfusion noted. Multiphasic flow is noted in the right common femoral artery, right popliteal artery, right posterior tibial artery and right dorsalis pedis artery. Called by tracings due to non-compressible vessels. Left Lower PVR: No evidence of arterial occlusive disease in the left lower extremity at rest. Normal digital perfusion noted. Multiphasic flow is noted in the left common femoral artery, left popliteal artery, left posterior tibial artery and left dorsalis pedis artery. Called by tracings due to non-compressible vessels. Additional Findings: Technically difficult exam due to edema.  Imaging & Doppler Findings:  RIGHT Lower PVR                Pressures Ratios Right Posterior Tibial (Ankle) 255 mmHg  1.71 Right Dorsalis Pedis (Ankle)   255 mmHg  1.71 Right Digit (Great Toe)        255 mmHg  1.71   LEFT Lower PVR                Pressures Ratios Left Posterior Tibial (Ankle) 255 mmHg  1.71 Left Dorsalis Pedis (Ankle)   255 mmHg  1.71 Left Digit (Great Toe)        172 mmHg  1.15                      Right     Left Brachial Pressure 149 mmHg 139 mmHg   [id number] Arely Gulilen MD Electronically signed by [id number] Arely Guillen MD on 7/30/2024 at 1:29:26 PM  ** Final **     XR foot left 3+ views    Result Date: 7/30/2024  Interpreted By:  Michelle Moseley, STUDY: XR FOOT LEFT 3+ VIEWS 7/30/2024 10:12 am   INDICATION: Swelling and tenderness   COMPARISON: 10/16/2021   ACCESSION NUMBER(S): [id number]   ORDERING CLINICIAN: TRAMAINE PATE   TECHNIQUE: Three views of left foot   FINDINGS: There is narrowing of the tibiotalar articulation appearing unchanged since the prior examination with narrowing of the joint  spaces of the midfoot and at the hindfoot/midfoot junction. There is exuberant callus formation about the proximal to mid 3rd metatarsal due to old healed traumatic injury. A plantar calcaneal spur is present.   There is severe edema of the distal left lower leg and ankle extending into the foot.   No obvious bony destruction is seen.       No plain film evidence for osteomyelitis is observed. If clinically indicated, MRI of the foot could be performed for further assessment.   Signed by: Michelle Moseley 7/30/2024 11:55 AM Dictation workstation:   [id number]    Transthoracic Echo (TTE) Complete    Result Date: 7/30/2024            [facility] [address] [facility], [place] [id number]             Phone [phone] TRANSTHORACIC ECHOCARDIOGRAM REPORT Patient Name:     ADRIENNE CEDILLO   Reading Physician:   [id number] Vicki Hendrickson MD Study Date:       7/29/2024            Ordering Provider:   [id number] ARECHIGA MRN/PID:          [id number]             Fellow: Accession#:       [id number]         Nurse: Date of           1965 / 59 years Sonographer:         Kamille Doe RDCS Birth/Age: Gender:           M                    Additional Staff: Height:           167.64 cm            Admit Date: Weight:           122.02 kg            Admission Status:    Inpatient - Routine BSA / BMI:        2.27 m2 / 43.42      Department Location: [facility]                   kg/m2 Blood Pressure: 151 /85 mmHg Study Type:    TRANSTHORACIC ECHO (TTE) COMPLETE Diagnosis/ICD: Localized swelling, mass and lump, trunk-R22.2 Indication:    Lymphedema CPT Codes:     Echo Complete w Full Doppler-[id number] Patient History: Pertinent History: Hyperlipidemia, CHF and Chest Pain. DM, venous insufficiency,                    CVD, CVA, DVT, CP, PE. Study Detail: The following Echo studies were performed: M-Mode, Doppler, 2D and               color flow. Technically challenging  study due to prominent lung               artifact.  PHYSICIAN INTERPRETATION: Left Ventricle: The left ventricular systolic function is normal, with a visually estimated ejection fraction of 60-65%. There are no regional wall motion abnormalities. The left ventricular cavity size is normal. Spectral Doppler shows a normal pattern of left ventricular diastolic filling. Left Atrium: The left atrium is normal in size. Right Ventricle: The right ventricle is normal in size. There is normal right ventricular global systolic function. Right Atrium: The right atrium is normal in size. Aortic Valve: The aortic valve appears structurally normal. The aortic valve dimensionless index is 0.56. There is no evidence of aortic valve regurgitation. The peak instantaneous gradient of the aortic valve is 11.6 mmHg. The mean gradient of the aortic valve is 7.0 mmHg. Mitral Valve: The mitral valve is abnormal. There is mild mitral annular calcification. There is no evidence of mitral valve regurgitation. Tricuspid Valve: The tricuspid valve is structurally normal. There is trace tricuspid regurgitation. The right ventricular systolic pressure is unable to be estimated. Pulmonic Valve: The pulmonic valve is structurally normal. There is physiologic pulmonic valve regurgitation. Pericardium: There is a trivial pericardial effusion. Aorta: The aortic root is normal. Systemic Veins: The inferior vena cava appears mildly dilated. There is less than 50% IVC collapse with inspiration.  CONCLUSIONS:  1. The left ventricular systolic function is normal, with a visually estimated ejection fraction of 60-65%.  2. There is normal right ventricular global systolic function.  3. No evidence of mitral valve regurgitation.  4. Trace tricuspid regurgitation is visualized. QUANTITATIVE DATA SUMMARY: 2D MEASUREMENTS:                          Normal Ranges: LAs:           3.40 cm   (2.7-4.0cm) IVSd:          1.04 cm   (0.6-1.1cm) LVPWd:         1.24 cm    (0.6-1.1cm) LVIDd:         4.33 cm   (3.9-5.9cm) LVIDs:         3.01 cm LV Mass Index: 76.4 g/m2 LV % FS        30.5 % LA VOLUME:                              Normal Ranges: LA Vol A4C:        41.9 ml   (22+/-6mL/m2) LA Vol Index A4C:  18.5ml/m2 LA Area A4C:       16.5 cm2 LA Major Axis A4C: 5.5 cm LA Vol A4C:        39.2 ml RA VOLUME BY A/L METHOD:                               Normal Ranges: RA Vol A4C:        27.0 ml    (8.3-19.5ml) RA Vol Index A4C:  11.9 ml/m2 RA Area A4C:       13.1 cm2 RA Major Axis A4C: 5.4 cm M-MODE MEASUREMENTS:                  Normal Ranges: Ao Root: 3.20 cm (2.0-3.7cm) AORTA MEASUREMENTS:                    Normal Ranges: Asc Ao, d: 2.80 cm (2.1-3.4cm) LV SYSTOLIC FUNCTION BY 2D PLANIMETRY (MOD):                      Normal Ranges: EF-A4C View:    68 % (>=55%) EF-A2C View:    61 % EF-Biplane:     65 % EF-Visual:      63 % LV EF Reported: 63 % LV DIASTOLIC FUNCTION:                         Normal Ranges: MV Peak E:    0.95 m/s  (0.7-1.2 m/s) MV Peak A:    1.15 m/s  (0.42-0.7 m/s) E/A Ratio:    0.82      (1.0-2.2) MV e'         0.076 m/s (>8.0) MV lateral e' 0.07 m/s MV medial e'  0.08 m/s E/e' Ratio:   12.51     (<8.0) MITRAL VALVE:                 Normal Ranges: MV DT: 263 msec (150-240msec) AORTIC VALVE:                                    Normal Ranges: AoV Vmax:                1.70 m/s  (<=1.7m/s) AoV Peak P.6 mmHg (<20mmHg) AoV Mean P.0 mmHg  (1.7-11.5mmHg) LVOT Max Cristobal:            1.07 m/s  (<=1.1m/s) AoV VTI:                 32.00 cm  (18-25cm) LVOT VTI:                17.90 cm LVOT Diameter:           2.00 cm   (1.8-2.4cm) AoV Area, VTI:           1.76 cm2  (2.5-5.5cm2) AoV Area,Vmax:           1.98 cm2  (2.5-4.5cm2) AoV Dimensionless Index: 0.56  RIGHT VENTRICLE: TAPSE: 27.5 mm RV s'  0.18 m/s TRICUSPID VALVE/RVSP:                   Normal Ranges: IVC Diam: 2.37 cm  [id number] Vicki Hendrickson MD Electronically signed on 2024 at 9:34:56 AM  ** Final  **     Lower extremity venous duplex bilateral    Result Date: 7/29/2024  Interpreted By:  Eusebio Souza, STUDY: [facility] US LOWER EXTREMITY VENOUS DUPLEX BILATERAL;  7/29/2024 7:55 am   INDICATION: Signs/Symptoms:Bilateral lower extremity lymphedema with cellulitis.   COMPARISON: None.   ACCESSION NUMBER(S): [id number]   ORDERING CLINICIAN: GIOVANNA ARECHIGA   TECHNIQUE: Vascular ultrasound of the bilateral lower extremities was performed. Real-time compression views as well as Gray scale, color Doppler and spectral Doppler waveform analysis was performed.   FINDINGS: Evaluation of the visualized portions of the bilateral common femoral vein, proximal, mid, and distal femoral vein, and popliteal vein were performed.  Calf veins could not be adequately evaluated secondary to swelling and limited acoustic windows.   The evaluated veins demonstrate normal compressibility. There is intact venous flow demonstrating normal respiratory variability and normal augmentation of flow with calf compression. Therefore, there is no ultrasonographic evidence for deep vein thrombosis within the evaluated veins.       No sonographic evidence for deep vein thrombosis within the evaluated veins of the lower extremities bilaterally.   MACRO: None.   Signed by: Eusebio Souza 7/29/2024 8:27 AM Dictation workstation:   [id number]    Colonoscopy Screening; High Risk Patient; mother with colon cancer    Result Date: 7/25/2024  Table formatting from the original result was not included. Impression Scattered diverticulosis in the descending colon and sigmoid colon Findings Few small and medium, scattered diverticula in the descending colon and sigmoid colon  Recommendation Follow up with PCP No further screening colonoscopies necessary Indication Personal history of colonic polyps, Family history of malignant neoplasm of gastrointestinal tract, Long term (current) use of anticoagulants Staff Staff Role Jorge A Post MD Proceduralist Medications See  Anesthesia Record. Preprocedure A history and physical has been performed, and patient medication allergies have been reviewed. The patient's tolerance of previous anesthesia has been reviewed. The risks and benefits of the procedure and the sedation options and risks were discussed with the Patient All questions were answered and informed consent obtained. Details of the Procedure The patient underwent monitored anesthesia care, which was administered by an anesthesia professional. The patient's blood pressure, ECG, ETCO2, heart rate, level of consciousness, oxygen and respirations were monitored throughout the procedure. A digital rectal exam was not performed. The scope was introduced through the anus and advanced to the terminal ileum. Retroflexion was performed in the rectum. Bowel prep was adequate. The procedure was not difficult. The patient tolerated the procedure well. There were no apparent adverse events. Events Procedure Events Event Event Time ENDO SCOPE IN TIME 7/25/2024  2:43 PM ENDO CECUM REACHED 7/25/2024  2:49 PM ENDO SCOPE OUT TIME 7/25/2024  2:59 PM Specimens No specimens collected Procedure Location [facility] [address] [facility] [address] [phone] Referring Provider Jorge A Post MD Procedure Provider Jorge A Post MD     ECG 12 Lead    Result Date: 7/18/2024  Sinus rhythm with 1st degree AV block Inferior infarct , age undetermined Abnormal ECG No previous ECGs available Confirmed by Channing Calderon ([id number]) on 7/18/2024 10:52:21 AM        Assessment/Plan   Type 2 diabetes mellitus  Lymphedema-risk factor for cellulitis  Bilateral  lower extremity cellulitis   Acute kidney injury on CKD stage III        IV Zosyn  Monitor temperature and WBC  Monitor renal function  Monitor response to therapy  Leg elevation  Supportive care  Follow up Leg Wound culture   Follow up blood culture   Podiatry on consult  De-escalate antibiotics based on culture  results     Total time spent caring for the patient today was 20 minutes. This includes time spent before the visit reviewing the chart, time spent during the visit, and time spent after the visit on documentation.       LIANA Ortez-CNP

## 2024-08-01 LAB
ANION GAP SERPL CALC-SCNC: 9 MMOL/L
BACTERIA SPEC CULT: NORMAL
BUN SERPL-MCNC: 30 MG/DL (ref 8–25)
CALCIUM SERPL-MCNC: 9 MG/DL (ref 8.5–10.4)
CHLORIDE SERPL-SCNC: 104 MMOL/L (ref 97–107)
CO2 SERPL-SCNC: 26 MMOL/L (ref 24–31)
CREAT SERPL-MCNC: 2.1 MG/DL (ref 0.4–1.6)
EGFRCR SERPLBLD CKD-EPI 2021: 35 ML/MIN/1.73M*2
ERYTHROCYTE [DISTWIDTH] IN BLOOD BY AUTOMATED COUNT: 13.4 % (ref 11.5–14.5)
GLUCOSE BLD MANUAL STRIP-MCNC: 150 MG/DL (ref 74–99)
GLUCOSE BLD MANUAL STRIP-MCNC: 166 MG/DL (ref 74–99)
GLUCOSE BLD MANUAL STRIP-MCNC: 170 MG/DL (ref 74–99)
GLUCOSE BLD MANUAL STRIP-MCNC: 189 MG/DL (ref 74–99)
GLUCOSE SERPL-MCNC: 146 MG/DL (ref 65–99)
GRAM STN SPEC: NORMAL
GRAM STN SPEC: NORMAL
HCT VFR BLD AUTO: 35.5 % (ref 41–52)
HGB BLD-MCNC: 11.7 G/DL (ref 13.5–17.5)
MCH RBC QN AUTO: 29.6 PG (ref 26–34)
MCHC RBC AUTO-ENTMCNC: 33 G/DL (ref 32–36)
MCV RBC AUTO: 90 FL (ref 80–100)
NRBC BLD-RTO: 0 /100 WBCS (ref 0–0)
PLATELET # BLD AUTO: 450 X10*3/UL (ref 150–450)
POTASSIUM SERPL-SCNC: 4 MMOL/L (ref 3.4–5.1)
RBC # BLD AUTO: 3.95 X10*6/UL (ref 4.5–5.9)
SODIUM SERPL-SCNC: 139 MMOL/L (ref 133–145)
WBC # BLD AUTO: 12.3 X10*3/UL (ref 4.4–11.3)

## 2024-08-01 PROCEDURE — 82374 ASSAY BLOOD CARBON DIOXIDE: CPT | Performed by: STUDENT IN AN ORGANIZED HEALTH CARE EDUCATION/TRAINING PROGRAM

## 2024-08-01 PROCEDURE — 85027 COMPLETE CBC AUTOMATED: CPT | Performed by: STUDENT IN AN ORGANIZED HEALTH CARE EDUCATION/TRAINING PROGRAM

## 2024-08-01 PROCEDURE — 99232 SBSQ HOSP IP/OBS MODERATE 35: CPT | Performed by: NURSE PRACTITIONER

## 2024-08-01 PROCEDURE — G0378 HOSPITAL OBSERVATION PER HR: HCPCS

## 2024-08-01 PROCEDURE — 2500000002 HC RX 250 W HCPCS SELF ADMINISTERED DRUGS (ALT 637 FOR MEDICARE OP, ALT 636 FOR OP/ED): Performed by: STUDENT IN AN ORGANIZED HEALTH CARE EDUCATION/TRAINING PROGRAM

## 2024-08-01 PROCEDURE — 97116 GAIT TRAINING THERAPY: CPT | Mod: GP

## 2024-08-01 PROCEDURE — 2500000004 HC RX 250 GENERAL PHARMACY W/ HCPCS (ALT 636 FOR OP/ED): Performed by: STUDENT IN AN ORGANIZED HEALTH CARE EDUCATION/TRAINING PROGRAM

## 2024-08-01 PROCEDURE — 2500000001 HC RX 250 WO HCPCS SELF ADMINISTERED DRUGS (ALT 637 FOR MEDICARE OP): Performed by: NURSE PRACTITIONER

## 2024-08-01 PROCEDURE — 97535 SELF CARE MNGMENT TRAINING: CPT | Mod: GO

## 2024-08-01 PROCEDURE — 36415 COLL VENOUS BLD VENIPUNCTURE: CPT | Performed by: STUDENT IN AN ORGANIZED HEALTH CARE EDUCATION/TRAINING PROGRAM

## 2024-08-01 PROCEDURE — 2500000001 HC RX 250 WO HCPCS SELF ADMINISTERED DRUGS (ALT 637 FOR MEDICARE OP): Performed by: STUDENT IN AN ORGANIZED HEALTH CARE EDUCATION/TRAINING PROGRAM

## 2024-08-01 PROCEDURE — 82947 ASSAY GLUCOSE BLOOD QUANT: CPT

## 2024-08-01 PROCEDURE — 2500000004 HC RX 250 GENERAL PHARMACY W/ HCPCS (ALT 636 FOR OP/ED): Performed by: INTERNAL MEDICINE

## 2024-08-01 PROCEDURE — 1100000001 HC PRIVATE ROOM DAILY

## 2024-08-01 RX ADMIN — SODIUM BICARBONATE 650 MG TABLET 650 MG: at 17:19

## 2024-08-01 RX ADMIN — INSULIN LISPRO 3 UNITS: 100 INJECTION, SOLUTION INTRAVENOUS; SUBCUTANEOUS at 17:00

## 2024-08-01 RX ADMIN — Medication 1 APPLICATION: at 21:17

## 2024-08-01 RX ADMIN — PROPRANOLOL HYDROCHLORIDE 60 MG: 40 TABLET ORAL at 06:05

## 2024-08-01 RX ADMIN — APIXABAN 5 MG: 5 TABLET, FILM COATED ORAL at 17:19

## 2024-08-01 RX ADMIN — INSULIN LISPRO 3 UNITS: 100 INJECTION, SOLUTION INTRAVENOUS; SUBCUTANEOUS at 12:14

## 2024-08-01 RX ADMIN — DILTIAZEM HYDROCHLORIDE 240 MG: 120 CAPSULE, COATED, EXTENDED RELEASE ORAL at 08:47

## 2024-08-01 RX ADMIN — CLOPIDOGREL BISULFATE 75 MG: 75 TABLET ORAL at 08:47

## 2024-08-01 RX ADMIN — ISOSORBIDE MONONITRATE 60 MG: 60 TABLET, EXTENDED RELEASE ORAL at 08:47

## 2024-08-01 RX ADMIN — SODIUM BICARBONATE 650 MG TABLET 650 MG: at 12:16

## 2024-08-01 RX ADMIN — APIXABAN 5 MG: 5 TABLET, FILM COATED ORAL at 06:05

## 2024-08-01 RX ADMIN — ROSUVASTATIN CALCIUM 20 MG: 20 TABLET, COATED ORAL at 08:47

## 2024-08-01 RX ADMIN — ALLOPURINOL 150 MG: 300 TABLET ORAL at 08:47

## 2024-08-01 RX ADMIN — SODIUM BICARBONATE 650 MG TABLET 650 MG: at 06:05

## 2024-08-01 RX ADMIN — PIPERACILLIN SODIUM AND TAZOBACTAM SODIUM 3.38 G: 3; .375 INJECTION, SOLUTION INTRAVENOUS at 00:58

## 2024-08-01 RX ADMIN — CALCITRIOL CAPSULES 0.25 MCG 0.5 MCG: 0.25 CAPSULE ORAL at 08:47

## 2024-08-01 RX ADMIN — PIPERACILLIN SODIUM AND TAZOBACTAM SODIUM 3.38 G: 3; .375 INJECTION, SOLUTION INTRAVENOUS at 17:19

## 2024-08-01 RX ADMIN — SODIUM BICARBONATE 650 MG TABLET 650 MG: at 21:17

## 2024-08-01 RX ADMIN — INSULIN GLARGINE 50 UNITS: 100 INJECTION, SOLUTION SUBCUTANEOUS at 08:43

## 2024-08-01 RX ADMIN — PIPERACILLIN SODIUM AND TAZOBACTAM SODIUM 3.38 G: 3; .375 INJECTION, SOLUTION INTRAVENOUS at 08:46

## 2024-08-01 RX ADMIN — POTASSIUM CHLORIDE 10 MEQ: 750 TABLET, EXTENDED RELEASE ORAL at 08:47

## 2024-08-01 ASSESSMENT — COGNITIVE AND FUNCTIONAL STATUS - GENERAL
HELP NEEDED FOR BATHING: A LITTLE
MOVING TO AND FROM BED TO CHAIR: A LITTLE
TOILETING: A LITTLE
PERSONAL GROOMING: A LITTLE
PERSONAL GROOMING: A LITTLE
CLIMB 3 TO 5 STEPS WITH RAILING: A LITTLE
MOBILITY SCORE: 20
TOILETING: A LITTLE
DAILY ACTIVITIY SCORE: 17
DRESSING REGULAR UPPER BODY CLOTHING: A LITTLE
DAILY ACTIVITIY SCORE: 17
STANDING UP FROM CHAIR USING ARMS: A LITTLE
WALKING IN HOSPITAL ROOM: A LITTLE
MOBILITY SCORE: 20
HELP NEEDED FOR BATHING: A LITTLE
WALKING IN HOSPITAL ROOM: A LITTLE
DRESSING REGULAR LOWER BODY CLOTHING: TOTAL
CLIMB 3 TO 5 STEPS WITH RAILING: A LITTLE
DRESSING REGULAR UPPER BODY CLOTHING: A LITTLE
MOVING TO AND FROM BED TO CHAIR: A LITTLE
STANDING UP FROM CHAIR USING ARMS: A LITTLE
DRESSING REGULAR LOWER BODY CLOTHING: TOTAL

## 2024-08-01 ASSESSMENT — ACTIVITIES OF DAILY LIVING (ADL)
BATHING_LEVEL_OF_ASSISTANCE: MINIMUM ASSISTANCE
HOME_MANAGEMENT_TIME_ENTRY: 17

## 2024-08-01 ASSESSMENT — PAIN SCALES - GENERAL
PAINLEVEL_OUTOF10: 2
PAINLEVEL_OUTOF10: 0 - NO PAIN
PAINLEVEL_OUTOF10: 0 - NO PAIN
PAINLEVEL_OUTOF10: 7

## 2024-08-01 ASSESSMENT — PAIN - FUNCTIONAL ASSESSMENT
PAIN_FUNCTIONAL_ASSESSMENT: 0-10

## 2024-08-01 NOTE — PROGRESS NOTES
partha Dc is a 59 y.o. male on day 1 of admission presenting with Cellulitis.    Subjective   Interval History:   Remains afebrile, no chills  Denies shortness of breath  Denies nausea, vomiting, diarrhea  Reports 5-6/10 pain to the left leg    Objective   Range of Vitals (last 24 hours)  Heart Rate:  [71-87]   Temp:  [36.5 °C (97.7 °F)-37.5 °C (99.5 °F)]   Resp:  [18-20]   BP: (116-140)/(57-70)   SpO2:  [95 %]   Daily Weight  07/29/24 : 122 kg (269 lb 6.4 oz)    Body mass index is 43.48 kg/m².    Physical Exam  Constitutional:       Appearance: Normal appearance.   HENT:      Head: Normocephalic and atraumatic.      Nose: Nose normal.      Mouth/Throat:      Mouth: Mucous membranes are moist.      Pharynx: Oropharynx is clear.   Eyes:      General: No scleral icterus.  Cardiovascular:      Rate and Rhythm: Normal rate and regular rhythm.   Pulmonary:      Effort: Pulmonary effort is normal.      Breath sounds: Normal breath sounds.   Abdominal:      General: Bowel sounds are normal.      Palpations: Abdomen is soft.   Musculoskeletal:         General: Normal range of motion.      Cervical back: Normal range of motion and neck supple.      Right lower leg: Edema present.      Left lower leg: Edema present.   Skin:     General: Skin is warm and dry.      Comments: Bilateral leg erythema, weeping   Neurological:      General: No focal deficit present.      Mental Status: He is alert and oriented to person, place, and time. Mental status is at baseline.   Psychiatric:         Mood and Affect: Mood normal.         Behavior: Behavior normal.     Antibiotics  piperacillin-tazobactam - 3.375 gram/50 mL    Relevant Results  Labs  Results from last 72 hours   Lab Units 08/01/24  0529 07/31/24  0534 07/30/24  0424   WBC AUTO x10*3/uL 12.3* 11.6* 10.9   HEMOGLOBIN g/dL 11.7* 11.3* 10.5*   HEMATOCRIT % 35.5* 34.8* 32.0*   PLATELETS AUTO x10*3/uL 450 435 421     Results from last 72 hours   Lab Units 08/01/24  0529  07/31/24  0534 07/30/24  0424   SODIUM mmol/L 139 140 141   POTASSIUM mmol/L 4.0 3.8 3.6   CHLORIDE mmol/L 104 107 108*   CO2 mmol/L 26 23* 22*   BUN mg/dL 30* 29* 37*   CREATININE mg/dL 2.10* 2.00* 2.10*   GLUCOSE mg/dL 146* 149* 225*   CALCIUM mg/dL 9.0 8.5 8.5   ANION GAP mmol/L 9 10 11   EGFR mL/min/1.73m*2 35* 38* 35*   PHOSPHORUS mg/dL  --  3.8  --      Results from last 72 hours   Lab Units 07/31/24  0534   ALBUMIN g/dL 2.4*     Estimated Creatinine Clearance: 46.1 mL/min (A) (by C-G formula based on SCr of 2.1 mg/dL (H)).  CRP   Date Value Ref Range Status   10/16/2021 3.2 (H) 0 - 2.0 MG/DL Final     Comment:     Performed at [address] [address]     Microbiology  Wound culture pending-mixed gram positive bacteria  Blood culture pending-no growth x 2 days  Imaging  JERED without exercise    Result Date: 7/30/2024            [address], [place] [address]             Phone [phone]  Vascular Lab Report  [facility] US ANKLE BRACHIAL INDEX (JERED) WITHOUT EXERCISE Patient Name:      ADRIENNE Pradhan Physician: [id number] Arely Guillen MD Study Date:        7/29/2024           Ordering Provider: [id number] ARECHIGA MRN/PID:           [id number]            Fellow: Accession#:        [id number]        Technologist:      SURAJ LOOMIS RDMS Date of Birth/Age: 1965 / 59      Technologist 2:                    years Gender:            M                   Encounter#:        [id number] Admission Status:  Inpatient           Location           [facility]                                        Performed:  Diagnosis/ICD: Lymphedema, not elsewhere classified-I89.0; Cellulitus of right                lower limb-L03.115; Cellulitus of left lower limb-L03.116  CONCLUSIONS: Right Lower PVR: No evidence of arterial occlusive disease in the  right lower extremity at rest. Normal digital perfusion noted. Multiphasic flow is noted in the right common femoral artery, right popliteal artery, right posterior tibial artery and right dorsalis pedis artery. Called by tracings due to non-compressible vessels. Left Lower PVR: No evidence of arterial occlusive disease in the left lower extremity at rest. Normal digital perfusion noted. Multiphasic flow is noted in the left common femoral artery, left popliteal artery, left posterior tibial artery and left dorsalis pedis artery. Called by tracings due to non-compressible vessels. Additional Findings: Technically difficult exam due to edema.  Imaging & Doppler Findings:  RIGHT Lower PVR                Pressures Ratios Right Posterior Tibial (Ankle) 255 mmHg  1.71 Right Dorsalis Pedis (Ankle)   255 mmHg  1.71 Right Digit (Great Toe)        255 mmHg  1.71   LEFT Lower PVR                Pressures Ratios Left Posterior Tibial (Ankle) 255 mmHg  1.71 Left Dorsalis Pedis (Ankle)   255 mmHg  1.71 Left Digit (Great Toe)        172 mmHg  1.15                      Right     Left Brachial Pressure 149 mmHg 139 mmHg   [id number] Arely Guillen MD Electronically signed by [id number] Arely Guillen MD on 7/30/2024 at 1:29:26 PM  ** Final **     XR foot left 3+ views    Result Date: 7/30/2024  Interpreted By:  Michelle Moseley, STUDY: XR FOOT LEFT 3+ VIEWS 7/30/2024 10:12 am   INDICATION: Swelling and tenderness   COMPARISON: 10/16/2021   ACCESSION NUMBER(S): [id number]   ORDERING CLINICIAN: TRAMAINE PATE   TECHNIQUE: Three views of left foot   FINDINGS: There is narrowing of the tibiotalar articulation appearing unchanged since the prior examination with narrowing of the joint spaces of the midfoot and at the hindfoot/midfoot junction. There is exuberant callus formation about the proximal to mid 3rd metatarsal due to old healed traumatic injury. A plantar calcaneal spur is present.   There is severe edema of the distal left lower leg and  ankle extending into the foot.   No obvious bony destruction is seen.       No plain film evidence for osteomyelitis is observed. If clinically indicated, MRI of the foot could be performed for further assessment.   Signed by: Michelle Moseley 7/30/2024 11:55 AM Dictation workstation:   [id number]    Transthoracic Echo (TTE) Complete    Result Date: 7/30/2024            [facility] [address] [place] [address], [place] [id number]             Phone [phone] TRANSTHORACIC ECHOCARDIOGRAM REPORT Patient Name:     ADRIENNE CEDILLO   Reading Physician:   [id number] Vicki Hendrickson MD Study Date:       7/29/2024            Ordering Provider:   [id number] ARECHIGA MRN/PID:          [id number]             Fellow: Accession#:       [id number]         Nurse: Date of           1965 / 59 years Sonographer:         Kamille Doe RDCS Birth/Age: Gender:           M                    Additional Staff: Height:           167.64 cm            Admit Date: Weight:           122.02 kg            Admission Status:    Inpatient - Routine BSA / BMI:        2.27 m2 / 43.42      Department Location: [facility]                   kg/m2 Blood Pressure: 151 /85 mmHg Study Type:    TRANSTHORACIC ECHO (TTE) COMPLETE Diagnosis/ICD: Localized swelling, mass and lump, trunk-R22.2 Indication:    Lymphedema CPT Codes:     Echo Complete w Full Doppler-[id number] Patient History: Pertinent History: Hyperlipidemia, CHF and Chest Pain. DM, venous insufficiency,                    CVD, CVA, DVT, CP, PE. Study Detail: The following Echo studies were performed: M-Mode, Doppler, 2D and               color flow. Technically challenging study due to prominent lung               artifact.  PHYSICIAN INTERPRETATION: Left Ventricle: The left ventricular systolic function is normal, with a visually estimated ejection fraction of 60-65%. There are no regional wall motion abnormalities. The left ventricular  cavity size is normal. Spectral Doppler shows a normal pattern of left ventricular diastolic filling. Left Atrium: The left atrium is normal in size. Right Ventricle: The right ventricle is normal in size. There is normal right ventricular global systolic function. Right Atrium: The right atrium is normal in size. Aortic Valve: The aortic valve appears structurally normal. The aortic valve dimensionless index is 0.56. There is no evidence of aortic valve regurgitation. The peak instantaneous gradient of the aortic valve is 11.6 mmHg. The mean gradient of the aortic valve is 7.0 mmHg. Mitral Valve: The mitral valve is abnormal. There is mild mitral annular calcification. There is no evidence of mitral valve regurgitation. Tricuspid Valve: The tricuspid valve is structurally normal. There is trace tricuspid regurgitation. The right ventricular systolic pressure is unable to be estimated. Pulmonic Valve: The pulmonic valve is structurally normal. There is physiologic pulmonic valve regurgitation. Pericardium: There is a trivial pericardial effusion. Aorta: The aortic root is normal. Systemic Veins: The inferior vena cava appears mildly dilated. There is less than 50% IVC collapse with inspiration.  CONCLUSIONS:  1. The left ventricular systolic function is normal, with a visually estimated ejection fraction of 60-65%.  2. There is normal right ventricular global systolic function.  3. No evidence of mitral valve regurgitation.  4. Trace tricuspid regurgitation is visualized. QUANTITATIVE DATA SUMMARY: 2D MEASUREMENTS:                          Normal Ranges: LAs:           3.40 cm   (2.7-4.0cm) IVSd:          1.04 cm   (0.6-1.1cm) LVPWd:         1.24 cm   (0.6-1.1cm) LVIDd:         4.33 cm   (3.9-5.9cm) LVIDs:         3.01 cm LV Mass Index: 76.4 g/m2 LV % FS        30.5 % LA VOLUME:                              Normal Ranges: LA Vol A4C:        41.9 ml   (22+/-6mL/m2) LA Vol Index A4C:  18.5ml/m2 LA Area A4C:       16.5  cm2 LA Major Axis A4C: 5.5 cm LA Vol A4C:        39.2 ml RA VOLUME BY A/L METHOD:                               Normal Ranges: RA Vol A4C:        27.0 ml    (8.3-19.5ml) RA Vol Index A4C:  11.9 ml/m2 RA Area A4C:       13.1 cm2 RA Major Axis A4C: 5.4 cm M-MODE MEASUREMENTS:                  Normal Ranges: Ao Root: 3.20 cm (2.0-3.7cm) AORTA MEASUREMENTS:                    Normal Ranges: Asc Ao, d: 2.80 cm (2.1-3.4cm) LV SYSTOLIC FUNCTION BY 2D PLANIMETRY (MOD):                      Normal Ranges: EF-A4C View:    68 % (>=55%) EF-A2C View:    61 % EF-Biplane:     65 % EF-Visual:      63 % LV EF Reported: 63 % LV DIASTOLIC FUNCTION:                         Normal Ranges: MV Peak E:    0.95 m/s  (0.7-1.2 m/s) MV Peak A:    1.15 m/s  (0.42-0.7 m/s) E/A Ratio:    0.82      (1.0-2.2) MV e'         0.076 m/s (>8.0) MV lateral e' 0.07 m/s MV medial e'  0.08 m/s E/e' Ratio:   12.51     (<8.0) MITRAL VALVE:                 Normal Ranges: MV DT: 263 msec (150-240msec) AORTIC VALVE:                                    Normal Ranges: AoV Vmax:                1.70 m/s  (<=1.7m/s) AoV Peak P.6 mmHg (<20mmHg) AoV Mean P.0 mmHg  (1.7-11.5mmHg) LVOT Max Cristobal:            1.07 m/s  (<=1.1m/s) AoV VTI:                 32.00 cm  (18-25cm) LVOT VTI:                17.90 cm LVOT Diameter:           2.00 cm   (1.8-2.4cm) AoV Area, VTI:           1.76 cm2  (2.5-5.5cm2) AoV Area,Vmax:           1.98 cm2  (2.5-4.5cm2) AoV Dimensionless Index: 0.56  RIGHT VENTRICLE: TAPSE: 27.5 mm RV s'  0.18 m/s TRICUSPID VALVE/RVSP:                   Normal Ranges: IVC Diam: 2.37 cm  [id number] Vicki Hendrickson MD Electronically signed on 2024 at 9:34:56 AM  ** Final **     Lower extremity venous duplex bilateral    Result Date: 2024  Interpreted By:  Eusebio Souza, STUDY: [facility] LOWER EXTREMITY VENOUS DUPLEX BILATERAL;  2024 7:55 am   INDICATION: Signs/Symptoms:Bilateral lower extremity lymphedema with cellulitis.    COMPARISON: None.   ACCESSION NUMBER(S): [id number]   ORDERING CLINICIAN: GIOVANNA ARECHIGA   TECHNIQUE: Vascular ultrasound of the bilateral lower extremities was performed. Real-time compression views as well as Gray scale, color Doppler and spectral Doppler waveform analysis was performed.   FINDINGS: Evaluation of the visualized portions of the bilateral common femoral vein, proximal, mid, and distal femoral vein, and popliteal vein were performed.  Calf veins could not be adequately evaluated secondary to swelling and limited acoustic windows.   The evaluated veins demonstrate normal compressibility. There is intact venous flow demonstrating normal respiratory variability and normal augmentation of flow with calf compression. Therefore, there is no ultrasonographic evidence for deep vein thrombosis within the evaluated veins.       No sonographic evidence for deep vein thrombosis within the evaluated veins of the lower extremities bilaterally.   MACRO: None.   Signed by: Eusebio Souza 7/29/2024 8:27 AM Dictation workstation:   [id number]    Colonoscopy Screening; High Risk Patient; mother with colon cancer    Result Date: 7/25/2024  Table formatting from the original result was not included. Impression Scattered diverticulosis in the descending colon and sigmoid colon Findings Few small and medium, scattered diverticula in the descending colon and sigmoid colon  Recommendation Follow up with PCP No further screening colonoscopies necessary Indication Personal history of colonic polyps, Family history of malignant neoplasm of gastrointestinal tract, Long term (current) use of anticoagulants Staff Staff Role Jorge A Post MD Proceduralist Medications See Anesthesia Record. Preprocedure A history and physical has been performed, and patient medication allergies have been reviewed. The patient's tolerance of previous anesthesia has been reviewed. The risks and benefits of the procedure and the sedation options and risks  were discussed with the Patient All questions were answered and informed consent obtained. Details of the Procedure The patient underwent monitored anesthesia care, which was administered by an anesthesia professional. The patient's blood pressure, ECG, ETCO2, heart rate, level of consciousness, oxygen and respirations were monitored throughout the procedure. A digital rectal exam was not performed. The scope was introduced through the anus and advanced to the terminal ileum. Retroflexion was performed in the rectum. Bowel prep was adequate. The procedure was not difficult. The patient tolerated the procedure well. There were no apparent adverse events. Events Procedure Events Event Event Time ENDO SCOPE IN TIME 7/25/2024  2:43 PM ENDO CECUM REACHED 7/25/2024  2:49 PM ENDO SCOPE OUT TIME 7/25/2024  2:59 PM Specimens No specimens collected Procedure Location [facility] [address] [facility] [id number] [phone] Referring Provider Jorge A Post MD Procedure Provider Jorge A Post MD     ECG 12 Lead    Result Date: 7/18/2024  Sinus rhythm with 1st degree AV block Inferior infarct , age undetermined Abnormal ECG No previous ECGs available Confirmed by Channing Calderon ([id number]) on 7/18/2024 10:52:21 AM          Assessment/Plan   Type 2 diabetes mellitus  Lymphedema-risk factor for cellulitis  Bilateral  lower extremity cellulitis   Acute kidney injury on CKD stage III        IV Zosyn  Monitor temperature and WBC  Monitor renal function  Monitor response to therapy  Leg elevation  Supportive care  Follow up Leg Wound culture   Follow up blood cultures  Podiatry on consult  De-escalate antibiotics based on culture results     Total time spent caring for the patient today was 20 minutes. This includes time spent before the visit reviewing the chart, time spent during the visit, and time spent after the visit on documentation.       Loly Antonio, APRN-CNP

## 2024-08-01 NOTE — PROGRESS NOTES
"Occupational Therapy Treatment    Name: Kevin Dc \"partha\"  MRN: [id number]  : 1965  Date: 24  Time Calculation  Start Time: 907  Stop Time: 924  Time Calculation (min): 17 min    Assessment:  OT Assessment: Pt demonstrated progress toward established goals and was receptive to therapy and instructions throughout.  Pt would benefit from continued OT services d/t potential for further gains with skilled intervention.  Barriers to Discharge: Other (Comment) (lives alone, limited support, decline in ADLs)  Medical Staff Made Aware: Yes  End of Session Communication: Bedside nurse  End of Session Patient Position: Up in chair, Alarm on    Plan:  Treatment Interventions: ADL retraining, Functional transfer training, Patient/family training, Equipment evaluation/education, Compensatory technique education  OT Frequency: 3 times per week  OT Discharge Recommendations: Moderate intensity level of continued care  Equipment Recommended upon Discharge: Wheeled walker  OT Recommended Transfer Status: Assist of 1  OT - OK to Discharge: Yes        Subjective   Previous Visit Info:  OT Last Visit  OT Received On: 24    General:  General  Reason for Referral: Impaired ADLs r/t LE cellulitis  Past Medical History Relevant to Rehab: DB, CKD, HTN, HLD  Family/Caregiver Present: No  Prior to Session Communication: Bedside nurse  Patient Position Received: Up in chair, Alarm on  Preferred Learning Style: verbal, visual  General Comment: Pt cleared by nursing for therapy and agreeable to same    Precautions:  Medical Precautions: Fall precautions    Pain Assessment:  Pain Assessment  Pain Assessment: 0-10  0-10 (Numeric) Pain Score: 7  Pain Type: Acute pain  Pain Location: Leg  Pain Interventions: Repositioned, Other (Comment) (Nurse aware of same)         Objective   Cognition:  Overall Cognitive Status: Within Functional Limits    Activities of Daily Living: Grooming  Grooming Level of Assistance: " Setup  Grooming Where Assessed: Chair  Grooming Comments: oral care    UE Bathing  UE Bathing Level of Assistance: Setup  UE Bathing Where Assessed:  (Chair)    LE Bathing  LE Bathing Level of Assistance: Minimum assistance  LE Bathing Where Assessed:  (Chair)  LE Bathing Comments: steadying assist to stand and wash buttocks plus assist for feet - educated on use of long-handled sponge for future use to increase functional capacity    LE Dressing  LE Dressing: Yes  Sock Level of Assistance: Dependent  LE Dressing Where Assessed: Chair  LE Dressing Comments: donning/doffing - educated on long-handled equipment for future use to increase functional capacity and provided with purchasing information for same    Bed Mobility/Transfers: Transfers  Transfer: Yes  Transfer 1  Transfer From 1: Chair with arms to  Transfer to 1: Stand  Technique 1: Sit to stand, Stand to sit  Transfer Device 1: Walker  Transfer Level of Assistance 1: Contact guard    Outcome Measures:  [facility] Daily Activity  Putting on and taking off regular lower body clothing: Total  Bathing (including washing, rinsing, drying): A little  Putting on and taking off regular upper body clothing: A little  Toileting, which includes using toilet, bedpan or urinal: A little  Taking care of personal grooming such as brushing teeth: A little  Eating Meals: None  Daily Activity - Total Score: 17    Education Documentation  ADL Training, taught by Lou Cummings OT at 8/1/2024 10:20 AM.  Learner: Patient  Readiness: Acceptance  Method: Explanation  Response: Demonstrated Understanding, Needs Reinforcement  Comment: ADL retraining utilizing compensatory techniques to maximize functional capacity    Goals:  Encounter Problems       Encounter Problems (Active)       OT Goals       ADL's (Progressing)       Start:  07/30/24    Expected End:  08/13/24       Pt will complete bathing, dressing , toileting and grooming tasks w/ close supervision w/ AE/ compensatory tech  for safety and increased independence in self care tasks.         Functional transfers (Progressing)       Start:  07/30/24    Expected End:  08/13/24       Pt will demon bed, chair and toilet/ BSC transfers w/ close supervision w/ LRD, elev seat heights using B arm supports for safety and increased independence in daily tasks and functional mobility.         Functional endurance (Progressing)       Start:  07/30/24    Expected End:  08/13/24       Pt will tolerate 20 minutes of functional activity to improve functional endurance for daily tasks and functional mobility.

## 2024-08-01 NOTE — PROGRESS NOTES
Spiritual Care Visit    Clinical Encounter Type  Visited With: Patient  Routine Visit: Introduction    Scientologist Encounters  Scientologist Needs:  (Pastoral presence and emotional support)       Annotation:  provided the patient support while rounding the unit.  explained the spiritual care services of [facility]. Patient was resting and appears comfortable today. Patient states that he lives alone and did not indicate his support system. Patient's family are outside the area. No Worship or spiritual needs were expressed today.  reviewed the process for requesting  support.

## 2024-08-01 NOTE — PROGRESS NOTES
partha Cedillo is a 59 y.o. male on day 1 of admission presenting with Cellulitis.      Subjective   Patient resting in bed comfortably. Patient declining ACE wraps to BLE due to pain with compression.        Objective     Last Recorded Vitals  /67 (BP Location: Left arm, Patient Position: Lying)   Pulse 78   Temp 36.5 °C (97.7 °F) (Oral)   Resp 18   Wt 122 kg (269 lb 6.4 oz)   SpO2 95%   Intake/Output last 3 Shifts:    Intake/Output Summary (Last 24 hours) at 8/1/2024 1221  Last data filed at 8/1/2024 0746  Gross per 24 hour   Intake 600 ml   Output 2200 ml   Net -1600 ml       Admission Weight  Weight: 126 kg (278 lb) (07/28/24 2017)    Daily Weight  07/29/24 : 122 kg (269 lb 6.4 oz)    Image Results  JERED without exercise              [facility]  [id number] , [place] [id number]              Phone [phone]       Vascular Lab Report     [facility] US ANKLE BRACHIAL INDEX (JERED) WITHOUT EXERCISE    Patient Name:      ADRIENNE CEDILLO  Reading Physician: [id number] Arely Guillen MD  Study Date:        7/29/2024           Ordering Provider: [id number] ARECHIGA  MRN/PID:           [id number]            Fellow:  Accession#:        [id number]        Technologist:      SURAJ LOOMIS RDMS  Date of Birth/Age: 1965 / 59      Technologist 2:                     years  Gender:            M                   Encounter#:        [id number]  Admission Status:  Inpatient           Location           [facility]                                         Performed:       Diagnosis/ICD: Lymphedema, not elsewhere classified-I89.0; Cellulitus of right                 lower limb-L03.115; Cellulitus of left lower limb-L03.116       CONCLUSIONS:  Right Lower PVR: No evidence of arterial occlusive disease in the right lower extremity at rest. Normal digital perfusion noted.  Multiphasic flow is noted in the right common femoral artery, right popliteal artery, right posterior tibial artery and right dorsalis pedis artery. Called by tracings due to non-compressible vessels.  Left Lower PVR: No evidence of arterial occlusive disease in the left lower extremity at rest. Normal digital perfusion noted. Multiphasic flow is noted in the left common femoral artery, left popliteal artery, left posterior tibial artery and left dorsalis pedis artery. Called by tracings due to non-compressible vessels.  Additional Findings:  Technically difficult exam due to edema.       Imaging & Doppler Findings:     RIGHT Lower PVR                Pressures Ratios  Right Posterior Tibial (Ankle) 255 mmHg  1.71  Right Dorsalis Pedis (Ankle)   255 mmHg  1.71  Right Digit (Great Toe)        255 mmHg  1.71          LEFT Lower PVR                Pressures Ratios  Left Posterior Tibial (Ankle) 255 mmHg  1.71  Left Dorsalis Pedis (Ankle)   255 mmHg  1.71  Left Digit (Great Toe)        172 mmHg  1.15                             Right     Left  Brachial Pressure 149 mmHg 139 mmHg          [id number] Arely Guillen MD  Electronically signed by [id number] Arely Guillen MD on 7/30/2024 at 1:29:26 PM       ** Final **  XR foot left 3+ views  Narrative: Interpreted By:  Michelle Moseley,   STUDY:  XR FOOT LEFT 3+ VIEWS 7/30/2024 10:12 am      INDICATION:  Swelling and tenderness      COMPARISON:  10/16/2021      ACCESSION NUMBER(S):  [id number]      ORDERING CLINICIAN:  TRAMAINE PATE      TECHNIQUE:  Three views of left foot      FINDINGS:  There is narrowing of the tibiotalar articulation appearing unchanged  since the prior examination with narrowing of the joint spaces of the  midfoot and at the hindfoot/midfoot junction. There is exuberant  callus formation about the proximal to mid 3rd metatarsal due to old  healed traumatic injury. A plantar calcaneal spur is present.      There is severe edema of the distal left lower leg and  ankle  extending into the foot.      No obvious bony destruction is seen.      Impression: No plain film evidence for osteomyelitis is observed. If clinically  indicated, MRI of the foot could be performed for further assessment.      Signed by: Michelle Moseley 7/30/2024 11:55 AM  Dictation workstation:   [id number]  Transthoracic Echo (TTE) Complete              [facility]  [address], [place] [address]              Phone [phone]    TRANSTHORACIC ECHOCARDIOGRAM REPORT    Patient Name:     DARIENNE CEDILLO   Reading Physician:   [id number] Vicki Hendrickson MD  Study Date:       7/29/2024            Ordering Provider:   [id number] ARECHIGA  MRN/PID:          [id number]             Fellow:  Accession#:       [id number]         Nurse:  Date of           1965 / 59 years Sonographer:         Kamille Doe RDCS  Birth/Age:  Gender:           M                    Additional Staff:  Height:           167.64 cm            Admit Date:  Weight:           122.02 kg            Admission Status:    Inpatient - Routine  BSA / BMI:        2.27 m2 / 43.42      Department Location: [facility]                    kg/m2  Blood Pressure: 151 /85 mmHg    Study Type:    TRANSTHORACIC ECHO (TTE) COMPLETE  Diagnosis/ICD: Localized swelling, mass and lump, trunk-R22.2  Indication:    Lymphedema  CPT Codes:     Echo Complete w Full Doppler-21986    Patient History:  Pertinent History: Hyperlipidemia, CHF and Chest Pain. DM, venous insufficiency,                     CVD, CVA, DVT, CP, PE.    Study Detail: The following Echo studies were performed: M-Mode, Doppler, 2D and                color flow. Technically challenging study due to prominent lung                artifact.       PHYSICIAN INTERPRETATION:  Left Ventricle: The left ventricular systolic function is normal, with a visually estimated ejection fraction of 60-65%. There are no regional wall motion  abnormalities. The left ventricular cavity size is normal. Spectral Doppler shows a normal pattern of left ventricular diastolic filling.  Left Atrium: The left atrium is normal in size.  Right Ventricle: The right ventricle is normal in size. There is normal right ventricular global systolic function.  Right Atrium: The right atrium is normal in size.  Aortic Valve: The aortic valve appears structurally normal. The aortic valve dimensionless index is 0.56. There is no evidence of aortic valve regurgitation. The peak instantaneous gradient of the aortic valve is 11.6 mmHg. The mean gradient of the aortic valve is 7.0 mmHg.  Mitral Valve: The mitral valve is abnormal. There is mild mitral annular calcification. There is no evidence of mitral valve regurgitation.  Tricuspid Valve: The tricuspid valve is structurally normal. There is trace tricuspid regurgitation. The right ventricular systolic pressure is unable to be estimated.  Pulmonic Valve: The pulmonic valve is structurally normal. There is physiologic pulmonic valve regurgitation.  Pericardium: There is a trivial pericardial effusion.  Aorta: The aortic root is normal.  Systemic Veins: The inferior vena cava appears mildly dilated. There is less than 50% IVC collapse with inspiration.       CONCLUSIONS:   1. The left ventricular systolic function is normal, with a visually estimated ejection fraction of 60-65%.   2. There is normal right ventricular global systolic function.   3. No evidence of mitral valve regurgitation.   4. Trace tricuspid regurgitation is visualized.    QUANTITATIVE DATA SUMMARY:  2D MEASUREMENTS:                           Normal Ranges:  LAs:           3.40 cm   (2.7-4.0cm)  IVSd:          1.04 cm   (0.6-1.1cm)  LVPWd:         1.24 cm   (0.6-1.1cm)  LVIDd:         4.33 cm   (3.9-5.9cm)  LVIDs:         3.01 cm  LV Mass Index: 76.4 g/m2  LV % FS        30.5 %    LA VOLUME:                               Normal Ranges:  LA Vol A4C:        41.9  ml   (22+/-6mL/m2)  LA Vol Index A4C:  18.5ml/m2  LA Area A4C:       16.5 cm2  LA Major Axis A4C: 5.5 cm  LA Vol A4C:        39.2 ml    RA VOLUME BY A/L METHOD:                                Normal Ranges:  RA Vol A4C:        27.0 ml    (8.3-19.5ml)  RA Vol Index A4C:  11.9 ml/m2  RA Area A4C:       13.1 cm2  RA Major Axis A4C: 5.4 cm    M-MODE MEASUREMENTS:                   Normal Ranges:  Ao Root: 3.20 cm (2.0-3.7cm)    AORTA MEASUREMENTS:                     Normal Ranges:  Asc Ao, d: 2.80 cm (2.1-3.4cm)    LV SYSTOLIC FUNCTION BY 2D PLANIMETRY (MOD):                       Normal Ranges:  EF-A4C View:    68 % (>=55%)  EF-A2C View:    61 %  EF-Biplane:     65 %  EF-Visual:      63 %  LV EF Reported: 63 %    LV DIASTOLIC FUNCTION:                          Normal Ranges:  MV Peak E:    0.95 m/s  (0.7-1.2 m/s)  MV Peak A:    1.15 m/s  (0.42-0.7 m/s)  E/A Ratio:    0.82      (1.0-2.2)  MV e'         0.076 m/s (>8.0)  MV lateral e' 0.07 m/s  MV medial e'  0.08 m/s  E/e' Ratio:   12.51     (<8.0)    MITRAL VALVE:                  Normal Ranges:  MV DT: 263 msec (150-240msec)    AORTIC VALVE:                                     Normal Ranges:  AoV Vmax:                1.70 m/s  (<=1.7m/s)  AoV Peak P.6 mmHg (<20mmHg)  AoV Mean P.0 mmHg  (1.7-11.5mmHg)  LVOT Max Cristobal:            1.07 m/s  (<=1.1m/s)  AoV VTI:                 32.00 cm  (18-25cm)  LVOT VTI:                17.90 cm  LVOT Diameter:           2.00 cm   (1.8-2.4cm)  AoV Area, VTI:           1.76 cm2  (2.5-5.5cm2)  AoV Area,Vmax:           1.98 cm2  (2.5-4.5cm2)  AoV Dimensionless Index: 0.56       RIGHT VENTRICLE:  TAPSE: 27.5 mm  RV s'  0.18 m/s    TRICUSPID VALVE/RVSP:                    Normal Ranges:  IVC Diam: 2.37 cm       [id number] Vicki Hendrickson MD  Electronically signed on 2024 at 9:34:56 AM       ** Final **      Physical Exam  Constitutional:       Appearance: Normal appearance.   Cardiovascular:      Pulses: Normal  pulses.      Heart sounds: Normal heart sounds.   Pulmonary:      Effort: Pulmonary effort is normal.      Breath sounds: Normal breath sounds.   Abdominal:      General: Bowel sounds are normal.      Palpations: Abdomen is soft.   Musculoskeletal:         General: Normal range of motion.      Right lower leg: Edema present.      Left lower leg: Edema present.   Skin:     General: Skin is warm and dry.      Comments: BLE thickened skin with multiple areas of open areas see pictures in media section.   Neurological:      Mental Status: He is alert and oriented to person, place, and time.         Relevant Results             Results for orders placed or performed during the hospital encounter of 07/28/24 (from the past 24 hour(s))   POCT GLUCOSE   Result Value Ref Range    POCT Glucose 189 (H) 74 - 99 mg/dL   POCT GLUCOSE   Result Value Ref Range    POCT Glucose 219 (H) 74 - 99 mg/dL   CBC   Result Value Ref Range    WBC 12.3 (H) 4.4 - 11.3 x10*3/uL    nRBC 0.0 0.0 - 0.0 /100 WBCs    RBC 3.95 (L) 4.50 - 5.90 x10*6/uL    Hemoglobin 11.7 (L) 13.5 - 17.5 g/dL    Hematocrit 35.5 (L) 41.0 - 52.0 %    MCV 90 80 - 100 fL    MCH 29.6 26.0 - 34.0 pg    MCHC 33.0 32.0 - 36.0 g/dL    RDW 13.4 11.5 - 14.5 %    Platelets 450 150 - 450 x10*3/uL   Basic metabolic panel   Result Value Ref Range    Glucose 146 (H) 65 - 99 mg/dL    Sodium 139 133 - 145 mmol/L    Potassium 4.0 3.4 - 5.1 mmol/L    Chloride 104 97 - 107 mmol/L    Bicarbonate 26 24 - 31 mmol/L    Urea Nitrogen 30 (H) 8 - 25 mg/dL    Creatinine 2.10 (H) 0.40 - 1.60 mg/dL    eGFR 35 (L) >60 mL/min/1.73m*2    Calcium 9.0 8.5 - 10.4 mg/dL    Anion Gap 9 <=19 mmol/L   POCT GLUCOSE   Result Value Ref Range    POCT Glucose 150 (H) 74 - 99 mg/dL   POCT GLUCOSE   Result Value Ref Range    POCT Glucose 170 (H) 74 - 99 mg/dL       Assessment/Plan                  Principal Problem:    Cellulitis  Active Problems:    History of coronary artery stent placement    History of pulmonary  embolism    Lymphedema    Type 2 diabetes mellitus (Multi)    Chronic renal impairment, stage 3 (moderate) (Multi)    Decreased activities of daily living (ADL)    Cellulitis of lower extremity, unspecified laterality        Cellulitis of the bilateral lower extremities  Lymphedema of the bilateral lower extremities  IV Zosyn every 8 hours  Blood cultures negative 2 days  JERED lower extremity  U/S negative for DVT  ID following  Wound consult  Podiatry signed off, compression wrap with Ace bandages from the toes to the knee  Pain management  XR L foot neg for OM  Wound consult     Type 2 diabetes mellitus  Reports noncompliance, Home long-acting insulin reported per patient does not make sense  -Diabetic educator consult   -Diabetic diet  -A1c 6.9  -Lantus 50 units nightly  -SSI  -Monitor closely     CKD stage III  Creatinine 2.1, EGFR 35  Avoid/caution nephrotoxic agents  Continue to monitor  50 mg Torsemide PO     Reported history of CAD with stents  Cardiology following  Patient unsure why he is taking propranolol, Propranolol 120 mg held, started 60 mg  Continue home medications   Echo, EF 60-65%      DVT prophylaxis  Continue Eliquis     GI prophylaxis  PPI        Plan  Patient lives at home byhomself and will be discharged to SNF for rehab.         Plan discussed with patient and collaborating physician, Dr. Burch.             Kate Wallace, APRN-CNP

## 2024-08-01 NOTE — PROGRESS NOTES
08/01/24 1604   Discharge Planning   Living Arrangements Alone   Support Systems Family members;Friends/neighbors   Type of Residence Private residence   Who is requesting discharge planning? Provider   Home or Post Acute Services Post acute facilities (Rehab/SNF/etc)   Type of Post Acute Facility Services Rehab;Skilled nursing   Expected Discharge Disposition SNF   Does the patient need discharge transport arranged? Yes   RoundTrip coordination needed? Yes   Has discharge transport been arranged? No     PER ID NOTE: IV Zosyn, Leg elevation, Follow up Leg Wound culture and blood cultures  Podiatry on consult  De-escalate antibiotics based on culture results  [facility]errals placed to [facility]; both do not have availability. [facility] does not provide lymphedema therapy.  Was noted that PT changed from moderate to low intensity. Uncertain if able to qualify for skilled rehab.     PLAN: To be determined. Intensity has changed from mod to low.

## 2024-08-01 NOTE — CARE PLAN
Problem: Pain - Adult  Goal: Verbalizes/displays adequate comfort level or baseline comfort level  Outcome: Progressing     Problem: Safety - Adult  Goal: Free from fall injury  Outcome: Met     Problem: Fall/Injury  Goal: Not fall by end of shift  Outcome: Met     Problem: Skin  Goal: Participates in plan/prevention/treatment measures  Outcome: Progressing     Problem: Skin  Goal: Prevent/manage excess moisture  Outcome: Progressing  Flowsheets (Taken 8/1/2024 0630)  Prevent/manage excess moisture: Follow provider orders for dressing changes     The patient's goals for the shift include pain    The clinical goals for the shift include pain control    Over the shift, the patient did make progress toward the above goals. Barriers to progression include patient non-compliance with keeping dressings on left leg.

## 2024-08-01 NOTE — PROGRESS NOTES
"Physical Therapy Treatment    Patient Name: Kevin Dc \"partha\"  MRN: [id number]  Today's Date: 8/1/2024  Time Calculation  Start Time: 1328  Stop Time: 1406  Time Calculation (min): 38 min    Assessment/Plan   PT Assessment  Evaluation/Treatment Tolerance: Patient tolerated treatment well  Medical Staff Made Aware: Yes  End of Session Communication: Bedside nurse  Assessment Comment: Improving well with rehab. Low intensity rehab now recommended at [facility]. CC aware  End of Session Patient Position: Bed, 3 rail up, Alarm on     PT Plan  Treatment/Interventions: Bed mobility, Transfer training, Gait training, Balance training, Strengthening, Endurance training, Range of motion, Therapeutic exercise, Home exercise program, Therapeutic activity, Stair training  PT Plan: Ongoing PT  PT Frequency: 4 times per week  PT Discharge Recommendations: Low intensity level of continued care  Equipment Recommended upon Discharge: Wheeled walker  PT Recommended Transfer Status: Contact guard, Assistive device  PT - OK to Discharge: Yes      General Visit Information:   PT  Visit  PT Received On: 08/01/24  Response to Previous Treatment: Patient with no complaints from previous session.  General  Family/Caregiver Present: No  Prior to Session Communication: Bedside nurse  Patient Position Received: Bed, 3 rail up, Alarm on  Preferred Learning Style: verbal  General Comment: Agreeable to PT follow up    Subjective   Precautions:  Precautions  Medical Precautions: Fall precautions    Objective   Pain:  Pain Assessment  Pain Assessment: 0-10  0-10 (Numeric) Pain Score: 2  Pain Type: Acute pain  Pain Location: Leg  Pain Orientation: Left  Pain Interventions: Ambulation/increased activity  Response to Interventions: Unchanged pain with ambulation  Cognition:  Cognition  Overall Cognitive Status: Within Functional Limits    Activity Tolerance:  Activity Tolerance  Endurance: Decreased tolerance for upright activites  Rate of Perceived " Exertion (RPE): 5-7/10 with each gait trial  Treatments:  Bed Mobility  Bed Mobility: Yes  Bed Mobility 1  Bed Mobility 1: Supine to sitting  Level of Assistance 1: Modified independent    Ambulation/Gait Training  Ambulation/Gait Training Performed: Yes  Ambulation/Gait Training 1  Surface 1: Level tile  Device 1: Rolling walker  Gait Support Devices: Wheelchair follow  Assistance 1: Close supervision  Quality of Gait 1:  (Mildly antalgic. Shuffled gait increased the more Pt ambulates. Seated rest break in between each trial. 5-7 RPE with each attempt)  Comments/Distance (ft) 1: (125'x4)+10+10  Transfers  Transfer: Yes  Transfer 1  Technique 1: Stand to sit, Sit to stand  Transfer Device 1: Walker  Transfer Level of Assistance 1: Close supervision  Trials/Comments 1: x8 trials throughout session from EOB, Toilet, and WC. Cues to review proper techniques    Outcome Measures:  [facility] Basic Mobility  Turning from your back to your side while in a flat bed without using bedrails: None  Moving from lying on your back to sitting on the side of a flat bed without using bedrails: None  Moving to and from bed to chair (including a wheelchair): A little  Standing up from a chair using your arms (e.g. wheelchair or bedside chair): A little  To walk in hospital room: A little  Climbing 3-5 steps with railing: A little  Basic Mobility - Total Score: 20    Education Documentation  Mobility Training, taught by Enrique Carbajal, PT at 8/1/2024  2:52 PM.  Learner: Patient  Readiness: Acceptance  Method: Explanation  Response: Verbalizes Understanding  Comment: safe mobility techniques    Education Comments  No comments found.        OP EDUCATION:       Encounter Problems       Encounter Problems (Active)       Balance       Sitting Balance (Progressing)       Start:  07/30/24    Expected End:  08/13/24       Pt will demonstrate good sitting balance to promote safe engagement with out of bed activities           Standing Balance  (Progressing)       Start:  07/30/24    Expected End:  08/13/24       Pt will demonstrate good static standing balance to promote safe participation with out of bed activity, transfers, and mobility              Mobility       Ambulation (Progressing)       Start:  07/30/24    Expected End:  08/13/24       Pt will ambulate 50' modified independent assist with walker to promote safe home mobility           Entry Stair Negotiation (Progressing)       Start:  07/30/24    Expected End:  08/13/24       Pt will ascend/descend 3 stairs with rail(s) on B and modified independent assist to promote safe entry and exit in home environment                PT Transfers       Supine to sit (Progressing)       Start:  07/30/24    Expected End:  08/13/24       Pt will transfer supine to sitting at edge of bed with modified independent assist to promote acute care out of bed activity           Sit to stand (Progressing)       Start:  07/30/24    Expected End:  08/13/24       Pt will transfer sit to standing position with modified independent assist and walker to promote safe out of bed activity           Bed to chair (Progressing)       Start:  07/30/24    Expected End:  08/13/24       Pt will transfer from sitting edge of bed to the chair with modified independent assist and walker to promote out of bed activity and reduce the risks of prolonged acute care bedrest              Pain - Adult          Safety       Safe Mobility Techniques (Progressing)       Start:  07/30/24    Expected End:  08/13/24       Pt will correctly identify and demonstrate safe mobility techniques to reduce their risks for falls during their acute care stay

## 2024-08-01 NOTE — PROGRESS NOTES
Seen for chronic kidney stage III admitted with bilateral lower extremity cellulitis he is feeling better less pain and redness in both legs renal function has been stable with a creatinine of 2.1 mg/dL he has normal electrolytes we will continue to monitor

## 2024-08-02 LAB
ANION GAP SERPL CALC-SCNC: 8 MMOL/L
BACTERIA BLD CULT: NORMAL
BUN SERPL-MCNC: 28 MG/DL (ref 8–25)
CALCIUM SERPL-MCNC: 8.7 MG/DL (ref 8.5–10.4)
CHLORIDE SERPL-SCNC: 104 MMOL/L (ref 97–107)
CO2 SERPL-SCNC: 26 MMOL/L (ref 24–31)
CREAT SERPL-MCNC: 2.1 MG/DL (ref 0.4–1.6)
EGFRCR SERPLBLD CKD-EPI 2021: 35 ML/MIN/1.73M*2
ERYTHROCYTE [DISTWIDTH] IN BLOOD BY AUTOMATED COUNT: 13.3 % (ref 11.5–14.5)
GLUCOSE BLD MANUAL STRIP-MCNC: 122 MG/DL (ref 74–99)
GLUCOSE BLD MANUAL STRIP-MCNC: 136 MG/DL (ref 74–99)
GLUCOSE BLD MANUAL STRIP-MCNC: 156 MG/DL (ref 74–99)
GLUCOSE BLD MANUAL STRIP-MCNC: 165 MG/DL (ref 74–99)
GLUCOSE SERPL-MCNC: 119 MG/DL (ref 65–99)
HCT VFR BLD AUTO: 33 % (ref 41–52)
HGB BLD-MCNC: 10.6 G/DL (ref 13.5–17.5)
MCH RBC QN AUTO: 29.3 PG (ref 26–34)
MCHC RBC AUTO-ENTMCNC: 32.1 G/DL (ref 32–36)
MCV RBC AUTO: 91 FL (ref 80–100)
NRBC BLD-RTO: 0 /100 WBCS (ref 0–0)
PLATELET # BLD AUTO: 397 X10*3/UL (ref 150–450)
POTASSIUM SERPL-SCNC: 3.9 MMOL/L (ref 3.4–5.1)
RBC # BLD AUTO: 3.62 X10*6/UL (ref 4.5–5.9)
SODIUM SERPL-SCNC: 138 MMOL/L (ref 133–145)
WBC # BLD AUTO: 9.6 X10*3/UL (ref 4.4–11.3)

## 2024-08-02 PROCEDURE — 84132 ASSAY OF SERUM POTASSIUM: CPT | Performed by: STUDENT IN AN ORGANIZED HEALTH CARE EDUCATION/TRAINING PROGRAM

## 2024-08-02 PROCEDURE — 2500000004 HC RX 250 GENERAL PHARMACY W/ HCPCS (ALT 636 FOR OP/ED): Performed by: INTERNAL MEDICINE

## 2024-08-02 PROCEDURE — 82947 ASSAY GLUCOSE BLOOD QUANT: CPT

## 2024-08-02 PROCEDURE — 36415 COLL VENOUS BLD VENIPUNCTURE: CPT | Performed by: STUDENT IN AN ORGANIZED HEALTH CARE EDUCATION/TRAINING PROGRAM

## 2024-08-02 PROCEDURE — G0378 HOSPITAL OBSERVATION PER HR: HCPCS

## 2024-08-02 PROCEDURE — 97116 GAIT TRAINING THERAPY: CPT | Mod: GP

## 2024-08-02 PROCEDURE — 2500000001 HC RX 250 WO HCPCS SELF ADMINISTERED DRUGS (ALT 637 FOR MEDICARE OP): Performed by: STUDENT IN AN ORGANIZED HEALTH CARE EDUCATION/TRAINING PROGRAM

## 2024-08-02 PROCEDURE — 99232 SBSQ HOSP IP/OBS MODERATE 35: CPT | Performed by: NURSE PRACTITIONER

## 2024-08-02 PROCEDURE — 82565 ASSAY OF CREATININE: CPT | Performed by: STUDENT IN AN ORGANIZED HEALTH CARE EDUCATION/TRAINING PROGRAM

## 2024-08-02 PROCEDURE — 1100000001 HC PRIVATE ROOM DAILY

## 2024-08-02 PROCEDURE — 2500000004 HC RX 250 GENERAL PHARMACY W/ HCPCS (ALT 636 FOR OP/ED): Performed by: STUDENT IN AN ORGANIZED HEALTH CARE EDUCATION/TRAINING PROGRAM

## 2024-08-02 PROCEDURE — 97110 THERAPEUTIC EXERCISES: CPT | Mod: GO

## 2024-08-02 PROCEDURE — 97530 THERAPEUTIC ACTIVITIES: CPT | Mod: GO

## 2024-08-02 PROCEDURE — 2500000002 HC RX 250 W HCPCS SELF ADMINISTERED DRUGS (ALT 637 FOR MEDICARE OP, ALT 636 FOR OP/ED): Performed by: STUDENT IN AN ORGANIZED HEALTH CARE EDUCATION/TRAINING PROGRAM

## 2024-08-02 PROCEDURE — 85027 COMPLETE CBC AUTOMATED: CPT | Performed by: STUDENT IN AN ORGANIZED HEALTH CARE EDUCATION/TRAINING PROGRAM

## 2024-08-02 RX ADMIN — POTASSIUM CHLORIDE 10 MEQ: 750 TABLET, EXTENDED RELEASE ORAL at 08:52

## 2024-08-02 RX ADMIN — PROPRANOLOL HYDROCHLORIDE 60 MG: 40 TABLET ORAL at 06:06

## 2024-08-02 RX ADMIN — Medication 1 APPLICATION: at 09:00

## 2024-08-02 RX ADMIN — ROSUVASTATIN CALCIUM 20 MG: 20 TABLET, COATED ORAL at 08:52

## 2024-08-02 RX ADMIN — CLOPIDOGREL BISULFATE 75 MG: 75 TABLET ORAL at 08:52

## 2024-08-02 RX ADMIN — SODIUM BICARBONATE 650 MG TABLET 650 MG: at 06:06

## 2024-08-02 RX ADMIN — SODIUM BICARBONATE 650 MG TABLET 650 MG: at 13:08

## 2024-08-02 RX ADMIN — PIPERACILLIN SODIUM AND TAZOBACTAM SODIUM 3.38 G: 3; .375 INJECTION, SOLUTION INTRAVENOUS at 17:02

## 2024-08-02 RX ADMIN — CALCITRIOL CAPSULES 0.25 MCG 0.5 MCG: 0.25 CAPSULE ORAL at 08:52

## 2024-08-02 RX ADMIN — SODIUM BICARBONATE 650 MG TABLET 650 MG: at 17:01

## 2024-08-02 RX ADMIN — ISOSORBIDE MONONITRATE 60 MG: 60 TABLET, EXTENDED RELEASE ORAL at 08:52

## 2024-08-02 RX ADMIN — APIXABAN 5 MG: 5 TABLET, FILM COATED ORAL at 18:00

## 2024-08-02 RX ADMIN — TORSEMIDE 50 MG: 100 TABLET ORAL at 08:52

## 2024-08-02 RX ADMIN — INSULIN LISPRO 3 UNITS: 100 INJECTION, SOLUTION INTRAVENOUS; SUBCUTANEOUS at 17:04

## 2024-08-02 RX ADMIN — DILTIAZEM HYDROCHLORIDE 240 MG: 120 CAPSULE, COATED, EXTENDED RELEASE ORAL at 08:52

## 2024-08-02 RX ADMIN — PIPERACILLIN SODIUM AND TAZOBACTAM SODIUM 3.38 G: 3; .375 INJECTION, SOLUTION INTRAVENOUS at 01:03

## 2024-08-02 RX ADMIN — PIPERACILLIN SODIUM AND TAZOBACTAM SODIUM 3.38 G: 3; .375 INJECTION, SOLUTION INTRAVENOUS at 08:52

## 2024-08-02 RX ADMIN — SODIUM BICARBONATE 650 MG TABLET 650 MG: at 21:02

## 2024-08-02 RX ADMIN — ALLOPURINOL 150 MG: 300 TABLET ORAL at 08:52

## 2024-08-02 RX ADMIN — INSULIN GLARGINE 50 UNITS: 100 INJECTION, SOLUTION SUBCUTANEOUS at 08:52

## 2024-08-02 RX ADMIN — APIXABAN 5 MG: 5 TABLET, FILM COATED ORAL at 06:06

## 2024-08-02 ASSESSMENT — COGNITIVE AND FUNCTIONAL STATUS - GENERAL
DRESSING REGULAR LOWER BODY CLOTHING: A LOT
CLIMB 3 TO 5 STEPS WITH RAILING: A LITTLE
DAILY ACTIVITIY SCORE: 19
DRESSING REGULAR LOWER BODY CLOTHING: A LOT
MOBILITY SCORE: 22
DRESSING REGULAR LOWER BODY CLOTHING: A LOT
MOBILITY SCORE: 20
WALKING IN HOSPITAL ROOM: A LITTLE
DRESSING REGULAR UPPER BODY CLOTHING: A LITTLE
HELP NEEDED FOR BATHING: A LITTLE
WALKING IN HOSPITAL ROOM: A LITTLE
TOILETING: A LITTLE
MOBILITY SCORE: 20
HELP NEEDED FOR BATHING: A LITTLE
DAILY ACTIVITIY SCORE: 19
HELP NEEDED FOR BATHING: A LITTLE
DRESSING REGULAR UPPER BODY CLOTHING: A LITTLE
STANDING UP FROM CHAIR USING ARMS: A LITTLE
STANDING UP FROM CHAIR USING ARMS: A LITTLE
CLIMB 3 TO 5 STEPS WITH RAILING: A LITTLE
WALKING IN HOSPITAL ROOM: A LITTLE
DAILY ACTIVITIY SCORE: 19
TOILETING: A LITTLE
MOVING TO AND FROM BED TO CHAIR: A LITTLE
DRESSING REGULAR UPPER BODY CLOTHING: A LITTLE
TOILETING: A LITTLE
MOVING TO AND FROM BED TO CHAIR: A LITTLE
CLIMB 3 TO 5 STEPS WITH RAILING: A LITTLE

## 2024-08-02 ASSESSMENT — PAIN SCALES - GENERAL
PAINLEVEL_OUTOF10: 4
PAINLEVEL_OUTOF10: 0 - NO PAIN

## 2024-08-02 ASSESSMENT — PAIN - FUNCTIONAL ASSESSMENT
PAIN_FUNCTIONAL_ASSESSMENT: 0-10

## 2024-08-02 NOTE — CARE PLAN
The patient's goals for the shift include pain    The clinical goals for the shift include pain control

## 2024-08-02 NOTE — PROGRESS NOTES
08/02/24 1720   Discharge Planning   Living Arrangements Alone   Support Systems Family members;Friends/neighbors   Assistance Needed Improving with ADLs and mobility   Type of Residence Private residence   Who is requesting discharge planning? Provider   Home or Post Acute Services Post acute facilities (Rehab/SNF/etc)   Type of Post Acute Facility Services Rehab;Skilled nursing   Expected Discharge Disposition  Services       He is good with going home rather than SNF. Therapy notes indicate patient has progressed well with both safety and endurance.  Per PT from moderate to low intensity, does not qualify for skilled rehab. Will be dicharged to home with [facility]  PLAN: Patient lives at home by himself and will be discharged with [facility] tomorrow.

## 2024-08-02 NOTE — PROGRESS NOTES
"Occupational Therapy    Occupational Therapy Treatment    Name: Kevin Dc \"partha\"  MRN: [id number]  : 1965  Date: 24  Time Calculation  Start Time: 1043  Stop Time: 1109  Time Calculation (min): 26 min    Assessment:  OT Assessment: Patient continues to demonstrate progress towards OT POC; primarily limited today by decreased activity tolerance, pain. Will continue to follow OT POC to maximize pt’s independence in ADL/IADL’s, endurance, functional mobility and transfers.  Prognosis: Good  Barriers to Discharge: Other (Comment) (lives alone, CGA for xfers and mobility, decreased activity tolerance.)  Evaluation/Treatment Tolerance: Patient limited by pain  Medical Staff Made Aware: Yes  End of Session Communication: Bedside nurse  End of Session Patient Position: Bed, 3 rail up, Alarm on  Plan:  Treatment Interventions: ADL retraining, Functional transfer training, UE strengthening/ROM, Endurance training, Equipment evaluation/education, Patient/family training, Neuromuscular reeducation, Compensatory technique education  OT Frequency: 3 times per week  OT Discharge Recommendations: Moderate intensity level of continued care  Equipment Recommended upon Discharge: Wheeled walker  OT Recommended Transfer Status: Assist of 1  OT - OK to Discharge: Yes    Subjective   Previous Visit Info:  OT Last Visit  OT Received On: 24  General:  General  Reason for Referral: Impaired ADLs r/t LE cellulitis  Referred By: Dr. Sue  Past Medical History Relevant to Rehab: DB, CKD, HTN, HLD  Family/Caregiver Present: No  Prior to Session Communication: Bedside nurse  Patient Position Received: Bed, 3 rail up, Alarm on  Preferred Learning Style: verbal  General Comment: pt supine in bed, agreeable for OT. Cleared by RN  Precautions:  Hearing/Visual Limitations: reading glasses  Medical Precautions: Fall precautions  Vitals:     Pain Assessment:  Pain Assessment  Pain Assessment: 0-10  0-10 (Numeric) Pain Score: " 4  Pain Type: Chronic pain  Pain Location:  (between knee and ankle (L))     Objective   Cognition:  Overall Cognitive Status: Within Functional Limits  Orientation Level: Oriented X4  Activities of Daily Living:      Toileting  Toileting Level of Assistance: Minimum assistance  Where Assessed: Toilet  Toileting Comments: CGA for STS for toileting with FWW, pt toileting seated, close supervision, set up. Increased time d/t decreased activity tolerance. VC for FWW mgmt and upright posture.    Bed Mobility/Transfers: Bed Mobility  Bed Mobility: Yes  Bed Mobility 1  Bed Mobility 1: Supine to sitting, Sitting to supine  Level of Assistance 1: Contact guard  Bed Mobility Comments 1: CGA for bed mobility, HOB elevated.    Transfers  Transfer: Yes  Transfer 1  Transfer From 1: Bed to  Transfer to 1: Chair with arms  Technique 1: Stand to sit, Sit to stand  Transfer Level of Assistance 1: Contact guard  Trials/Comments 1: CGA for xfer to/from EOB - chair, with set up and FWW.  Transfers 2  Transfer From 2: Chair with arms to  Transfer to 2: Toilet  Technique 2: Sit to stand, Stand to sit  Transfer Device 2: Walker  Transfer Level of Assistance 2: Contact guard  Trials/Comments 2: CGA for xfer to/from toilet - EOB - supine in bed, set up, with FWW. Increased time d/t decreased activity tolerance.    Functional Mobility:  Functional Mobility  Functional Mobility Performed: Yes (pt completed functional mobility short household distance to/from EOB-around the room -chair with arms, close supervision, FWW. Increased time d/t pain and decreased activity tolerance.)    Therapy/Activity: Therapeutic Exercise  Therapeutic Exercise Performed: Yes  Therapeutic Exercise Activity 1:  (Pt educated and demo  ther ex 2 sets x 10 reps elbow flex/ext, shoulder flex/ext/abd/add seated chair. Pt required 2 rest breaks within the session for ther ex.)    Outcome Measures:  [facility] Daily Activity  Putting on and taking off regular lower body  clothing: A lot  Bathing (including washing, rinsing, drying): A little  Putting on and taking off regular upper body clothing: A little  Toileting, which includes using toilet, bedpan or urinal: A little  Taking care of personal grooming such as brushing teeth: None  Eating Meals: None  Daily Activity - Total Score: 19      Education Documentation  Body Mechanics, taught by Irene Clarke OT at 8/2/2024 11:36 AM.  Learner: Patient  Readiness: Acceptance  Method: Explanation  Response: Verbalizes Understanding    ADL Training, taught by Irene Clarke OT at 8/2/2024 11:36 AM.  Learner: Patient  Readiness: Acceptance  Method: Explanation  Response: Verbalizes Understanding    Education Comments  No comments found.      Goals:  Encounter Problems       Encounter Problems (Active)       OT Goals       ADL's (Progressing)       Start:  07/30/24    Expected End:  08/13/24       Pt will complete bathing, dressing , toileting and grooming tasks w/ close supervision w/ AE/ compensatory tech for safety and increased independence in self care tasks.         Functional transfers (Progressing)       Start:  07/30/24    Expected End:  08/13/24       Pt will demon bed, chair and toilet/ BSC transfers w/ close supervision w/ LRD, elev seat heights using B arm supports for safety and increased independence in daily tasks and functional mobility.         Functional endurance (Progressing)       Start:  07/30/24    Expected End:  08/13/24       Pt will tolerate 20 minutes of functional activity to improve functional endurance for daily tasks and functional mobility.

## 2024-08-02 NOTE — PROGRESS NOTES
partha Dc is a 59 y.o. male on day 2 of admission presenting with Cellulitis.    Subjective   Interval History:   Remains afebrile, no chills  Denies shortness of breath  Denies nausea, vomiting, diarrhea  Moderate pain to the left leg    Objective   Range of Vitals (last 24 hours)  Heart Rate:  [75-78]   Temp:  [36.4 °C (97.5 °F)-36.7 °C (98.1 °F)]   Resp:  [16-18]   BP: (106-133)/(60-69)   SpO2:  [93 %-97 %]   Daily Weight  07/29/24 : 122 kg (269 lb 6.4 oz)    Body mass index is 43.48 kg/m².    Physical Exam  Constitutional:       Appearance: Normal appearance.   HENT:      Head: Normocephalic and atraumatic.      Nose: Nose normal.      Mouth/Throat:      Mouth: Mucous membranes are moist.      Pharynx: Oropharynx is clear.   Eyes:      General: No scleral icterus.  Cardiovascular:      Rate and Rhythm: Normal rate and regular rhythm.   Pulmonary:      Effort: Pulmonary effort is normal.      Breath sounds: Normal breath sounds.   Abdominal:      General: Bowel sounds are normal.      Palpations: Abdomen is soft.   Musculoskeletal:         General: Normal range of motion.      Cervical back: Normal range of motion and neck supple.      Right lower leg: Edema present.      Left lower leg: Edema present.   Skin:     General: Skin is warm and dry.      Comments: Bilateral leg erythema, weeping   Neurological:      General: No focal deficit present.      Mental Status: He is alert and oriented to person, place, and time. Mental status is at baseline.   Psychiatric:         Mood and Affect: Mood normal.         Behavior: Behavior normal.     Antibiotics  piperacillin-tazobactam - 3.375 gram/50 mL    Relevant Results  Labs  Results from last 72 hours   Lab Units 08/02/24  0440 08/01/24  0529 07/31/24  0534   WBC AUTO x10*3/uL 9.6 12.3* 11.6*   HEMOGLOBIN g/dL 10.6* 11.7* 11.3*   HEMATOCRIT % 33.0* 35.5* 34.8*   PLATELETS AUTO x10*3/uL 397 450 435     Results from last 72 hours   Lab Units 08/02/24  0440  08/01/24  0529 07/31/24  0534   SODIUM mmol/L 138 139 140   POTASSIUM mmol/L 3.9 4.0 3.8   CHLORIDE mmol/L 104 104 107   CO2 mmol/L 26 26 23*   BUN mg/dL 28* 30* 29*   CREATININE mg/dL 2.10* 2.10* 2.00*   GLUCOSE mg/dL 119* 146* 149*   CALCIUM mg/dL 8.7 9.0 8.5   ANION GAP mmol/L 8 9 10   EGFR mL/min/1.73m*2 35* 35* 38*   PHOSPHORUS mg/dL  --   --  3.8     Results from last 72 hours   Lab Units 07/31/24  0534   ALBUMIN g/dL 2.4*     Estimated Creatinine Clearance: 46.1 mL/min (A) (by C-G formula based on SCr of 2.1 mg/dL (H)).  CRP   Date Value Ref Range Status   10/16/2021 3.2 (H) 0 - 2.0 MG/DL Final     Comment:     Performed at [address] [address]     Microbiology  Wound culture pending-mixed gram positive bacteria  Blood culture pending-no growth x 3 days  Imaging  JERED without exercise    Result Date: 7/30/2024            [address] [address]             Phone [phone]  Vascular Lab Report  [facility] ANKLE BRACHIAL INDEX (JERED) WITHOUT EXERCISE Patient Name:      ADRIENNE Pradhan Physician: [id number] Arely Guillen MD Study Date:        7/29/2024           Ordering Provider: [id number] ARECHIGA MRN/PID:           [id number]            Fellow: Accession#:        [id number]        Technologist:      SURAJ LOOMIS RDMS Date of Birth/Age: 1965 / 59      Technologist 2:                    years Gender:            M                   Encounter#:        [id number] Admission Status:  Inpatient           Location           [facility]                                        Performed:  Diagnosis/ICD: Lymphedema, not elsewhere classified-I89.0; Cellulitus of right                lower limb-L03.115; Cellulitus of left lower limb-L03.116  CONCLUSIONS: Right Lower PVR: No evidence of arterial occlusive disease in the right  lower extremity at rest. Normal digital perfusion noted. Multiphasic flow is noted in the right common femoral artery, right popliteal artery, right posterior tibial artery and right dorsalis pedis artery. Called by tracings due to non-compressible vessels. Left Lower PVR: No evidence of arterial occlusive disease in the left lower extremity at rest. Normal digital perfusion noted. Multiphasic flow is noted in the left common femoral artery, left popliteal artery, left posterior tibial artery and left dorsalis pedis artery. Called by tracings due to non-compressible vessels. Additional Findings: Technically difficult exam due to edema.  Imaging & Doppler Findings:  RIGHT Lower PVR                Pressures Ratios Right Posterior Tibial (Ankle) 255 mmHg  1.71 Right Dorsalis Pedis (Ankle)   255 mmHg  1.71 Right Digit (Great Toe)        255 mmHg  1.71   LEFT Lower PVR                Pressures Ratios Left Posterior Tibial (Ankle) 255 mmHg  1.71 Left Dorsalis Pedis (Ankle)   255 mmHg  1.71 Left Digit (Great Toe)        172 mmHg  1.15                      Right     Left Brachial Pressure 149 mmHg 139 mmHg   [id number] Arely Guillen MD Electronically signed by [id number] Arely Guillen MD on 7/30/2024 at 1:29:26 PM  ** Final **     XR foot left 3+ views    Result Date: 7/30/2024  Interpreted By:  Michelle Moseley, STUDY: XR FOOT LEFT 3+ VIEWS 7/30/2024 10:12 am   INDICATION: Swelling and tenderness   COMPARISON: 10/16/2021   ACCESSION NUMBER(S): [id number]   ORDERING CLINICIAN: TRAMAINE PATE   TECHNIQUE: Three views of left foot   FINDINGS: There is narrowing of the tibiotalar articulation appearing unchanged since the prior examination with narrowing of the joint spaces of the midfoot and at the hindfoot/midfoot junction. There is exuberant callus formation about the proximal to mid 3rd metatarsal due to old healed traumatic injury. A plantar calcaneal spur is present.   There is severe edema of the distal left lower leg and ankle  extending into the foot.   No obvious bony destruction is seen.       No plain film evidence for osteomyelitis is observed. If clinically indicated, MRI of the foot could be performed for further assessment.   Signed by: Michelle Moseley 7/30/2024 11:55 AM Dictation workstation:   [id number]    Transthoracic Echo (TTE) Complete    Result Date: 7/30/2024            [facility] [address], [place] [id number]             Phone [phone] TRANSTHORACIC ECHOCARDIOGRAM REPORT Patient Name:     ADRIENNE CEDILLO   Reading Physician:   [id number] Vicki Hendrickson MD Study Date:       7/29/2024            Ordering Provider:   [id number] ARECHIGA MRN/PID:          [id number]             Fellow: Accession#:       [id number]         Nurse: Date of           1965 / 59 years Sonographer:         Kamille Doe RDCS Birth/Age: Gender:           M                    Additional Staff: Height:           167.64 cm            Admit Date: Weight:           122.02 kg            Admission Status:    Inpatient - Routine BSA / BMI:        2.27 m2 / 43.42      Department Location: [facility]                   kg/m2 Blood Pressure: 151 /85 mmHg Study Type:    TRANSTHORACIC ECHO (TTE) COMPLETE Diagnosis/ICD: Localized swelling, mass and lump, trunk-R22.2 Indication:    Lymphedema CPT Codes:     Echo Complete w Full Doppler-00280 Patient History: Pertinent History: Hyperlipidemia, CHF and Chest Pain. DM, venous insufficiency,                    CVD, CVA, DVT, CP, PE. Study Detail: The following Echo studies were performed: M-Mode, Doppler, 2D and               color flow. Technically challenging study due to prominent lung               artifact.  PHYSICIAN INTERPRETATION: Left Ventricle: The left ventricular systolic function is normal, with a visually estimated ejection fraction of 60-65%. There are no regional wall motion abnormalities. The left ventricular  cavity size is normal. Spectral Doppler shows a normal pattern of left ventricular diastolic filling. Left Atrium: The left atrium is normal in size. Right Ventricle: The right ventricle is normal in size. There is normal right ventricular global systolic function. Right Atrium: The right atrium is normal in size. Aortic Valve: The aortic valve appears structurally normal. The aortic valve dimensionless index is 0.56. There is no evidence of aortic valve regurgitation. The peak instantaneous gradient of the aortic valve is 11.6 mmHg. The mean gradient of the aortic valve is 7.0 mmHg. Mitral Valve: The mitral valve is abnormal. There is mild mitral annular calcification. There is no evidence of mitral valve regurgitation. Tricuspid Valve: The tricuspid valve is structurally normal. There is trace tricuspid regurgitation. The right ventricular systolic pressure is unable to be estimated. Pulmonic Valve: The pulmonic valve is structurally normal. There is physiologic pulmonic valve regurgitation. Pericardium: There is a trivial pericardial effusion. Aorta: The aortic root is normal. Systemic Veins: The inferior vena cava appears mildly dilated. There is less than 50% IVC collapse with inspiration.  CONCLUSIONS:  1. The left ventricular systolic function is normal, with a visually estimated ejection fraction of 60-65%.  2. There is normal right ventricular global systolic function.  3. No evidence of mitral valve regurgitation.  4. Trace tricuspid regurgitation is visualized. QUANTITATIVE DATA SUMMARY: 2D MEASUREMENTS:                          Normal Ranges: LAs:           3.40 cm   (2.7-4.0cm) IVSd:          1.04 cm   (0.6-1.1cm) LVPWd:         1.24 cm   (0.6-1.1cm) LVIDd:         4.33 cm   (3.9-5.9cm) LVIDs:         3.01 cm LV Mass Index: 76.4 g/m2 LV % FS        30.5 % LA VOLUME:                              Normal Ranges: LA Vol A4C:        41.9 ml   (22+/-6mL/m2) LA Vol Index A4C:  18.5ml/m2 LA Area A4C:       16.5  cm2 LA Major Axis A4C: 5.5 cm LA Vol A4C:        39.2 ml RA VOLUME BY A/L METHOD:                               Normal Ranges: RA Vol A4C:        27.0 ml    (8.3-19.5ml) RA Vol Index A4C:  11.9 ml/m2 RA Area A4C:       13.1 cm2 RA Major Axis A4C: 5.4 cm M-MODE MEASUREMENTS:                  Normal Ranges: Ao Root: 3.20 cm (2.0-3.7cm) AORTA MEASUREMENTS:                    Normal Ranges: Asc Ao, d: 2.80 cm (2.1-3.4cm) LV SYSTOLIC FUNCTION BY 2D PLANIMETRY (MOD):                      Normal Ranges: EF-A4C View:    68 % (>=55%) EF-A2C View:    61 % EF-Biplane:     65 % EF-Visual:      63 % LV EF Reported: 63 % LV DIASTOLIC FUNCTION:                         Normal Ranges: MV Peak E:    0.95 m/s  (0.7-1.2 m/s) MV Peak A:    1.15 m/s  (0.42-0.7 m/s) E/A Ratio:    0.82      (1.0-2.2) MV e'         0.076 m/s (>8.0) MV lateral e' 0.07 m/s MV medial e'  0.08 m/s E/e' Ratio:   12.51     (<8.0) MITRAL VALVE:                 Normal Ranges: MV DT: 263 msec (150-240msec) AORTIC VALVE:                                    Normal Ranges: AoV Vmax:                1.70 m/s  (<=1.7m/s) AoV Peak P.6 mmHg (<20mmHg) AoV Mean P.0 mmHg  (1.7-11.5mmHg) LVOT Max Cristobal:            1.07 m/s  (<=1.1m/s) AoV VTI:                 32.00 cm  (18-25cm) LVOT VTI:                17.90 cm LVOT Diameter:           2.00 cm   (1.8-2.4cm) AoV Area, VTI:           1.76 cm2  (2.5-5.5cm2) AoV Area,Vmax:           1.98 cm2  (2.5-4.5cm2) AoV Dimensionless Index: 0.56  RIGHT VENTRICLE: TAPSE: 27.5 mm RV s'  0.18 m/s TRICUSPID VALVE/RVSP:                   Normal Ranges: IVC Diam: 2.37 cm  [id number] Vicki Hendrickson MD Electronically signed on 2024 at 9:34:56 AM  ** Final **     Lower extremity venous duplex bilateral    Result Date: 2024  Interpreted By:  Eusebio Souza, STUDY: [facility] LOWER EXTREMITY VENOUS DUPLEX BILATERAL;  2024 7:55 am   INDICATION: Signs/Symptoms:Bilateral lower extremity lymphedema with cellulitis.    COMPARISON: None.   ACCESSION NUMBER(S): [id number]   ORDERING CLINICIAN: GIOVANNA ARECHIGA   TECHNIQUE: Vascular ultrasound of the bilateral lower extremities was performed. Real-time compression views as well as Gray scale, color Doppler and spectral Doppler waveform analysis was performed.   FINDINGS: Evaluation of the visualized portions of the bilateral common femoral vein, proximal, mid, and distal femoral vein, and popliteal vein were performed.  Calf veins could not be adequately evaluated secondary to swelling and limited acoustic windows.   The evaluated veins demonstrate normal compressibility. There is intact venous flow demonstrating normal respiratory variability and normal augmentation of flow with calf compression. Therefore, there is no ultrasonographic evidence for deep vein thrombosis within the evaluated veins.       No sonographic evidence for deep vein thrombosis within the evaluated veins of the lower extremities bilaterally.   MACRO: None.   Signed by: Eusebio Souza 7/29/2024 8:27 AM Dictation workstation:   [id number]    Colonoscopy Screening; High Risk Patient; mother with colon cancer    Result Date: 7/25/2024  Table formatting from the original result was not included. Impression Scattered diverticulosis in the descending colon and sigmoid colon Findings Few small and medium, scattered diverticula in the descending colon and sigmoid colon  Recommendation Follow up with PCP No further screening colonoscopies necessary Indication Personal history of colonic polyps, Family history of malignant neoplasm of gastrointestinal tract, Long term (current) use of anticoagulants Staff Staff Role Jorge A Post MD Proceduralist Medications See Anesthesia Record. Preprocedure A history and physical has been performed, and patient medication allergies have been reviewed. The patient's tolerance of previous anesthesia has been reviewed. The risks and benefits of the procedure and the sedation options and risks  were discussed with the Patient All questions were answered and informed consent obtained. Details of the Procedure The patient underwent monitored anesthesia care, which was administered by an anesthesia professional. The patient's blood pressure, ECG, ETCO2, heart rate, level of consciousness, oxygen and respirations were monitored throughout the procedure. A digital rectal exam was not performed. The scope was introduced through the anus and advanced to the terminal ileum. Retroflexion was performed in the rectum. Bowel prep was adequate. The procedure was not difficult. The patient tolerated the procedure well. There were no apparent adverse events. Events Procedure Events Event Event Time ENDO SCOPE IN TIME 7/25/2024  2:43 PM ENDO CECUM REACHED 7/25/2024  2:49 PM ENDO SCOPE OUT TIME 7/25/2024  2:59 PM Specimens No specimens collected Procedure Location [facility] [address] [facility] [address] [phone] Referring Provider Jorge A Post MD Procedure Provider Jorge A Post MD     ECG 12 Lead    Result Date: 7/18/2024  Sinus rhythm with 1st degree AV block Inferior infarct , age undetermined Abnormal ECG No previous ECGs available Confirmed by Channing Calderon ([id number]) on 7/18/2024 10:52:21 AM          Assessment/Plan   Type 2 diabetes mellitus  Lymphedema-risk factor for cellulitis  Bilateral  lower extremity cellulitis   Acute kidney injury on CKD stage III        IV Zosyn  Monitor temperature and WBC  Monitor renal function  Leg elevation  Supportive care  Follow up blood cultures  Podiatry on consult  Discharge plan is doxycycline and Augmentin for total of 14 days     Discussed with Dr. Badillo    Total time spent caring for the patient today was 20 minutes. This includes time spent before the visit reviewing the chart, time spent during the visit, and time spent after the visit on documentation.       Loly Antonio, APRN-CNP

## 2024-08-02 NOTE — PROGRESS NOTES
partha Cedillo is a 59 y.o. male on day 2 of admission presenting with Cellulitis.      Subjective   Patient feeling better than previous days. He is good with going home rather than SNF. Therapy notes indicate patient has progressed well with both safety and endurance. He denies any pain or sob.        Objective     Last Recorded Vitals  /60 (BP Location: Left arm, Patient Position: Lying)   Pulse 77   Temp 36.7 °C (98.1 °F) (Oral)   Resp 18   Wt 122 kg (269 lb 6.4 oz)   SpO2 94%   Intake/Output last 3 Shifts:    Intake/Output Summary (Last 24 hours) at 8/2/2024 1620  Last data filed at 8/2/2024 0615  Gross per 24 hour   Intake 50 ml   Output 950 ml   Net -900 ml       Admission Weight  Weight: 126 kg (278 lb) (07/28/24 2017)    Daily Weight  07/29/24 : 122 kg (269 lb 6.4 oz)    Image Results  JERDE without exercise              [facility]  [address], [place] [id number]              Phone [phone]       Vascular Lab Report     [facility] US ANKLE BRACHIAL INDEX (JERED) WITHOUT EXERCISE    Patient Name:      ADRIENNE CEDILLO  Reading Physician: [id number] Arely Guillen MD  Study Date:        7/29/2024           Ordering Provider: [id number] ARECHIGA  MRN/PID:           [id number]            Fellow:  Accession#:        [id number]        Technologist:      SURAJ LOOMIS RDMS  Date of Birth/Age: 1965 / 59      Technologist 2:                     years  Gender:            M                   Encounter#:        [id number]  Admission Status:  Inpatient           Location           [facility]                                         Performed:       Diagnosis/ICD: Lymphedema, not elsewhere classified-I89.0; Cellulitus of right                 lower limb-L03.115; Cellulitus of left lower limb-L03.116       CONCLUSIONS:  Right Lower PVR: No evidence of arterial  occlusive disease in the right lower extremity at rest. Normal digital perfusion noted. Multiphasic flow is noted in the right common femoral artery, right popliteal artery, right posterior tibial artery and right dorsalis pedis artery. Called by tracings due to non-compressible vessels.  Left Lower PVR: No evidence of arterial occlusive disease in the left lower extremity at rest. Normal digital perfusion noted. Multiphasic flow is noted in the left common femoral artery, left popliteal artery, left posterior tibial artery and left dorsalis pedis artery. Called by tracings due to non-compressible vessels.  Additional Findings:  Technically difficult exam due to edema.       Imaging & Doppler Findings:     RIGHT Lower PVR                Pressures Ratios  Right Posterior Tibial (Ankle) 255 mmHg  1.71  Right Dorsalis Pedis (Ankle)   255 mmHg  1.71  Right Digit (Great Toe)        255 mmHg  1.71          LEFT Lower PVR                Pressures Ratios  Left Posterior Tibial (Ankle) 255 mmHg  1.71  Left Dorsalis Pedis (Ankle)   255 mmHg  1.71  Left Digit (Great Toe)        172 mmHg  1.15                             Right     Left  Brachial Pressure 149 mmHg 139 mmHg          [id number] Arely Guillen MD  Electronically signed by [id number] Arely Guillen MD on 7/30/2024 at 1:29:26 PM       ** Final **  XR foot left 3+ views  Narrative: Interpreted By:  Michelle Moseley,   STUDY:  XR FOOT LEFT 3+ VIEWS 7/30/2024 10:12 am      INDICATION:  Swelling and tenderness      COMPARISON:  10/16/2021      ACCESSION NUMBER(S):  [id number]      ORDERING CLINICIAN:  TRAMAINE PATE      TECHNIQUE:  Three views of left foot      FINDINGS:  There is narrowing of the tibiotalar articulation appearing unchanged  since the prior examination with narrowing of the joint spaces of the  midfoot and at the hindfoot/midfoot junction. There is exuberant  callus formation about the proximal to mid 3rd metatarsal due to old  healed traumatic injury. A plantar  calcaneal spur is present.      There is severe edema of the distal left lower leg and ankle  extending into the foot.      No obvious bony destruction is seen.      Impression: No plain film evidence for osteomyelitis is observed. If clinically  indicated, MRI of the foot could be performed for further assessment.      Signed by: Michelle Moseley 7/30/2024 11:55 AM  Dictation workstation:   [id number]  Transthoracic Echo (TTE) Complete              [facility] [place]note 2[facility]  [address] , [place] [address]              Phone [phone]    TRANSTHORACIC ECHOCARDIOGRAM REPORT    Patient Name:     ADRIENNE CEDILLO   Reading Physician:   [id number] Vicki Hendrickson MD  Study Date:       7/29/2024            Ordering Provider:   [id number] GIOVANNA ARECHIGA  MRN/PID:          [id number]             Fellow:  Accession#:       [id number]         Nurse:  Date of           1965 / 59 years Sonographer:         Kamille Doe RDCS  Birth/Age:  Gender:           M                    Additional Staff:  Height:           167.64 cm            Admit Date:  Weight:           122.02 kg            Admission Status:    Inpatient - Routine  BSA / BMI:        2.27 m2 / 43.42      Department Location: [facility]                    kg/m2  Blood Pressure: 151 /85 mmHg    Study Type:    TRANSTHORACIC ECHO (TTE) COMPLETE  Diagnosis/ICD: Localized swelling, mass and lump, trunk-R22.2  Indication:    Lymphedema  CPT Codes:     Echo Complete w Full [id number]    Patient History:  Pertinent History: Hyperlipidemia, CHF and Chest Pain. DM, venous insufficiency,                     CVD, CVA, DVT, CP, PE.    Study Detail: The following Echo studies were performed: M-Mode, Doppler, 2D and                color flow. Technically challenging study due to prominent lung                artifact.       PHYSICIAN INTERPRETATION:  Left Ventricle: The left ventricular systolic function is normal,  with a visually estimated ejection fraction of 60-65%. There are no regional wall motion abnormalities. The left ventricular cavity size is normal. Spectral Doppler shows a normal pattern of left ventricular diastolic filling.  Left Atrium: The left atrium is normal in size.  Right Ventricle: The right ventricle is normal in size. There is normal right ventricular global systolic function.  Right Atrium: The right atrium is normal in size.  Aortic Valve: The aortic valve appears structurally normal. The aortic valve dimensionless index is 0.56. There is no evidence of aortic valve regurgitation. The peak instantaneous gradient of the aortic valve is 11.6 mmHg. The mean gradient of the aortic valve is 7.0 mmHg.  Mitral Valve: The mitral valve is abnormal. There is mild mitral annular calcification. There is no evidence of mitral valve regurgitation.  Tricuspid Valve: The tricuspid valve is structurally normal. There is trace tricuspid regurgitation. The right ventricular systolic pressure is unable to be estimated.  Pulmonic Valve: The pulmonic valve is structurally normal. There is physiologic pulmonic valve regurgitation.  Pericardium: There is a trivial pericardial effusion.  Aorta: The aortic root is normal.  Systemic Veins: The inferior vena cava appears mildly dilated. There is less than 50% IVC collapse with inspiration.       CONCLUSIONS:   1. The left ventricular systolic function is normal, with a visually estimated ejection fraction of 60-65%.   2. There is normal right ventricular global systolic function.   3. No evidence of mitral valve regurgitation.   4. Trace tricuspid regurgitation is visualized.    QUANTITATIVE DATA SUMMARY:  2D MEASUREMENTS:                           Normal Ranges:  LAs:           3.40 cm   (2.7-4.0cm)  IVSd:          1.04 cm   (0.6-1.1cm)  LVPWd:         1.24 cm   (0.6-1.1cm)  LVIDd:         4.33 cm   (3.9-5.9cm)  LVIDs:         3.01 cm  LV Mass Index: 76.4 g/m2  LV % FS         30.5 %    LA VOLUME:                               Normal Ranges:  LA Vol A4C:        41.9 ml   (22+/-6mL/m2)  LA Vol Index A4C:  18.5ml/m2  LA Area A4C:       16.5 cm2  LA Major Axis A4C: 5.5 cm  LA Vol A4C:        39.2 ml    RA VOLUME BY A/L METHOD:                                Normal Ranges:  RA Vol A4C:        27.0 ml    (8.3-19.5ml)  RA Vol Index A4C:  11.9 ml/m2  RA Area A4C:       13.1 cm2  RA Major Axis A4C: 5.4 cm    M-MODE MEASUREMENTS:                   Normal Ranges:  Ao Root: 3.20 cm (2.0-3.7cm)    AORTA MEASUREMENTS:                     Normal Ranges:  Asc Ao, d: 2.80 cm (2.1-3.4cm)    LV SYSTOLIC FUNCTION BY 2D PLANIMETRY (MOD):                       Normal Ranges:  EF-A4C View:    68 % (>=55%)  EF-A2C View:    61 %  EF-Biplane:     65 %  EF-Visual:      63 %  LV EF Reported: 63 %    LV DIASTOLIC FUNCTION:                          Normal Ranges:  MV Peak E:    0.95 m/s  (0.7-1.2 m/s)  MV Peak A:    1.15 m/s  (0.42-0.7 m/s)  E/A Ratio:    0.82      (1.0-2.2)  MV e'         0.076 m/s (>8.0)  MV lateral e' 0.07 m/s  MV medial e'  0.08 m/s  E/e' Ratio:   12.51     (<8.0)    MITRAL VALVE:                  Normal Ranges:  MV DT: 263 msec (150-240msec)    AORTIC VALVE:                                     Normal Ranges:  AoV Vmax:                1.70 m/s  (<=1.7m/s)  AoV Peak P.6 mmHg (<20mmHg)  AoV Mean P.0 mmHg  (1.7-11.5mmHg)  LVOT Max Cristobal:            1.07 m/s  (<=1.1m/s)  AoV VTI:                 32.00 cm  (18-25cm)  LVOT VTI:                17.90 cm  LVOT Diameter:           2.00 cm   (1.8-2.4cm)  AoV Area, VTI:           1.76 cm2  (2.5-5.5cm2)  AoV Area,Vmax:           1.98 cm2  (2.5-4.5cm2)  AoV Dimensionless Index: 0.56       RIGHT VENTRICLE:  TAPSE: 27.5 mm  RV s'  0.18 m/s    TRICUSPID VALVE/RVSP:                    Normal Ranges:  IVC Diam: 2.37 cm       [id number] Vicki Hendrickson MD  Electronically signed on 2024 at 9:34:56 AM       ** Final **      Physical  Exam  Constitutional:       Appearance: Normal appearance.   Cardiovascular:      Rate and Rhythm: Normal rate and regular rhythm.      Pulses: Normal pulses.      Heart sounds: Normal heart sounds.   Pulmonary:      Effort: Pulmonary effort is normal.      Breath sounds: Normal breath sounds.   Abdominal:      General: Bowel sounds are normal.      Palpations: Abdomen is soft.   Musculoskeletal:      Right lower leg: Edema present.      Left lower leg: Edema present.   Skin:     General: Skin is warm and dry.      Comments: Multiple areas of open skin lesions redness is better than it has been in past days.   Neurological:      Mental Status: He is alert and oriented to person, place, and time.         Relevant Results             Results for orders placed or performed during the hospital encounter of 07/28/24 (from the past 24 hour(s))   POCT GLUCOSE   Result Value Ref Range    POCT Glucose 166 (H) 74 - 99 mg/dL   CBC   Result Value Ref Range    WBC 9.6 4.4 - 11.3 x10*3/uL    nRBC 0.0 0.0 - 0.0 /100 WBCs    RBC 3.62 (L) 4.50 - 5.90 x10*6/uL    Hemoglobin 10.6 (L) 13.5 - 17.5 g/dL    Hematocrit 33.0 (L) 41.0 - 52.0 %    MCV 91 80 - 100 fL    MCH 29.3 26.0 - 34.0 pg    MCHC 32.1 32.0 - 36.0 g/dL    RDW 13.3 11.5 - 14.5 %    Platelets 397 150 - 450 x10*3/uL   Basic metabolic panel   Result Value Ref Range    Glucose 119 (H) 65 - 99 mg/dL    Sodium 138 133 - 145 mmol/L    Potassium 3.9 3.4 - 5.1 mmol/L    Chloride 104 97 - 107 mmol/L    Bicarbonate 26 24 - 31 mmol/L    Urea Nitrogen 28 (H) 8 - 25 mg/dL    Creatinine 2.10 (H) 0.40 - 1.60 mg/dL    eGFR 35 (L) >60 mL/min/1.73m*2    Calcium 8.7 8.5 - 10.4 mg/dL    Anion Gap 8 <=19 mmol/L   POCT GLUCOSE   Result Value Ref Range    POCT Glucose 122 (H) 74 - 99 mg/dL   POCT GLUCOSE   Result Value Ref Range    POCT Glucose 136 (H) 74 - 99 mg/dL   POCT GLUCOSE   Result Value Ref Range    POCT Glucose 156 (H) 74 - 99 mg/dL       Assessment/Plan                  Principal  Problem:    Cellulitis  Active Problems:    History of coronary artery stent placement    History of pulmonary embolism    Lymphedema    Type 2 diabetes mellitus (Multi)    Chronic renal impairment, stage 3 (moderate) (Multi)    Decreased activities of daily living (ADL)    Cellulitis of lower extremity, unspecified laterality        Cellulitis of the bilateral lower extremities  Lymphedema of the bilateral lower extremities  IV Zosyn every 8 hours  Blood cultures negative 2 days  JERED lower extremity done  U/S negative for DVT  ID following  Wound consult  Podiatry signed off, compression wrap with Ace bandages from the toes to the knee  Pain management  XR L foot neg for OM  Wound consult     Type 2 diabetes mellitus  Reports noncompliance, Home long-acting insulin reported per patient does not make sense  -Diabetic diet  -A1c 6.9  -Lantus 50 units nightly  -SSI  -Monitor closely     CKD stage III  Creatinine 2.1, EGFR 35  Avoid/caution nephrotoxic agents  Continue to monitor  50 mg Torsemide PO     Reported history of CAD with stents  Cardiology following  Patient unsure why he is taking propranolol, Propranolol 120 mg held, started 60 mg  Continue home medications   Echo, EF 60-65%      DVT prophylaxis  Continue Eliquis     GI prophylaxis  PPI        Plan  Patient lives at home by himself and will be discharged with [facility] tomorrow.      Plan discussed with patient and collaborating physician.              Kate Wallace, APRN-CNP

## 2024-08-02 NOTE — PROGRESS NOTES
"Physical Therapy Treatment    Patient Name: Kevin Dc \"partha\"  MRN: [id number]  Today's Date: 8/2/2024  Time Calculation  Start Time: 1121  Stop Time: 1147  Time Calculation (min): 26 min    Assessment/Plan   PT Assessment  Evaluation/Treatment Tolerance: Patient tolerated treatment well  Medical Staff Made Aware: Yes  End of Session Communication: Bedside nurse  Assessment Comment: Progressing well. Improved ambulation safety and endurance today.  End of Session Patient Position: Bed, 3 rail up, Alarm on     PT Plan  Treatment/Interventions: Bed mobility, Transfer training, Gait training, Balance training, Strengthening, Endurance training, Range of motion, Therapeutic exercise, Home exercise program, Therapeutic activity, Stair training  PT Plan: Ongoing PT  PT Frequency: 4 times per week  PT Discharge Recommendations: Low intensity level of continued care  Equipment Recommended upon Discharge: Wheeled walker  PT Recommended Transfer Status: Contact guard, Assistive device  PT - OK to Discharge: Yes      General Visit Information:   PT  Visit  PT Received On: 08/02/24  Response to Previous Treatment: Patient with no complaints from previous session.  General  Family/Caregiver Present: No  Prior to Session Communication: Bedside nurse  Patient Position Received: Bed, 3 rail up, Alarm on  Preferred Learning Style: verbal  General Comment: Agreeable to PT follow up    Subjective   Precautions:  Precautions  Medical Precautions: Fall precautions    Objective   Pain:  Pain Assessment  Pain Assessment: 0-10  0-10 (Numeric) Pain Score: 0 - No pain  Pain Type: Acute pain  Pain Location: Leg  Pain Orientation: Left  Pain Interventions: Ambulation/increased activity  Response to Interventions: Unchanged pain with ambulation  Cognition:  Cognition  Overall Cognitive Status: Within Functional Limits    Activity Tolerance:  Activity Tolerance  Endurance: Decreased tolerance for upright activites  Rate of Perceived " Exertion (RPE): 7/10 with gait training attempts  Treatments:  Therapeutic Exercise  Therapeutic Exercise Performed: No    Bed Mobility  Bed Mobility: Yes  Bed Mobility 1  Bed Mobility 1: Supine to sitting  Level of Assistance 1: Modified independent  Bed Mobility 2  Bed Mobility  2: Sitting to supine  Level of Assistance 2: Modified independent    Ambulation/Gait Training  Ambulation/Gait Training Performed: Yes  Ambulation/Gait Training 1  Surface 1: Level tile  Device 1: Rolling walker  Gait Support Devices: Wheelchair follow  Assistance 1: Close supervision  Quality of Gait 1:  (slow, shuffled, and laborous. 7/10 RPE with each attempt. Cues to keep device close at all times for improved PHONG)  Comments/Distance (ft) 1: 185+250  Transfers  Transfer: Yes  Transfer 1  Transfer From 1: Bed to  Transfer to 1: Stand  Technique 1: Sit to stand, Stand to sit  Transfer Device 1: Walker  Transfer Level of Assistance 1: Distant supervision  Trials/Comments 1: No cues needed. DS for safety  Transfers 2  Transfer From 2: Wheelchair to  Transfer to 2: Stand  Technique 2: Sit to stand, Stand to sit  Transfer Device 2: Walker  Transfer Level of Assistance 2: Close supervision  Trials/Comments 2: CS for safety during gait training due to increasing fatigue    Outcome Measures:  [facility] Basic Mobility  Turning from your back to your side while in a flat bed without using bedrails: None  Moving from lying on your back to sitting on the side of a flat bed without using bedrails: None  Moving to and from bed to chair (including a wheelchair): A little  Standing up from a chair using your arms (e.g. wheelchair or bedside chair): A little  To walk in hospital room: A little  Climbing 3-5 steps with railing: A little  Basic Mobility - Total Score: 20    Education Documentation  Mobility Training, taught by Enrique Carbajal, PT at 8/2/2024  1:21 PM.  Learner: Patient  Readiness: Acceptance  Method: Explanation  Response: Verbalizes  Understanding  Comment: safe mobility techniques    Education Comments  No comments found.        OP EDUCATION:       Encounter Problems       Encounter Problems (Active)       Balance       Sitting Balance (Met)       Start:  07/30/24    Expected End:  08/13/24    Resolved:  08/02/24    Pt will demonstrate good sitting balance to promote safe engagement with out of bed activities           Standing Balance (Progressing)       Start:  07/30/24    Expected End:  08/13/24       Pt will demonstrate good static standing balance to promote safe participation with out of bed activity, transfers, and mobility              Mobility       Ambulation (Progressing)       Start:  07/30/24    Expected End:  08/13/24       Pt will ambulate 50' modified independent assist with walker to promote safe home mobility           Entry Stair Negotiation (Progressing)       Start:  07/30/24    Expected End:  08/13/24       Pt will ascend/descend 3 stairs with rail(s) on B and modified independent assist to promote safe entry and exit in home environment                PT Transfers       Supine to sit (Met)       Start:  07/30/24    Expected End:  08/13/24    Resolved:  08/02/24    Pt will transfer supine to sitting at edge of bed with modified independent assist to promote acute care out of bed activity           Sit to stand (Progressing)       Start:  07/30/24    Expected End:  08/13/24       Pt will transfer sit to standing position with modified independent assist and walker to promote safe out of bed activity           Bed to chair (Progressing)       Start:  07/30/24    Expected End:  08/13/24       Pt will transfer from sitting edge of bed to the chair with modified independent assist and walker to promote out of bed activity and reduce the risks of prolonged acute care bedrest              Pain - Adult          Safety       Safe Mobility Techniques (Progressing)       Start:  07/30/24    Expected End:  08/13/24       Pt will  correctly identify and demonstrate safe mobility techniques to reduce their risks for falls during their acute care stay

## 2024-08-02 NOTE — PROGRESS NOTES
"Kevin Dc \"miky" is a 59 y.o. male on day 2 of admission presenting with Cellulitis.    Subjective   Was seen yesterday for chronic disease stage III he was admitted with bilateral lower extremity cellulitis on IV antibiotics patient continues to feel okay denies any fever chills nausea vomiting diarrhea his left leg pain is better       Objective     Physical Exam  Neck:      Vascular: No carotid bruit.   Cardiovascular:      Rate and Rhythm: Normal rate and regular rhythm.      Heart sounds: No murmur heard.     No friction rub. No gallop.   Pulmonary:      Breath sounds: No wheezing, rhonchi or rales.   Chest:      Chest wall: No tenderness.   Abdominal:      General: There is no distension.      Tenderness: There is no abdominal tenderness. There is no guarding or rebound.   Musculoskeletal:         General: No swelling or tenderness.      Cervical back: Neck supple.      Right lower leg: Edema present.      Left lower leg: Edema present.   Lymphadenopathy:      Cervical: No cervical adenopathy.         Last Recorded Vitals  Blood pressure 102/60, pulse 77, temperature 36.7 °C (98.1 °F), temperature source Oral, resp. rate 18, height 1.676 m (5' 6\"), weight 122 kg (269 lb 6.4 oz), SpO2 94%.    Intake/Output last 3 Shifts:  I/O last 3 completed shifts:  In: 350 (2.9 mL/kg) [P.O.:200; IV Piggyback:150]  Out: 1950 (16 mL/kg) [Urine:1950 (0.4 mL/kg/hr)]  Weight: 122.2 kg     Current Facility-Administered Medications:     acetaminophen (Tylenol) tablet 650 mg, 650 mg, oral, q4h PRN, 650 mg at 07/31/24 1012 **OR** acetaminophen (Tylenol) oral liquid 650 mg, 650 mg, oral, q4h PRN **OR** acetaminophen (Tylenol) suppository 650 mg, 650 mg, rectal, q4h PRN, Zackery Sue MD    allopurinol (Zyloprim) tablet 150 mg, 150 mg, oral, Daily, Zackery Sue MD, 150 mg at 08/02/24 0852    apixaban (Eliquis) tablet 5 mg, 5 mg, oral, BID, Zackery Sue MD, 5 mg at 08/02/24 0606    calcitriol (Rocaltrol) capsule 0.5 mcg, 0.5 " mcg, oral, Daily, Zackery Sue MD, 0.5 mcg at 08/02/24 0852    clopidogrel (Plavix) tablet 75 mg, 75 mg, oral, Daily, Zackery Sue MD, 75 mg at 08/02/24 0852    dextrose 50 % injection 12.5 g, 12.5 g, intravenous, q15 min PRN, Zackery Sue MD    dextrose 50 % injection 25 g, 25 g, intravenous, q15 min PRN, Zackery Sue MD    dilTIAZem CD (Cardizem CD) 24 hr capsule 240 mg, 240 mg, oral, Daily, Zackery Sue MD, 240 mg at 08/02/24 0852    emollient combination no.61 cream 1 Application, 1 Application, topical (top), Daily, LIANA Mcfarlane-CNP, 1 Application at 08/02/24 0900    glucagon (Glucagen) injection 1 mg, 1 mg, intramuscular, q15 min PRN, Zackery Sue MD    guaiFENesin (Mucinex) 12 hr tablet 600 mg, 600 mg, oral, q12h PRN, Zackery Sue MD    insulin glargine (Lantus) injection 50 Units, 50 Units, subcutaneous, Daily, Zackery Sue MD, 50 Units at 08/02/24 0852    insulin lispro (HumaLOG) injection 0-15 Units, 0-15 Units, subcutaneous, TID, Zackery Sue MD, 3 Units at 08/01/24 1700    isosorbide mononitrate ER (Imdur) 24 hr tablet 60 mg, 60 mg, oral, Daily, Zackery Sue MD, 60 mg at 08/02/24 0852    melatonin tablet 3 mg, 3 mg, oral, Nightly PRN, Zackery Sue MD, 3 mg at 07/30/24 0109    nitroglycerin (Nitrostat) SL tablet 0.4 mg, 0.4 mg, sublingual, q5 min PRN, Zackery Sue MD    piperacillin-tazobactam (Zosyn) 3.375 g in dextrose (iso) IV 50 mL, 3.375 g, intravenous, q8h, Hernán Chance MD, Last Rate: 100 mL/hr at 08/02/24 1702, 3.375 g at 08/02/24 1702    polyethylene glycol (Glycolax, Miralax) packet 17 g, 17 g, oral, Daily, Zackery Sue MD    potassium chloride CR (Klor-Con) ER tablet 10 mEq, 10 mEq, oral, Daily with breakfast, Zackery Sue MD, 10 mEq at 08/02/24 0852    propranolol (Inderal) tablet 60 mg, 60 mg, oral, Daily, Zackery Sue MD, 60 mg at 08/02/24 0606    rosuvastatin (Crestor) tablet 20 mg, 20 mg, oral, Daily, Zackery Sue MD, 20 mg at 08/02/24 0852    sodium bicarbonate  tablet 650 mg, 650 mg, oral, 4x daily, Zackery Sue MD, 650 mg at 08/02/24 1701    torsemide (Demadex) tablet 50 mg, 50 mg, oral, Every other day, Zackery Sue MD, 50 mg at 08/02/24 0852   Relevant Results    Results for orders placed or performed during the hospital encounter of 07/28/24 (from the past 96 hour(s))   Tissue/Wound Culture/Smear    Specimen: Wound/Tissue; Tissue/Biopsy   Result Value Ref Range    Tissue/Wound Culture/Smear (2+) Few Mixed Gram-Positive Bacteria     Gram Stain No polymorphonuclear leukocytes seen     Gram Stain No organisms seen    POCT GLUCOSE   Result Value Ref Range    POCT Glucose 156 (H) 74 - 99 mg/dL   CBC   Result Value Ref Range    WBC 10.9 4.4 - 11.3 x10*3/uL    nRBC 0.0 0.0 - 0.0 /100 WBCs    RBC 3.52 (L) 4.50 - 5.90 x10*6/uL    Hemoglobin 10.5 (L) 13.5 - 17.5 g/dL    Hematocrit 32.0 (L) 41.0 - 52.0 %    MCV 91 80 - 100 fL    MCH 29.8 26.0 - 34.0 pg    MCHC 32.8 32.0 - 36.0 g/dL    RDW 13.5 11.5 - 14.5 %    Platelets 421 150 - 450 x10*3/uL   Basic metabolic panel   Result Value Ref Range    Glucose 225 (H) 65 - 99 mg/dL    Sodium 141 133 - 145 mmol/L    Potassium 3.6 3.4 - 5.1 mmol/L    Chloride 108 (H) 97 - 107 mmol/L    Bicarbonate 22 (L) 24 - 31 mmol/L    Urea Nitrogen 37 (H) 8 - 25 mg/dL    Creatinine 2.10 (H) 0.40 - 1.60 mg/dL    eGFR 35 (L) >60 mL/min/1.73m*2    Calcium 8.5 8.5 - 10.4 mg/dL    Anion Gap 11 <=19 mmol/L   POCT GLUCOSE   Result Value Ref Range    POCT Glucose 215 (H) 74 - 99 mg/dL   POCT GLUCOSE   Result Value Ref Range    POCT Glucose 210 (H) 74 - 99 mg/dL   POCT GLUCOSE   Result Value Ref Range    POCT Glucose 153 (H) 74 - 99 mg/dL   POCT GLUCOSE   Result Value Ref Range    POCT Glucose 194 (H) 74 - 99 mg/dL   CBC   Result Value Ref Range    WBC 11.6 (H) 4.4 - 11.3 x10*3/uL    nRBC 0.0 0.0 - 0.0 /100 WBCs    RBC 3.84 (L) 4.50 - 5.90 x10*6/uL    Hemoglobin 11.3 (L) 13.5 - 17.5 g/dL    Hematocrit 34.8 (L) 41.0 - 52.0 %    MCV 91 80 - 100 fL    MCH 29.4  26.0 - 34.0 pg    MCHC 32.5 32.0 - 36.0 g/dL    RDW 13.5 11.5 - 14.5 %    Platelets 435 150 - 450 x10*3/uL   Renal Function Panel   Result Value Ref Range    Glucose 149 (H) 65 - 99 mg/dL    Sodium 140 133 - 145 mmol/L    Potassium 3.8 3.4 - 5.1 mmol/L    Chloride 107 97 - 107 mmol/L    Bicarbonate 23 (L) 24 - 31 mmol/L    Urea Nitrogen 29 (H) 8 - 25 mg/dL    Creatinine 2.00 (H) 0.40 - 1.60 mg/dL    eGFR 38 (L) >60 mL/min/1.73m*2    Calcium 8.5 8.5 - 10.4 mg/dL    Phosphorus 3.8 2.5 - 4.5 mg/dL    Albumin 2.4 (L) 3.5 - 5.0 g/dL    Anion Gap 10 <=19 mmol/L   POCT GLUCOSE   Result Value Ref Range    POCT Glucose 140 (H) 74 - 99 mg/dL   POCT GLUCOSE   Result Value Ref Range    POCT Glucose 179 (H) 74 - 99 mg/dL   POCT GLUCOSE   Result Value Ref Range    POCT Glucose 189 (H) 74 - 99 mg/dL   POCT GLUCOSE   Result Value Ref Range    POCT Glucose 219 (H) 74 - 99 mg/dL   CBC   Result Value Ref Range    WBC 12.3 (H) 4.4 - 11.3 x10*3/uL    nRBC 0.0 0.0 - 0.0 /100 WBCs    RBC 3.95 (L) 4.50 - 5.90 x10*6/uL    Hemoglobin 11.7 (L) 13.5 - 17.5 g/dL    Hematocrit 35.5 (L) 41.0 - 52.0 %    MCV 90 80 - 100 fL    MCH 29.6 26.0 - 34.0 pg    MCHC 33.0 32.0 - 36.0 g/dL    RDW 13.4 11.5 - 14.5 %    Platelets 450 150 - 450 x10*3/uL   Basic metabolic panel   Result Value Ref Range    Glucose 146 (H) 65 - 99 mg/dL    Sodium 139 133 - 145 mmol/L    Potassium 4.0 3.4 - 5.1 mmol/L    Chloride 104 97 - 107 mmol/L    Bicarbonate 26 24 - 31 mmol/L    Urea Nitrogen 30 (H) 8 - 25 mg/dL    Creatinine 2.10 (H) 0.40 - 1.60 mg/dL    eGFR 35 (L) >60 mL/min/1.73m*2    Calcium 9.0 8.5 - 10.4 mg/dL    Anion Gap 9 <=19 mmol/L   POCT GLUCOSE   Result Value Ref Range    POCT Glucose 150 (H) 74 - 99 mg/dL   POCT GLUCOSE   Result Value Ref Range    POCT Glucose 170 (H) 74 - 99 mg/dL   POCT GLUCOSE   Result Value Ref Range    POCT Glucose 189 (H) 74 - 99 mg/dL   POCT GLUCOSE   Result Value Ref Range    POCT Glucose 166 (H) 74 - 99 mg/dL   CBC   Result Value Ref  Range    WBC 9.6 4.4 - 11.3 x10*3/uL    nRBC 0.0 0.0 - 0.0 /100 WBCs    RBC 3.62 (L) 4.50 - 5.90 x10*6/uL    Hemoglobin 10.6 (L) 13.5 - 17.5 g/dL    Hematocrit 33.0 (L) 41.0 - 52.0 %    MCV 91 80 - 100 fL    MCH 29.3 26.0 - 34.0 pg    MCHC 32.1 32.0 - 36.0 g/dL    RDW 13.3 11.5 - 14.5 %    Platelets 397 150 - 450 x10*3/uL   Basic metabolic panel   Result Value Ref Range    Glucose 119 (H) 65 - 99 mg/dL    Sodium 138 133 - 145 mmol/L    Potassium 3.9 3.4 - 5.1 mmol/L    Chloride 104 97 - 107 mmol/L    Bicarbonate 26 24 - 31 mmol/L    Urea Nitrogen 28 (H) 8 - 25 mg/dL    Creatinine 2.10 (H) 0.40 - 1.60 mg/dL    eGFR 35 (L) >60 mL/min/1.73m*2    Calcium 8.7 8.5 - 10.4 mg/dL    Anion Gap 8 <=19 mmol/L   POCT GLUCOSE   Result Value Ref Range    POCT Glucose 122 (H) 74 - 99 mg/dL   POCT GLUCOSE   Result Value Ref Range    POCT Glucose 136 (H) 74 - 99 mg/dL   POCT GLUCOSE   Result Value Ref Range    POCT Glucose 156 (H) 74 - 99 mg/dL       Assessment/Plan   Chronic kidney disease stage III renal function remained stable continue to monitor renal function avoid nephrotoxins  Cellulitis lower extremities to continue antibiotic therapy  Lymphedema  Diabetes mellitus type 2  Hypertension  Coronary artery disease with stent placement  Mild anemia chronic kidney disease             Hernán Chance MD

## 2024-08-03 ENCOUNTER — DOCUMENTATION (OUTPATIENT)
Dept: HOME HEALTH SERVICES | Facility: HOME HEALTH | Age: 59
End: 2024-08-03
Payer: MEDICARE

## 2024-08-03 ENCOUNTER — PHARMACY VISIT (OUTPATIENT)
Dept: PHARMACY | Facility: CLINIC | Age: 59
End: 2024-08-03
Payer: COMMERCIAL

## 2024-08-03 ENCOUNTER — HOME HEALTH ADMISSION (OUTPATIENT)
Dept: HOME HEALTH SERVICES | Facility: HOME HEALTH | Age: 59
End: 2024-08-03
Payer: MEDICARE

## 2024-08-03 VITALS
WEIGHT: 269.4 LBS | OXYGEN SATURATION: 94 % | BODY MASS INDEX: 43.3 KG/M2 | HEART RATE: 71 BPM | SYSTOLIC BLOOD PRESSURE: 114 MMHG | HEIGHT: 66 IN | RESPIRATION RATE: 19 BRPM | TEMPERATURE: 98.4 F | DIASTOLIC BLOOD PRESSURE: 65 MMHG

## 2024-08-03 LAB
ANION GAP SERPL CALC-SCNC: 9 MMOL/L
BUN SERPL-MCNC: 30 MG/DL (ref 8–25)
CALCIUM SERPL-MCNC: 8.8 MG/DL (ref 8.5–10.4)
CHLORIDE SERPL-SCNC: 102 MMOL/L (ref 97–107)
CO2 SERPL-SCNC: 27 MMOL/L (ref 24–31)
CREAT SERPL-MCNC: 2.3 MG/DL (ref 0.4–1.6)
EGFRCR SERPLBLD CKD-EPI 2021: 32 ML/MIN/1.73M*2
ERYTHROCYTE [DISTWIDTH] IN BLOOD BY AUTOMATED COUNT: 13.3 % (ref 11.5–14.5)
GLUCOSE BLD MANUAL STRIP-MCNC: 105 MG/DL (ref 74–99)
GLUCOSE BLD MANUAL STRIP-MCNC: 108 MG/DL (ref 74–99)
GLUCOSE BLD MANUAL STRIP-MCNC: 125 MG/DL (ref 74–99)
GLUCOSE SERPL-MCNC: 100 MG/DL (ref 65–99)
HCT VFR BLD AUTO: 35.4 % (ref 41–52)
HGB BLD-MCNC: 11.2 G/DL (ref 13.5–17.5)
MCH RBC QN AUTO: 29.1 PG (ref 26–34)
MCHC RBC AUTO-ENTMCNC: 31.6 G/DL (ref 32–36)
MCV RBC AUTO: 92 FL (ref 80–100)
NRBC BLD-RTO: 0 /100 WBCS (ref 0–0)
PLATELET # BLD AUTO: 391 X10*3/UL (ref 150–450)
POTASSIUM SERPL-SCNC: 3.9 MMOL/L (ref 3.4–5.1)
RBC # BLD AUTO: 3.85 X10*6/UL (ref 4.5–5.9)
SODIUM SERPL-SCNC: 138 MMOL/L (ref 133–145)
WBC # BLD AUTO: 9.4 X10*3/UL (ref 4.4–11.3)

## 2024-08-03 PROCEDURE — 82947 ASSAY GLUCOSE BLOOD QUANT: CPT

## 2024-08-03 PROCEDURE — 82374 ASSAY BLOOD CARBON DIOXIDE: CPT | Performed by: STUDENT IN AN ORGANIZED HEALTH CARE EDUCATION/TRAINING PROGRAM

## 2024-08-03 PROCEDURE — 36415 COLL VENOUS BLD VENIPUNCTURE: CPT | Performed by: STUDENT IN AN ORGANIZED HEALTH CARE EDUCATION/TRAINING PROGRAM

## 2024-08-03 PROCEDURE — 2500000004 HC RX 250 GENERAL PHARMACY W/ HCPCS (ALT 636 FOR OP/ED): Performed by: STUDENT IN AN ORGANIZED HEALTH CARE EDUCATION/TRAINING PROGRAM

## 2024-08-03 PROCEDURE — RXMED WILLOW AMBULATORY MEDICATION CHARGE

## 2024-08-03 PROCEDURE — 2500000002 HC RX 250 W HCPCS SELF ADMINISTERED DRUGS (ALT 637 FOR MEDICARE OP, ALT 636 FOR OP/ED): Performed by: STUDENT IN AN ORGANIZED HEALTH CARE EDUCATION/TRAINING PROGRAM

## 2024-08-03 PROCEDURE — 2500000001 HC RX 250 WO HCPCS SELF ADMINISTERED DRUGS (ALT 637 FOR MEDICARE OP): Performed by: NURSE PRACTITIONER

## 2024-08-03 PROCEDURE — 85027 COMPLETE CBC AUTOMATED: CPT | Performed by: STUDENT IN AN ORGANIZED HEALTH CARE EDUCATION/TRAINING PROGRAM

## 2024-08-03 PROCEDURE — 2500000004 HC RX 250 GENERAL PHARMACY W/ HCPCS (ALT 636 FOR OP/ED): Performed by: INTERNAL MEDICINE

## 2024-08-03 PROCEDURE — G0378 HOSPITAL OBSERVATION PER HR: HCPCS

## 2024-08-03 PROCEDURE — 2500000001 HC RX 250 WO HCPCS SELF ADMINISTERED DRUGS (ALT 637 FOR MEDICARE OP): Performed by: STUDENT IN AN ORGANIZED HEALTH CARE EDUCATION/TRAINING PROGRAM

## 2024-08-03 PROCEDURE — 99239 HOSP IP/OBS DSCHRG MGMT >30: CPT | Performed by: NURSE PRACTITIONER

## 2024-08-03 RX ORDER — AMOXICILLIN AND CLAVULANATE POTASSIUM 875; 125 MG/1; MG/1
1 TABLET, FILM COATED ORAL 2 TIMES DAILY
Qty: 28 TABLET | Refills: 0 | Status: SHIPPED | OUTPATIENT
Start: 2024-08-03 | End: 2024-08-17

## 2024-08-03 RX ORDER — DOXYCYCLINE 100 MG/1
100 CAPSULE ORAL 2 TIMES DAILY
Qty: 28 CAPSULE | Refills: 0 | Status: SHIPPED | OUTPATIENT
Start: 2024-08-03

## 2024-08-03 RX ORDER — PROPRANOLOL HYDROCHLORIDE 60 MG/1
60 TABLET ORAL
Qty: 30 TABLET | Refills: 0 | Status: SHIPPED | OUTPATIENT
Start: 2024-08-04 | End: 2024-09-03

## 2024-08-03 RX ADMIN — CALCITRIOL CAPSULES 0.25 MCG 0.5 MCG: 0.25 CAPSULE ORAL at 08:17

## 2024-08-03 RX ADMIN — SODIUM BICARBONATE 650 MG TABLET 650 MG: at 17:00

## 2024-08-03 RX ADMIN — Medication 1 APPLICATION: at 09:00

## 2024-08-03 RX ADMIN — ALLOPURINOL 150 MG: 300 TABLET ORAL at 08:18

## 2024-08-03 RX ADMIN — SODIUM BICARBONATE 650 MG TABLET 650 MG: at 13:21

## 2024-08-03 RX ADMIN — PROPRANOLOL HYDROCHLORIDE 60 MG: 40 TABLET ORAL at 05:57

## 2024-08-03 RX ADMIN — PIPERACILLIN SODIUM AND TAZOBACTAM SODIUM 3.38 G: 3; .375 INJECTION, SOLUTION INTRAVENOUS at 01:05

## 2024-08-03 RX ADMIN — APIXABAN 5 MG: 5 TABLET, FILM COATED ORAL at 18:00

## 2024-08-03 RX ADMIN — PIPERACILLIN SODIUM AND TAZOBACTAM SODIUM 3.38 G: 3; .375 INJECTION, SOLUTION INTRAVENOUS at 09:10

## 2024-08-03 RX ADMIN — ISOSORBIDE MONONITRATE 60 MG: 60 TABLET, EXTENDED RELEASE ORAL at 08:16

## 2024-08-03 RX ADMIN — APIXABAN 5 MG: 5 TABLET, FILM COATED ORAL at 05:57

## 2024-08-03 RX ADMIN — POTASSIUM CHLORIDE 10 MEQ: 750 TABLET, EXTENDED RELEASE ORAL at 08:16

## 2024-08-03 RX ADMIN — DILTIAZEM HYDROCHLORIDE 240 MG: 120 CAPSULE, COATED, EXTENDED RELEASE ORAL at 08:14

## 2024-08-03 RX ADMIN — CLOPIDOGREL BISULFATE 75 MG: 75 TABLET ORAL at 08:18

## 2024-08-03 RX ADMIN — SODIUM BICARBONATE 650 MG TABLET 650 MG: at 05:57

## 2024-08-03 RX ADMIN — ROSUVASTATIN CALCIUM 20 MG: 20 TABLET, COATED ORAL at 08:14

## 2024-08-03 ASSESSMENT — COGNITIVE AND FUNCTIONAL STATUS - GENERAL
CLIMB 3 TO 5 STEPS WITH RAILING: A LITTLE
DAILY ACTIVITIY SCORE: 19
HELP NEEDED FOR BATHING: A LITTLE
MOBILITY SCORE: 20
WALKING IN HOSPITAL ROOM: A LITTLE
MOVING TO AND FROM BED TO CHAIR: A LITTLE
STANDING UP FROM CHAIR USING ARMS: A LITTLE
TOILETING: A LITTLE
DRESSING REGULAR LOWER BODY CLOTHING: A LOT
DRESSING REGULAR UPPER BODY CLOTHING: A LITTLE

## 2024-08-03 ASSESSMENT — PAIN - FUNCTIONAL ASSESSMENT: PAIN_FUNCTIONAL_ASSESSMENT: 0-10

## 2024-08-03 ASSESSMENT — PAIN SCALES - GENERAL: PAINLEVEL_OUTOF10: 0 - NO PAIN

## 2024-08-03 NOTE — NURSING NOTE
Patient was agreeable to change 1 am dressing change to around 5am, but not refusing and requesting it to be done later in morning.

## 2024-08-03 NOTE — PROGRESS NOTES
"Kevin Dc \"miky" is a 59 y.o. male on day 3 of admission presenting with Cellulitis.    Subjective   Was seen yesterday for chronic disease stage III he was admitted with bilateral lower extremity cellulitis on IV antibiotics the patient is much better he is ambulating pain is gone from his legs cellulitis is improving       Objective     Physical Exam  Neck:      Vascular: No carotid bruit.   Cardiovascular:      Rate and Rhythm: Normal rate and regular rhythm.      Heart sounds: No murmur heard.     No friction rub. No gallop.   Pulmonary:      Breath sounds: No wheezing, rhonchi or rales.   Chest:      Chest wall: No tenderness.   Abdominal:      General: There is no distension.      Tenderness: There is no abdominal tenderness. There is no guarding or rebound.   Musculoskeletal:         General: No swelling or tenderness.      Cervical back: Neck supple.      Right lower leg: Edema present.      Left lower leg: Edema present.   Lymphadenopathy:      Cervical: No cervical adenopathy.         Last Recorded Vitals  Blood pressure 133/66, pulse 73, temperature 36.8 °C (98.2 °F), temperature source Oral, resp. rate 18, height 1.676 m (5' 6\"), weight 122 kg (269 lb 6.4 oz), SpO2 95%.    Intake/Output last 3 Shifts:  I/O last 3 completed shifts:  In: 250 (2 mL/kg) [P.O.:200; IV Piggyback:50]  Out: 1675 (13.7 mL/kg) [Urine:1675 (0.4 mL/kg/hr)]  Weight: 122.2 kg     Current Facility-Administered Medications:     acetaminophen (Tylenol) tablet 650 mg, 650 mg, oral, q4h PRN, 650 mg at 07/31/24 1012 **OR** acetaminophen (Tylenol) oral liquid 650 mg, 650 mg, oral, q4h PRN **OR** acetaminophen (Tylenol) suppository 650 mg, 650 mg, rectal, q4h PRN, Zackery Sue MD    allopurinol (Zyloprim) tablet 150 mg, 150 mg, oral, Daily, Zackery Sue MD, 150 mg at 08/03/24 0818    apixaban (Eliquis) tablet 5 mg, 5 mg, oral, BID, Zackery Sue MD, 5 mg at 08/03/24 0557    calcitriol (Rocaltrol) capsule 0.5 mcg, 0.5 mcg, oral, " Daily, Zackery Sue MD, 0.5 mcg at 08/03/24 0817    clopidogrel (Plavix) tablet 75 mg, 75 mg, oral, Daily, Zackery Sue MD, 75 mg at 08/03/24 0818    dextrose 50 % injection 12.5 g, 12.5 g, intravenous, q15 min PRN, Zackery Sue MD    dextrose 50 % injection 25 g, 25 g, intravenous, q15 min PRN, Zackery Sue MD    dilTIAZem CD (Cardizem CD) 24 hr capsule 240 mg, 240 mg, oral, Daily, Zackery Sue MD, 240 mg at 08/03/24 0814    emollient combination no.61 cream 1 Application, 1 Application, topical (top), Daily, Kate Wallace, ILANA-CNP, 1 Application at 08/03/24 0900    glucagon (Glucagen) injection 1 mg, 1 mg, intramuscular, q15 min PRN, Zackery Sue MD    guaiFENesin (Mucinex) 12 hr tablet 600 mg, 600 mg, oral, q12h PRN, Zackery Sue MD    insulin glargine (Lantus) injection 50 Units, 50 Units, subcutaneous, Daily, Zackery Sue MD, 50 Units at 08/02/24 0852    insulin lispro (HumaLOG) injection 0-15 Units, 0-15 Units, subcutaneous, TID, Zackery Sue MD, 3 Units at 08/02/24 1704    isosorbide mononitrate ER (Imdur) 24 hr tablet 60 mg, 60 mg, oral, Daily, Zackery Sue MD, 60 mg at 08/03/24 0816    melatonin tablet 3 mg, 3 mg, oral, Nightly PRN, Zackery Sue MD, 3 mg at 07/30/24 0109    nitroglycerin (Nitrostat) SL tablet 0.4 mg, 0.4 mg, sublingual, q5 min PRN, Zackery Sue MD    piperacillin-tazobactam (Zosyn) 3.375 g in dextrose (iso) IV 50 mL, 3.375 g, intravenous, q8h, Hernán Chance MD, Last Rate: 100 mL/hr at 08/03/24 0910, 3.375 g at 08/03/24 0910    polyethylene glycol (Glycolax, Miralax) packet 17 g, 17 g, oral, Daily, Zackery Sue MD    potassium chloride CR (Klor-Con) ER tablet 10 mEq, 10 mEq, oral, Daily with breakfast, Zackery Sue MD, 10 mEq at 08/03/24 0816    propranolol (Inderal) tablet 60 mg, 60 mg, oral, Daily, Zackery Sue MD, 60 mg at 08/03/24 0557    rosuvastatin (Crestor) tablet 20 mg, 20 mg, oral, Daily, Zackery Sue MD, 20 mg at 08/03/24 0814    sodium bicarbonate tablet 650  mg, 650 mg, oral, 4x daily, Zackery Sue MD, 650 mg at 08/03/24 0557    torsemide (Demadex) tablet 50 mg, 50 mg, oral, Every other day, Zackery Sue MD, 50 mg at 08/02/24 0852   Relevant Results    Results for orders placed or performed during the hospital encounter of 07/28/24 (from the past 96 hour(s))   POCT GLUCOSE   Result Value Ref Range    POCT Glucose 210 (H) 74 - 99 mg/dL   POCT GLUCOSE   Result Value Ref Range    POCT Glucose 153 (H) 74 - 99 mg/dL   POCT GLUCOSE   Result Value Ref Range    POCT Glucose 194 (H) 74 - 99 mg/dL   CBC   Result Value Ref Range    WBC 11.6 (H) 4.4 - 11.3 x10*3/uL    nRBC 0.0 0.0 - 0.0 /100 WBCs    RBC 3.84 (L) 4.50 - 5.90 x10*6/uL    Hemoglobin 11.3 (L) 13.5 - 17.5 g/dL    Hematocrit 34.8 (L) 41.0 - 52.0 %    MCV 91 80 - 100 fL    MCH 29.4 26.0 - 34.0 pg    MCHC 32.5 32.0 - 36.0 g/dL    RDW 13.5 11.5 - 14.5 %    Platelets 435 150 - 450 x10*3/uL   Renal Function Panel   Result Value Ref Range    Glucose 149 (H) 65 - 99 mg/dL    Sodium 140 133 - 145 mmol/L    Potassium 3.8 3.4 - 5.1 mmol/L    Chloride 107 97 - 107 mmol/L    Bicarbonate 23 (L) 24 - 31 mmol/L    Urea Nitrogen 29 (H) 8 - 25 mg/dL    Creatinine 2.00 (H) 0.40 - 1.60 mg/dL    eGFR 38 (L) >60 mL/min/1.73m*2    Calcium 8.5 8.5 - 10.4 mg/dL    Phosphorus 3.8 2.5 - 4.5 mg/dL    Albumin 2.4 (L) 3.5 - 5.0 g/dL    Anion Gap 10 <=19 mmol/L   POCT GLUCOSE   Result Value Ref Range    POCT Glucose 140 (H) 74 - 99 mg/dL   POCT GLUCOSE   Result Value Ref Range    POCT Glucose 179 (H) 74 - 99 mg/dL   POCT GLUCOSE   Result Value Ref Range    POCT Glucose 189 (H) 74 - 99 mg/dL   POCT GLUCOSE   Result Value Ref Range    POCT Glucose 219 (H) 74 - 99 mg/dL   CBC   Result Value Ref Range    WBC 12.3 (H) 4.4 - 11.3 x10*3/uL    nRBC 0.0 0.0 - 0.0 /100 WBCs    RBC 3.95 (L) 4.50 - 5.90 x10*6/uL    Hemoglobin 11.7 (L) 13.5 - 17.5 g/dL    Hematocrit 35.5 (L) 41.0 - 52.0 %    MCV 90 80 - 100 fL    MCH 29.6 26.0 - 34.0 pg    MCHC 33.0 32.0 - 36.0  g/dL    RDW 13.4 11.5 - 14.5 %    Platelets 450 150 - 450 x10*3/uL   Basic metabolic panel   Result Value Ref Range    Glucose 146 (H) 65 - 99 mg/dL    Sodium 139 133 - 145 mmol/L    Potassium 4.0 3.4 - 5.1 mmol/L    Chloride 104 97 - 107 mmol/L    Bicarbonate 26 24 - 31 mmol/L    Urea Nitrogen 30 (H) 8 - 25 mg/dL    Creatinine 2.10 (H) 0.40 - 1.60 mg/dL    eGFR 35 (L) >60 mL/min/1.73m*2    Calcium 9.0 8.5 - 10.4 mg/dL    Anion Gap 9 <=19 mmol/L   POCT GLUCOSE   Result Value Ref Range    POCT Glucose 150 (H) 74 - 99 mg/dL   POCT GLUCOSE   Result Value Ref Range    POCT Glucose 170 (H) 74 - 99 mg/dL   POCT GLUCOSE   Result Value Ref Range    POCT Glucose 189 (H) 74 - 99 mg/dL   POCT GLUCOSE   Result Value Ref Range    POCT Glucose 166 (H) 74 - 99 mg/dL   CBC   Result Value Ref Range    WBC 9.6 4.4 - 11.3 x10*3/uL    nRBC 0.0 0.0 - 0.0 /100 WBCs    RBC 3.62 (L) 4.50 - 5.90 x10*6/uL    Hemoglobin 10.6 (L) 13.5 - 17.5 g/dL    Hematocrit 33.0 (L) 41.0 - 52.0 %    MCV 91 80 - 100 fL    MCH 29.3 26.0 - 34.0 pg    MCHC 32.1 32.0 - 36.0 g/dL    RDW 13.3 11.5 - 14.5 %    Platelets 397 150 - 450 x10*3/uL   Basic metabolic panel   Result Value Ref Range    Glucose 119 (H) 65 - 99 mg/dL    Sodium 138 133 - 145 mmol/L    Potassium 3.9 3.4 - 5.1 mmol/L    Chloride 104 97 - 107 mmol/L    Bicarbonate 26 24 - 31 mmol/L    Urea Nitrogen 28 (H) 8 - 25 mg/dL    Creatinine 2.10 (H) 0.40 - 1.60 mg/dL    eGFR 35 (L) >60 mL/min/1.73m*2    Calcium 8.7 8.5 - 10.4 mg/dL    Anion Gap 8 <=19 mmol/L   POCT GLUCOSE   Result Value Ref Range    POCT Glucose 122 (H) 74 - 99 mg/dL   POCT GLUCOSE   Result Value Ref Range    POCT Glucose 136 (H) 74 - 99 mg/dL   POCT GLUCOSE   Result Value Ref Range    POCT Glucose 156 (H) 74 - 99 mg/dL   POCT GLUCOSE   Result Value Ref Range    POCT Glucose 165 (H) 74 - 99 mg/dL   CBC   Result Value Ref Range    WBC 9.4 4.4 - 11.3 x10*3/uL    nRBC 0.0 0.0 - 0.0 /100 WBCs    RBC 3.85 (L) 4.50 - 5.90 x10*6/uL     Hemoglobin 11.2 (L) 13.5 - 17.5 g/dL    Hematocrit 35.4 (L) 41.0 - 52.0 %    MCV 92 80 - 100 fL    MCH 29.1 26.0 - 34.0 pg    MCHC 31.6 (L) 32.0 - 36.0 g/dL    RDW 13.3 11.5 - 14.5 %    Platelets 391 150 - 450 x10*3/uL   Basic metabolic panel   Result Value Ref Range    Glucose 100 (H) 65 - 99 mg/dL    Sodium 138 133 - 145 mmol/L    Potassium 3.9 3.4 - 5.1 mmol/L    Chloride 102 97 - 107 mmol/L    Bicarbonate 27 24 - 31 mmol/L    Urea Nitrogen 30 (H) 8 - 25 mg/dL    Creatinine 2.30 (H) 0.40 - 1.60 mg/dL    eGFR 32 (L) >60 mL/min/1.73m*2    Calcium 8.8 8.5 - 10.4 mg/dL    Anion Gap 9 <=19 mmol/L   POCT GLUCOSE   Result Value Ref Range    POCT Glucose 108 (H) 74 - 99 mg/dL       Assessment/Plan   Chronic kidney disease stage III and today is 2.3 which is up a little bit however does not the reason to keep the patient in the hospital I agree with discharge and have him follow-up with me in a few weeks  Cellulitis lower extremities to continue antibiotic therapy  Lymphedema  Diabetes mellitus type 2  Hypertension  Coronary artery disease with stent placement  Mild anemia chronic kidney disease             Hernán Chance MD

## 2024-08-03 NOTE — CARE PLAN
Problem: Chronic Conditions and Co-morbidities  Goal: Patient's chronic conditions and co-morbidity symptoms are monitored and maintained or improved  Outcome: Progressing     Problem: Pain - Adult  Goal: Verbalizes/displays adequate comfort level or baseline comfort level  Outcome: Progressing     Problem: Fall/Injury  Goal: Verbalize understanding of personal risk factors for fall in the hospital  Outcome: Progressing  Goal: Verbalize understanding of risk factor reduction measures to prevent injury from fall in the home  Outcome: Progressing  Goal: Use assistive devices by end of the shift  Outcome: Progressing  Goal: Pace activities to prevent fatigue by end of the shift  Outcome: Progressing

## 2024-08-03 NOTE — HH CARE COORDINATION
Home Care received a Referral for Nursing, Physical Therapy, and Occupational Therapy. We have processed the referral for a Start of Care on 8/4-8/6.     If you have any questions or concerns, please feel free to contact us at [phone]. Follow the prompts, enter your five digit zip code, and you will be directed to your care team on [facility].

## 2024-08-03 NOTE — DISCHARGE SUMMARY
"Discharge Diagnosis  Cellulitis    Issues Requiring Follow-Up  Chronic kidney disease  Lymphedema    Discharge Meds     Your medication list        START taking these medications        Instructions Last Dose Given Next Dose Due   amoxicillin-pot clavulanate 875-125 mg tablet  Commonly known as: Augmentin      Take 1 tablet by mouth 2 times a day for 14 days.       doxycycline 100 mg capsule  Commonly known as: Vibramycin      Take 1 capsule (100 mg) by mouth 2 times a day. Take with at least 8 ounces (large glass) of water, do not lie down for 30 minutes after       propranolol 60 mg tablet  Commonly known as: Inderal  Start taking on: August 4, 2024  Replaces: propranolol  mg 24 hr capsule      Take 1 tablet (60 mg) by mouth early in the morning..              CONTINUE taking these medications        Instructions Last Dose Given Next Dose Due   acetaminophen 500 mg tablet  Commonly known as: Tylenol           allopurinol 100 mg tablet  Commonly known as: Zyloprim           aspirin-sod bicarb-citric acid 324 mg effervescent tablet  Commonly known as: [place]           blood sugar diagnostic strip           calcitriol 0.5 mcg capsule  Commonly known as: Rocaltrol           clopidogrel 75 mg tablet  Commonly known as: Plavix      Take 1 tablet (75 mg) by mouth once daily. For 90       dilTIAZem  mg 24 hr capsule  Commonly known as: Tiazac      Take 1 capsule (240 mg) by mouth once daily. on an empty stomach in the morning Orally Once a day for 90 days       Eliquis 5 mg tablet  Generic drug: apixaban           HumuLIN R U-500 (Conc) Kwikpen 500 unit/mL (3 mL) CONCENTRATED injection  Generic drug: insulin regular           isosorbide mononitrate ER 60 mg 24 hr tablet  Commonly known as: Imdur      Take 1 tablet (60 mg) by mouth once daily. For 90       nitroglycerin 0.4 mg SL tablet  Commonly known as: Nitrostat           pen needle, diabetic 32 gauge x 5/32\" needle           potassium chloride CR 10 " mEq ER tablet  Commonly known as: Klor-Con           rosuvastatin 20 mg tablet  Commonly known as: Crestor           sodium bicarbonate 650 mg tablet           torsemide 100 mg tablet  Commonly known as: Demadex                  STOP taking these medications      propranolol  mg 24 hr capsule  Commonly known as: Innopran XL  Replaced by: propranolol 60 mg tablet                  Where to Get Your Medications        These medications were sent to [facility]  [address], [address], [place] [facility] [address]      Hours: 9 AM to 6 PM Mon-Fri, 9 AM to 1 PM Sat Phone: [phone]   amoxicillin-pot clavulanate 875-125 mg tablet  doxycycline 100 mg capsule  propranolol 60 mg tablet         Test Results Pending At Discharge  Pending Labs       No current pending labs.            Hospital Course   Patient is a 59 year old male that presented to the ED with difficulty ambulating and worsening swelling with lymphedema and cellulitis. Patient has a history of lymphedema. He is unable to follow with an outpatient lymphedema clinic because of financial restrictions. Patient has improved mobility while in the hospital. He will be going home with [facility] in stable condition today.     Pertinent Physical Exam At Time of Discharge  Physical Exam  Constitutional:       Appearance: Normal appearance.   Cardiovascular:      Rate and Rhythm: Normal rate.      Pulses: Normal pulses.      Heart sounds: Normal heart sounds.   Pulmonary:      Effort: Pulmonary effort is normal.      Breath sounds: Normal breath sounds.   Abdominal:      General: Bowel sounds are normal.      Palpations: Abdomen is soft.   Musculoskeletal:      Right lower leg: Edema present.      Left lower leg: Edema present.   Skin:     General: Skin is warm and dry.      Comments: Multiple areas of open wounds healing, see media   Neurological:      Mental Status: He is alert and oriented to person, place, and time.         Outpatient Follow-Up  Future  Appointments   Date Time Provider Department Center   9/4/2024  9:40 AM Vicki Hendrickson MD [id number] [facility]         LIANA Mcfarlane-CNP

## 2024-08-03 NOTE — PROGRESS NOTES
08/03/24 1349   Discharge Planning   Home or Post Acute Services In home services   Type of Home Care Services Home nursing visits;Home OT;Home PT   Expected Discharge Disposition  Services  ([facility] processed referral for SOC 8/4/24 - 8/7/24.)

## 2024-08-05 NOTE — ANESTHESIA POSTPROCEDURE EVALUATION
"Patient: Kevin Dc \"partha\"    Procedure Summary       Date: 07/25/24 Room / Location: [facility]    Anesthesia Start: 1435 Anesthesia Stop: 1508    Procedure: COLONOSCOPY Diagnosis:       Personal history of colonic polyps      Long term (current) use of anticoagulants      Family history of malignant neoplasm of gastrointestinal tract    Scheduled Providers: Jorge A Post MD; Johnny Fishman DO Responsible Provider: Ray Man MD    Anesthesia Type: MAC ASA Status: 4            Anesthesia Type: MAC    Vitals Value Taken Time   /66 07/25/24 1530   Temp 36.2 °C (97.2 °F) 07/25/24 1505   Pulse 72 07/25/24 1530   Resp 25 07/25/24 1530   SpO2 92 % 07/25/24 1530       Anesthesia Post Evaluation    Patient location during evaluation: PACU  Patient participation: complete - patient participated  Level of consciousness: awake and alert  Pain management: adequate  Airway patency: patent  Cardiovascular status: acceptable  Respiratory status: acceptable  Hydration status: acceptable  Postoperative Nausea and Vomiting: none        There were no known notable events for this encounter.    "

## 2024-08-06 ENCOUNTER — HOME CARE VISIT (OUTPATIENT)
Dept: HOME HEALTH SERVICES | Facility: HOME HEALTH | Age: 59
End: 2024-08-06
Payer: MEDICARE

## 2024-08-06 VITALS
HEART RATE: 76 BPM | DIASTOLIC BLOOD PRESSURE: 62 MMHG | RESPIRATION RATE: 18 BRPM | SYSTOLIC BLOOD PRESSURE: 100 MMHG | TEMPERATURE: 97.5 F | OXYGEN SATURATION: 97 %

## 2024-08-06 PROCEDURE — G0299 HHS/HOSPICE OF RN EA 15 MIN: HCPCS

## 2024-08-06 ASSESSMENT — ACTIVITIES OF DAILY LIVING (ADL)
OASIS_M1830: 03
AMBULATION ASSISTANCE: STAND BY ASSIST
ENTERING_EXITING_HOME: MODERATE ASSIST
CURRENT_FUNCTION: STAND BY ASSIST

## 2024-08-06 ASSESSMENT — ENCOUNTER SYMPTOMS
APPETITE LEVEL: GOOD
DENIES PAIN: 1
LOWER EXTREMITY EDEMA: 1

## 2024-08-07 ENCOUNTER — TELEPHONE (OUTPATIENT)
Dept: PRIMARY CARE | Facility: CLINIC | Age: 59
End: 2024-08-07
Payer: MEDICARE

## 2024-08-07 ENCOUNTER — HOME CARE VISIT (OUTPATIENT)
Dept: HOME HEALTH SERVICES | Facility: HOME HEALTH | Age: 59
End: 2024-08-07
Payer: MEDICARE

## 2024-08-07 PROCEDURE — G0152 HHCP-SERV OF OT,EA 15 MIN: HCPCS

## 2024-08-07 ASSESSMENT — ENCOUNTER SYMPTOMS
LOWEST PAIN SEVERITY IN PAST 24 HOURS: 0/10
DENIES PAIN: 1
HIGHEST PAIN SEVERITY IN PAST 24 HOURS: 0/10
PAIN SEVERITY GOAL: 0/10
SUBJECTIVE PAIN PROGRESSION: UNCHANGED
PERSON REPORTING PAIN: PATIENT

## 2024-08-07 ASSESSMENT — ACTIVITIES OF DAILY LIVING (ADL)
AMBULATION ASSISTANCE: 1
AMBULATION ASSISTANCE: INDEPENDENT
BATHING ASSESSED: 1
BATHING EQUIPMENT USED: SPONGE BATHE
DRESSING_LB_CURRENT_FUNCTION: INDEPENDENT
TOILETING: 1
CURRENT_FUNCTION: INDEPENDENT
BATHING_CURRENT_FUNCTION: INDEPENDENT
PHYSICAL TRANSFERS ASSESSED: 1
DRESSING_UB_CURRENT_FUNCTION: INDEPENDENT
TOILETING: INDEPENDENT

## 2024-08-07 ASSESSMENT — PAIN SCALES - PAIN ASSESSMENT IN ADVANCED DEMENTIA (PAINAD)
BREATHING: 0
CONSOLABILITY: 0
FACIALEXPRESSION: 0
CONSOLABILITY: 0 - NO NEED TO CONSOLE.
BODYLANGUAGE: 0
FACIALEXPRESSION: 0 - SMILING OR INEXPRESSIVE.
TOTALSCORE: 0
BODYLANGUAGE: 0 - RELAXED.
NEGVOCALIZATION: 0 - NONE.
NEGVOCALIZATION: 0

## 2024-08-07 NOTE — TELEPHONE ENCOUNTER
Needs to choose new PCP here and schedule appointment for hospital follow up.  Prior LZS and we are being asked to sign home health forms.  Needs seen in next week.  If he won't come in, we can no longer sign the orders for his home health.  If no other option, can do a TeleVisit 30 min

## 2024-08-08 ENCOUNTER — HOME CARE VISIT (OUTPATIENT)
Dept: HOME HEALTH SERVICES | Facility: HOME HEALTH | Age: 59
End: 2024-08-08
Payer: MEDICARE

## 2024-08-08 VITALS
OXYGEN SATURATION: 95 % | TEMPERATURE: 97.9 F | DIASTOLIC BLOOD PRESSURE: 88 MMHG | HEART RATE: 78 BPM | SYSTOLIC BLOOD PRESSURE: 138 MMHG

## 2024-08-08 PROCEDURE — G0299 HHS/HOSPICE OF RN EA 15 MIN: HCPCS

## 2024-08-08 SDOH — ECONOMIC STABILITY: GENERAL

## 2024-08-08 ASSESSMENT — ACTIVITIES OF DAILY LIVING (ADL)
MONEY MANAGEMENT (EXPENSES/BILLS): INDEPENDENT
AMBULATION ASSISTANCE: 1
AMBULATION ASSISTANCE: INDEPENDENT

## 2024-08-08 ASSESSMENT — ENCOUNTER SYMPTOMS
LOWER EXTREMITY EDEMA: 1
APPETITE LEVEL: GOOD
CHANGE IN APPETITE: UNCHANGED
PERSON REPORTING PAIN: PATIENT
DENIES PAIN: 1

## 2024-08-12 ENCOUNTER — HOME CARE VISIT (OUTPATIENT)
Dept: HOME HEALTH SERVICES | Facility: HOME HEALTH | Age: 59
End: 2024-08-12
Payer: MEDICARE

## 2024-08-12 VITALS — HEART RATE: 97 BPM | TEMPERATURE: 96.8 F | OXYGEN SATURATION: 98 %

## 2024-08-12 PROCEDURE — G0151 HHCP-SERV OF PT,EA 15 MIN: HCPCS | Performed by: PHYSICAL THERAPIST

## 2024-08-12 ASSESSMENT — ENCOUNTER SYMPTOMS
PERSON REPORTING PAIN: PATIENT
MUSCLE WEAKNESS: 1
DENIES PAIN: 1

## 2024-08-12 ASSESSMENT — ACTIVITIES OF DAILY LIVING (ADL): AMBULATION ASSISTANCE ON FLAT SURFACES: 1

## 2024-08-13 ENCOUNTER — HOME CARE VISIT (OUTPATIENT)
Dept: HOME HEALTH SERVICES | Facility: HOME HEALTH | Age: 59
End: 2024-08-13
Payer: MEDICARE

## 2024-08-13 VITALS
RESPIRATION RATE: 18 BRPM | DIASTOLIC BLOOD PRESSURE: 70 MMHG | OXYGEN SATURATION: 97 % | HEART RATE: 80 BPM | SYSTOLIC BLOOD PRESSURE: 100 MMHG | TEMPERATURE: 97.5 F

## 2024-08-13 PROCEDURE — G0299 HHS/HOSPICE OF RN EA 15 MIN: HCPCS

## 2024-08-13 SDOH — ECONOMIC STABILITY: GENERAL

## 2024-08-13 ASSESSMENT — ENCOUNTER SYMPTOMS
APPETITE LEVEL: GOOD
LOWER EXTREMITY EDEMA: 1
DENIES PAIN: 1

## 2024-08-13 ASSESSMENT — ACTIVITIES OF DAILY LIVING (ADL)
AMBULATION ASSISTANCE: STAND BY ASSIST
CURRENT_FUNCTION: STAND BY ASSIST

## 2024-08-14 ENCOUNTER — APPOINTMENT (OUTPATIENT)
Dept: PRIMARY CARE | Facility: CLINIC | Age: 59
End: 2024-08-14
Payer: MEDICARE

## 2024-08-16 ENCOUNTER — HOME CARE VISIT (OUTPATIENT)
Dept: HOME HEALTH SERVICES | Facility: HOME HEALTH | Age: 59
End: 2024-08-16
Payer: MEDICARE

## 2024-08-16 ENCOUNTER — TELEPHONE (OUTPATIENT)
Dept: PRIMARY CARE | Facility: CLINIC | Age: 59
End: 2024-08-16
Payer: MEDICARE

## 2024-08-16 DIAGNOSIS — I73.9 PERIPHERAL VASCULAR DISEASE (CMS-HCC): ICD-10-CM

## 2024-08-16 DIAGNOSIS — I89.0 LYMPHEDEMA: Primary | ICD-10-CM

## 2024-08-16 ASSESSMENT — ACTIVITIES OF DAILY LIVING (ADL)
HOME_HEALTH_OASIS: 00
OASIS_M1830: 00

## 2024-08-16 ASSESSMENT — ENCOUNTER SYMPTOMS: DENIES PAIN: 1

## 2024-08-17 NOTE — TELEPHONE ENCOUNTER
Call and patient and have him schedule in office hospital follow up visit.  He needs to choose new PCP that is seeing new patients.  If not seen in next 30 days, we will not be able to continue signing home health orders

## 2024-08-19 NOTE — TELEPHONE ENCOUNTER
Patient needs referral to house calls NP since he cannot come in for a visit and hasn't been seen over the past year. We cannot continue to sign orders for home health without him being seen

## 2024-08-19 NOTE — TELEPHONE ENCOUNTER
Spoke with patient. He is aware we cannot sign the home health orders until he chooses a new PCP and schedules a hospital follow up. He is not able to schedule at this time due to not having a vehicle. He will call us back to schedule once he has a way to get here.

## 2024-08-21 ENCOUNTER — HOME CARE VISIT (OUTPATIENT)
Dept: HOME HEALTH SERVICES | Facility: HOME HEALTH | Age: 59
End: 2024-08-21
Payer: MEDICARE

## 2024-08-21 VITALS
TEMPERATURE: 97 F | SYSTOLIC BLOOD PRESSURE: 118 MMHG | DIASTOLIC BLOOD PRESSURE: 70 MMHG | OXYGEN SATURATION: 96 % | HEART RATE: 72 BPM | RESPIRATION RATE: 18 BRPM

## 2024-08-21 PROCEDURE — G0299 HHS/HOSPICE OF RN EA 15 MIN: HCPCS

## 2024-08-21 SDOH — ECONOMIC STABILITY: GENERAL

## 2024-08-21 ASSESSMENT — ENCOUNTER SYMPTOMS
LOWER EXTREMITY EDEMA: 1
APPETITE LEVEL: GOOD
DRY SKIN: 1
DENIES PAIN: 1

## 2024-08-21 ASSESSMENT — ACTIVITIES OF DAILY LIVING (ADL)
CURRENT_FUNCTION: STAND BY ASSIST
AMBULATION ASSISTANCE: STAND BY ASSIST

## 2024-08-27 ENCOUNTER — HOME CARE VISIT (OUTPATIENT)
Dept: HOME HEALTH SERVICES | Facility: HOME HEALTH | Age: 59
End: 2024-08-27
Payer: MEDICARE

## 2024-08-27 VITALS
TEMPERATURE: 97.3 F | RESPIRATION RATE: 18 BRPM | DIASTOLIC BLOOD PRESSURE: 70 MMHG | HEART RATE: 82 BPM | OXYGEN SATURATION: 97 % | SYSTOLIC BLOOD PRESSURE: 110 MMHG

## 2024-08-27 PROCEDURE — G0299 HHS/HOSPICE OF RN EA 15 MIN: HCPCS

## 2024-08-27 ASSESSMENT — ACTIVITIES OF DAILY LIVING (ADL)
HOME_HEALTH_OASIS: 00
OASIS_M1830: 00

## 2024-08-27 ASSESSMENT — ENCOUNTER SYMPTOMS: DENIES PAIN: 1

## 2024-09-04 ENCOUNTER — APPOINTMENT (OUTPATIENT)
Dept: CARDIOLOGY | Facility: CLINIC | Age: 59
End: 2024-09-04
Payer: MEDICARE

## 2024-10-16 ENCOUNTER — TELEPHONE (OUTPATIENT)
Dept: PRIMARY CARE | Facility: CLINIC | Age: 59
End: 2024-10-16
Payer: MEDICARE

## 2024-10-16 DIAGNOSIS — I10 PRIMARY HYPERTENSION: ICD-10-CM

## 2024-10-16 DIAGNOSIS — Z11.59 NEED FOR HEPATITIS C SCREENING TEST: ICD-10-CM

## 2024-10-16 DIAGNOSIS — Z12.5 SCREENING FOR PROSTATE CANCER: ICD-10-CM

## 2024-10-16 DIAGNOSIS — Z79.4 TYPE 2 DIABETES MELLITUS WITH OTHER SKIN ULCER, WITH LONG-TERM CURRENT USE OF INSULIN: ICD-10-CM

## 2024-10-16 DIAGNOSIS — E11.622 TYPE 2 DIABETES MELLITUS WITH OTHER SKIN ULCER, WITH LONG-TERM CURRENT USE OF INSULIN: ICD-10-CM

## 2024-10-16 DIAGNOSIS — E78.2 MIXED HYPERLIPIDEMIA: ICD-10-CM

## 2024-10-16 NOTE — TELEPHONE ENCOUNTER
FT 11/08/2024  CPE    Patient used to see Jamila Cummings, please call if LINDA would like labs prior

## 2024-10-24 ENCOUNTER — APPOINTMENT (OUTPATIENT)
Dept: CARDIOLOGY | Facility: CLINIC | Age: 59
End: 2024-10-24
Payer: MEDICARE

## 2024-10-24 VITALS
SYSTOLIC BLOOD PRESSURE: 122 MMHG | WEIGHT: 284 LBS | DIASTOLIC BLOOD PRESSURE: 78 MMHG | OXYGEN SATURATION: 98 % | BODY MASS INDEX: 45.84 KG/M2 | HEART RATE: 94 BPM

## 2024-10-24 DIAGNOSIS — I25.10 ATHEROSCLEROSIS OF NATIVE CORONARY ARTERY OF NATIVE HEART WITHOUT ANGINA PECTORIS: ICD-10-CM

## 2024-10-24 DIAGNOSIS — E78.2 MIXED HYPERLIPIDEMIA: ICD-10-CM

## 2024-10-24 DIAGNOSIS — I25.118 ATHEROSCLEROTIC HEART DISEASE OF NATIVE CORONARY ARTERY WITH OTHER FORMS OF ANGINA PECTORIS: ICD-10-CM

## 2024-10-24 DIAGNOSIS — I10 HYPERTENSION, UNSPECIFIED TYPE: ICD-10-CM

## 2024-10-24 DIAGNOSIS — I25.10 ARTERIOSCLEROSIS OF CORONARY ARTERY: ICD-10-CM

## 2024-10-24 DIAGNOSIS — I20.9 ANGINA PECTORIS: ICD-10-CM

## 2024-10-24 DIAGNOSIS — I26.09 ACUTE COR PULMONALE (MULTI): Primary | ICD-10-CM

## 2024-10-24 PROCEDURE — 3078F DIAST BP <80 MM HG: CPT | Performed by: INTERNAL MEDICINE

## 2024-10-24 PROCEDURE — 3048F LDL-C <100 MG/DL: CPT | Performed by: INTERNAL MEDICINE

## 2024-10-24 PROCEDURE — 3074F SYST BP LT 130 MM HG: CPT | Performed by: INTERNAL MEDICINE

## 2024-10-24 PROCEDURE — 99213 OFFICE O/P EST LOW 20 MIN: CPT | Performed by: INTERNAL MEDICINE

## 2024-10-24 PROCEDURE — 1036F TOBACCO NON-USER: CPT | Performed by: INTERNAL MEDICINE

## 2024-10-24 PROCEDURE — 3044F HG A1C LEVEL LT 7.0%: CPT | Performed by: INTERNAL MEDICINE

## 2024-10-24 RX ORDER — PROPRANOLOL HYDROCHLORIDE 60 MG/1
60 TABLET ORAL
Qty: 90 TABLET | Refills: 3 | Status: SHIPPED | OUTPATIENT
Start: 2024-10-24 | End: 2025-10-24

## 2024-10-24 RX ORDER — DILTIAZEM HYDROCHLORIDE 240 MG/1
240 CAPSULE, EXTENDED RELEASE ORAL DAILY
Qty: 90 CAPSULE | Refills: 3 | Status: SHIPPED | OUTPATIENT
Start: 2024-10-24 | End: 2025-10-24

## 2024-10-24 RX ORDER — NITROGLYCERIN 0.4 MG/1
0.4 TABLET SUBLINGUAL AS NEEDED
Qty: 30 TABLET | Refills: 0 | Status: SHIPPED | OUTPATIENT
Start: 2024-10-24 | End: 2024-11-23

## 2024-10-24 ASSESSMENT — PATIENT HEALTH QUESTIONNAIRE - PHQ9
SUM OF ALL RESPONSES TO PHQ9 QUESTIONS 1 AND 2: 0
1. LITTLE INTEREST OR PLEASURE IN DOING THINGS: NOT AT ALL
2. FEELING DOWN, DEPRESSED OR HOPELESS: NOT AT ALL

## 2024-10-24 ASSESSMENT — PAIN SCALES - GENERAL: PAINLEVEL_OUTOF10: 0-NO PAIN

## 2024-10-24 ASSESSMENT — ENCOUNTER SYMPTOMS
OCCASIONAL FEELINGS OF UNSTEADINESS: 0
DEPRESSION: 0
LOSS OF SENSATION IN FEET: 0

## 2024-10-24 NOTE — PROGRESS NOTES
History of present illness:  59 year old male patient here today following up on hx of CAD/HFpEF/atypical chest pains. Patient had saddle PE 02/2018 that was treated with TPA. Nuclear stress test in 04/2018 showed mild inferior wall ischemia and possible lateral ischemia with EF of 73%. Patient had borderline 3 vessels disease, FFR of LAD was negative and we proceeded with cardiac catheterization on 08/05/19 with PCI to LCX with 2 DARA.  Patient returns to my office for follow-up.  Has been feeling overall good.  Denies any chest pain shortness of breath or palpitations.     Past Medical History:   Diagnosis Date    Acute cor pulmonale (Multi) 02/20/2024    Anal fissure     Angina pectoris 08/31/2023    Atherosclerotic heart disease of native coronary artery with other forms of angina pectoris 08/31/2023    Atypical chest pain 08/31/2023    Cerebral vascular accident (Multi)     Cerebrovascular accident (Multi) 08/31/2023    CHF (congestive heart failure)     Chronic kidney disease, stage 3 (Multi) 08/31/2023    CKD (chronic kidney disease)     Clotting disorder (Multi)     Deep venous thrombosis of lower extremity (Multi) 08/31/2023    Facial abscess     Heart failure 08/31/2023    Hematochezia     Lymphedema 05/22/2018    Mixed hyperlipidemia 08/31/2023    Obesity     Palpitations 08/31/2023    Peripheral vascular disease (CMS-HCC) 08/31/2023    Peripheral vascular disease (CMS-[facility], [facility])     Presence of coronary angioplasty implant and graft 03/24/2023    Primary hypertension 05/22/2018    Pulmonary embolism 03/24/2023    Sleep apnea     Type 2 diabetes mellitus 05/22/2018    Venous insufficiency     Venous insufficiency (chronic) (peripheral) 03/24/2023    Ventricular premature complex 08/31/2023       Past Surgical History:   Procedure Laterality Date    CARDIAC CATHETERIZATION      CAROTID STENT      COLONOSCOPY         No Known Allergies     reports that he has never smoked. He has never been exposed to tobacco  "smoke. He has never used smokeless tobacco. He reports that he does not drink alcohol and does not use drugs.    Family History   Problem Relation Name Age of Onset    Heart disease Father      Other (double bypass with valve replacement) Father         Patient's Medications   New Prescriptions    No medications on file   Previous Medications    ACETAMINOPHEN (TYLENOL) 500 MG TABLET    Take 1 tablet (500 mg) by mouth every 4 hours if needed (pain).    ALLOPURINOL (ZYLOPRIM) 100 MG TABLET    Take 1.5 tablets (150 mg) by mouth once daily.    APIXABAN (ELIQUIS) 5 MG TABLET    Take 1 tablet (5 mg) by mouth 2 times a day.    ASPIRIN-SOD BICARB-CITRIC ACID (JAMILA-SELTZER) 324 MG EFFERVESCENT TABLET    Take 1 tablet (324 mg) by mouth every 8 hours if needed for headaches or mild pain (1 - 3).    BLOOD SUGAR DIAGNOSTIC STRIP    One Touch Ultra Test strips -  3 times per day sc 3X per meal for 90 days    CALCITRIOL (ROCALTROL) 0.5 MCG CAPSULE    Take 1 capsule (0.5 mcg) by mouth once daily. For 30 days    CLOPIDOGREL (PLAVIX) 75 MG TABLET    Take 1 tablet (75 mg) by mouth once daily. For 90    DILTIAZEM ER (TIAZAC) 240 MG 24 HR CAPSULE    Take 1 capsule (240 mg) by mouth once daily. on an empty stomach in the morning Orally Once a day for 90 days    DOXYCYCLINE (VIBRAMYCIN) 100 MG CAPSULE    Take 1 capsule (100 mg) by mouth 2 times a day. Take with at least 8 ounces (large glass) of water, do not lie down for 30 minutes after    INSULIN REGULAR (HUMULIN R U-500, CONC, KWIKPEN) 500 UNIT/ML (3 ML) CONCENTRATED INJECTION    Inject 110 Units under the skin 2 times a day. 110 UNITS AT LUNCH  AT DINNER    ISOSORBIDE MONONITRATE ER (IMDUR) 60 MG 24 HR TABLET    Take 1 tablet (60 mg) by mouth once daily. For 90    NITROGLYCERIN (NITROSTAT) 0.4 MG SL TABLET    Place 1 tablet (0.4 mg) under the tongue if needed for chest pain. For 30 days    PEN NEEDLE, DIABETIC 32 GAUGE X 5/32\" NEEDLE    2 times a day.  as directed sc bid for " 90 days    POTASSIUM CHLORIDE CR 10 MEQ ER TABLET    Take 1 tablet (10 mEq) by mouth once daily. With food    PROPRANOLOL (INDERAL) 60 MG TABLET    Take 1 tablet (60 mg) by mouth early in the morning..    ROSUVASTATIN (CRESTOR) 20 MG TABLET    Take 1 tablet (20 mg) by mouth once daily. For 90    SODIUM BICARBONATE 650 MG TABLET    Take 1 tablet (650 mg) by mouth 4 times a day. For 30    TORSEMIDE (DEMADEX) 100 MG TABLET    Take 0.5 tablets (50 mg) by mouth every other day. For 90 days   Modified Medications    No medications on file   Discontinued Medications    No medications on file       Objective   Physical Exam  General: Patient in no acute distress   HEENT: Atraumatic normocephalic.  Neck: Supple, jugular venous pressure within normal limit.  No bruits  Lungs: Clear to auscultation bilaterally  Cardiovascular: Regular rate and rhythm, normal heart sounds, no murmurs rubs or gallops  Abdomen: Soft nontender nondistended.  Normal bowel sounds.  Extremities: Warm to touch, 3+ edema.    Lab Review   No visits with results within 2 Month(s) from this visit.   Latest known visit with results is:   No results displayed because visit has over 200 results.           Assessment/Plan   Patient Active Problem List   Diagnosis    Severe obesity (BMI >= 40) (Multi)    History of coronary artery stent placement    History of pulmonary embolism    Hyperglycemia due to type 2 diabetes mellitus (Multi)    Peripheral circulatory disorder due to type 2 diabetes mellitus (Multi)    Hypokalemia    Hyponatremia    Hypoxia    Leg edema    Lymphedema    Occult blood in stools    TANO treated with BiPAP    Mixed hyperlipidemia    Hypertriglyceridemia    Palpitations    Rash    Papular eruption    Periorbital cellulitis    Venous insufficiency (chronic) (peripheral)    CVD (cardiovascular disease)    Primary hypertension    Peripheral vascular disease (CMS-HCC)    Sepsis (Multi)    Dyspnea    Pain in upper limb    Shoulder pain    Type 2  diabetes mellitus    Hypoglycemia    Ventricular premature complex    Diabetic neuropathic arthropathy (Multi)    Dyslipidemia    Elbow joint pain    Gout    Heart failure    Hematochezia    History of percutaneous transluminal coronary angioplasty    Generalized atherosclerosis    Arthritis of elbow    Backache    Blister    Bursitis of shoulder    Cellulitis of left lower extremity    Cerebrovascular accident (Multi)    Chronic renal impairment, stage 3 (moderate) (Multi)    Chronic renal failure syndrome    Deep venous thrombosis of lower extremity (Multi)    Diabetic foot ulcer (Multi)    Abnormal TSH    Kidney function test abnormal    Abscess of face    Accidental fall    Anal fissure    Anemia, unspecified    Angina pectoris    Atypical chest pain    Chest wall pain    Atherosclerosis of coronary artery    Atherosclerotic heart disease of native coronary artery with other forms of angina pectoris    Arteriosclerosis of coronary artery    Tremor    Open wound, lower leg    Acute cor pulmonale (Multi)    Blood glucose abnormal    Kidney disease    Morbid obesity (Multi)    Pneumonia    Pulmonary embolism    Symptom of leg swelling    Acidosis, unspecified    Stented coronary artery    Anticoagulant long-term use    Cellulitis    Decreased activities of daily living (ADL)    Cellulitis of lower extremity, unspecified laterality      59 year old male patient here today following up on hx of CAD/HFpEF/atypical chest pains. Patient had saddle PE 02/2018 that was treated with TPA. Nuclear stress test in 04/2018 showed mild inferior wall ischemia and possible lateral ischemia with EF of 73%. Patient had borderline 3 vessels disease, FFR of LAD was negative and we proceeded with cardiac catheterization on 08/05/19 with PCI to LCX with 2 DARA.  Patient returns to my office for follow-up.  Has been feeling overall good.  Denies any chest pain shortness of breath or palpitations.     Patient overall doing good.  Will  continue current medications.  Asked him to decrease Plavix to every third or fourth day for now.  When I see him next time we will switch him to baby aspirin if he is okay with that.  Will renew his propranolol and diltiazem.  Have not had any lipid panel done in more than 2 years.  Recommend to repeat lipid panel.  Target LDL less than 55.  Will follow-up in 6 months.  Discussed with patient lifestyle modification.    Vicki Hendrickson MD

## 2024-10-28 DIAGNOSIS — I25.10 ARTERIOSCLEROSIS OF CORONARY ARTERY: ICD-10-CM

## 2024-10-28 RX ORDER — NITROGLYCERIN 0.4 MG/1
0.4 TABLET SUBLINGUAL AS NEEDED
Qty: 30 TABLET | Refills: 0 | Status: SHIPPED | OUTPATIENT
Start: 2024-10-28 | End: 2024-11-27

## 2024-11-04 ENCOUNTER — LAB (OUTPATIENT)
Dept: LAB | Facility: LAB | Age: 59
End: 2024-11-04
Payer: MEDICARE

## 2024-11-04 DIAGNOSIS — Z11.59 NEED FOR HEPATITIS C SCREENING TEST: ICD-10-CM

## 2024-11-04 DIAGNOSIS — N18.30 CHRONIC KIDNEY DISEASE, STAGE 3 UNSPECIFIED (MULTI): Primary | ICD-10-CM

## 2024-11-04 DIAGNOSIS — Z12.5 SCREENING FOR PROSTATE CANCER: ICD-10-CM

## 2024-11-04 DIAGNOSIS — Z79.4 TYPE 2 DIABETES MELLITUS WITH OTHER SKIN ULCER, WITH LONG-TERM CURRENT USE OF INSULIN: ICD-10-CM

## 2024-11-04 DIAGNOSIS — I10 PRIMARY HYPERTENSION: ICD-10-CM

## 2024-11-04 DIAGNOSIS — E11.622 TYPE 2 DIABETES MELLITUS WITH OTHER SKIN ULCER, WITH LONG-TERM CURRENT USE OF INSULIN: ICD-10-CM

## 2024-11-04 DIAGNOSIS — E78.2 MIXED HYPERLIPIDEMIA: ICD-10-CM

## 2024-11-04 LAB
ALBUMIN SERPL BCP-MCNC: 3.5 G/DL (ref 3.4–5)
ALP SERPL-CCNC: 68 U/L (ref 33–136)
ALT SERPL W P-5'-P-CCNC: 9 U/L (ref 10–52)
ANION GAP SERPL CALCULATED.3IONS-SCNC: 11 MMOL/L (ref 10–20)
APPEARANCE UR: CLEAR
AST SERPL W P-5'-P-CCNC: 11 U/L (ref 9–39)
BASOPHILS # BLD AUTO: 0.04 X10*3/UL (ref 0–0.1)
BASOPHILS NFR BLD AUTO: 0.5 %
BILIRUB SERPL-MCNC: 0.6 MG/DL (ref 0–1.2)
BILIRUB UR STRIP.AUTO-MCNC: NEGATIVE MG/DL
BUN SERPL-MCNC: 27 MG/DL (ref 6–23)
CALCIUM SERPL-MCNC: 8.7 MG/DL (ref 8.6–10.3)
CHLORIDE SERPL-SCNC: 108 MMOL/L (ref 98–107)
CHOLEST SERPL-MCNC: 133 MG/DL (ref 0–199)
CHOLEST/HDLC SERPL: 4.1 {RATIO}
CO2 SERPL-SCNC: 24 MMOL/L (ref 21–32)
COLOR UR: COLORLESS
CREAT SERPL-MCNC: 1.84 MG/DL (ref 0.5–1.3)
CREAT UR-MCNC: 42.6 MG/DL (ref 20–370)
EGFRCR SERPLBLD CKD-EPI 2021: 41 ML/MIN/1.73M*2
EOSINOPHIL # BLD AUTO: 0.25 X10*3/UL (ref 0–0.7)
EOSINOPHIL NFR BLD AUTO: 3.2 %
ERYTHROCYTE [DISTWIDTH] IN BLOOD BY AUTOMATED COUNT: 13.8 % (ref 11.5–14.5)
EST. AVERAGE GLUCOSE BLD GHB EST-MCNC: 151 MG/DL
GLUCOSE SERPL-MCNC: 164 MG/DL (ref 74–99)
GLUCOSE UR STRIP.AUTO-MCNC: NORMAL MG/DL
HBA1C MFR BLD: 6.9 %
HCT VFR BLD AUTO: 41.1 % (ref 41–52)
HCV AB SER QL: NONREACTIVE
HDLC SERPL-MCNC: 32.1 MG/DL
HGB BLD-MCNC: 13.5 G/DL (ref 13.5–17.5)
IMM GRANULOCYTES # BLD AUTO: 0.04 X10*3/UL (ref 0–0.7)
IMM GRANULOCYTES NFR BLD AUTO: 0.5 % (ref 0–0.9)
KETONES UR STRIP.AUTO-MCNC: NEGATIVE MG/DL
LDLC SERPL CALC-MCNC: 78 MG/DL
LEUKOCYTE ESTERASE UR QL STRIP.AUTO: NEGATIVE
LYMPHOCYTES # BLD AUTO: 1.78 X10*3/UL (ref 1.2–4.8)
LYMPHOCYTES NFR BLD AUTO: 23 %
MCH RBC QN AUTO: 30.1 PG (ref 26–34)
MCHC RBC AUTO-ENTMCNC: 32.8 G/DL (ref 32–36)
MCV RBC AUTO: 92 FL (ref 80–100)
MICROALBUMIN UR-MCNC: 587.3 MG/L
MICROALBUMIN/CREAT UR: 1378.6 UG/MG CREAT
MONOCYTES # BLD AUTO: 0.58 X10*3/UL (ref 0.1–1)
MONOCYTES NFR BLD AUTO: 7.5 %
MUCOUS THREADS #/AREA URNS AUTO: NORMAL /LPF
NEUTROPHILS # BLD AUTO: 5.05 X10*3/UL (ref 1.2–7.7)
NEUTROPHILS NFR BLD AUTO: 65.3 %
NITRITE UR QL STRIP.AUTO: NEGATIVE
NON HDL CHOLESTEROL: 101 MG/DL (ref 0–149)
NRBC BLD-RTO: 0 /100 WBCS (ref 0–0)
PH UR STRIP.AUTO: 6 [PH]
PHOSPHATE SERPL-MCNC: 3.5 MG/DL (ref 2.5–4.9)
PLATELET # BLD AUTO: 310 X10*3/UL (ref 150–450)
POTASSIUM SERPL-SCNC: 4.4 MMOL/L (ref 3.5–5.3)
PROT SERPL-MCNC: 6.9 G/DL (ref 6.4–8.2)
PROT UR STRIP.AUTO-MCNC: ABNORMAL MG/DL
PSA SERPL-MCNC: 0.2 NG/ML
PTH-INTACT SERPL-MCNC: 96.5 PG/ML (ref 18.5–88)
RBC # BLD AUTO: 4.49 X10*6/UL (ref 4.5–5.9)
RBC # UR STRIP.AUTO: NEGATIVE /UL
RBC #/AREA URNS AUTO: NORMAL /HPF
SODIUM SERPL-SCNC: 139 MMOL/L (ref 136–145)
SP GR UR STRIP.AUTO: 1.01
SQUAMOUS #/AREA URNS AUTO: NORMAL /HPF
TRIGL SERPL-MCNC: 114 MG/DL (ref 0–149)
UROBILINOGEN UR STRIP.AUTO-MCNC: NORMAL MG/DL
VLDL: 23 MG/DL (ref 0–40)
WBC # BLD AUTO: 7.7 X10*3/UL (ref 4.4–11.3)
WBC #/AREA URNS AUTO: NORMAL /HPF

## 2024-11-04 PROCEDURE — 83970 ASSAY OF PARATHORMONE: CPT

## 2024-11-04 PROCEDURE — G0103 PSA SCREENING: HCPCS

## 2024-11-04 PROCEDURE — 85025 COMPLETE CBC W/AUTO DIFF WBC: CPT

## 2024-11-04 PROCEDURE — 80053 COMPREHEN METABOLIC PANEL: CPT

## 2024-11-04 PROCEDURE — 83036 HEMOGLOBIN GLYCOSYLATED A1C: CPT

## 2024-11-04 PROCEDURE — 81001 URINALYSIS AUTO W/SCOPE: CPT

## 2024-11-04 PROCEDURE — 80061 LIPID PANEL: CPT

## 2024-11-04 PROCEDURE — 84100 ASSAY OF PHOSPHORUS: CPT

## 2024-11-04 PROCEDURE — 82043 UR ALBUMIN QUANTITATIVE: CPT

## 2024-11-04 PROCEDURE — 36415 COLL VENOUS BLD VENIPUNCTURE: CPT

## 2024-11-04 PROCEDURE — 82570 ASSAY OF URINE CREATININE: CPT

## 2024-11-04 PROCEDURE — G0472 HEP C SCREEN HIGH RISK/OTHER: HCPCS

## 2024-11-08 ENCOUNTER — TELEPHONE (OUTPATIENT)
Dept: PRIMARY CARE | Facility: CLINIC | Age: 59
End: 2024-11-08

## 2024-11-08 ENCOUNTER — APPOINTMENT (OUTPATIENT)
Dept: PRIMARY CARE | Facility: CLINIC | Age: 59
End: 2024-11-08
Payer: MEDICARE

## 2024-11-08 VITALS
WEIGHT: 284 LBS | BODY MASS INDEX: 45.84 KG/M2 | HEART RATE: 70 BPM | TEMPERATURE: 97.5 F | SYSTOLIC BLOOD PRESSURE: 122 MMHG | DIASTOLIC BLOOD PRESSURE: 82 MMHG | OXYGEN SATURATION: 96 %

## 2024-11-08 DIAGNOSIS — E78.2 MIXED HYPERLIPIDEMIA: ICD-10-CM

## 2024-11-08 DIAGNOSIS — I10 PRIMARY HYPERTENSION: ICD-10-CM

## 2024-11-08 DIAGNOSIS — Z79.4 TYPE 2 DIABETES MELLITUS WITH OTHER SKIN ULCER, WITH LONG-TERM CURRENT USE OF INSULIN: ICD-10-CM

## 2024-11-08 DIAGNOSIS — N18.31 STAGE 3A CHRONIC KIDNEY DISEASE (MULTI): ICD-10-CM

## 2024-11-08 DIAGNOSIS — I89.0 LYMPHEDEMA: ICD-10-CM

## 2024-11-08 DIAGNOSIS — L97.421 DIABETIC ULCER OF LEFT MIDFOOT ASSOCIATED WITH TYPE 2 DIABETES MELLITUS, LIMITED TO BREAKDOWN OF SKIN: ICD-10-CM

## 2024-11-08 DIAGNOSIS — Z71.89 COUNSELING REGARDING ADVANCED CARE PLANNING AND GOALS OF CARE: ICD-10-CM

## 2024-11-08 DIAGNOSIS — E11.621 DIABETIC ULCER OF LEFT MIDFOOT ASSOCIATED WITH TYPE 2 DIABETES MELLITUS, LIMITED TO BREAKDOWN OF SKIN: ICD-10-CM

## 2024-11-08 DIAGNOSIS — Z00.00 ROUTINE GENERAL MEDICAL EXAMINATION AT HEALTH CARE FACILITY: Primary | ICD-10-CM

## 2024-11-08 DIAGNOSIS — Z12.5 SCREENING FOR PROSTATE CANCER: ICD-10-CM

## 2024-11-08 DIAGNOSIS — E11.622 TYPE 2 DIABETES MELLITUS WITH OTHER SKIN ULCER, WITH LONG-TERM CURRENT USE OF INSULIN: ICD-10-CM

## 2024-11-08 PROBLEM — T14.8XXA BLISTER: Status: RESOLVED | Noted: 2023-08-31 | Resolved: 2024-11-08

## 2024-11-08 PROBLEM — Z86.711 HISTORY OF PULMONARY EMBOLISM: Status: ACTIVE | Noted: 2023-03-24

## 2024-11-08 PROBLEM — I25.118 ATHEROSCLEROTIC HEART DISEASE OF NATIVE CORONARY ARTERY WITH OTHER FORMS OF ANGINA PECTORIS: Status: ACTIVE | Noted: 2021-11-02

## 2024-11-08 PROBLEM — M25.519 SHOULDER PAIN: Status: RESOLVED | Noted: 2023-08-31 | Resolved: 2024-11-08

## 2024-11-08 PROBLEM — R06.00 DYSPNEA: Status: RESOLVED | Noted: 2023-08-31 | Resolved: 2024-11-08

## 2024-11-08 PROBLEM — M75.50 BURSITIS OF SHOULDER: Status: RESOLVED | Noted: 2023-08-31 | Resolved: 2024-11-08

## 2024-11-08 PROBLEM — M79.603 PAIN IN UPPER LIMB: Status: RESOLVED | Noted: 2023-08-31 | Resolved: 2024-11-08

## 2024-11-08 PROBLEM — J18.9 PNEUMONIA: Status: RESOLVED | Noted: 2024-02-20 | Resolved: 2024-11-08

## 2024-11-08 PROBLEM — R07.89 CHEST WALL PAIN: Status: RESOLVED | Noted: 2023-08-31 | Resolved: 2024-11-08

## 2024-11-08 PROBLEM — E66.01 SEVERE OBESITY (BMI >= 40) (MULTI): Status: ACTIVE | Noted: 2023-03-24

## 2024-11-08 PROBLEM — R00.2 PALPITATIONS: Status: RESOLVED | Noted: 2023-08-31 | Resolved: 2024-11-08

## 2024-11-08 PROBLEM — R09.02 HYPOXIA: Status: RESOLVED | Noted: 2023-08-31 | Resolved: 2024-11-08

## 2024-11-08 PROBLEM — M54.9 BACKACHE: Status: RESOLVED | Noted: 2023-08-31 | Resolved: 2024-11-08
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
## 2024-11-08 PROBLEM — M25.529 ELBOW JOINT PAIN: Status: RESOLVED | Noted: 2023-08-31 | Resolved: 2024-11-08

## 2024-11-08 PROCEDURE — 1036F TOBACCO NON-USER: CPT | Performed by: STUDENT IN AN ORGANIZED HEALTH CARE EDUCATION/TRAINING PROGRAM

## 2024-11-08 PROCEDURE — 3044F HG A1C LEVEL LT 7.0%: CPT | Performed by: STUDENT IN AN ORGANIZED HEALTH CARE EDUCATION/TRAINING PROGRAM

## 2024-11-08 PROCEDURE — 3062F POS MACROALBUMINURIA REV: CPT | Performed by: STUDENT IN AN ORGANIZED HEALTH CARE EDUCATION/TRAINING PROGRAM

## 2024-11-08 PROCEDURE — G0439 PPPS, SUBSEQ VISIT: HCPCS | Performed by: STUDENT IN AN ORGANIZED HEALTH CARE EDUCATION/TRAINING PROGRAM

## 2024-11-08 PROCEDURE — 3074F SYST BP LT 130 MM HG: CPT | Performed by: STUDENT IN AN ORGANIZED HEALTH CARE EDUCATION/TRAINING PROGRAM

## 2024-11-08 PROCEDURE — 3078F DIAST BP <80 MM HG: CPT | Performed by: STUDENT IN AN ORGANIZED HEALTH CARE EDUCATION/TRAINING PROGRAM

## 2024-11-08 PROCEDURE — 3048F LDL-C <100 MG/DL: CPT | Performed by: STUDENT IN AN ORGANIZED HEALTH CARE EDUCATION/TRAINING PROGRAM

## 2024-11-08 RX ORDER — INSULIN LISPRO 100 [IU]/ML
INJECTION, SUSPENSION SUBCUTANEOUS
COMMUNITY

## 2024-11-08 ASSESSMENT — ENCOUNTER SYMPTOMS
FREQUENCY: 0
ARTHRALGIAS: 0
SINUS PAIN: 0
LIGHT-HEADEDNESS: 0
SHORTNESS OF BREATH: 0
VOMITING: 0
SLEEP DISTURBANCE: 0
CHILLS: 0
WOUND: 1
WHEEZING: 0
FEVER: 0
NERVOUS/ANXIOUS: 0
DIARRHEA: 0
POLYDIPSIA: 0
DYSPHORIC MOOD: 0
RHINORRHEA: 0
DYSURIA: 0
FATIGUE: 0
NAUSEA: 0
DIZZINESS: 0
SINUS PRESSURE: 0
PALPITATIONS: 0
HEADACHES: 0
CONSTIPATION: 0
COUGH: 0
MYALGIAS: 0

## 2024-11-08 ASSESSMENT — ACTIVITIES OF DAILY LIVING (ADL)
TAKING_MEDICATION: INDEPENDENT
DOING_HOUSEWORK: INDEPENDENT
BATHING: INDEPENDENT
DRESSING: INDEPENDENT
MANAGING_FINANCES: INDEPENDENT
GROCERY_SHOPPING: INDEPENDENT

## 2024-11-08 ASSESSMENT — PAIN SCALES - GENERAL: PAINLEVEL_OUTOF10: 0-NO PAIN

## 2024-11-08 NOTE — ASSESSMENT & PLAN NOTE
Orders:    Referral to Wound Clinic; Future  Ulcer as photographed on bottom of left foot.  It penetrates into subcutaneous tissue with induration extending multiple centimeters in all directions from the wound itself.  It does not appear to penetrate below the subcutaneous tissue or into the muscle layer.

## 2024-11-08 NOTE — PATIENT INSTRUCTIONS
When you see Dr. Chance, please ask his opinion on starting Jardiance or Farxiga for your CKD.  This could also help with your diabetes and heart issues.

## 2024-11-08 NOTE — PROGRESS NOTES
Subjective   Reason for Visit: Kevin Dc is an 59 y.o. male here for a Medicare Wellness visit. Medicare Annual Wellness Visit Subsequent        Past Medical, Surgical, and Family History reviewed and updated in chart.    Reviewed all medications by prescribing practitioner or clinical pharmacist (such as prescriptions, OTCs, herbal therapies and supplements) and documented in the medical record.    Here today for annual exam.  He is up-to-date on colonoscopy.  He is not up-to-date on retinopathy screening.    He does have known history of lymphedema.  He was previously following with the lymphedema clinic which he found helpful, however insurance stopped covering this service so he has not been able to afford to go back.    He does keep track of his feet.  He has noticed a new ulcer on the bottom of his left foot.  It is not painful.  It has not impacted his ability to walk.        Patient Care Team:  Ketan Lloyd DO as PCP - General (Family Medicine)  Moriah Barrera MD as PCP - [employer] PCP     Review of Systems   Constitutional:  Negative for chills, fatigue and fever.   HENT:  Negative for congestion, hearing loss, rhinorrhea, sinus pressure, sinus pain and tinnitus.    Eyes:  Negative for visual disturbance.   Respiratory:  Negative for cough, shortness of breath and wheezing.    Cardiovascular:  Positive for leg swelling. Negative for chest pain and palpitations.   Gastrointestinal:  Negative for constipation, diarrhea, nausea and vomiting.   Endocrine: Negative for cold intolerance, heat intolerance, polydipsia and polyuria.   Genitourinary:  Negative for dysuria, frequency and urgency.   Musculoskeletal:  Negative for arthralgias and myalgias.   Skin:  Positive for wound. Negative for pallor and rash.   Neurological:  Negative for dizziness, light-headedness and headaches.   Psychiatric/Behavioral:  Negative for dysphoric mood and sleep disturbance. The patient is not  nervous/anxious.        Objective   Vitals:  /78 (BP Location: Left arm)   Pulse 70   Temp 36.4 °C (97.5 °F) (Temporal)   Wt 129 kg (284 lb)   SpO2 96%   BMI 45.84 kg/m²       Physical Exam  Constitutional:       Appearance: Normal appearance.   HENT:      Head: Normocephalic and atraumatic.      Right Ear: Tympanic membrane, ear canal and external ear normal.      Left Ear: Tympanic membrane, ear canal and external ear normal.      Nose: Nose normal.      Mouth/Throat:      Mouth: Mucous membranes are moist.      Pharynx: Oropharynx is clear.   Eyes:      Extraocular Movements: Extraocular movements intact.      Conjunctiva/sclera: Conjunctivae normal.      Pupils: Pupils are equal, round, and reactive to light.   Cardiovascular:      Rate and Rhythm: Normal rate and regular rhythm.      Pulses: Normal pulses.      Heart sounds: Normal heart sounds.      Comments: Extensive lymphedema bilateral lower extremities.  Pulmonary:      Effort: Pulmonary effort is normal.      Breath sounds: Normal breath sounds.   Abdominal:      General: There is no distension.      Palpations: Abdomen is soft.      Tenderness: There is no abdominal tenderness.   Musculoskeletal:         General: Normal range of motion.   Skin:     General: Skin is warm and dry.      Comments: Ulcer on plantar surface of left foot with some extension into interdigital spaces.  There is visible, pink, healthy appearing subcutaneous tissue at the base of the ulcer.  There is some superficial darkened eschar and blanched induration extending approximately 2 cm in all direction from the ulcer.   Neurological:      Mental Status: He is alert and oriented to person, place, and time. Mental status is at baseline.   Psychiatric:         Mood and Affect: Mood normal.         Behavior: Behavior normal.         Assessment & Plan  Routine general medical examination at health care facility    Orders:    1 Year Follow Up In Primary Care - Wellness Exam;  Future    Diabetic ulcer of left midfoot associated with type 2 diabetes mellitus, limited to breakdown of skin    Orders:    Referral to Wound Clinic; Future  Ulcer as photographed on bottom of left foot.  It penetrates into subcutaneous tissue with induration extending multiple centimeters in all directions from the wound itself.  It does not appear to penetrate below the subcutaneous tissue or into the muscle layer.  Lymphedema    Orders:    Disability Placard    Counseling regarding advanced care planning and goals of care       Patient is to remain full code per his wishes.  Discussed advance care planning.  Preferred healthcare agent is his brother as listed in the chart.  This is his closest living relative so there would be no conflict with legal requirements and absence of a living well.  I did discuss the importance of ensuring that his brother knows what his wishes would be in an urgent/emergent situation.  1 way to do this would be to have a living well and provide a copy to his brother so that he knows what steps to take if there is ever need.  Stage 3a chronic kidney disease (Multi)       Patient is not on SGLT2 inhibitor therapy for his CKD 3A with notable proteinuria.  This would also be beneficial from a cardiovascular and diabetes standpoint for patient.  Patient is seeing his nephrologist on Monday.  I asked him to inquire about Jardiance or Farxiga at that appointment.  It appears to be well covered by his insurance.     Reviewed and approved by MARTIN CROW on 11/8/24 at 11:35 AM.

## 2024-11-08 NOTE — ASSESSMENT & PLAN NOTE
Patient is not on SGLT2 inhibitor therapy for his CKD 3A with notable proteinuria.  This would also be beneficial from a cardiovascular and diabetes standpoint for patient.  Patient is seeing his nephrologist on Monday.  I asked him to inquire about Jardiance or Farxiga at that appointment.  It appears to be well covered by his insurance.

## 2024-11-13 ENCOUNTER — APPOINTMENT (OUTPATIENT)
Dept: RADIOLOGY | Facility: HOSPITAL | Age: 59
End: 2024-11-13
Payer: MEDICARE

## 2024-11-13 ENCOUNTER — OFFICE VISIT (OUTPATIENT)
Dept: WOUND CARE | Facility: HOSPITAL | Age: 59
End: 2024-11-13
Payer: MEDICARE

## 2024-11-13 ENCOUNTER — HOSPITAL ENCOUNTER (EMERGENCY)
Facility: HOSPITAL | Age: 59
Discharge: HOME | End: 2024-11-13
Attending: EMERGENCY MEDICINE
Payer: MEDICARE

## 2024-11-13 VITALS
SYSTOLIC BLOOD PRESSURE: 167 MMHG | TEMPERATURE: 97.7 F | DIASTOLIC BLOOD PRESSURE: 129 MMHG | OXYGEN SATURATION: 96 % | RESPIRATION RATE: 14 BRPM | HEART RATE: 76 BPM

## 2024-11-13 DIAGNOSIS — E11.621 DIABETIC ULCER OF LEFT MIDFOOT ASSOCIATED WITH TYPE 2 DIABETES MELLITUS, LIMITED TO BREAKDOWN OF SKIN: Primary | ICD-10-CM
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
## 2024-11-13 DIAGNOSIS — S09.90XA CLOSED HEAD INJURY, INITIAL ENCOUNTER: ICD-10-CM

## 2024-11-13 DIAGNOSIS — L97.421 DIABETIC ULCER OF LEFT MIDFOOT ASSOCIATED WITH TYPE 2 DIABETES MELLITUS, LIMITED TO BREAKDOWN OF SKIN: Primary | ICD-10-CM

## 2024-11-13 LAB
ALBUMIN SERPL BCP-MCNC: 3.7 G/DL (ref 3.4–5)
ALP SERPL-CCNC: 74 U/L (ref 33–136)
ALT SERPL W P-5'-P-CCNC: 10 U/L (ref 10–52)
ANION GAP SERPL CALCULATED.3IONS-SCNC: 12 MMOL/L (ref 10–20)
APTT PPP: 36.2 SECONDS (ref 22–32.5)
AST SERPL W P-5'-P-CCNC: 11 U/L (ref 9–39)
BASOPHILS # BLD AUTO: 0.05 X10*3/UL (ref 0–0.1)
BASOPHILS NFR BLD AUTO: 0.6 %
BILIRUB SERPL-MCNC: 0.6 MG/DL (ref 0–1.2)
BUN SERPL-MCNC: 37 MG/DL (ref 6–23)
CALCIUM SERPL-MCNC: 9.1 MG/DL (ref 8.6–10.3)
CHLORIDE SERPL-SCNC: 107 MMOL/L (ref 98–107)
CO2 SERPL-SCNC: 24 MMOL/L (ref 21–32)
CREAT SERPL-MCNC: 1.91 MG/DL (ref 0.5–1.3)
EGFRCR SERPLBLD CKD-EPI 2021: 40 ML/MIN/1.73M*2
EOSINOPHIL # BLD AUTO: 0.28 X10*3/UL (ref 0–0.7)
EOSINOPHIL NFR BLD AUTO: 3.2 %
ERYTHROCYTE [DISTWIDTH] IN BLOOD BY AUTOMATED COUNT: 13.7 % (ref 11.5–14.5)
GLUCOSE SERPL-MCNC: 199 MG/DL (ref 74–99)
HCT VFR BLD AUTO: 39.5 % (ref 41–52)
HGB BLD-MCNC: 13.1 G/DL (ref 13.5–17.5)
IMM GRANULOCYTES # BLD AUTO: 0.03 X10*3/UL (ref 0–0.7)
IMM GRANULOCYTES NFR BLD AUTO: 0.3 % (ref 0–0.9)
INR PPP: 1.1 (ref 0.9–1.2)
LYMPHOCYTES # BLD AUTO: 1.46 X10*3/UL (ref 1.2–4.8)
LYMPHOCYTES NFR BLD AUTO: 16.6 %
MCH RBC QN AUTO: 30.5 PG (ref 26–34)
MCHC RBC AUTO-ENTMCNC: 33.2 G/DL (ref 32–36)
MCV RBC AUTO: 92 FL (ref 80–100)
MONOCYTES # BLD AUTO: 0.77 X10*3/UL (ref 0.1–1)
MONOCYTES NFR BLD AUTO: 8.7 %
NEUTROPHILS # BLD AUTO: 6.23 X10*3/UL (ref 1.2–7.7)
NEUTROPHILS NFR BLD AUTO: 70.6 %
NRBC BLD-RTO: 0 /100 WBCS (ref 0–0)
PLATELET # BLD AUTO: 241 X10*3/UL (ref 150–450)
POTASSIUM SERPL-SCNC: 4.5 MMOL/L (ref 3.5–5.3)
PROT SERPL-MCNC: 7.1 G/DL (ref 6.4–8.2)
PROTHROMBIN TIME: 11.7 SECONDS (ref 9.3–12.7)
RBC # BLD AUTO: 4.3 X10*6/UL (ref 4.5–5.9)
SODIUM SERPL-SCNC: 138 MMOL/L (ref 136–145)
WBC # BLD AUTO: 8.8 X10*3/UL (ref 4.4–11.3)

## 2024-11-13 PROCEDURE — 11042 DBRDMT SUBQ TIS 1ST 20SQCM/<: CPT | Mod: LT

## 2024-11-13 PROCEDURE — 72125 CT NECK SPINE W/O DYE: CPT | Performed by: RADIOLOGY

## 2024-11-13 PROCEDURE — 70450 CT HEAD/BRAIN W/O DYE: CPT

## 2024-11-13 PROCEDURE — 72125 CT NECK SPINE W/O DYE: CPT

## 2024-11-13 PROCEDURE — 99285 EMERGENCY DEPT VISIT HI MDM: CPT | Mod: 25,27

## 2024-11-13 PROCEDURE — 99214 OFFICE O/P EST MOD 30 MIN: CPT | Mod: 25

## 2024-11-13 PROCEDURE — 70486 CT MAXILLOFACIAL W/O DYE: CPT

## 2024-11-13 PROCEDURE — 84075 ASSAY ALKALINE PHOSPHATASE: CPT

## 2024-11-13 PROCEDURE — 36415 COLL VENOUS BLD VENIPUNCTURE: CPT

## 2024-11-13 PROCEDURE — 87077 CULTURE AEROBIC IDENTIFY: CPT | Mod: WESLAB | Performed by: STUDENT IN AN ORGANIZED HEALTH CARE EDUCATION/TRAINING PROGRAM

## 2024-11-13 PROCEDURE — 76377 3D RENDER W/INTRP POSTPROCES: CPT

## 2024-11-13 PROCEDURE — 70450 CT HEAD/BRAIN W/O DYE: CPT | Performed by: RADIOLOGY

## 2024-11-13 PROCEDURE — 85610 PROTHROMBIN TIME: CPT

## 2024-11-13 PROCEDURE — 85025 COMPLETE CBC W/AUTO DIFF WBC: CPT

## 2024-11-13 PROCEDURE — 85730 THROMBOPLASTIN TIME PARTIAL: CPT

## 2024-11-13 RX ORDER — LOSARTAN POTASSIUM 50 MG/1
50 TABLET ORAL DAILY
COMMUNITY

## 2024-11-13 ASSESSMENT — PAIN SCALES - GENERAL: PAINLEVEL_OUTOF10: 0 - NO PAIN

## 2024-11-13 ASSESSMENT — PAIN - FUNCTIONAL ASSESSMENT: PAIN_FUNCTIONAL_ASSESSMENT: 0-10

## 2024-11-13 NOTE — PROGRESS NOTES
"Pharmacy Medication History Review    Kevin Dc \"Kofi\". Pharmacy reviewed the patient's [id number] medications and allergies for accuracy.    Medications ADDED:  Losartan 50mg   Medications CHANGED:  none  Medications REMOVED:   None      The list below reflects the updated PTA list. Comments regarding how patient may be taking medications differently can be found in the Admit Orders Activity  Prior to Admission Medications   Prescriptions Last Dose Informant   acetaminophen (Tylenol) 500 mg tablet Unknown Self   Sig: Take 1 tablet (500 mg) by mouth every 4 hours if needed (pain).   allopurinol (Zyloprim) 100 mg tablet 11/13/2024 Self   Sig: Take 1.5 tablets (150 mg) by mouth once daily.   apixaban (Eliquis) 5 mg tablet 11/13/2024 Self   Sig: Take 1 tablet (5 mg) by mouth 2 times a day.   blood sugar diagnostic strip Unknown Self   Sig: One Touch Ultra Test strips -  3 times per day sc 3X per meal for 90 days   calcitriol (Rocaltrol) 0.5 mcg capsule 11/13/2024 Self   Sig: Take 1 capsule (0.5 mcg) by mouth once daily. For 30 days   clopidogrel (Plavix) 75 mg tablet 11/13/2024 Self   Sig: Take 1 tablet (75 mg) by mouth once daily. For 90   dilTIAZem ER (Tiazac) 240 mg 24 hr capsule 11/13/2024 Self   Sig: Take 1 capsule (240 mg) by mouth once daily. on an empty stomach in the morning Orally Once a day for 90 days   insulin lispro protamin-lispro (HumaLOG Mix 50-50 KwikPen) 100 unit/mL (50-50) injection Unknown Self   Sig: Inject under the skin 2 times daily (morning and late afternoon). Take as directed per insulin instructions.   insulin regular (HumuLIN R U-500, Conc, Kwikpen) 500 unit/mL (3 mL) CONCENTRATED injection 11/12/2024 Self   Sig: Inject 110 Units under the skin 2 times a day. 110 UNITS AT LUNCH  AT DINNER   isosorbide mononitrate ER (Imdur) 60 mg 24 hr tablet 11/13/2024 Self   Sig: Take 1 tablet (60 mg) by mouth once daily. For 90   losartan (Cozaar) 50 mg tablet 11/13/2024 Self " "  Sig: Take 1 tablet (50 mg) by mouth once daily.   nitroglycerin (Nitrostat) 0.4 mg SL tablet Unknown Self   Sig: Place 1 tablet (0.4 mg) under the tongue if needed for chest pain. For 30 days   pen needle, diabetic 32 gauge x /32\" needle Unknown Self   Si times a day.  as directed sc bid for 90 days   potassium chloride CR 10 mEq ER tablet 2024 Self   Sig: Take 1 tablet (10 mEq) by mouth once daily. With food   propranolol (Inderal) 60 mg tablet 2024 Self   Sig: Take 1 tablet (60 mg) by mouth early in the morning..   rosuvastatin (Crestor) 20 mg tablet 2024 Self   Sig: Take 1 tablet (20 mg) by mouth once daily. For 90   sodium bicarbonate 650 mg tablet 2024 Self   Sig: Take 1 tablet (650 mg) by mouth 4 times a day. For 30   torsemide (Demadex) 100 mg tablet 2024 Self   Sig: Take 0.5 tablets (50 mg) by mouth every other day. For 90 days      Facility-Administered Medications: None        The list below reflects the updated allergy list. Please review each documented allergy for additional clarification and justification.  Allergies  Reviewed by Selene Mena CPhT on 2024   No Known Allergies         Pharmacy has been updated to [facility].    Sources used to complete the med history include dispense history, PTA medication, patient interview. Patient is a fair historian.    Below are additional concerns with the patient's PTA list.  None     Selene Mena CPhT-Adv  Please reach out via [employer] Secure Chat for questions    "

## 2024-11-13 NOTE — DISCHARGE INSTRUCTIONS
Imaging studies of your head and neck and face are unremarkable.  Please take Tylenol for pain relief.  Return to ER with any worsening or new onset of symptoms despite therapy.    It is important to remember that your care does not end here and you must continue to monitor your condition closely. Please return to the emergency department for any worsening or concerning signs or symptoms as directed by our conversations and the discharge instructions. If you do not have a doctor please contact the referral number on your discharge instructions. Please contact any physician specialists provided in your discharge notes as it is very important to follow up with them regarding your condition. If you are unable to reach the physicians provided, please come back to the Emergency Department at any time.

## 2024-11-13 NOTE — ED PROVIDER NOTES
HPI   Chief Complaint   Patient presents with    Fall       HPI  Patient is a 59-year-old male with history of atrial fibrillation, PE on Eliquis brought in from the wound clinic for evaluation of a fall with head injury.  Patient states that he recently got a new postop shoe that is ill fitting and he was walking and he tripped over the postop shoe and hit his nose on the carpet.  Denies any preceding chest pain, shortness of breath, dizziness or lightheadedness.  States he has otherwise been feeling well recently and has no other acute complaints.  Denies any other acute injuries, denies loss of consciousness.      Patient History   Past Medical History:   Diagnosis Date    Acute cor pulmonale (Multi) 02/20/2024    Anal fissure     Angina pectoris 08/31/2023    Atherosclerotic heart disease of native coronary artery with other forms of angina pectoris 11/2/2021    Atypical chest pain 08/31/2023    Cerebral vascular accident (Multi)     Cerebrovascular accident (Multi) 08/31/2023    CHF (congestive heart failure)     Chronic kidney disease, stage 3 (Multi) 08/31/2023    CKD (chronic kidney disease)     Clotting disorder (Multi)     Deep venous thrombosis of lower extremity (Multi) 08/31/2023    Facial abscess     Heart failure 08/31/2023    Hematochezia     Lymphedema 05/22/2018    Mixed hyperlipidemia 08/31/2023    Obesity     Palpitations 08/31/2023    Peripheral vascular disease (CMS-HCC) 08/31/2023    Peripheral vascular disease (CMS-[facility])     Presence of coronary angioplasty implant and graft 03/24/2023    Primary hypertension 05/22/2018    Pulmonary embolism 03/24/2023    Sleep apnea     Type 2 diabetes mellitus 05/22/2018    Venous insufficiency     Venous insufficiency (chronic) (peripheral) 03/24/2023    Ventricular premature complex 08/31/2023     Past Surgical History:   Procedure Laterality Date    CARDIAC CATHETERIZATION      CAROTID STENT      COLONOSCOPY       Family History   Problem Relation Name  Age of Onset    Heart disease Father      Other (double bypass with valve replacement) Father       Social History     Tobacco Use    Smoking status: Never     Passive exposure: Never    Smokeless tobacco: Never   Vaping Use    Vaping status: Never Used   Substance Use Topics    Alcohol use: Never    Drug use: Never       Physical Exam   ED Triage Vitals   Temp Pulse Resp BP   -- -- -- --      SpO2 Temp src Heart Rate Source Patient Position   -- -- -- --      BP Location FiO2 (%)     -- --       Physical Exam  Vitals and nursing note reviewed.   Constitutional:       General: He is not in acute distress.     Appearance: He is well-developed.      Comments: Resting in hospital wheelchair in no apparent distress   HENT:      Head: Normocephalic and atraumatic.      Comments: No obvious injuries or deformities to the face or head  Eyes:      Conjunctiva/sclera: Conjunctivae normal.   Neck:      Comments: No midline spinal tenderness  Cardiovascular:      Rate and Rhythm: Normal rate and regular rhythm.      Heart sounds: No murmur heard.  Pulmonary:      Effort: Pulmonary effort is normal. No respiratory distress.      Breath sounds: Normal breath sounds.   Abdominal:      Palpations: Abdomen is soft.      Tenderness: There is no abdominal tenderness.   Musculoskeletal:         General: No swelling.      Cervical back: Neck supple.   Skin:     General: Skin is warm and dry.      Capillary Refill: Capillary refill takes less than 2 seconds.   Neurological:      Mental Status: He is alert.      Comments: Awake alert and oriented to person time place and situation.  No focal neurologic deficits.  Moving all extremities with symmetric strength.   Psychiatric:         Mood and Affect: Mood normal.           ED Course & MDM   Diagnoses as of 11/14/24 0740   Fall, initial encounter   Closed head injury, initial encounter                 No data recorded                                 Medical Decision Making  Parts of this  chart have been completed using voice recognition software. Please excuse any errors of transcription.  My thought process and reason for plan has been formulated from the time that I saw the patient until the time of disposition and is not specific to one specific moment during their visit and furthermore my MDM encompasses this entire chart and not only this text box.      HPI: Detailed above.    Exam: A medically appropriate exam performed, outlined above, given the known history and presentation.    History obtained from: Patient    Medications given during visit:  Medications - No data to display     Diagnostic/tests  Labs Reviewed   CBC WITH AUTO DIFFERENTIAL - Abnormal       Result Value    WBC 8.8      nRBC 0.0      RBC 4.30 (*)     Hemoglobin 13.1 (*)     Hematocrit 39.5 (*)     MCV 92      MCH 30.5      MCHC 33.2      RDW 13.7      Platelets 241      Neutrophils % 70.6      Immature Granulocytes %, Automated 0.3      Lymphocytes % 16.6      Monocytes % 8.7      Eosinophils % 3.2      Basophils % 0.6      Neutrophils Absolute 6.23      Immature Granulocytes Absolute, Automated 0.03      Lymphocytes Absolute 1.46      Monocytes Absolute 0.77      Eosinophils Absolute 0.28      Basophils Absolute 0.05     COMPREHENSIVE METABOLIC PANEL - Abnormal    Glucose 199 (*)     Sodium 138      Potassium 4.5      Chloride 107      Bicarbonate 24      Anion Gap 12      Urea Nitrogen 37 (*)     Creatinine 1.91 (*)     eGFR 40 (*)     Calcium 9.1      Albumin 3.7      Alkaline Phosphatase 74      Total Protein 7.1      AST 11      Bilirubin, Total 0.6      ALT 10     APTT - Abnormal    aPTT 36.2 (*)     Narrative:     Patient is on APIXABAN-ELIQUIS.   PROTIME-INR - Normal    Protime 11.7      INR 1.1      Narrative:     INR Therapeutic Range: 2.0-3.5      CT head wo IV contrast   Final Result   No evidence of acute cortical infarct or intracranial hemorrhage.        No evidence of intracranial hemorrhage or displaced skull  fracture.        MACRO:   None        Signed by: Chaz Cardenas 11/13/2024 10:50 AM   Dictation workstation:   [id number]      CT cervical spine wo IV contrast   Final Result   No evidence for an acute fracture or subluxation of the cervical   spine.        MACRO:   None        Signed by: Chaz Cardenas 11/13/2024 11:14 AM   Dictation workstation:   [id number]      CT maxillofacial bones wo IV contrast   Final Result   Addendum (preliminary) 1 of 1   Interpreted By:  Chaz Cardenas,    AMBERUM:   Three-dimensional images were generated on a separate workstation and   submitted for evaluation. No definite fracture dislocation.        Signed by: Chaz Cardenas 11/13/2024 12:41 PM        -------- ORIGINAL REPORT --------   Dictation workstation:   [id number]      Final   No acute facial bone fracture visualized.        MACRO:   None        Signed by: Chaz Cardenas 11/13/2024 11:09 AM   Dictation workstation:   [id number]      CT 3D reconstruction   Final Result   Addendum (preliminary) 1 of 1   Interpreted By:  Chaz Cardenas,    ADDENDUM:   Three-dimensional images were generated on a separate workstation and   submitted for evaluation. No definite fracture dislocation.        Signed by: Chaz Cardenas 11/13/2024 4:22 PM        -------- ORIGINAL REPORT --------   Dictation workstation:   [id number]      Final   No acute facial bone fracture visualized.        MACRO:   None        Signed by: Chaz Cardenas 11/13/2024 4:22 PM   Dictation workstation:   [id number]           Considerations/further MDM:  Patient is a 59-year-old male presenting for evaluation of head injury on Eliquis    Patient is well-appearing awake and alert in no apparent distress during the visit.  Ambulating without difficulty.  Patient has no gross motor, neurologic or vascular deficits on exam and vital signs are stable during the visit exempt from hypertension.  No obvious injuries or deformities on exam.  Laboratory workup is unremarkable with slight anemia and  elevated creatinine similar to patient's baseline.  Imaging studies including CT head, C-spine and maxillofacial bones were performed without acute abnormalities.  Patient is stable for discharge.  Return precautions discussed.  I estimate there is LOW risk for severe head injury requiring admission or transfer to another facility.  I have considered: FRACTURE, DISLOCATION, FACIAL FRACTURE, OCULAR INVOLEMENT, CAUDA EQUINA or CENTRAL CORD SYNDROME, SPINAL STENOSIS, OR HERNIATED DISK CAUSING SEVERE STENOSIS SUBARACHNOID HEMORRHAGE, CAVERNOUS VENOUS THROMBOSIS, MENINGITIS, INTRACRANIAL HEMORRHAGE, SUBDURAL HEMATOMA, OR STROKE/TIA, CONCUSSION. thus I consider the discharge disposition reasonable. We have discussed the diagnosis and risks, and we agree with discharging home to follow-up with their primary doctor, or specialist as provided. We also discussed returning to the Emergency Department immediately if new or worsening symptoms occur. We have discussed the symptoms which are most concerning (e.g., changing or worsening pain, weakness, vomiting, fever) that necessitate immediate return.      Procedure  Procedures     Bell Braden PA-C  11/14/24 0741

## 2024-11-16 LAB
BACTERIA SPEC CULT: ABNORMAL
BACTERIA SPEC CULT: ABNORMAL
GRAM STN SPEC: ABNORMAL
GRAM STN SPEC: ABNORMAL

## 2024-11-18 ENCOUNTER — LAB (OUTPATIENT)
Dept: LAB | Facility: LAB | Age: 59
End: 2024-11-18
Payer: MEDICARE

## 2024-11-18 DIAGNOSIS — N18.30 CHRONIC KIDNEY DISEASE, STAGE 3 UNSPECIFIED (MULTI): Primary | ICD-10-CM

## 2024-11-18 LAB
ANION GAP SERPL CALCULATED.3IONS-SCNC: 11 MMOL/L (ref 10–20)
BUN SERPL-MCNC: 33 MG/DL (ref 6–23)
CALCIUM SERPL-MCNC: 8.8 MG/DL (ref 8.6–10.3)
CHLORIDE SERPL-SCNC: 108 MMOL/L (ref 98–107)
CO2 SERPL-SCNC: 27 MMOL/L (ref 21–32)
CREAT SERPL-MCNC: 2.17 MG/DL (ref 0.5–1.3)
EGFRCR SERPLBLD CKD-EPI 2021: 34 ML/MIN/1.73M*2
GLUCOSE SERPL-MCNC: 165 MG/DL (ref 74–99)
POTASSIUM SERPL-SCNC: 4.7 MMOL/L (ref 3.5–5.3)
SODIUM SERPL-SCNC: 141 MMOL/L (ref 136–145)

## 2024-11-18 PROCEDURE — 36415 COLL VENOUS BLD VENIPUNCTURE: CPT

## 2024-11-18 PROCEDURE — 80048 BASIC METABOLIC PNL TOTAL CA: CPT

## 2024-11-20 ENCOUNTER — OFFICE VISIT (OUTPATIENT)
Dept: WOUND CARE | Facility: HOSPITAL | Age: 59
End: 2024-11-20
Payer: MEDICARE

## 2024-11-20 PROCEDURE — 11042 DBRDMT SUBQ TIS 1ST 20SQCM/<: CPT | Mod: LT

## 2024-11-25 ENCOUNTER — OFFICE VISIT (OUTPATIENT)
Dept: WOUND CARE | Facility: HOSPITAL | Age: 59
End: 2024-11-25
Payer: MEDICARE

## 2024-11-25 DIAGNOSIS — I25.10 CORONARY ARTERY DISEASE INVOLVING NATIVE CORONARY ARTERY OF NATIVE HEART WITHOUT ANGINA PECTORIS: ICD-10-CM

## 2024-11-25 PROCEDURE — 11042 DBRDMT SUBQ TIS 1ST 20SQCM/<: CPT

## 2024-11-26 RX ORDER — CLOPIDOGREL BISULFATE 75 MG/1
75 TABLET ORAL DAILY
Qty: 90 TABLET | Refills: 3 | Status: SHIPPED | OUTPATIENT
Start: 2024-11-26

## 2024-12-04 ENCOUNTER — OFFICE VISIT (OUTPATIENT)
Dept: WOUND CARE | Facility: HOSPITAL | Age: 59
End: 2024-12-04
Payer: MEDICARE

## 2024-12-04 PROCEDURE — 11042 DBRDMT SUBQ TIS 1ST 20SQCM/<: CPT | Mod: LT

## 2024-12-11 ENCOUNTER — OFFICE VISIT (OUTPATIENT)
Dept: WOUND CARE | Facility: HOSPITAL | Age: 59
End: 2024-12-11
Payer: MEDICARE

## 2024-12-11 PROCEDURE — 11042 DBRDMT SUBQ TIS 1ST 20SQCM/<: CPT

## 2024-12-18 ENCOUNTER — APPOINTMENT (OUTPATIENT)
Dept: WOUND CARE | Facility: HOSPITAL | Age: 59
End: 2024-12-18
Payer: MEDICARE

## 2025-04-22 ENCOUNTER — APPOINTMENT (OUTPATIENT)
Dept: CARDIOLOGY | Facility: CLINIC | Age: 60
End: 2025-04-22
Payer: MEDICARE

## 2025-04-22 VITALS
TEMPERATURE: 98.6 F | RESPIRATION RATE: 18 BRPM | WEIGHT: 277 LBS | HEART RATE: 68 BPM | DIASTOLIC BLOOD PRESSURE: 77 MMHG | SYSTOLIC BLOOD PRESSURE: 128 MMHG | BODY MASS INDEX: 44.52 KG/M2 | HEIGHT: 66 IN | OXYGEN SATURATION: 98 %

## 2025-04-22 DIAGNOSIS — I20.9 ANGINA PECTORIS: ICD-10-CM

## 2025-04-22 DIAGNOSIS — I25.118 ATHEROSCLEROTIC HEART DISEASE OF NATIVE CORONARY ARTERY WITH OTHER FORMS OF ANGINA PECTORIS: ICD-10-CM

## 2025-04-22 DIAGNOSIS — E78.2 MIXED HYPERLIPIDEMIA: Primary | ICD-10-CM

## 2025-04-22 DIAGNOSIS — I26.09 ACUTE COR PULMONALE (MULTI): ICD-10-CM

## 2025-04-22 PROCEDURE — 99214 OFFICE O/P EST MOD 30 MIN: CPT | Performed by: INTERNAL MEDICINE

## 2025-04-22 PROCEDURE — 3074F SYST BP LT 130 MM HG: CPT | Performed by: INTERNAL MEDICINE

## 2025-04-22 PROCEDURE — 3078F DIAST BP <80 MM HG: CPT | Performed by: INTERNAL MEDICINE

## 2025-04-22 PROCEDURE — 1036F TOBACCO NON-USER: CPT | Performed by: INTERNAL MEDICINE

## 2025-04-22 PROCEDURE — 3008F BODY MASS INDEX DOCD: CPT | Performed by: INTERNAL MEDICINE

## 2025-04-22 PROCEDURE — 4010F ACE/ARB THERAPY RXD/TAKEN: CPT | Performed by: INTERNAL MEDICINE

## 2025-04-22 RX ORDER — ATORVASTATIN CALCIUM 80 MG/1
80 TABLET, FILM COATED ORAL DAILY
Qty: 90 TABLET | Refills: 3 | Status: SHIPPED | OUTPATIENT
Start: 2025-04-22 | End: 2026-04-22

## 2025-04-22 ASSESSMENT — LIFESTYLE VARIABLES
HOW OFTEN DO YOU HAVE SIX OR MORE DRINKS ON ONE OCCASION: NEVER
SKIP TO QUESTIONS 9-10: 1
HOW OFTEN DO YOU HAVE A DRINK CONTAINING ALCOHOL: NEVER
HOW MANY STANDARD DRINKS CONTAINING ALCOHOL DO YOU HAVE ON A TYPICAL DAY: PATIENT DOES NOT DRINK
AUDIT-C TOTAL SCORE: 0

## 2025-04-22 ASSESSMENT — ENCOUNTER SYMPTOMS
OCCASIONAL FEELINGS OF UNSTEADINESS: 0
DEPRESSION: 0
LOSS OF SENSATION IN FEET: 0

## 2025-04-22 ASSESSMENT — PAIN SCALES - GENERAL: PAINLEVEL_OUTOF10: 0-NO PAIN

## 2025-04-22 NOTE — PROGRESS NOTES
History of present illness:  59 year old male patient here today following up on hx of CAD/HFpEF/atypical chest pains. Patient had saddle PE 02/2018 that was treated with TPA. Nuclear stress test in 04/2018 showed mild inferior wall ischemia and possible lateral ischemia with EF of 73%. Patient had borderline 3 vessels disease, FFR of LAD was negative and we proceeded with cardiac catheterization on 08/05/19 with PCI to LCX with 2 DARA.  Patient returns to my office for follow-up.  Has been feeling overall good.  Denies any chest pain shortness of breath or palpitations.       Medical History[1]    Surgical History[2]    Allergies[3]     reports that he has never smoked. He has never been exposed to tobacco smoke. He has never used smokeless tobacco. He reports that he does not drink alcohol and does not use drugs.    Family History[4]    Patient's Medications   New Prescriptions    No medications on file   Previous Medications    ACETAMINOPHEN (TYLENOL) 500 MG TABLET    Take 1 tablet (500 mg) by mouth every 4 hours if needed (pain).    ALLOPURINOL (ZYLOPRIM) 100 MG TABLET    Take 1.5 tablets (150 mg) by mouth once daily.    APIXABAN (ELIQUIS) 5 MG TABLET    Take 1 tablet (5 mg) by mouth 2 times a day.    BLOOD SUGAR DIAGNOSTIC STRIP    One Touch Ultra Test strips -  3 times per day sc 3X per meal for 90 days    CALCITRIOL (ROCALTROL) 0.5 MCG CAPSULE    Take 1 capsule (0.5 mcg) by mouth once daily. For 30 days    CLOPIDOGREL (PLAVIX) 75 MG TABLET    TAKE 1 TABLET(75 MG) BY MOUTH EVERY DAY    DILTIAZEM ER (TIAZAC) 240 MG 24 HR CAPSULE    Take 1 capsule (240 mg) by mouth once daily. on an empty stomach in the morning Orally Once a day for 90 days    INSULIN LISPRO PROTAMIN-LISPRO (HUMALOG MIX 50-50 KWIKPEN) 100 UNIT/ML (50-50) INJECTION    Inject under the skin 2 times daily (morning and late afternoon). Take as directed per insulin instructions.    INSULIN REGULAR (HUMULIN R U-500, CONC, KWIKPEN) 500 UNIT/ML (3 ML)  "CONCENTRATED INJECTION    Inject 110 Units under the skin 2 times a day. 110 UNITS AT LUNCH  AT DINNER    ISOSORBIDE MONONITRATE ER (IMDUR) 60 MG 24 HR TABLET    Take 1 tablet (60 mg) by mouth once daily. For 90    LOSARTAN (COZAAR) 50 MG TABLET    Take 1 tablet (50 mg) by mouth once daily.    NITROGLYCERIN (NITROSTAT) 0.4 MG SL TABLET    Place 1 tablet (0.4 mg) under the tongue if needed for chest pain. For 30 days    PEN NEEDLE, DIABETIC 32 GAUGE X 5/32\" NEEDLE    2 times a day.  as directed sc bid for 90 days    POTASSIUM CHLORIDE CR 10 MEQ ER TABLET    Take 1 tablet (10 mEq) by mouth once daily. With food    PROPRANOLOL (INDERAL) 60 MG TABLET    Take 1 tablet (60 mg) by mouth early in the morning..    ROSUVASTATIN (CRESTOR) 20 MG TABLET    Take 1 tablet (20 mg) by mouth once daily. For 90    SODIUM BICARBONATE 650 MG TABLET    Take 1 tablet (650 mg) by mouth 4 times a day. For 30    TORSEMIDE (DEMADEX) 100 MG TABLET    Take 0.5 tablets (50 mg) by mouth every other day. For 90 days   Modified Medications    No medications on file   Discontinued Medications    No medications on file       Objective   Physical Exam  General: Patient in no acute distress   HEENT: Atraumatic normocephalic.  Neck: Supple, jugular venous pressure within normal limit.  No bruits  Lungs: Clear to auscultation bilaterally  Cardiovascular: Regular rate and rhythm, normal heart sounds, no murmurs rubs or gallops  Abdomen: Soft nontender nondistended.  Normal bowel sounds.  Extremities: Warm to touch, no edema.    Lab Review   No visits with results within 2 Month(s) from this visit.   Latest known visit with results is:   Lab on 11/18/2024   Component Date Value    Glucose 11/18/2024 165 (H)     Sodium 11/18/2024 141     Potassium 11/18/2024 4.7     Chloride 11/18/2024 108 (H)     Bicarbonate 11/18/2024 27     Anion Gap 11/18/2024 11     Urea Nitrogen 11/18/2024 33 (H)     Creatinine 11/18/2024 2.17 (H)     eGFR 11/18/2024 34 (L)     " Calcium 11/18/2024 8.8         Assessment/Plan   Problem List[5]     59 year old male patient here today following up on hx of CAD/HFpEF/atypical chest pains. Patient had saddle PE 02/2018 that was treated with TPA. Nuclear stress test in 04/2018 showed mild inferior wall ischemia and possible lateral ischemia with EF of 73%. Patient had borderline 3 vessels disease, FFR of LAD was negative and we proceeded with cardiac catheterization on 08/05/19 with PCI to LCX with 2 DARA.  Patient returns to my office for follow-up.  Has been feeling overall good.  Denies any chest pain shortness of breath or palpitations.     Patient denies any chest pain shortness of breath.  No palpitation dizziness lightheadedness or syncope.  Patient still suffering from lymphedema of lower extremities.  Lost significant weight.  Kidney function stable.  Reviewed patient labs.  LDL slightly above target we will switch him from rosuvastatin 20 mg oral daily to atorvastatin 80 mg oral daily.  Will repeat lipid panel in 6 months.  Discussed with patient lifestyle modification weight loss.  Will follow-up in 6 months.      Vicki Hendrickson MD              [1]   Past Medical History:  Diagnosis Date    Acute cor pulmonale (Multi) 02/20/2024    Anal fissure     Angina pectoris 08/31/2023    Atherosclerotic heart disease of native coronary artery with other forms of angina pectoris 11/2/2021    Atypical chest pain 08/31/2023    Cerebral vascular accident (Multi)     Cerebrovascular accident (Multi) 08/31/2023    CHF (congestive heart failure)     Chronic kidney disease, stage 3 (Multi) 08/31/2023    CKD (chronic kidney disease)     Clotting disorder (Multi)     Deep venous thrombosis of lower extremity 08/31/2023    Facial abscess     Heart failure 08/31/2023    Hematochezia     Lymphedema 05/22/2018    Mixed hyperlipidemia 08/31/2023    Obesity     Palpitations 08/31/2023    Peripheral vascular disease (CMS-HCC) 08/31/2023    Peripheral vascular  disease (CMS-HCC)     Presence of coronary angioplasty implant and graft 03/24/2023    Primary hypertension 05/22/2018    Pulmonary embolism 03/24/2023    Sleep apnea     Type 2 diabetes mellitus 05/22/2018    Venous insufficiency     Venous insufficiency (chronic) (peripheral) 03/24/2023    Ventricular premature complex 08/31/2023   [2]   Past Surgical History:  Procedure Laterality Date    CARDIAC CATHETERIZATION      CAROTID STENT      COLONOSCOPY     [3] No Known Allergies  [4]   Family History  Problem Relation Name Age of Onset    Heart disease Father      Other (double bypass with valve replacement) Father     [5]   Patient Active Problem List  Diagnosis    Severe obesity (BMI >= 40) (Multi)    History of coronary artery stent placement    History of pulmonary embolism    Hyperglycemia due to type 2 diabetes mellitus (Multi)    Peripheral circulatory disorder due to type 2 diabetes mellitus (Multi)    Hypokalemia    Hyponatremia    Leg edema    Lymphedema    Occult blood in stools    TANO treated with BiPAP    Mixed hyperlipidemia    Hypertriglyceridemia    Rash    Papular eruption    Periorbital cellulitis    Venous insufficiency (chronic) (peripheral)    CVD (cardiovascular disease)    Primary hypertension    Peripheral vascular disease (CMS-HCC)    Sepsis (Multi)    Type 2 diabetes mellitus    Hypoglycemia    Ventricular premature complex    Diabetic neuropathic arthropathy (Multi)    Gout    Heart failure    Hematochezia    History of percutaneous transluminal coronary angioplasty    Generalized atherosclerosis    Arthritis of elbow    Cellulitis of left lower extremity    Cerebrovascular accident (Multi)    Stage 3a chronic kidney disease (Multi)    Deep venous thrombosis of lower extremity    Diabetic foot ulcer (Multi)    Abnormal TSH    Abscess of face    Anal fissure    Anemia, unspecified    Angina pectoris    Atypical chest pain    Atherosclerotic heart disease of native coronary artery with other  forms of angina pectoris    Tremor    Open wound, lower leg    Acute cor pulmonale (Multi)    Blood glucose abnormal    Symptom of leg swelling    Acidosis, unspecified    Stented coronary artery    Anticoagulant long-term use    Cellulitis    Decreased activities of daily living (ADL)    Cellulitis of lower extremity, unspecified laterality

## 2025-05-21 ENCOUNTER — APPOINTMENT (OUTPATIENT)
Dept: RADIOLOGY | Facility: HOSPITAL | Age: 60
DRG: 871 | End: 2025-05-21
Payer: MEDICARE

## 2025-05-21 ENCOUNTER — HOSPITAL ENCOUNTER (INPATIENT)
Facility: HOSPITAL | Age: 60
End: 2025-05-21
Attending: EMERGENCY MEDICINE | Admitting: INTERNAL MEDICINE
Payer: MEDICARE

## 2025-05-21 ENCOUNTER — APPOINTMENT (OUTPATIENT)
Dept: CARDIOLOGY | Facility: HOSPITAL | Age: 60
DRG: 871 | End: 2025-05-21
Payer: MEDICARE

## 2025-05-21 DIAGNOSIS — S81.801D OPEN WOUND OF RIGHT LOWER LEG, SUBSEQUENT ENCOUNTER: ICD-10-CM

## 2025-05-21 DIAGNOSIS — R41.82 ALTERED MENTAL STATUS, UNSPECIFIED ALTERED MENTAL STATUS TYPE: ICD-10-CM

## 2025-05-21 DIAGNOSIS — Z78.9 DECREASED ACTIVITIES OF DAILY LIVING (ADL): ICD-10-CM

## 2025-05-21 DIAGNOSIS — I10 PRIMARY HYPERTENSION: ICD-10-CM

## 2025-05-21 DIAGNOSIS — L03.116 CELLULITIS OF LEFT LOWER EXTREMITY: Primary | ICD-10-CM

## 2025-05-21 DIAGNOSIS — R65.20 SEPSIS WITH ACUTE RENAL FAILURE WITHOUT SEPTIC SHOCK, DUE TO UNSPECIFIED ORGANISM, UNSPECIFIED ACUTE RENAL FAILURE TYPE (MULTI): ICD-10-CM

## 2025-05-21 DIAGNOSIS — R00.1 BRADYCARDIA: ICD-10-CM

## 2025-05-21 DIAGNOSIS — N17.9 ACUTE KIDNEY INJURY: ICD-10-CM

## 2025-05-21 DIAGNOSIS — N17.9 SEPSIS WITH ACUTE RENAL FAILURE WITHOUT SEPTIC SHOCK, DUE TO UNSPECIFIED ORGANISM, UNSPECIFIED ACUTE RENAL FAILURE TYPE (MULTI): ICD-10-CM

## 2025-05-21 DIAGNOSIS — L03.119 CELLULITIS OF LOWER EXTREMITY, UNSPECIFIED LATERALITY: ICD-10-CM

## 2025-05-21 DIAGNOSIS — A41.9 SEPSIS WITH ACUTE RENAL FAILURE WITHOUT SEPTIC SHOCK, DUE TO UNSPECIFIED ORGANISM, UNSPECIFIED ACUTE RENAL FAILURE TYPE (MULTI): ICD-10-CM

## 2025-05-21 DIAGNOSIS — I89.0 LYMPHEDEMA OF BOTH LOWER EXTREMITIES: ICD-10-CM

## 2025-05-21 DIAGNOSIS — T14.8XXA OPEN WOUND: ICD-10-CM

## 2025-05-21 DIAGNOSIS — R06.00 DYSPNEA, UNSPECIFIED TYPE: ICD-10-CM

## 2025-05-21 LAB
ALBUMIN SERPL BCP-MCNC: 3.7 G/DL (ref 3.4–5)
ALP SERPL-CCNC: 71 U/L (ref 33–136)
ALT SERPL W P-5'-P-CCNC: 13 U/L (ref 10–52)
ANION GAP SERPL CALCULATED.3IONS-SCNC: 15 MMOL/L (ref 10–20)
APTT PPP: 33.7 SECONDS (ref 22–32.5)
AST SERPL W P-5'-P-CCNC: 13 U/L (ref 9–39)
BASOPHILS # BLD AUTO: 0.05 X10*3/UL (ref 0–0.1)
BASOPHILS NFR BLD AUTO: 0.2 %
BILIRUB SERPL-MCNC: 1 MG/DL (ref 0–1.2)
BUN SERPL-MCNC: 46 MG/DL (ref 6–23)
CALCIUM SERPL-MCNC: 8.9 MG/DL (ref 8.6–10.3)
CARDIAC TROPONIN I PNL SERPL HS: 15 NG/L (ref 0–20)
CHLORIDE SERPL-SCNC: 106 MMOL/L (ref 98–107)
CO2 SERPL-SCNC: 20 MMOL/L (ref 21–32)
CREAT SERPL-MCNC: 2.64 MG/DL (ref 0.5–1.3)
EGFRCR SERPLBLD CKD-EPI 2021: 27 ML/MIN/1.73M*2
EOSINOPHIL # BLD AUTO: 0.04 X10*3/UL (ref 0–0.7)
EOSINOPHIL NFR BLD AUTO: 0.2 %
ERYTHROCYTE [DISTWIDTH] IN BLOOD BY AUTOMATED COUNT: 14.6 % (ref 11.5–14.5)
GLUCOSE BLD MANUAL STRIP-MCNC: 166 MG/DL (ref 74–99)
GLUCOSE SERPL-MCNC: 151 MG/DL (ref 74–99)
HCT VFR BLD AUTO: 37 % (ref 41–52)
HGB BLD-MCNC: 12 G/DL (ref 13.5–17.5)
IMM GRANULOCYTES # BLD AUTO: 0.25 X10*3/UL (ref 0–0.7)
IMM GRANULOCYTES NFR BLD AUTO: 1.1 % (ref 0–0.9)
INR PPP: 1.2 (ref 0.9–1.2)
LACTATE SERPL-SCNC: 2 MMOL/L (ref 0.4–2)
LYMPHOCYTES # BLD AUTO: 0.74 X10*3/UL (ref 1.2–4.8)
LYMPHOCYTES NFR BLD AUTO: 3.3 %
MCH RBC QN AUTO: 29 PG (ref 26–34)
MCHC RBC AUTO-ENTMCNC: 32.4 G/DL (ref 32–36)
MCV RBC AUTO: 89 FL (ref 80–100)
MONOCYTES # BLD AUTO: 0.89 X10*3/UL (ref 0.1–1)
MONOCYTES NFR BLD AUTO: 3.9 %
NEUTROPHILS # BLD AUTO: 20.73 X10*3/UL (ref 1.2–7.7)
NEUTROPHILS NFR BLD AUTO: 91.3 %
NRBC BLD-RTO: 0 /100 WBCS (ref 0–0)
PLATELET # BLD AUTO: 221 X10*3/UL (ref 150–450)
POTASSIUM SERPL-SCNC: 4.5 MMOL/L (ref 3.5–5.3)
PROT SERPL-MCNC: 7.2 G/DL (ref 6.4–8.2)
PROTHROMBIN TIME: 13 SECONDS (ref 9.3–12.7)
RBC # BLD AUTO: 4.14 X10*6/UL (ref 4.5–5.9)
SODIUM SERPL-SCNC: 136 MMOL/L (ref 136–145)
WBC # BLD AUTO: 22.7 X10*3/UL (ref 4.4–11.3)

## 2025-05-21 PROCEDURE — 96365 THER/PROPH/DIAG IV INF INIT: CPT | Mod: 59

## 2025-05-21 PROCEDURE — 82947 ASSAY GLUCOSE BLOOD QUANT: CPT

## 2025-05-21 PROCEDURE — 93010 ELECTROCARDIOGRAM REPORT: CPT | Performed by: INTERNAL MEDICINE

## 2025-05-21 PROCEDURE — 87040 BLOOD CULTURE FOR BACTERIA: CPT | Mod: WESLAB | Performed by: CLINICAL NURSE SPECIALIST

## 2025-05-21 PROCEDURE — 2500000001 HC RX 250 WO HCPCS SELF ADMINISTERED DRUGS (ALT 637 FOR MEDICARE OP): Performed by: CLINICAL NURSE SPECIALIST

## 2025-05-21 PROCEDURE — 71045 X-RAY EXAM CHEST 1 VIEW: CPT

## 2025-05-21 PROCEDURE — 87077 CULTURE AEROBIC IDENTIFY: CPT | Mod: WESLAB | Performed by: CLINICAL NURSE SPECIALIST

## 2025-05-21 PROCEDURE — 73630 X-RAY EXAM OF FOOT: CPT | Mod: LEFT SIDE | Performed by: STUDENT IN AN ORGANIZED HEALTH CARE EDUCATION/TRAINING PROGRAM

## 2025-05-21 PROCEDURE — 83605 ASSAY OF LACTIC ACID: CPT | Performed by: CLINICAL NURSE SPECIALIST

## 2025-05-21 PROCEDURE — 99291 CRITICAL CARE FIRST HOUR: CPT | Mod: 25 | Performed by: CLINICAL NURSE SPECIALIST

## 2025-05-21 PROCEDURE — 36415 COLL VENOUS BLD VENIPUNCTURE: CPT | Performed by: EMERGENCY MEDICINE

## 2025-05-21 PROCEDURE — 2500000004 HC RX 250 GENERAL PHARMACY W/ HCPCS (ALT 636 FOR OP/ED): Mod: JZ | Performed by: CLINICAL NURSE SPECIALIST

## 2025-05-21 PROCEDURE — 96360 HYDRATION IV INFUSION INIT: CPT

## 2025-05-21 PROCEDURE — 85730 THROMBOPLASTIN TIME PARTIAL: CPT | Performed by: EMERGENCY MEDICINE

## 2025-05-21 PROCEDURE — 80053 COMPREHEN METABOLIC PANEL: CPT | Performed by: EMERGENCY MEDICINE

## 2025-05-21 PROCEDURE — 36415 COLL VENOUS BLD VENIPUNCTURE: CPT | Performed by: CLINICAL NURSE SPECIALIST

## 2025-05-21 PROCEDURE — 73630 X-RAY EXAM OF FOOT: CPT | Mod: LT

## 2025-05-21 PROCEDURE — 70450 CT HEAD/BRAIN W/O DYE: CPT | Performed by: RADIOLOGY

## 2025-05-21 PROCEDURE — 85025 COMPLETE CBC W/AUTO DIFF WBC: CPT | Performed by: EMERGENCY MEDICINE

## 2025-05-21 PROCEDURE — 93005 ELECTROCARDIOGRAM TRACING: CPT

## 2025-05-21 PROCEDURE — 70450 CT HEAD/BRAIN W/O DYE: CPT

## 2025-05-21 PROCEDURE — 85610 PROTHROMBIN TIME: CPT | Performed by: EMERGENCY MEDICINE

## 2025-05-21 PROCEDURE — 84484 ASSAY OF TROPONIN QUANT: CPT | Performed by: EMERGENCY MEDICINE

## 2025-05-21 PROCEDURE — 71045 X-RAY EXAM CHEST 1 VIEW: CPT | Performed by: STUDENT IN AN ORGANIZED HEALTH CARE EDUCATION/TRAINING PROGRAM

## 2025-05-21 RX ORDER — VANCOMYCIN 2 G/400ML
2 INJECTION, SOLUTION INTRAVENOUS ONCE
Status: COMPLETED | OUTPATIENT
Start: 2025-05-21 | End: 2025-05-22

## 2025-05-21 RX ORDER — ACETAMINOPHEN 325 MG/1
975 TABLET ORAL ONCE
Status: COMPLETED | OUTPATIENT
Start: 2025-05-21 | End: 2025-05-21

## 2025-05-21 RX ADMIN — ACETAMINOPHEN 975 MG: 325 TABLET, FILM COATED ORAL at 22:55

## 2025-05-21 RX ADMIN — PIPERACILLIN SODIUM AND TAZOBACTAM SODIUM 4.5 G: 4; .5 INJECTION, SOLUTION INTRAVENOUS at 22:59

## 2025-05-21 RX ADMIN — SODIUM CHLORIDE 1914 ML: 900 INJECTION, SOLUTION INTRAVENOUS at 23:03

## 2025-05-21 ASSESSMENT — PAIN - FUNCTIONAL ASSESSMENT: PAIN_FUNCTIONAL_ASSESSMENT: 0-10

## 2025-05-21 ASSESSMENT — PAIN DESCRIPTION - PROGRESSION: CLINICAL_PROGRESSION: NOT CHANGED

## 2025-05-21 ASSESSMENT — PAIN SCALES - GENERAL
PAINLEVEL_OUTOF10: 0 - NO PAIN

## 2025-05-21 ASSESSMENT — COLUMBIA-SUICIDE SEVERITY RATING SCALE - C-SSRS
6. HAVE YOU EVER DONE ANYTHING, STARTED TO DO ANYTHING, OR PREPARED TO DO ANYTHING TO END YOUR LIFE?: NO
2. HAVE YOU ACTUALLY HAD ANY THOUGHTS OF KILLING YOURSELF?: NO
1. IN THE PAST MONTH, HAVE YOU WISHED YOU WERE DEAD OR WISHED YOU COULD GO TO SLEEP AND NOT WAKE UP?: NO

## 2025-05-22 ENCOUNTER — APPOINTMENT (OUTPATIENT)
Dept: RADIOLOGY | Facility: HOSPITAL | Age: 60
DRG: 871 | End: 2025-05-22
Payer: MEDICARE

## 2025-05-22 PROBLEM — E11.9 TYPE 2 DIABETES MELLITUS, WITH LONG-TERM CURRENT USE OF INSULIN: Status: ACTIVE | Noted: 2025-05-22

## 2025-05-22 PROBLEM — R41.82 ALTERED MENTAL STATUS: Status: ACTIVE | Noted: 2025-05-22

## 2025-05-22 PROBLEM — Z79.4 TYPE 2 DIABETES MELLITUS, WITH LONG-TERM CURRENT USE OF INSULIN: Status: ACTIVE | Noted: 2025-05-22

## 2025-05-22 PROBLEM — T14.8XXA OPEN WOUND: Status: RESOLVED | Noted: 2023-08-31 | Resolved: 2025-05-22

## 2025-05-22 LAB
ALBUMIN SERPL BCP-MCNC: 3.2 G/DL (ref 3.4–5)
ALP SERPL-CCNC: 60 U/L (ref 33–136)
ALT SERPL W P-5'-P-CCNC: 12 U/L (ref 10–52)
ANION GAP SERPL CALCULATED.3IONS-SCNC: 15 MMOL/L (ref 10–20)
APPEARANCE UR: CLEAR
AST SERPL W P-5'-P-CCNC: 13 U/L (ref 9–39)
BACTERIA #/AREA URNS AUTO: ABNORMAL /HPF
BASOPHILS # BLD AUTO: 0.02 X10*3/UL (ref 0–0.1)
BASOPHILS NFR BLD AUTO: 0.1 %
BILIRUB SERPL-MCNC: 1.2 MG/DL (ref 0–1.2)
BILIRUB UR STRIP.AUTO-MCNC: NEGATIVE MG/DL
BUN SERPL-MCNC: 45 MG/DL (ref 6–23)
CALCIUM SERPL-MCNC: 8.3 MG/DL (ref 8.6–10.3)
CHLORIDE SERPL-SCNC: 108 MMOL/L (ref 98–107)
CK SERPL-CCNC: 89 U/L (ref 0–325)
CO2 SERPL-SCNC: 18 MMOL/L (ref 21–32)
COLOR UR: ABNORMAL
CREAT SERPL-MCNC: 2.58 MG/DL (ref 0.5–1.3)
CRP SERPL-MCNC: 9.7 MG/DL
EGFRCR SERPLBLD CKD-EPI 2021: 28 ML/MIN/1.73M*2
EOSINOPHIL # BLD AUTO: 0.02 X10*3/UL (ref 0–0.7)
EOSINOPHIL NFR BLD AUTO: 0.1 %
ERYTHROCYTE [DISTWIDTH] IN BLOOD BY AUTOMATED COUNT: 14.6 % (ref 11.5–14.5)
ERYTHROCYTE [SEDIMENTATION RATE] IN BLOOD BY WESTERGREN METHOD: 56 MM/H (ref 0–20)
GLUCOSE BLD MANUAL STRIP-MCNC: 101 MG/DL (ref 74–99)
GLUCOSE BLD MANUAL STRIP-MCNC: 134 MG/DL (ref 74–99)
GLUCOSE BLD MANUAL STRIP-MCNC: 171 MG/DL (ref 74–99)
GLUCOSE BLD MANUAL STRIP-MCNC: 178 MG/DL (ref 74–99)
GLUCOSE BLD MANUAL STRIP-MCNC: 52 MG/DL (ref 74–99)
GLUCOSE SERPL-MCNC: 162 MG/DL (ref 74–99)
GLUCOSE UR STRIP.AUTO-MCNC: ABNORMAL MG/DL
HCT VFR BLD AUTO: 36.5 % (ref 41–52)
HGB BLD-MCNC: 11.8 G/DL (ref 13.5–17.5)
HOLD SPECIMEN: NORMAL
IMM GRANULOCYTES # BLD AUTO: 0.05 X10*3/UL (ref 0–0.7)
IMM GRANULOCYTES NFR BLD AUTO: 0.3 % (ref 0–0.9)
KETONES UR STRIP.AUTO-MCNC: NEGATIVE MG/DL
LEUKOCYTE ESTERASE UR QL STRIP.AUTO: NEGATIVE
LYMPHOCYTES # BLD AUTO: 0.34 X10*3/UL (ref 1.2–4.8)
LYMPHOCYTES NFR BLD AUTO: 2.3 %
MCH RBC QN AUTO: 29.3 PG (ref 26–34)
MCHC RBC AUTO-ENTMCNC: 32.3 G/DL (ref 32–36)
MCV RBC AUTO: 91 FL (ref 80–100)
MONOCYTES # BLD AUTO: 0.22 X10*3/UL (ref 0.1–1)
MONOCYTES NFR BLD AUTO: 1.5 %
NEUTROPHILS # BLD AUTO: 14.06 X10*3/UL (ref 1.2–7.7)
NEUTROPHILS NFR BLD AUTO: 95.7 %
NITRITE UR QL STRIP.AUTO: NEGATIVE
NRBC BLD-RTO: 0 /100 WBCS (ref 0–0)
PH UR STRIP.AUTO: 6.5 [PH]
PLATELET # BLD AUTO: 173 X10*3/UL (ref 150–450)
POTASSIUM SERPL-SCNC: 4.1 MMOL/L (ref 3.5–5.3)
PROT SERPL-MCNC: 6.4 G/DL (ref 6.4–8.2)
PROT UR STRIP.AUTO-MCNC: ABNORMAL MG/DL
RBC # BLD AUTO: 4.03 X10*6/UL (ref 4.5–5.9)
RBC # UR STRIP.AUTO: ABNORMAL MG/DL
RBC #/AREA URNS AUTO: ABNORMAL /HPF
SODIUM SERPL-SCNC: 137 MMOL/L (ref 136–145)
SP GR UR STRIP.AUTO: 1.01
UROBILINOGEN UR STRIP.AUTO-MCNC: NORMAL MG/DL
VANCOMYCIN SERPL-MCNC: 22.9 UG/ML (ref 5–20)
WBC # BLD AUTO: 14.7 X10*3/UL (ref 4.4–11.3)
WBC #/AREA URNS AUTO: ABNORMAL /HPF

## 2025-05-22 PROCEDURE — 2500000001 HC RX 250 WO HCPCS SELF ADMINISTERED DRUGS (ALT 637 FOR MEDICARE OP): Performed by: INTERNAL MEDICINE

## 2025-05-22 PROCEDURE — 81001 URINALYSIS AUTO W/SCOPE: CPT | Performed by: CLINICAL NURSE SPECIALIST

## 2025-05-22 PROCEDURE — 2500000004 HC RX 250 GENERAL PHARMACY W/ HCPCS (ALT 636 FOR OP/ED): Mod: JZ | Performed by: NURSE PRACTITIONER

## 2025-05-22 PROCEDURE — 1200000002 HC GENERAL ROOM WITH TELEMETRY DAILY

## 2025-05-22 PROCEDURE — 97161 PT EVAL LOW COMPLEX 20 MIN: CPT | Mod: GP | Performed by: PHYSICAL THERAPIST

## 2025-05-22 PROCEDURE — 2500000002 HC RX 250 W HCPCS SELF ADMINISTERED DRUGS (ALT 637 FOR MEDICARE OP, ALT 636 FOR OP/ED): Performed by: INTERNAL MEDICINE

## 2025-05-22 PROCEDURE — 97166 OT EVAL MOD COMPLEX 45 MIN: CPT | Mod: GO

## 2025-05-22 PROCEDURE — 99223 1ST HOSP IP/OBS HIGH 75: CPT | Performed by: INTERNAL MEDICINE

## 2025-05-22 PROCEDURE — 86140 C-REACTIVE PROTEIN: CPT | Performed by: STUDENT IN AN ORGANIZED HEALTH CARE EDUCATION/TRAINING PROGRAM

## 2025-05-22 PROCEDURE — 0JBR0ZZ EXCISION OF LEFT FOOT SUBCUTANEOUS TISSUE AND FASCIA, OPEN APPROACH: ICD-10-PCS | Performed by: STUDENT IN AN ORGANIZED HEALTH CARE EDUCATION/TRAINING PROGRAM

## 2025-05-22 PROCEDURE — 36415 COLL VENOUS BLD VENIPUNCTURE: CPT | Performed by: INTERNAL MEDICINE

## 2025-05-22 PROCEDURE — 2500000004 HC RX 250 GENERAL PHARMACY W/ HCPCS (ALT 636 FOR OP/ED): Performed by: INTERNAL MEDICINE

## 2025-05-22 PROCEDURE — 99233 SBSQ HOSP IP/OBS HIGH 50: CPT | Performed by: INTERNAL MEDICINE

## 2025-05-22 PROCEDURE — 2500000004 HC RX 250 GENERAL PHARMACY W/ HCPCS (ALT 636 FOR OP/ED): Performed by: CLINICAL NURSE SPECIALIST

## 2025-05-22 PROCEDURE — 80202 ASSAY OF VANCOMYCIN: CPT | Performed by: INTERNAL MEDICINE

## 2025-05-22 PROCEDURE — 73700 CT LOWER EXTREMITY W/O DYE: CPT | Mod: LT

## 2025-05-22 PROCEDURE — 85025 COMPLETE CBC W/AUTO DIFF WBC: CPT | Performed by: INTERNAL MEDICINE

## 2025-05-22 PROCEDURE — 97535 SELF CARE MNGMENT TRAINING: CPT | Mod: GO

## 2025-05-22 PROCEDURE — 96366 THER/PROPH/DIAG IV INF ADDON: CPT

## 2025-05-22 PROCEDURE — 82550 ASSAY OF CK (CPK): CPT | Performed by: NURSE PRACTITIONER

## 2025-05-22 PROCEDURE — 96361 HYDRATE IV INFUSION ADD-ON: CPT

## 2025-05-22 PROCEDURE — 96367 TX/PROPH/DG ADDL SEQ IV INF: CPT

## 2025-05-22 PROCEDURE — 84075 ASSAY ALKALINE PHOSPHATASE: CPT | Performed by: INTERNAL MEDICINE

## 2025-05-22 PROCEDURE — 73700 CT LOWER EXTREMITY W/O DYE: CPT | Mod: LEFT SIDE | Performed by: RADIOLOGY

## 2025-05-22 PROCEDURE — 82947 ASSAY GLUCOSE BLOOD QUANT: CPT

## 2025-05-22 PROCEDURE — 85652 RBC SED RATE AUTOMATED: CPT | Performed by: STUDENT IN AN ORGANIZED HEALTH CARE EDUCATION/TRAINING PROGRAM

## 2025-05-22 RX ORDER — ACETAMINOPHEN 325 MG/1
650 TABLET ORAL EVERY 4 HOURS PRN
Status: DISPENSED | OUTPATIENT
Start: 2025-05-22

## 2025-05-22 RX ORDER — TORSEMIDE 100 MG/1
50 TABLET ORAL EVERY OTHER DAY
Status: DISPENSED | OUTPATIENT
Start: 2025-05-22

## 2025-05-22 RX ORDER — CALCITRIOL 0.25 UG/1
0.5 CAPSULE ORAL DAILY
Status: DISPENSED | OUTPATIENT
Start: 2025-05-22

## 2025-05-22 RX ORDER — ACETAMINOPHEN 650 MG/1
650 SUPPOSITORY RECTAL EVERY 4 HOURS PRN
Status: ACTIVE | OUTPATIENT
Start: 2025-05-22

## 2025-05-22 RX ORDER — TALC
3 POWDER (GRAM) TOPICAL NIGHTLY PRN
Status: ACTIVE | OUTPATIENT
Start: 2025-05-22

## 2025-05-22 RX ORDER — ALLOPURINOL 300 MG/1
150 TABLET ORAL DAILY
Status: DISPENSED | OUTPATIENT
Start: 2025-05-22

## 2025-05-22 RX ORDER — ONDANSETRON HYDROCHLORIDE 2 MG/ML
4 INJECTION, SOLUTION INTRAVENOUS EVERY 8 HOURS PRN
Status: ACTIVE | OUTPATIENT
Start: 2025-05-22

## 2025-05-22 RX ORDER — MEROPENEM 1 G/1
1 INJECTION, POWDER, FOR SOLUTION INTRAVENOUS EVERY 12 HOURS
Status: DISPENSED | OUTPATIENT
Start: 2025-05-22

## 2025-05-22 RX ORDER — INSULIN LISPRO 100 [IU]/ML
20 INJECTION, SOLUTION INTRAVENOUS; SUBCUTANEOUS
Status: DISCONTINUED | OUTPATIENT
Start: 2025-05-22 | End: 2025-05-23

## 2025-05-22 RX ORDER — ONDANSETRON 4 MG/1
4 TABLET, ORALLY DISINTEGRATING ORAL EVERY 8 HOURS PRN
Status: DISPENSED | OUTPATIENT
Start: 2025-05-22

## 2025-05-22 RX ORDER — INSULIN GLARGINE 100 [IU]/ML
110 INJECTION, SOLUTION SUBCUTANEOUS NIGHTLY
Status: DISCONTINUED | OUTPATIENT
Start: 2025-05-22 | End: 2025-05-22

## 2025-05-22 RX ORDER — LOSARTAN POTASSIUM 50 MG/1
50 TABLET ORAL DAILY
Status: DISCONTINUED | OUTPATIENT
Start: 2025-05-22 | End: 2025-05-22

## 2025-05-22 RX ORDER — INSULIN GLARGINE 100 [IU]/ML
100 INJECTION, SOLUTION SUBCUTANEOUS NIGHTLY
Status: DISCONTINUED | OUTPATIENT
Start: 2025-05-22 | End: 2025-05-23

## 2025-05-22 RX ORDER — SODIUM BICARBONATE 650 MG/1
650 TABLET ORAL 4 TIMES DAILY
Status: DISPENSED | OUTPATIENT
Start: 2025-05-22

## 2025-05-22 RX ORDER — DILTIAZEM HYDROCHLORIDE 120 MG/1
120 CAPSULE, COATED, EXTENDED RELEASE ORAL DAILY
Status: DISPENSED | OUTPATIENT
Start: 2025-05-23

## 2025-05-22 RX ORDER — ISOSORBIDE MONONITRATE 30 MG/1
30 TABLET, EXTENDED RELEASE ORAL DAILY
Status: DISCONTINUED | OUTPATIENT
Start: 2025-05-23 | End: 2025-05-23

## 2025-05-22 RX ORDER — DAPTOMYCIN IN SODIUM CHLORIDE 500 MG/50ML
500 INJECTION, SOLUTION INTRAVENOUS EVERY 24 HOURS
Status: DISCONTINUED | OUTPATIENT
Start: 2025-05-22 | End: 2025-05-23

## 2025-05-22 RX ORDER — INSULIN LISPRO 100 [IU]/ML
38 INJECTION, SOLUTION INTRAVENOUS; SUBCUTANEOUS
Status: DISCONTINUED | OUTPATIENT
Start: 2025-05-22 | End: 2025-05-22

## 2025-05-22 RX ORDER — VANCOMYCIN HYDROCHLORIDE 1 G/20ML
INJECTION, POWDER, LYOPHILIZED, FOR SOLUTION INTRAVENOUS DAILY PRN
Status: DISCONTINUED | OUTPATIENT
Start: 2025-05-22 | End: 2025-05-22

## 2025-05-22 RX ORDER — DILTIAZEM HYDROCHLORIDE 120 MG/1
240 CAPSULE, COATED, EXTENDED RELEASE ORAL DAILY
Status: DISCONTINUED | OUTPATIENT
Start: 2025-05-22 | End: 2025-05-22

## 2025-05-22 RX ORDER — ISOSORBIDE MONONITRATE 60 MG/1
60 TABLET, EXTENDED RELEASE ORAL DAILY
Status: DISCONTINUED | OUTPATIENT
Start: 2025-05-22 | End: 2025-05-22

## 2025-05-22 RX ORDER — CLOPIDOGREL BISULFATE 75 MG/1
75 TABLET ORAL DAILY
Status: DISPENSED | OUTPATIENT
Start: 2025-05-22

## 2025-05-22 RX ORDER — ACETAMINOPHEN 160 MG/5ML
650 SOLUTION ORAL EVERY 4 HOURS PRN
Status: ACTIVE | OUTPATIENT
Start: 2025-05-22

## 2025-05-22 RX ORDER — ATORVASTATIN CALCIUM 80 MG/1
80 TABLET, FILM COATED ORAL DAILY
Status: DISPENSED | OUTPATIENT
Start: 2025-05-22

## 2025-05-22 RX ADMIN — LOSARTAN POTASSIUM 50 MG: 50 TABLET, FILM COATED ORAL at 08:40

## 2025-05-22 RX ADMIN — CALCITRIOL CAPSULES 0.25 MCG 0.5 MCG: 0.25 CAPSULE ORAL at 08:40

## 2025-05-22 RX ADMIN — DILTIAZEM HYDROCHLORIDE 240 MG: 120 CAPSULE, COATED, EXTENDED RELEASE ORAL at 08:40

## 2025-05-22 RX ADMIN — VANCOMYCIN 2 G: 2 INJECTION, SOLUTION INTRAVENOUS at 00:01

## 2025-05-22 RX ADMIN — ALLOPURINOL 150 MG: 300 TABLET ORAL at 08:40

## 2025-05-22 RX ADMIN — SODIUM BICARBONATE 650 MG: 650 TABLET ORAL at 14:30

## 2025-05-22 RX ADMIN — MEROPENEM 1 G: 1 INJECTION, POWDER, FOR SOLUTION INTRAVENOUS at 09:59

## 2025-05-22 RX ADMIN — ATORVASTATIN CALCIUM 80 MG: 80 TABLET, FILM COATED ORAL at 08:40

## 2025-05-22 RX ADMIN — DAPTOMYCIN IN SODIUM CHLORIDE 500 MG: 500 INJECTION, SOLUTION INTRAVENOUS at 14:32

## 2025-05-22 RX ADMIN — SODIUM BICARBONATE 650 MG: 650 TABLET ORAL at 08:40

## 2025-05-22 RX ADMIN — CLOPIDOGREL 75 MG: 75 TABLET ORAL at 08:40

## 2025-05-22 RX ADMIN — INSULIN LISPRO 38 UNITS: 100 INJECTION, SOLUTION INTRAVENOUS; SUBCUTANEOUS at 08:40

## 2025-05-22 RX ADMIN — SODIUM BICARBONATE 650 MG: 650 TABLET ORAL at 21:58

## 2025-05-22 RX ADMIN — PIPERACILLIN SODIUM AND TAZOBACTAM SODIUM 3.38 G: 3; .375 INJECTION, SOLUTION INTRAVENOUS at 07:00

## 2025-05-22 RX ADMIN — TORSEMIDE 50 MG: 100 TABLET ORAL at 08:40

## 2025-05-22 RX ADMIN — APIXABAN 5 MG: 5 TABLET, FILM COATED ORAL at 21:58

## 2025-05-22 RX ADMIN — MEROPENEM 1 G: 1 INJECTION, POWDER, FOR SOLUTION INTRAVENOUS at 22:02

## 2025-05-22 RX ADMIN — SODIUM BICARBONATE 650 MG: 650 TABLET ORAL at 16:45

## 2025-05-22 RX ADMIN — APIXABAN 5 MG: 5 TABLET, FILM COATED ORAL at 08:40

## 2025-05-22 RX ADMIN — ISOSORBIDE MONONITRATE 60 MG: 60 TABLET, EXTENDED RELEASE ORAL at 08:40

## 2025-05-22 SDOH — SOCIAL STABILITY: SOCIAL INSECURITY: WITHIN THE LAST YEAR, HAVE YOU BEEN AFRAID OF YOUR PARTNER OR EX-PARTNER?: NO

## 2025-05-22 SDOH — ECONOMIC STABILITY: FOOD INSECURITY: HOW HARD IS IT FOR YOU TO PAY FOR THE VERY BASICS LIKE FOOD, HOUSING, MEDICAL CARE, AND HEATING?: SOMEWHAT HARD

## 2025-05-22 SDOH — ECONOMIC STABILITY: FOOD INSECURITY: WITHIN THE PAST 12 MONTHS, YOU WORRIED THAT YOUR FOOD WOULD RUN OUT BEFORE YOU GOT THE MONEY TO BUY MORE.: NEVER TRUE

## 2025-05-22 SDOH — ECONOMIC STABILITY: HOUSING INSECURITY: IN THE LAST 12 MONTHS, WAS THERE A TIME WHEN YOU WERE NOT ABLE TO PAY THE MORTGAGE OR RENT ON TIME?: NO

## 2025-05-22 SDOH — SOCIAL STABILITY: SOCIAL INSECURITY: WITHIN THE LAST YEAR, HAVE YOU BEEN HUMILIATED OR EMOTIONALLY ABUSED IN OTHER WAYS BY YOUR PARTNER OR EX-PARTNER?: NO

## 2025-05-22 SDOH — HEALTH STABILITY: MENTAL HEALTH: HOW OFTEN DO YOU HAVE SIX OR MORE DRINKS ON ONE OCCASION?: NEVER

## 2025-05-22 SDOH — HEALTH STABILITY: MENTAL HEALTH: HOW MANY DRINKS CONTAINING ALCOHOL DO YOU HAVE ON A TYPICAL DAY WHEN YOU ARE DRINKING?: PATIENT DOES NOT DRINK

## 2025-05-22 SDOH — ECONOMIC STABILITY: HOUSING INSECURITY: AT ANY TIME IN THE PAST 12 MONTHS, WERE YOU HOMELESS OR LIVING IN A SHELTER (INCLUDING NOW)?: NO

## 2025-05-22 SDOH — ECONOMIC STABILITY: FOOD INSECURITY: WITHIN THE PAST 12 MONTHS, THE FOOD YOU BOUGHT JUST DIDN'T LAST AND YOU DIDN'T HAVE MONEY TO GET MORE.: NEVER TRUE

## 2025-05-22 SDOH — ECONOMIC STABILITY: INCOME INSECURITY: IN THE PAST 12 MONTHS HAS THE ELECTRIC, GAS, OIL, OR WATER COMPANY THREATENED TO SHUT OFF SERVICES IN YOUR HOME?: NO

## 2025-05-22 SDOH — HEALTH STABILITY: MENTAL HEALTH: HOW OFTEN DO YOU HAVE A DRINK CONTAINING ALCOHOL?: NEVER

## 2025-05-22 SDOH — ECONOMIC STABILITY: HOUSING INSECURITY: IN THE PAST 12 MONTHS, HOW MANY TIMES HAVE YOU MOVED WHERE YOU WERE LIVING?: 0

## 2025-05-22 SDOH — ECONOMIC STABILITY: TRANSPORTATION INSECURITY: IN THE PAST 12 MONTHS, HAS LACK OF TRANSPORTATION KEPT YOU FROM MEDICAL APPOINTMENTS OR FROM GETTING MEDICATIONS?: NO

## 2025-05-22 ASSESSMENT — ENCOUNTER SYMPTOMS
PALPITATIONS: 0
MYALGIAS: 0
DIARRHEA: 0
FEVER: 1
DYSURIA: 0
CHILLS: 0
SHORTNESS OF BREATH: 0
TREMORS: 0
FREQUENCY: 0
COUGH: 0
COLOR CHANGE: 1
HEMATURIA: 0
BLOOD IN STOOL: 0
FATIGUE: 1
POLYPHAGIA: 0
ADENOPATHY: 0
APNEA: 0
FEVER: 0
RECTAL PAIN: 0
DIZZINESS: 0
WEAKNESS: 1
CHEST TIGHTNESS: 0
PHOTOPHOBIA: 0
WOUND: 1
COLOR CHANGE: 0
FATIGUE: 0
ABDOMINAL PAIN: 0
CONSTIPATION: 0
SPEECH DIFFICULTY: 0
WOUND: 0
ACTIVITY CHANGE: 1
NUMBNESS: 0
ARTHRALGIAS: 0
CHILLS: 1
DIFFICULTY URINATING: 0
SORE THROAT: 0
HALLUCINATIONS: 0
BACK PAIN: 0
LIGHT-HEADEDNESS: 0
SINUS PRESSURE: 0
WHEEZING: 0
SEIZURES: 0
NECK PAIN: 0
CONFUSION: 0
VOMITING: 0
JOINT SWELLING: 0
HEADACHES: 0
NAUSEA: 0
BRUISES/BLEEDS EASILY: 0
POLYDIPSIA: 0
SLEEP DISTURBANCE: 0

## 2025-05-22 ASSESSMENT — COGNITIVE AND FUNCTIONAL STATUS - GENERAL
CLIMB 3 TO 5 STEPS WITH RAILING: A LITTLE
WALKING IN HOSPITAL ROOM: A LITTLE
WALKING IN HOSPITAL ROOM: A LITTLE
TURNING FROM BACK TO SIDE WHILE IN FLAT BAD: A LITTLE
DRESSING REGULAR UPPER BODY CLOTHING: A LITTLE
DRESSING REGULAR LOWER BODY CLOTHING: A LOT
DAILY ACTIVITIY SCORE: 23
WALKING IN HOSPITAL ROOM: A LITTLE
DRESSING REGULAR LOWER BODY CLOTHING: A LITTLE
DRESSING REGULAR LOWER BODY CLOTHING: A LITTLE
TOILETING: A LITTLE
WALKING IN HOSPITAL ROOM: A LITTLE
CLIMB 3 TO 5 STEPS WITH RAILING: A LOT
PERSONAL GROOMING: A LITTLE
MOBILITY SCORE: 21
DAILY ACTIVITIY SCORE: 23
EATING MEALS: A LITTLE
CLIMB 3 TO 5 STEPS WITH RAILING: A LOT
MOVING FROM LYING ON BACK TO SITTING ON SIDE OF FLAT BED WITH BEDRAILS: A LITTLE
DAILY ACTIVITIY SCORE: 23
MOBILITY SCORE: 21
CLIMB 3 TO 5 STEPS WITH RAILING: A LOT
DRESSING REGULAR LOWER BODY CLOTHING: A LITTLE
STANDING UP FROM CHAIR USING ARMS: A LITTLE
HELP NEEDED FOR BATHING: A LITTLE
MOBILITY SCORE: 21
DAILY ACTIVITIY SCORE: 17
MOVING TO AND FROM BED TO CHAIR: A LITTLE
MOBILITY SCORE: 18

## 2025-05-22 ASSESSMENT — ACTIVITIES OF DAILY LIVING (ADL)
ADL_ASSISTANCE: INDEPENDENT
HOME_MANAGEMENT_TIME_ENTRY: 11
LACK_OF_TRANSPORTATION: NO
LACK_OF_TRANSPORTATION: NO
ADL_ASSISTANCE: INDEPENDENT
BATHING_ASSISTANCE: MINIMAL

## 2025-05-22 ASSESSMENT — LIFESTYLE VARIABLES
AUDIT-C TOTAL SCORE: 0
SKIP TO QUESTIONS 9-10: 1

## 2025-05-22 ASSESSMENT — PAIN SCALES - GENERAL
PAINLEVEL_OUTOF10: 0 - NO PAIN
PAINLEVEL_OUTOF10: 3
PAINLEVEL_OUTOF10: 0 - NO PAIN
PAINLEVEL_OUTOF10: 0 - NO PAIN

## 2025-05-22 ASSESSMENT — PAIN - FUNCTIONAL ASSESSMENT: PAIN_FUNCTIONAL_ASSESSMENT: 0-10

## 2025-05-22 NOTE — H&P
History Of Present Illness  Kofi Dc is a 59 y.o. male presenting with fall and left lower extremity erythema.  Patient has a history of lymphedema and has follow-up with the lymphedema clinic, but as had to stop due to cost, he has also follow-up with podiatry Dr. Watkins but had to stop routine wound care due to cost also.  Tonight, the patient was heading out to do trivia when he had a mechanical fall onto his butt and was not able to get back up.  EMS was able to stand him up, but then for about a minute he had garbled speech without any other reported focal findings, and this has since resolved; the patient does not have any memory of that.  In the emergency department, found to be febrile with left lower extremity cellulitis.  Remote stroke team was called and the momentary change in speech was felt to be more secondary to the infection and no intervention recommended.     Past Medical History  He has a past medical history of Acute cor pulmonale (Multi) (02/20/2024), Anal fissure, Angina pectoris (08/31/2023), Atherosclerotic heart disease of native coronary artery with other forms of angina pectoris (11/2/2021), Atypical chest pain (08/31/2023), Cerebral vascular accident (Multi), Cerebrovascular accident (Multi) (08/31/2023), CHF (congestive heart failure), Chronic kidney disease, stage 3 (Multi) (08/31/2023), CKD (chronic kidney disease), Clotting disorder (Multi), Deep venous thrombosis of lower extremity (08/31/2023), Facial abscess, Heart failure (08/31/2023), Hematochezia, Lymphedema (05/22/2018), Mixed hyperlipidemia (08/31/2023), Obesity, Palpitations (08/31/2023), Peripheral vascular disease (08/31/2023), Peripheral vascular disease, Presence of coronary angioplasty implant and graft (03/24/2023), Primary hypertension (05/22/2018), Pulmonary embolism (03/24/2023), Sleep apnea, Type 2 diabetes mellitus (05/22/2018), Venous insufficiency, Venous insufficiency (chronic) (peripheral) (03/24/2023),  and Ventricular premature complex (08/31/2023).    Surgical History  He has a past surgical history that includes Cardiac catheterization; Carotid stent; and Colonoscopy.     Social History  He reports that he has never smoked. He has never been exposed to tobacco smoke. He has never used smokeless tobacco. He reports that he does not drink alcohol and does not use drugs.    Family History  Family History[1]     Allergies  Patient has no known allergies.    Review of Systems   All other systems reviewed and are negative.       Physical Exam  Constitutional:       Appearance: He is obese. He is ill-appearing.   HENT:      Head: Normocephalic.      Right Ear: External ear normal.      Left Ear: External ear normal.      Mouth/Throat:      Mouth: Mucous membranes are moist.   Eyes:      Conjunctiva/sclera: Conjunctivae normal.   Cardiovascular:      Heart sounds: Normal heart sounds.   Pulmonary:      Breath sounds: Normal breath sounds.   Abdominal:      Palpations: Abdomen is soft.   Musculoskeletal:      Comments: No obvious bony deformity   Skin:     Comments: Bilateral lower extremities with lymphedema changes, with left lower extremity with circumferential erythema going from distal to about the proximal third of the thigh   Neurological:      Mental Status: He is alert.      Comments: Speech is completely fluent and no obvious confusion.  No facial droop.  Moves limbs spontaneously.   Psychiatric:         Mood and Affect: Mood normal.          Last Recorded Vitals  /55   Pulse 54   Temp 36.9 °C (98.5 °F) (Oral)   Resp (!) 21   Wt 136 kg (299 lb 13.2 oz)   SpO2 94%     Relevant Results        Results for orders placed or performed during the hospital encounter of 05/21/25 (from the past 24 hours)   POCT GLUCOSE   Result Value Ref Range    POCT Glucose 166 (H) 74 - 99 mg/dL   CBC and Auto Differential   Result Value Ref Range    WBC 22.7 (H) 4.4 - 11.3 x10*3/uL    nRBC 0.0 0.0 - 0.0 /100 WBCs    RBC  4.14 (L) 4.50 - 5.90 x10*6/uL    Hemoglobin 12.0 (L) 13.5 - 17.5 g/dL    Hematocrit 37.0 (L) 41.0 - 52.0 %    MCV 89 80 - 100 fL    MCH 29.0 26.0 - 34.0 pg    MCHC 32.4 32.0 - 36.0 g/dL    RDW 14.6 (H) 11.5 - 14.5 %    Platelets 221 150 - 450 x10*3/uL    Neutrophils % 91.3 40.0 - 80.0 %    Immature Granulocytes %, Automated 1.1 (H) 0.0 - 0.9 %    Lymphocytes % 3.3 13.0 - 44.0 %    Monocytes % 3.9 2.0 - 10.0 %    Eosinophils % 0.2 0.0 - 6.0 %    Basophils % 0.2 0.0 - 2.0 %    Neutrophils Absolute 20.73 (H) 1.20 - 7.70 x10*3/uL    Immature Granulocytes Absolute, Automated 0.25 0.00 - 0.70 x10*3/uL    Lymphocytes Absolute 0.74 (L) 1.20 - 4.80 x10*3/uL    Monocytes Absolute 0.89 0.10 - 1.00 x10*3/uL    Eosinophils Absolute 0.04 0.00 - 0.70 x10*3/uL    Basophils Absolute 0.05 0.00 - 0.10 x10*3/uL   Comprehensive metabolic panel   Result Value Ref Range    Glucose 151 (H) 74 - 99 mg/dL    Sodium 136 136 - 145 mmol/L    Potassium 4.5 3.5 - 5.3 mmol/L    Chloride 106 98 - 107 mmol/L    Bicarbonate 20 (L) 21 - 32 mmol/L    Anion Gap 15 10 - 20 mmol/L    Urea Nitrogen 46 (H) 6 - 23 mg/dL    Creatinine 2.64 (H) 0.50 - 1.30 mg/dL    eGFR 27 (L) >60 mL/min/1.73m*2    Calcium 8.9 8.6 - 10.3 mg/dL    Albumin 3.7 3.4 - 5.0 g/dL    Alkaline Phosphatase 71 33 - 136 U/L    Total Protein 7.2 6.4 - 8.2 g/dL    AST 13 9 - 39 U/L    Bilirubin, Total 1.0 0.0 - 1.2 mg/dL    ALT 13 10 - 52 U/L   Troponin I, High Sensitivity   Result Value Ref Range    Troponin I, High Sensitivity 15 0 - 20 ng/L   Protime-INR   Result Value Ref Range    Protime 13.0 (H) 9.3 - 12.7 seconds    INR 1.2 0.9 - 1.2   APTT   Result Value Ref Range    aPTT 33.7 (H) 22.0 - 32.5 seconds   Lactate   Result Value Ref Range    Lactate 2.0 0.4 - 2.0 mmol/L   Urinalysis with Reflex Culture and Microscopic   Result Value Ref Range    Color, Urine Light-Yellow Light-Yellow, Yellow, Dark-Yellow    Appearance, Urine Clear Clear    Specific Gravity, Urine 1.015 1.005 - 1.035     pH, Urine 6.5 5.0, 5.5, 6.0, 6.5, 7.0, 7.5, 8.0    Protein, Urine 100 (2+) (A) NEGATIVE, 10 (TRACE), 20 (TRACE) mg/dL    Glucose, Urine 70 (1+) (A) Normal mg/dL    Blood, Urine 0.2 (2+) (A) NEGATIVE mg/dL    Ketones, Urine NEGATIVE NEGATIVE mg/dL    Bilirubin, Urine NEGATIVE NEGATIVE mg/dL    Urobilinogen, Urine Normal Normal mg/dL    Nitrite, Urine NEGATIVE NEGATIVE    Leukocyte Esterase, Urine NEGATIVE NEGATIVE   Urinalysis Microscopic   Result Value Ref Range    WBC, Urine 1-5 1-5, NONE /HPF    RBC, Urine 11-20 (A) NONE, 1-2, 3-5 /HPF    Bacteria, Urine 1+ (A) NONE SEEN /HPF         Assessment/Plan   Assessment & Plan  Cellulitis of left lower extremity  Meet sepsis criteria with fever and leukocytosis with source of cellulitis  Blood cultures obtained and follow  With wound care, will consult Dr. Watkins who has followed the patient before  Infectious disease consult Dr. Badillo  Vancomycin and Zosyn.  Sepsis (Multi)  As above  Altered mental status  About minute episode of confused speech now resolved.  With the patient's sepsis and no symptoms, I do agree that this is likely from the infection and not true CVA  If another episode, would certainly pursue neurologic workup  Type 2 diabetes mellitus, with long-term current use of insulin  Patient is on concentrated U-500 twice daily dosing which is not available at this facility  Very appreciative of pharmacy further conversion to formulary products           eB Zamora DO         [1]   Family History  Problem Relation Name Age of Onset    Heart disease Father      Other (double bypass with valve replacement) Father

## 2025-05-22 NOTE — CONSULTS
"Inpatient consult to Infectious Diseases  Consult performed by: Loly Antonio, APRN-CNP  Consult ordered by: Be Zamora DO  Reason for consult: cellulitis          Primary MD: Ketan Lloyd DO        History Of Present Illness  Kevin Dc \"Kofi\" is a 59 y.o. male presenting with left leg cellulitis, altered mental status, and fall.  He has a past medical history of chronic left foot wound, lymphedema, DM2, chronic kidney disease, CHF.  He previously followed with the wound care center and lymphedema clinic but had to stop due to cost.  He states he fell outside yesterday and was unable to get up. He called EMS.  There was concern for slurred speech, he was sent to the ED.  He was found to have a fever of 39.3C with LLE erythema.  He was started on IV vancomycin and IV zosyn.  Reported having chills, was unaware he had a fever.   He denies shortness of breath, cough, chest pain.  Denies nausea, vomiting, diarrhea, abdominal pain.  Reports his left lower leg is more red than usual, states swelling is about the same.  Left plantar foot ulcer with large amount of sonja-ulcer callus     Past Medical History  He has a past medical history of Acute cor pulmonale (Multi) (02/20/2024), Anal fissure, Angina pectoris (08/31/2023), Atherosclerotic heart disease of native coronary artery with other forms of angina pectoris (11/2/2021), Atypical chest pain (08/31/2023), Cerebral vascular accident (Multi), Cerebrovascular accident (Multi) (08/31/2023), CHF (congestive heart failure), Chronic kidney disease, stage 3 (Multi) (08/31/2023), CKD (chronic kidney disease), Clotting disorder (Multi), Deep venous thrombosis of lower extremity (08/31/2023), Facial abscess, Heart failure (08/31/2023), Hematochezia, Lymphedema (05/22/2018), Mixed hyperlipidemia (08/31/2023), Obesity, Palpitations (08/31/2023), Peripheral vascular disease (08/31/2023), Peripheral vascular disease, Presence of coronary angioplasty implant and " graft (03/24/2023), Primary hypertension (05/22/2018), Pulmonary embolism (03/24/2023), Sleep apnea, Type 2 diabetes mellitus (05/22/2018), Venous insufficiency, Venous insufficiency (chronic) (peripheral) (03/24/2023), and Ventricular premature complex (08/31/2023).    Surgical History  He has a past surgical history that includes Cardiac catheterization; Carotid stent; and Colonoscopy.     Social History     Occupational History    Not on file   Tobacco Use    Smoking status: Never     Passive exposure: Never    Smokeless tobacco: Never   Vaping Use    Vaping status: Never Used   Substance and Sexual Activity    Alcohol use: Never    Drug use: Never    Sexual activity: Not Currently     Travel History   Travel since 04/22/25    No documented travel since 04/22/25              Family History  Family History[1]  Allergies  Patient has no known allergies.     Immunization History   Administered Date(s) Administered    Influenza, injectable, quadrivalent 11/06/2018    Influenza, seasonal, injectable 10/14/2014    Pfizer Purple Cap SARS-CoV-2 06/08/2021, 06/29/2021    Pneumococcal polysaccharide vaccine, 23-valent, age 2 years and older (PNEUMOVAX 23) 05/11/2020, 05/21/2020     Medications  Home medications:  Prescriptions Prior to Admission[2]  Current medications:  Scheduled medications  Scheduled Medications[3]  Continuous medications  Continuous Medications[4]  PRN medications  PRN Medications[5]    Review of Systems   Constitutional:  Positive for chills and fever. Negative for fatigue.   Respiratory:  Negative for cough, chest tightness and shortness of breath.    Cardiovascular:  Positive for leg swelling. Negative for chest pain.   Gastrointestinal:  Negative for abdominal pain, diarrhea, nausea and vomiting.   Genitourinary:  Negative for difficulty urinating, dysuria and frequency.   Skin:  Positive for color change and wound.   All other systems reviewed and are negative.       Objective  Range of Vitals  "(last 24 hours)  Heart Rate:  [54-83]   Temp:  [36.6 °C (97.9 °F)-39.3 °C (102.8 °F)]   Resp:  [12-33]   BP: (104-155)/(46-74)   Height:  [167.6 cm (5' 6\")]   Weight:  [136 kg (299 lb 13.2 oz)]   SpO2:  [89 %-99 %]   Daily Weight  05/21/25 : 136 kg (299 lb 13.2 oz)    Body mass index is 48.39 kg/m².     Physical Exam  Constitutional:       Appearance: Normal appearance.   HENT:      Head: Normocephalic and atraumatic.   Eyes:      Extraocular Movements: Extraocular movements intact.      Conjunctiva/sclera: Conjunctivae normal.   Cardiovascular:      Rate and Rhythm: Normal rate. Rhythm irregular.   Pulmonary:      Effort: Pulmonary effort is normal.      Breath sounds: Normal breath sounds.   Abdominal:      General: Bowel sounds are normal.      Palpations: Abdomen is soft.   Musculoskeletal:      Cervical back: Normal range of motion.      Right lower leg: Edema present.      Left lower leg: Edema present.   Skin:     General: Skin is warm and dry.      Findings: Erythema present.      Comments: Left plantar foot ulcer with large amount of sonja-ulcer callus    Neurological:      General: No focal deficit present.      Mental Status: He is alert and oriented to person, place, and time.   Psychiatric:         Mood and Affect: Mood normal.         Behavior: Behavior normal.          Relevant Results    Labs  Results from last 72 hours   Lab Units 05/22/25  0608 05/21/25  2205   WBC AUTO x10*3/uL 14.7* 22.7*   HEMOGLOBIN g/dL 11.8* 12.0*   HEMATOCRIT % 36.5* 37.0*   PLATELETS AUTO x10*3/uL 173 221   NEUTROS PCT AUTO % 95.7 91.3   LYMPHS PCT AUTO % 2.3 3.3   MONOS PCT AUTO % 1.5 3.9   EOS PCT AUTO % 0.1 0.2     Results from last 72 hours   Lab Units 05/22/25  0612 05/21/25  2205   SODIUM mmol/L 137 136   POTASSIUM mmol/L 4.1 4.5   CHLORIDE mmol/L 108* 106   CO2 mmol/L 18* 20*   BUN mg/dL 45* 46*   CREATININE mg/dL 2.58* 2.64*   GLUCOSE mg/dL 162* 151*   CALCIUM mg/dL 8.3* 8.9   ANION GAP mmol/L 15 15   EGFR " "mL/min/1.73m*2 28* 27*     Results from last 72 hours   Lab Units 05/22/25  0612 05/21/25  2205   ALK PHOS U/L 60 71   BILIRUBIN TOTAL mg/dL 1.2 1.0   PROTEIN TOTAL g/dL 6.4 7.2   ALT U/L 12 13   AST U/L 13 13   ALBUMIN g/dL 3.2* 3.7     Estimated Creatinine Clearance: 39.9 mL/min (A) (by C-G formula based on SCr of 2.58 mg/dL (H)).  CRP   Date Value Ref Range Status   10/16/2021 3.2 (H) 0 - 2.0 MG/DL Final     Comment:     Performed at [place] [facility] [id number]     No results found for: \"HIV1X2\", \"HIVCONF\", \"UUPWAB1DC\"  Hepatitis C AB   Date Value Ref Range Status   11/04/2024 Nonreactive Nonreactive Final     Comment:     Results from patients taking biotin supplements or receiving high-dose biotin therapy should be interpreted with caution due to possible interference with this test. Providers may contact their local laboratory for further information.     Microbiology  Wound culture pending with mixed gram positive and gram negative  Blood cultures pending with no growth  Previous cultures grew enterobacter, MSSA, klebsiella oxytoca    Imaging  ECG 12 lead  Result Date: 5/22/2025  Sinus tachycardia with 2nd degree AV block (Mobitz I) with Premature supraventricular complexes Septal infarct , age undetermined Abnormal ECG When compared with ECG of 18-JUL-2024 08:46, Premature supraventricular complexes are now Present Sinus rhythm is now with 2nd degree AV block (Mobitz I) Criteria for Inferior infarct are no longer Present    XR chest 1 view  Result Date: 5/21/2025  Interpreted By:  Thai Cartagena, STUDY: XR CHEST 1 VIEW;  5/21/2025 10:51 pm   INDICATION: Signs/Symptoms:lateral plantar foot wound, malodorous, purulent.   COMPARISON: None.   ACCESSION NUMBER(S): [id number]   ORDERING CLINICIAN: KARLOS BERGERON   FINDINGS:   CARDIOMEDIASTINAL SILHOUETTE: Cardiomediastinal silhouette is normal in size and configuration taking into account portable technique.   LUNGS/PLEURA: There are no " consolidations.There are no pleural effusions. There is no demonstrated pneumothorax.     BONES: No evidence of acute osseous abnormality.       1.  No evidence of acute cardiopulmonary process.     Signed by: Thai Cartagena 5/21/2025 11:24 PM Dictation workstation:   [id number]    XR foot left 3+ views  Result Date: 5/21/2025  Interpreted By:  Thai Cartagena, STUDY: XR FOOT LEFT 3+ VIEWS; ;  5/21/2025 10:50 pm   INDICATION: Signs/Symptoms:sepsis.     COMPARISON: 07/30/2024.   ACCESSION NUMBER(S): [id number]   ORDERING CLINICIAN: KARLOS BERGERON   FINDINGS: There is partially visualized diffuse edema of the lower leg possibly lymphedema.  No evidence of acute fracture or dislocation. Suspected pes cavus. Partially visualized degenerative change of the tibiotalar joint. No evidence of soft tissue gas or osseous erosion.       No evidence of acute fracture or dislocation. Diffuse lower leg edema. Suspected pes cavus. Degenerative change of the tibiotalar joint.     MACRO: None   Signed by: Thai Cartagena 5/21/2025 11:23 PM Dictation workstation:   [id number]    CT brain attack head wo IV contrast  Result Date: 5/21/2025  Interpreted By:  Ketan Birch, STUDY: CT BRAIN ATTACK HEAD WO IV CONTRAST;  5/21/2025 10:04 pm   INDICATION: Signs/Symptoms:Stroke Evaluation.     COMPARISON: 11/2024   ACCESSION NUMBER(S): [id number]   ORDERING CLINICIAN: KARLOS BERGERON   TECHNIQUE: Noncontrast axial CT scan of head was performed. Angled reformats in brain and bone windows were generated. The images were reviewed in bone, brain, blood and soft tissue windows.   FINDINGS: CSF Spaces: The ventricles, sulci and basal cisterns are within normal limits. There is no extraaxial fluid collection. Mild global volume loss and chronic small vessel ischemic change. The appearance is overall similar   Parenchyma:  The grey-white differentiation is intact. There is no mass effect or midline shift.  There is no intracranial hemorrhage.    Calvarium: The calvarium is unremarkable.   Vascular calcification       No evidence of acute cortical infarct or intracranial hemorrhage.   Senescent changes. No change from prior. If persistent concern, consider MRI   MACRO: Ketan Birch discussed the significance and urgency of this critical finding by epic chat with  KARLOS BERGERON on 5/21/2025 at 10:13 pm. (**-RCF-**) Findings:  See findings.     Signed by: Ketan Birch 5/21/2025 10:13 PM Dictation workstation:   [id number]      Assessment/Plan   Sepsis-triggered with leukocytosis, fever, tachypnea  Acute on chronic kidney disease  Toxic metabolic encephalopathy-resolved  Left leg cellulitis  Left diabetic foot ulcer infection-suspected Chan grade 2   Lymphedema  History of enterobacter, MSSA, klebsiella oxytoca    Discontinue vancomycin and zosyn due to ODILON  Begin IV meropenem-history of enterobacter  Begin IV daptomycin  CK level ordered - 89  Follow-up wound culture  Follow-up blood cultures  Monitor WBC and temperature  Monitor renal function  Local care  Offloading  Podiatry consult  Further recommendations based on pending work-up and cultures    Discussed with Dr Badillo    This is a complex infectious disease issue and the following was performed today (for more details please see the above note): Management decisions reflecting the added complexity (e.g., changes in antimicrobial therapy, infection control strategies).        Loly Antonio, APRN-CNP       [1]   Family History  Problem Relation Name Age of Onset    Heart disease Father      Other (double bypass with valve replacement) Father     [2]   Medications Prior to Admission   Medication Sig Dispense Refill Last Dose/Taking    acetaminophen (Tylenol) 500 mg tablet Take 1 tablet (500 mg) by mouth every 4 hours if needed (pain).   Past Month    allopurinol (Zyloprim) 100 mg tablet Take 1.5 tablets (150 mg) by mouth once daily.   5/21/2025    apixaban (Eliquis) 5 mg tablet Take 1 tablet (5 mg)  "by mouth 2 times a day.   5/21/2025    calcitriol (Rocaltrol) 0.5 mcg capsule Take 1 capsule (0.5 mcg) by mouth once daily. For 30 days   5/21/2025    clopidogrel (Plavix) 75 mg tablet TAKE 1 TABLET(75 MG) BY MOUTH EVERY DAY 90 tablet 3 5/21/2025    dilTIAZem ER (Tiazac) 240 mg 24 hr capsule Take 1 capsule (240 mg) by mouth once daily. on an empty stomach in the morning Orally Once a day for 90 days 90 capsule 3 5/21/2025    insulin regular (HumuLIN R U-500, Conc, Kwikpen) 500 unit/mL (3 mL) CONCENTRATED injection Inject 110 Units under the skin 2 times a day. 110 UNITS AT LUNCH  AT DINNER   Past Week    isosorbide mononitrate ER (Imdur) 60 mg 24 hr tablet Take 1 tablet (60 mg) by mouth once daily. For 90 90 tablet 3 5/21/2025    losartan (Cozaar) 50 mg tablet Take 1 tablet (50 mg) by mouth once daily.   5/21/2025    nitroglycerin (Nitrostat) 0.4 mg SL tablet Place 1 tablet (0.4 mg) under the tongue if needed for chest pain. For 30 days 30 tablet 0 More than a month    potassium chloride CR 10 mEq ER tablet Take 1 tablet (10 mEq) by mouth once daily. With food   5/21/2025    propranolol (Inderal) 60 mg tablet Take 1 tablet (60 mg) by mouth early in the morning.. 90 tablet 3 5/21/2025    sodium bicarbonate 650 mg tablet Take 1 tablet (650 mg) by mouth 4 times a day. For 30   5/21/2025    torsemide (Demadex) 100 mg tablet Take 0.5 tablets (50 mg) by mouth every other day. For 90 days   Past Week    atorvastatin (Lipitor) 80 mg tablet Take 1 tablet (80 mg) by mouth once daily. 90 tablet 3 Unknown    blood sugar diagnostic strip One Touch Ultra Test strips -  3 times per day sc 3X per meal for 90 days       insulin lispro protamin-lispro (HumaLOG Mix 50-50 KwikPen) 100 unit/mL (50-50) injection Inject under the skin 2 times daily (morning and late afternoon). Take as directed per insulin instructions. (Patient not taking: Reported on 4/22/2025)   Unknown    pen needle, diabetic 32 gauge x 5/32\" needle 2 times a " day.  as directed sc bid for 90 days   Unknown   [3] allopurinol, 150 mg, oral, Daily  apixaban, 5 mg, oral, BID  atorvastatin, 80 mg, oral, Daily  calcitriol, 0.5 mcg, oral, Daily  clopidogrel, 75 mg, oral, Daily  daptomycin, 500 mg, intravenous, q24h  [START ON 5/23/2025] dilTIAZem ER, 120 mg, oral, Daily  insulin glargine, 100 Units, subcutaneous, Nightly  insulin lispro, 20 Units, subcutaneous, TID AC  [START ON 5/23/2025] isosorbide mononitrate ER, 30 mg, oral, Daily  meropenem, 1 g, intravenous, q12h  propranolol, 60 mg, oral, Daily  sodium bicarbonate, 650 mg, oral, 4x daily  torsemide, 50 mg, oral, Every other day    [4]    [5] PRN medications: acetaminophen **OR** acetaminophen **OR** acetaminophen, benzocaine-menthol, melatonin, ondansetron ODT **OR** ondansetron

## 2025-05-22 NOTE — ASSESSMENT & PLAN NOTE
About minute episode of confused speech now resolved.  With the patient's sepsis and no symptoms, I do agree that this is likely from the infection and not true CVA  If another episode, would certainly pursue neurologic workup

## 2025-05-22 NOTE — PROGRESS NOTES
"Physical Therapy    Physical Therapy Evaluation    Patient Name: Kevin Dc \"Monalisa"  MRN: [id number]  Department: [address]  Room: [address] [place] [address]  Today's Date: 5/22/2025   Time Calculation  Start Time: 1122  Stop Time: 1137  Time Calculation (min): 15 min    Assessment/Plan   PT Assessment  PT Assessment Results: Decreased strength, Decreased mobility, Decreased endurance, Impaired sensation, Obesity  Rehab Prognosis: Good  Barriers to Discharge Home: No anticipated barriers  Strengths: Premorbid level of function, Attitude of self, Ability to acquire knowledge  Barriers to Participation: Comorbidities  End of Session Communication: Bedside nurse  Assessment Comment: He presented with the above listed impairments (see Assessment Results). Pt required up to CGA during functional mobility compared to his reported baseline of indep. Pt would benefit from continued skilled PT services for maximizing independence and safety prior to & after discharge (LOW intensity).  End of Session Patient Position: Bed, 3 rail up, Alarm on (call light and needs within reach; PCNA in room)  IP OR SWING BED PT PLAN  Inpatient or Swing Bed: Inpatient  PT Plan  Treatment/Interventions: Bed mobility, Transfer training, Gait training, Strengthening, Therapeutic exercise, Therapeutic activity, Balance training  PT Plan: Ongoing PT  PT Frequency: 4 times per week  PT Discharge Recommendations: Low intensity level of continued care  Equipment Recommended upon Discharge: Other (comment) (?bariatric FWW)  PT Recommended Transfer Status: Contact guard, Stand by assist, Assistive device  PT - OK to Discharge: Yes    Subjective     PT Visit Info:  PT Received On: 05/22/25    General Visit Information:  General  Reason for Referral: Impaired functional mobility due to decreased muscular strength. This 59 year old male was admitted for LLE cellulitus, L foot open wound, altered mental status, sepsis with acute renal failure & ODILON.  Referred By: eB GARNICA" DO Noah  Past Medical History Relevant to Rehab: CAD, DVT/PE, obstructive sleep apnea, hyperlipidemia, venous insufficiency, DM-II, heart failure, CKD-3a, coronary artery stent placement, carotid stent placement  Family/Caregiver Present: No  Prior to Session Communication: Bedside nurse  Patient Position Received: Up in chair, Alarm on  General Comment: Cleared by RN for participation. Pt agreed to session and was fully engaged throughout.    Home Living:  Home Living  Type of Home: Mobile home  Lives With: Alone  Home Adaptive Equipment: Crutches  Home Layout: One level  Home Access: Stairs to enter with rails  Entrance Stairs-Rails: Both  Entrance Stairs-Number of Steps: 3  Bathroom Shower/Tub: Walk-in shower  Bathroom Equipment: Grab bars in shower    Prior Level of Function:  Prior Function Per Pt/Caregiver Report  ADL Assistance: Independent  Homemaking Assistance: Independent  Ambulatory Assistance: Independent (he had a fall at home on the day of admission and reported one other recent fall due to an oversized shoe issued by wound clinic)  Hand Dominance: Right  Prior Function Comments: (+) driving    Precautions:  Precautions  Hearing/Visual Limitations: used readers  Medical Precautions: Fall precautions      Date/Time Vitals Session Patient Position Pulse Resp SpO2 BP MAP (mmHg)    05/22/25 1136 --  --  57  20  99 %  104/46  59         Objective   Pain:  Pain Assessment  0-10 (Numeric) Pain Score: 0 - No pain    Cognition:  Cognition  Overall Cognitive Status: Within Functional Limits  Orientation Level: Oriented X4  Following Commands: Follows multistep commands without difficulty    General Assessments:  General Observation  General Observation: BLE lymphedema (lower legs)    Activity Tolerance  Endurance: Decreased tolerance for upright activites    Sensation  Sensation Comment: Not tested; pt denied paresthesias at this time but reported intermittent paresthesia in bilateral feet which he  attributed to neuroapthy    ROM  BLE AROM: grossly WFL    Strength  Strength Comments: BLE: grossly >/=3+/5    Coordination  Movements are Fluid and Coordinated: Yes    Postural Control  Postural Control: Within Functional Limits    Static Sitting Balance  Static Sitting-Balance Support: Feet supported, Bilateral upper extremity supported  Static Sitting-Level of Assistance: Distant supervision    Static Standing Balance  Static Standing-Balance Support: Bilateral upper extremity supported  Static Standing-Level of Assistance: Close supervision  Dynamic Standing Balance  Dynamic Standing-Balance Support: Bilateral upper extremity supported  Dynamic Standing-Level of Assistance: Close supervision    Functional Assessments:  Bed Mobility  Bed Mobility: Yes  Bed Mobility 1  Bed Mobility 1: Sitting to supine  Level of Assistance 1: Close supervision (bed flat, no rail & toward R side. Increased time and effort to elevate LLE to begin transition)    Transfers  Transfer: Yes  Transfer 1  Technique 1: Sit to stand, Stand to sit  Transfer Device 1: Walker  Transfer Level of Assistance 1: Close supervision (progressed from CGA during 1st trial. Sit>stand from bedside chair. Sit<>stand at toilet using grab bar. Stand>sit at EOB.)    Ambulation/Gait Training  Ambulation/Gait Training Performed: Yes  Ambulation/Gait Training 1  Surface 1: Level tile  Device 1: Rolling walker (bariatric FWW)  Assistance 1: Close supervision  Quality of Gait 1:  (Pt ambualted 15 ft x2 using step through pattern with slowed velocity and continuous movement. No threat for LOB.)     Outcome Measures:  [facility] Basic Mobility  Turning from your back to your side while in a flat bed without using bedrails: A little  Moving from lying on your back to sitting on the side of a flat bed without using bedrails: A little  Moving to and from bed to chair (including a wheelchair): A little  Standing up from a chair using your arms (e.g. wheelchair or bedside  chair): A little  To walk in hospital room: A little  Climbing 3-5 steps with railing: A little  Basic Mobility - Total Score: 18    Encounter Problems       Encounter Problems (Active)       PT Problem       Pt will transition supine<>sit with mod I.        Start:  05/22/25    Expected End:  06/14/25            Pt will transfer sit<>stand with mod I.        Start:  05/22/25    Expected End:  06/14/25            Pt will ambulate >/=100 ft with FWW & mod I --or-- independently.       Start:  05/22/25    Expected End:  06/14/25                   Education Documentation  Mobility Training, taught by Beverley Richardson PT at 5/22/2025 12:15 PM.  Learner: Patient  Readiness: Acceptance  Method: Explanation  Response: Needs Reinforcement, Verbalizes Understanding  Comment: Fall precautions, PT role, POC & disposition.    Education Comments  No comments found.

## 2025-05-22 NOTE — PROGRESS NOTES
Vancomycin Dosing by Pharmacy- Cessation of Therapy    Consult to pharmacy for vancomycin dosing has been discontinued by the prescriber, pharmacy will sign off at this time.    Please call pharmacy if there are further questions or re-enter a consult if vancomycin is resumed.     Cheryle Kelsey, CaitlinD

## 2025-05-22 NOTE — PROGRESS NOTES
"Kevin Dc \"Kofi\" is a 59 y.o. male on day 0 of admission presenting with Cellulitis of left lower extremity.      Subjective   History of chronic renal insufficiency CHF lymphedema sleep apnea and a variety of other issues.  Came in yesterday with acute cellulitis involving most of his left leg.  He also has a dry fairly large heel ulcer on the left.       Objective alert oriented undistressed  Heart regular rate and rhythm  Lungs clear to auscultation  Extremities impressive lymphedema and lichenification bilateral legs.  Left leg is erythematous from the shin up to the mid thigh.  There is a thick flap under distal left foot that seems to be peeling off is also dry and black  Has pitting edema up to the mid thigh bilaterally.    Last Recorded Vitals  /53 (BP Location: Right arm, Patient Position: Lying)   Pulse 57   Temp 36.9 °C (98.4 °F) (Oral)   Resp 20   Wt 136 kg (299 lb 13.2 oz)   SpO2 96%   Intake/Output last 3 Shifts:    Intake/Output Summary (Last 24 hours) at 5/22/2025 1101  Last data filed at 5/22/2025 0851  Gross per 24 hour   Intake 4550 ml   Output 950 ml   Net 3600 ml       Admission Weight  Weight: 136 kg (299 lb 13.2 oz) (05/21/25 2216)    Daily Weight  05/21/25 : 136 kg (299 lb 13.2 oz)    Image Results  ECG 12 lead  Sinus tachycardia with 2nd degree AV block (Mobitz I) with Premature supraventricular complexes  Septal infarct , age undetermined  Abnormal ECG  When compared with ECG of 18-JUL-2024 08:46,  Premature supraventricular complexes are now Present  Sinus rhythm is now with 2nd degree AV block (Mobitz I)  Criteria for Inferior infarct are no longer Present                       Assessment & Plan  Cellulitis of left lower extremity  Meet sepsis criteria with fever and leukocytosis with source of cellulitis  Blood cultures obtained and follow  With wound care, will consult Dr. Watkins who has followed the patient before  Infectious disease consult Dr. Justino Kapadia " and Zosyn.  Sepsis (Multi)  As above  Altered mental status  About minute episode of confused speech now resolved.  With the patient's sepsis and no symptoms, I do agree that this is likely from the infection and not true CVA  If another episode, would certainly pursue neurologic workup  Type 2 diabetes mellitus, with long-term current use of insulin  Patient is on concentrated U-500 twice daily dosing which is not available at this facility  Very appreciative of pharmacy further conversion to formulary products      Lymphedema pitting edema and chronic renal insufficiency-right now his blood pressure is a bit on the low side.  He has A-fib but his heart rate is little bit on the low side as well.  There are certainly no RVR.  Going to do a few things in the hopes that we can perhaps improve the edema situation.  I am going to lower the Imdur and lowered the diltiazem in the hopes that there would be more room for diuresis.  I am also consulting his nephrologist Dr. Hernán Chance .  Perhaps as his kidney function continues to deteriorate and edema becomes a worsening clinical issue it may be time to stop the losartan as well.  I did discuss this with Dr. Meron Pack MD

## 2025-05-22 NOTE — ASSESSMENT & PLAN NOTE
Meet sepsis criteria with fever and leukocytosis with source of cellulitis  Blood cultures obtained and follow  With wound care, will consult Dr. Watkins who has followed the patient before  Infectious disease consult Dr. Justino Britton.

## 2025-05-22 NOTE — PROGRESS NOTES
"Occupational Therapy    Evaluation/Treatment    Patient Name: Kevin Dc \"Kofi\"  MRN: [id number]  Department: [address]  Room: [address] [place] [address]  Today's Date: 05/22/25  Time Calculation  Start Time: 0909  Stop Time: 0935  Time Calculation (min): 26 min       Assessment:  OT Assessment: pt demonstrates difficulty with ADLs, functional mobility, decreased endurance/activity tolerance, and generalized weakness. pt would benefit from skilled OT to facilitate PLOF and promote safety.  Prognosis: Good  Barriers to Discharge Home: No anticipated barriers  Evaluation/Treatment Tolerance: Patient tolerated treatment well  Medical Staff Made Aware: Yes  End of Session Communication: Bedside nurse  End of Session Patient Position: Up in chair, Alarm on (all needs in reach. MD present)  OT Assessment Results: Decreased ADL status, Decreased endurance, Decreased functional mobility, Decreased IADLs  Prognosis: Good  Evaluation/Treatment Tolerance: Patient tolerated treatment well  Medical Staff Made Aware: Yes  Strengths: Ability to acquire knowledge, Attitude of self  Barriers to Participation: Comorbidities  Plan:  Treatment Interventions: ADL retraining, Functional transfer training, Patient/family training, Equipment evaluation/education, Compensatory technique education  OT Frequency: 3 times per week  OT Discharge Recommendations: Low intensity level of continued care  OT Recommended Transfer Status: Assist of 1  OT - OK to Discharge: Yes  Treatment Interventions: ADL retraining, Functional transfer training, Patient/family training, Equipment evaluation/education, Compensatory technique education    Subjective   Current Problem:  1. Cellulitis of left lower extremity        2. Altered mental status, unspecified altered mental status type        3. Open wound        4. Sepsis with acute renal failure without septic shock, due to unspecified organism, unspecified acute renal failure type (Multi)        5. Acute kidney injury   "        OT Visit Info:  OT Received On: 05/22/25  General Visit Info:   General  Reason for Referral: impaired ADLs; admitted for  fall and left lower extremity erythema  Referred By: Be Zamora DO  Past Medical History Relevant to Rehab: coronary artery disease, pulmonary embolism, obstructive sleep apnea, hyperlipidemia, venous insufficiency, diabetes, heart failure, chronic kidney disease  Family/Caregiver Present: No  Prior to Session Communication: Bedside nurse  Patient Position Received: Bed, 3 rail up, Alarm off, not on at start of session  General Comment: cleared by nursing. pt pleasant and agreeable to therapy   Precautions:  Medical Precautions: Fall precautions        Pain:  Pain Assessment  Pain Assessment: 0-10  0-10 (Numeric) Pain Score: 3  Pain Type: Chronic pain  Pain Location: Leg  Pain Orientation: Left  Pain Interventions: Repositioned, Ambulation/increased activity  Response to Interventions: Decrease in pain    Objective   Cognition:  Overall Cognitive Status: Within Functional Limits  Orientation Level: Oriented X4  Following Commands: Follows all commands and directions without difficulty  Safety Judgment: Good awareness of safety precautions  Insight: Within function limits  Impulsive: Within functional limits           Home Living:  Type of Home: Mobile home  Lives With: Alone  Home Adaptive Equipment: Crutches  Home Layout: One level  Home Access: Stairs to enter with rails  Entrance Stairs-Rails: Both  Entrance Stairs-Number of Steps: 3  Bathroom Shower/Tub: Walk-in shower  Bathroom Equipment: Grab bars in shower  Home Living Comments: no concerns  Prior Function:  Level of [place]: Independent with ADLs and functional transfers, Independent with homemaking with ambulation  Receives Help From: Friends  ADL Assistance: Independent  Homemaking Assistance: Independent  Ambulatory Assistance: Independent  Hand Dominance: Right  Prior Function Comments: + drives, reports no other  falls other than admission     ADL:  Eating Assistance: Independent  Grooming Assistance: Stand by (SBA to wash hands at sink)  Bathing Assistance: Minimal  UE Dressing Assistance: Stand by  LE Dressing Assistance: Moderate (max A to clemente/doff socks supine, SBA to pull down/up underwear standing at toilet)  Toileting Assistance with Device: Stand by (SBA to perform hygeine seated on toilet)  Activities of Daily Living: Grooming  Grooming Comments: SBA to perform hand washing at sink    LE Dressing  LE Dressing: Yes  LE Dressing Comments: max A to clemente socks supine in bed. max A to doff L sock seated on chair due to bleeding. pt reports able to clemente/doff socks at home independently with no AD    Toileting  Toileting Comments: SBA to perform sonja care seated on toilet. SBA to pull down/up underwear standing at toilet. toilet transfer supervision with use of grab bars.  Activity Tolerance:  Endurance: Decreased tolerance for upright activites    Bed Mobility/Transfers: Bed Mobility  Bed Mobility: Yes  Bed Mobility 1  Bed Mobility 1: Supine to sitting  Level of Assistance 1: Close supervision  Bed Mobility Comments 1: HOB elevated and use of bed rails. increased time and effort to complete. min verbal cues for sequencing    Transfers  Transfer: Yes  Transfer 1  Technique 1: Sit to stand, Stand to sit  Transfer Device 1: Walker  Transfer Level of Assistance 1: Minimum assistance  Trials/Comments 1: min A sit<>stand bed, FWW. increased effort to complete  Transfers 2  Technique 2: Sit to stand, Stand to sit  Transfer Level of Assistance 2: Close supervision  Trials/Comments 2: toilet; supervision with use of grab bars.    Functional Mobility:  Functional Mobility  Functional Mobility Performed: Yes  Functional Mobility 1  Surface 1: Level tile  Device 1: Rolling walker, No device  Assistance 1: Contact guard  Comments 1: pt performed functional mobility to bathroom CGA with FWW. increased time to complete. pt performed  functional mobility bathroom>chair CGA no AD. pt reported decreased pain with movement, however L foot bleeding during mobility (RN notified)  Sitting Balance:  Static Sitting Balance  Static Sitting-Balance Support: Feet supported  Static Sitting-Level of Assistance: Distant supervision  Standing Balance:  Static Standing Balance  Static Standing-Balance Support: No upper extremity supported  Static Standing-Level of Assistance: Contact guard    Vision:Vision - Basic Assessment  Current Vision: Wears glasses only for reading  Sensation:  Sensation Comment: pt denies numbness/tingling  Strength:  Strength Comments: BUE WFL during functional activity    Coordination:  Movements are Fluid and Coordinated: Yes   Hand Function:  Hand Function  Gross Grasp: Functional  Coordination: Functional  Extremities: RUE   RUE : Within Functional Limits and LUE   LUE: Within Functional Limits    Outcome Measures: [facility] Daily Activity  Putting on and taking off regular lower body clothing: A lot  Bathing (including washing, rinsing, drying): A little  Putting on and taking off regular upper body clothing: A little  Toileting, which includes using toilet, bedpan or urinal: A little  Taking care of personal grooming such as brushing teeth: A little  Eating Meals: A little  Daily Activity - Total Score: 17      Education Documentation  Body Mechanics, taught by Brenda Forman OT at 5/22/2025  9:59 AM.  Learner: Patient  Readiness: Acceptance  Method: Explanation  Response: Verbalizes Understanding  Comment: Educated importance OOB activity, OT POC    ADL Training, taught by Brenda Forman OT at 5/22/2025  9:59 AM.  Learner: Patient  Readiness: Acceptance  Method: Explanation  Response: Verbalizes Understanding  Comment: Educated importance OOB activity, OT POC    Education Comments  No comments found.      Goals:  Encounter Problems       Encounter Problems (Active)       ADLs       Patient will perform UB and LB bathing with independent level  of assistance.       Start:  05/22/25    Expected End:  06/12/25            Patient with complete upper body dressing with independent level of assistance donning and doffing all UE clothes       Start:  05/22/25    Expected End:  06/12/25            Patient with complete lower body dressing with modified independent level of assistance donning and doffing all LE clothes  with PRN adaptive equipment       Start:  05/22/25    Expected End:  06/12/25            Patient will complete daily grooming tasks with independent level of assistance.       Start:  05/22/25    Expected End:  06/12/25            Patient will complete toileting including hygiene clothing management/hygiene with independent level of assistance.       Start:  05/22/25    Expected End:  06/12/25               TRANSFERS       Patient will complete functional transfer with least restrictive device with modified independent level of assistance.       Start:  05/22/25    Expected End:  06/12/25

## 2025-05-22 NOTE — ASSESSMENT & PLAN NOTE
Patient is on concentrated U-500 twice daily dosing which is not available at this facility  Very appreciative of pharmacy further conversion to formulary products

## 2025-05-22 NOTE — CONSULTS
Inpatient consult to Podiatry  Consult performed by: Barrett Causey DPM  Consult ordered by: Be Zamora DO          Reason For Consult  Bilateral lower leg cellulitis, left foot ulcer    History Of Present Illness  Kofi Dc is a 59 y.o. male presenting with bilateral lower extremity cellulitis.  Left foot ulcer.  He has history of chronic lymphedema and recurring cellulitis.  Also history of chronic wound to the left plantar forefoot which has been present at least for 8+ months.  Late last year he was being treated at the wound care center at [facility] however he was unable to continue secondary to financial reasons and the cost was not amenable for him.  He had been attempting to wear a surgical shoe that he was given, and attempting to wrap his legs.  He says the redness got even worse and so that is what brought him into the hospital this time.     Past Medical History  He has a past medical history of Acute cor pulmonale (Multi) (02/20/2024), Anal fissure, Angina pectoris (08/31/2023), Atherosclerotic heart disease of native coronary artery with other forms of angina pectoris (11/2/2021), Atypical chest pain (08/31/2023), Cerebral vascular accident (Multi), Cerebrovascular accident (Multi) (08/31/2023), CHF (congestive heart failure), Chronic kidney disease, stage 3 (Multi) (08/31/2023), CKD (chronic kidney disease), Clotting disorder (Multi), Deep venous thrombosis of lower extremity (08/31/2023), Facial abscess, Heart failure (08/31/2023), Hematochezia, Lymphedema (05/22/2018), Mixed hyperlipidemia (08/31/2023), Obesity, Palpitations (08/31/2023), Peripheral vascular disease (08/31/2023), Peripheral vascular disease, Presence of coronary angioplasty implant and graft (03/24/2023), Primary hypertension (05/22/2018), Pulmonary embolism (03/24/2023), Sleep apnea, Type 2 diabetes mellitus (05/22/2018), Venous insufficiency, Venous insufficiency (chronic) (peripheral) (03/24/2023), and  Ventricular premature complex (08/31/2023).    Surgical History  He has a past surgical history that includes Cardiac catheterization; Carotid stent; and Colonoscopy.     Social History  He reports that he has never smoked. He has never been exposed to tobacco smoke. He has never used smokeless tobacco. He reports that he does not drink alcohol and does not use drugs.    Family History  Family History[1]     Allergies  Patient has no known allergies.    Review of Systems    REVIEW OF SYSTEMS  GENERAL:  Negative for malaise, significant weight loss, fever  HEENT:  No changes in hearing or vision, no nose bleeds or other nasal problems and Negative for frequent or significant headaches  NECK:  Negative for lumps, goiter, pain and significant neck swelling  RESPIRATORY:  Negative for cough, wheezing and shortness of breath  CARDIOVASCULAR:  Negative for chest pain, leg swelling and palpitations  GI:  Negative for abdominal discomfort, blood in stools or black stools and change in bowel habits  :  Negative for dysuria, frequency and incontinence  MUSCULOSKELETAL:  Negative for joint pain or swelling, back pain, and muscle pain.  SKIN: Positive for cellulitis bilateral lower legs.  Ulcer left plantar foot.  PSYCH:  Negative for sleep disturbance, mood disorder and recent psychosocial stressors  HEMATOLOGY/LYMPHOLOGY:  Negative for prolonged bleeding, bruising easily, and swollen nodes.  ENDOCRINE:  Negative for cold or heat intolerance, polyuria, polydipsia and goiter  NEURO: Negative, denies any burning, tingling or numbness     Objective:   Vasc: DP and PT pulse unable to palpate secondary to edema.  CFT less than 3 seconds.  Massive brawny edema 4+ bilateral lower extremity and foot.    Neuro:  Light touch is intact to the foot bilateral.      Derm: Massive brawny edema to lower extremity bilaterally.  4+ pitting edema.  There is a venous stasis dermatitis and venous stasis changes with hyperkeratotic tissue  "bilateral lower extremities.  There is evidence of cellulitis and erythema bilateral lower legs from the knee to the ankle.  There is a chronic ulcer deep to fat layer on the plantar lateral forefoot measuring approximately 2.0 x 1.0 cm with a depth of 0.2 cm.  Fibrotic base.  There is no surrounding erythema.  No purulence.  No drainage.  Clinically does not appear infected.  It is not connected or associated with the cellulitis to the leg.  No ascending cellulitis at all from here.    Ortho: He does have all 10 toes.     Physical Exam     Last Recorded Vitals  Blood pressure 108/58, pulse 81, temperature 37 °C (98.6 °F), temperature source Oral, resp. rate 18, height 1.676 m (5' 6\"), weight 136 kg (299 lb 13.2 oz), SpO2 95%.    Relevant Results  Leukocytosis on admission: 22.7    Blood culture 5/21: Positive for gram-negative bacilli  Wound culture 5/21: Positive for gram-positive and gram-negative bacteria-preliminary-preliminary    CT with contrast: Pending     Assessment/Plan     1.  Left lower leg cellulitis  2.  Right lower leg cellulitis  3.  Left plantar foot chronic ulcer to fat layer  4.  Bilateral lower leg chronic lymphedema      Wound debrided and evaluated-does not probe deep and no abscess cavity.  Empiric IV antibiotics for now.  Patient states he is unable to obtain MRI-have ordered CT with contrast of left foot.  I have low suspicion this wound is a causative source of his sepsis but will confirm no abscess or OM.  Bandage change orders placed.  Will reevaluate tomorrow.    I spent 70 minutes in the professional and overall care of this patient.      Barrett Causey D.P.M.         [1]   Family History  Problem Relation Name Age of Onset    Heart disease Father      Other (double bypass with valve replacement) Father       "

## 2025-05-22 NOTE — ED TRIAGE NOTES
Pt brought in by [facility] with potential stroke-like symptoms. Per EMS the pt went to [facility] and drove home, then started to not feel well and fell. Pt denies any LOC or hitting his head. Per EMS when they arrived the pt was aphasic, ataxic, nauseous, & confused. Pt has a hx of 2 pulmonary embolisms and has had stents placed in the past. Pt is anticoagulated with both Eliquis & Plavix. Pt's LKW was 1900. EMS V/S /80, HR 80, SPO2 95% on RA, & .

## 2025-05-22 NOTE — ED PROVIDER NOTES
Department of Emergency Medicine   ED  Provider Note  Admit Date/RoomTime: 5/21/2025  9:46 PM  ED Room: TR03/TR03        History of Present Illness:  Chief Complaint   Patient presents with    Weakness, Gen     LKW 1900.         Kevin Dc is a 59 y.o. male history of coronary artery disease, pulmonary embolism, obstructive sleep apnea, hyperlipidemia, venous insufficiency, diabetes, heart failure, chronic kidney disease, presents to the emergency department with complaints of garbled speech .  Patient reports initially he called EMS because he had fallen onto his butt and could not get up.  EMS reported that they were trying to get him situated thought he was going to be okay then all of a sudden he started to stare and not following commands then started talking in his words did not make sense.  For about 1 minute then tempted to set him down he had another episode of garbled speech that lasted less than a minute.  Upon arrival patient does not remember the symptoms.  His speech is clear.  Facial movement symmetrical denies any chest pain or shortness of breath no abdominal pain no nausea no vomiting patient denies any runny nose sore throat complains of occasional cough not abnormal.  Chest pain or shortness of breath no abdominal pain no nausea vomiting or diarrhea.  Reports he does have a wound on his left foot that he used to see wound care for but cannot afford the visits.  He has lymphedema bilateral lower extremities also complaining of redness to both legs.    Review of Systems:   Pertinent positives and negatives are stated within HPI, all other systems reviewed and are negative.        --------------------------------------------- PAST HISTORY ---------------------------------------------  Past Medical History:  has a past medical history of Acute cor pulmonale (Multi) (02/20/2024), Anal fissure, Angina pectoris (08/31/2023), Atherosclerotic heart disease of native coronary artery with other  forms of angina pectoris (11/2/2021), Atypical chest pain (08/31/2023), Cerebral vascular accident (Multi), Cerebrovascular accident (Multi) (08/31/2023), CHF (congestive heart failure), Chronic kidney disease, stage 3 (Multi) (08/31/2023), CKD (chronic kidney disease), Clotting disorder (Multi), Deep venous thrombosis of lower extremity (08/31/2023), Facial abscess, Heart failure (08/31/2023), Hematochezia, Lymphedema (05/22/2018), Mixed hyperlipidemia (08/31/2023), Obesity, Palpitations (08/31/2023), Peripheral vascular disease (08/31/2023), Peripheral vascular disease, Presence of coronary angioplasty implant and graft (03/24/2023), Primary hypertension (05/22/2018), Pulmonary embolism (03/24/2023), Sleep apnea, Type 2 diabetes mellitus (05/22/2018), Venous insufficiency, Venous insufficiency (chronic) (peripheral) (03/24/2023), and Ventricular premature complex (08/31/2023).  Past Surgical History:  has a past surgical history that includes Cardiac catheterization; Carotid stent; and Colonoscopy.  Social History:  reports that he has never smoked. He has never been exposed to tobacco smoke. He has never used smokeless tobacco. He reports that he does not drink alcohol and does not use drugs.  Family History: family history includes Heart disease in his father; double bypass with valve replacement in his father.. Unless otherwise noted, family history is non contributory  The patient’s home medications have been reviewed.  Allergies: Patient has no known allergies.        ---------------------------------------------------PHYSICAL EXAM--------------------------------------    GENERAL APPEARANCE: Awake and alert.   VITAL SIGNS: As per the nurses' triage record.  Temperature 39.3  HEENT: Normocephalic, atraumatic. Extraocular muscles are intact. Pupils equal round and reactive to light. Conjunctiva are pink. Negative scleral icterus. Mucous membranes are moist. Tongue in the midline. Pharynx was without erythema or  "exudates, uvula midline  NECK: Soft Nontender and supple, full gross ROM, no meningeal signs.  CHEST: Nontender to palpation. Clear to auscultation bilaterally. No rales, rhonchi, or wheezing.   HEART: S1, S2. Regular rate and rhythm. No murmurs, gallops or rubs.  Strong and equal pulses in the extremities.   ABDOMEN: Obese soft, nontender, nondistended, positive bowel sounds, no palpable masses.  MUSCULCSKELETAL: Gross edema bilateral lower extremities left foot wound to the proximal plantar side odor.  Discoloration of the right lower leg distal to the knee proximal to the ankle erythema to the left leg from ankle to mid no pustules purpura petechiae noted please see picture below noted see picture below    NEUROLOGICAL: Awake, alert and oriented x 3. Power intact in the upper and lower extremities. Sensation is intact to light touch in the upper and lower extremities.   IMMUNOLOGICAL: No lymphatic streaking noted   DERM: No petechiae, rashes, or ecchymoses.          ------------------------- NURSING NOTES AND VITALS REVIEWED ---------------------------  The nursing notes within the ED encounter and vital signs as below have been reviewed by myself  /52   Pulse 63   Temp 36.9 °C (98.5 °F) (Oral)   Resp (!) 31   Ht 1.676 m (5' 6\")   Wt 136 kg (299 lb 13.2 oz)   SpO2 94%   BMI 48.39 kg/m²     Oxygen Saturation Interpretation: 94% room air    The cardiac monitor revealed sinus rhythm with a heart rate in the 60s as interpreted by me. The cardiac monitor was ordered secondary to the patient's heart rate and to monitor the patient for dysrhythmia.       The patient’s available past medical records and past encounters were reviewed.          -----------------------DIAGNOSTIC RESULTS------------------------  LABS:    Labs Reviewed   CBC WITH AUTO DIFFERENTIAL - Abnormal       Result Value    WBC 22.7 (*)     nRBC 0.0      RBC 4.14 (*)     Hemoglobin 12.0 (*)     Hematocrit 37.0 (*)     MCV 89      MCH 29.0   "    MCHC 32.4      RDW 14.6 (*)     Platelets 221      Neutrophils % 91.3      Immature Granulocytes %, Automated 1.1 (*)     Lymphocytes % 3.3      Monocytes % 3.9      Eosinophils % 0.2      Basophils % 0.2      Neutrophils Absolute 20.73 (*)     Immature Granulocytes Absolute, Automated 0.25      Lymphocytes Absolute 0.74 (*)     Monocytes Absolute 0.89      Eosinophils Absolute 0.04      Basophils Absolute 0.05     COMPREHENSIVE METABOLIC PANEL - Abnormal    Glucose 151 (*)     Sodium 136      Potassium 4.5      Chloride 106      Bicarbonate 20 (*)     Anion Gap 15      Urea Nitrogen 46 (*)     Creatinine 2.64 (*)     eGFR 27 (*)     Calcium 8.9      Albumin 3.7      Alkaline Phosphatase 71      Total Protein 7.2      AST 13      Bilirubin, Total 1.0      ALT 13     PROTIME-INR - Abnormal    Protime 13.0 (*)     INR 1.2      Narrative:     INR Therapeutic Range: 2.0-3.5   APTT - Abnormal    aPTT 33.7 (*)     Narrative:     SAMPLE INTEGRITY CHECKED   URINALYSIS WITH REFLEX CULTURE AND MICROSCOPIC - Abnormal    Color, Urine Light-Yellow      Appearance, Urine Clear      Specific Gravity, Urine 1.015      pH, Urine 6.5      Protein, Urine 100 (2+) (*)     Glucose, Urine 70 (1+) (*)     Blood, Urine 0.2 (2+) (*)     Ketones, Urine NEGATIVE      Bilirubin, Urine NEGATIVE      Urobilinogen, Urine Normal      Nitrite, Urine NEGATIVE      Leukocyte Esterase, Urine NEGATIVE     POCT GLUCOSE - Abnormal    POCT Glucose 166 (*)    URINALYSIS MICROSCOPIC WITH REFLEX CULTURE - Abnormal    WBC, Urine 1-5      RBC, Urine 11-20 (*)     Bacteria, Urine 1+ (*)    TROPONIN I, HIGH SENSITIVITY - Normal    Troponin I, High Sensitivity 15      Narrative:     Less than 99th percentile of normal range cutoff-  Female and children under 18 years old <14 ng/L; Male <21 ng/L: Negative  Repeat testing should be performed if clinically indicated.     Female and children under 18 years old 14-50 ng/L; Male 21-50 ng/L:  Consistent with possible  cardiac damage and possible increased clinical   risk. Serial measurements may help to assess extent of myocardial damage.     >50 ng/L: Consistent with cardiac damage, increased clinical risk and  myocardial infarction. Serial measurements may help assess extent of   myocardial damage.      NOTE: Children less than 1 year old may have higher baseline troponin   levels and results should be interpreted in conjunction with the overall   clinical context.     NOTE: Troponin I testing is performed using a different   testing methodology at [facility] than at other   [facility]. Direct result comparisons should only   be made within the same method.   LACTATE - Normal    Lactate 2.0      Narrative:     Venipuncture immediately after or during the administration of Metamizole may lead to falsely low results. Testing should be performed immediately prior to Metamizole dosing.   BLOOD CULTURE   BLOOD CULTURE   TISSUE/WOUND CULTURE/SMEAR   URINALYSIS WITH REFLEX CULTURE AND MICROSCOPIC    Narrative:     The following orders were created for panel order Urinalysis with Reflex Culture and Microscopic.  Procedure                               Abnormality         Status                     ---------                               -----------         ------                     Urinalysis with Reflex C...[[id number]]  Abnormal            Final result               Extra Urine Gray Tube[[id number]]                            In process                   Please view results for these tests on the individual orders.   EXTRA URINE GRAY TUBE   POCT GLUCOSE METER       As interpreted by me, the above displayed labs are abnormal. All other labs obtained during this visit were within normal range or not returned as of this dictation.      EKG Interpretation  2213 EKG personally interpreted by me performed at 2209  Sinus rhythm with second-degree AV block type I ventricular rate 73 borderline left axis deviation no acute  ischemic changes [EF]           XR chest 1 view   Final Result   1.  No evidence of acute cardiopulmonary process.             Signed by: Thai Cartagena 5/21/2025 11:24 PM   Dictation workstation:   [id number]      XR foot left 3+ views   Final Result   No evidence of acute fracture or dislocation. Diffuse lower leg   edema. Suspected pes cavus. Degenerative change of the tibiotalar   joint.             MACRO:   None        Signed by: Thai Cartagena 5/21/2025 11:23 PM   Dictation workstation:   [id number]      CT brain attack head wo IV contrast   Final Result   No evidence of acute cortical infarct or intracranial hemorrhage.        Senescent changes. No change from prior.   If persistent concern, consider MRI        MACRO:   Ketan Birch discussed the significance and urgency of this critical   finding by epic chat with  KARLOS BERGERON on 5/21/2025 at 10:13 pm.   (**-RCF-**) Findings:  See findings.             Signed by: Ketan Birch 5/21/2025 10:13 PM   Dictation workstation:   [id number]              XR chest 1 view   Final Result   1.  No evidence of acute cardiopulmonary process.             Signed by: Thai Cartagena 5/21/2025 11:24 PM   Dictation workstation:   [id number]      XR foot left 3+ views   Final Result   No evidence of acute fracture or dislocation. Diffuse lower leg   edema. Suspected pes cavus. Degenerative change of the tibiotalar   joint.             MACRO:   None        Signed by: Thai Cartagena 5/21/2025 11:23 PM   Dictation workstation:   [id number]      CT brain attack head wo IV contrast   Final Result   No evidence of acute cortical infarct or intracranial hemorrhage.        Senescent changes. No change from prior.   If persistent concern, consider MRI        MACRO:   Ketan Birch discussed the significance and urgency of this critical   finding by epic chat with  KARLOS BERGERON on 5/21/2025 at 10:13 pm.   (**-RCF-**) Findings:  See findings.             Signed by: Ketan Birch  5/21/2025 10:13 PM   Dictation workstation:   [id number]              ------------------------------ ED COURSE/MEDICAL DECISION MAKING----------------------  Medical Decision Making:   Exam: A medically appropriate exam performed, outlined above, given the known history and presentation.    History obtained from: Review of medical record nursing notes patient      Social Determinants of Health considered during this visit: Takes care of himself at home      PAST MEDICAL HISTORY/Chronic Conditions Affecting Care     has a past medical history of Acute cor pulmonale (Multi) (02/20/2024), Anal fissure, Angina pectoris (08/31/2023), Atherosclerotic heart disease of native coronary artery with other forms of angina pectoris (11/2/2021), Atypical chest pain (08/31/2023), Cerebral vascular accident (Multi), Cerebrovascular accident (Multi) (08/31/2023), CHF (congestive heart failure), Chronic kidney disease, stage 3 (Multi) (08/31/2023), CKD (chronic kidney disease), Clotting disorder (Multi), Deep venous thrombosis of lower extremity (08/31/2023), Facial abscess, Heart failure (08/31/2023), Hematochezia, Lymphedema (05/22/2018), Mixed hyperlipidemia (08/31/2023), Obesity, Palpitations (08/31/2023), Peripheral vascular disease (08/31/2023), Peripheral vascular disease, Presence of coronary angioplasty implant and graft (03/24/2023), Primary hypertension (05/22/2018), Pulmonary embolism (03/24/2023), Sleep apnea, Type 2 diabetes mellitus (05/22/2018), Venous insufficiency, Venous insufficiency (chronic) (peripheral) (03/24/2023), and Ventricular premature complex (08/31/2023).       CC/HPI Summary, Social Determinants of health, Records Reviewed, DDx, testing done/not done, ED Course, Reassessment, disposition considerations/shared decision making with patient, consults, disposition:   Presents with altered mental status garbled speech  Brain attack initiated  EKG, neurochecks, Zosyn, 30 mg/kg ideal weight fluids,  vancomycin, blood cultures, lactate, urine, wound culture, Tylenol, CT head, chest x-ray, foot x-ray left, APTT, CBC, CMP, PT/INR, troponin, urine    Medical Decision Making/Differential Diagnosis:  Differentials include but not limited to stroke versus TIA versus electrolyte abnormality versus anemia versus encephalopathy secondary to infection versus osteomyelitis versus cellulitis versus arrhythmia  Review  Patient presented as a brain attack NIH 0 test of skew negative Van negative.  CT of the head showed No evidence of acute cortical infarct or intracranial hemorrhage. Senescent changes. No change from prior.  Consultation to the stroke neurology team given patient's fluctuating level of consciousness leukocytosis and temperature less likely ischemic insult not a candidate for TNK or thrombectomy.  White blood cell count of 22.7 lactate normal cellulitis of the left leg with wound to the left foot wound culture obtained blood cultures obtained sepsis triggered IV fluids 30 mg/kg due to concern for fluid overload vancomycin and Zosyn cultures pending..  Hemoglobin 12 no signs of active bleeding urine is not consistent with UTI did show trace blood and glucose glucose 151 no signs of DKA.  Electrolytes unremarkable with electrolyte imbalance noted.  BUN/creatinine elevated from baseline.  Coag panel reviewed chest x-ray showed  1. No evidence of acute cardiopulmonary process.  Wound to the left foot wound culture obtained x-ray showed No evidence of acute fracture or dislocation. Diffuse lower leg  edema. Suspected pes cavus. Degenerative change of the tibiotalar joint.  EKG per attending note normal sinus rhythm no ST elevation or arrhythmia troponin 15 with no complaints of chest pain  Based on patient's clinical presentation history and symptoms consistent with cellulitis of the left leg  Concern for sepsis IV antibiotics fluids cultures pending  Leukocytosis  Acute kidney injury  Altered mental status  Open  wound to the foot  Case discussed with hospitalist team was agreed with the patient for further evaluation and treatment regular nursing for telemetry patient amenable plan amenable to admission  Patient seen evaluated with attending physician Dr. Mackey      PROCEDURES  Unless otherwise noted below, none      CONSULTS:   IP CONSULT TO NEURO TELESTROKE      ED Course as of 05/22/25 0321   Wed May 21, 2025   2213 EKG personally interpreted by me performed at 2209  Sinus rhythm with second-degree AV block type I ventricular rate 73 borderline left axis deviation no acute ischemic changes [EF]   2214 WBC(!): 22.7  Sepsis initiated.  Vancomycin Zosyn IV fluids.  Cultures wound culture obtained pictures of the left foot x-ray to evaluate for osteomyelitis [TB]   2217 Discussed case Dr Hung, stroke neurology. Given fluctuating level of consciousness, leukocytosis, and febrile temperature less likely to be an acute ischemic insult and therefore not a candidate for TNK or thrombolytic. [EF]   Thu May 22, 2025   0240 Discussed case with hospitalist team Dr. Zamora has agreed to admit the patient for further evaluation and treatment regular nursing floor telemetry [TB]   0314 I performed a sepsis reperfusion exam on Kevin Dc on 5/22/2025 3:15 AM     A targeted fluid bolus of 1914 was given, if adult patient did not receive a 30cc/kg fluid bolus, the clinical rationale is concern for fluid overload bilateral lower extremity edema    ULYSSES Arita  [TB]      ED Course User Index  [EF] Shauna Mackey DO  [TB] ULYSSES Arita         Diagnoses as of 05/22/25 0321   Cellulitis of left lower extremity   Altered mental status, unspecified altered mental status type   Open wound   Sepsis with acute renal failure without septic shock, due to unspecified organism, unspecified acute renal failure type (Multi)   Acute kidney injury         This patient has remained hemodynamically stable during their  ED course.      Critical Care: 31  Critical Care    Performed by: LIANA Arita-CNP  Authorized by: Shauna Mackey DO    Critical care provider statement:     Critical care time (minutes):  31    Critical care time was exclusive of:  Separately billable procedures and treating other patients and teaching time    Critical care was necessary to treat or prevent imminent or life-threatening deterioration of the following conditions:  Sepsis and CNS failure or compromise    Critical care was time spent personally by me on the following activities:  Blood draw for specimens, development of treatment plan with patient or surrogate, discussions with primary provider, evaluation of patient's response to treatment, examination of patient, interpretation of cardiac output measurements, obtaining history from patient or surrogate, ordering and performing treatments and interventions, ordering and review of laboratory studies, ordering and review of radiographic studies, pulse oximetry, re-evaluation of patient's condition and review of old charts      Critical care time secondary to initiation of brain attack requiring consultation to stroke neurology team advanced testing also sepsis due to febrile illness with leukocytosis cellulitis of the left leg and open room cultures pending    Counseling:  The emergency provider has spoken with the patient and discussed today’s results, in addition to providing specific details for the plan of care and counseling regarding the diagnosis and prognosis.  Questions are answered at this time and they are agreeable with the plan.         --------------------------------- IMPRESSION AND DISPOSITION ---------------------------------    IMPRESSION  1. Cellulitis of left lower extremity    2. Altered mental status, unspecified altered mental status type    3. Open wound    4. Sepsis with acute renal failure without septic shock, due to unspecified organism, unspecified acute renal  failure type (Multi)    5. Acute kidney injury        DISPOSITION  Disposition: Admit hospitalist service regular floor telemetry   patient condition is stable        NOTE: This report was transcribed using voice recognition software. Every effort was made to ensure accuracy; however, inadvertent computerized transcription errors may be present      LIANA Arita-JEANINE  05/22/25 0323

## 2025-05-22 NOTE — ASSESSMENT & PLAN NOTE
About minute episode of confused speech now resolved.  With the patient's sepsis and no symptoms, I do agree that this is likely from the infection and not true CVA  If another episode, would certainly pursue neurologic workup   

## 2025-05-22 NOTE — CONSULTS
".Reason For Consult  Acute kidney injury on top of chronic kidney disease stage III    History Of Present Illness  Kevin Dc \"Kofi\" is a 59 y.o. male who is very well-known to my practice with underlying CKD stage IIIb/IV and, morbid obesity, severe lymphedema in both lower extremities he also had a history of atherosclerotic heart disease congestive heart failure, hyperlipidemia who used to go to the lymphedema clinic however he stopped going there because he cannot afford it apparently the patient according to him was in his usual status of health until yesterday he was trying to get out of his car and he fell down he was brought into the emergency room for evaluation he was found to have severe cellulitis of his left lower extremity extending to his left groin with severe erythema and redness and pain I was asked to see the patient in consultation for worsening renal function with higher creatinine comparing to his baseline he denies any shortness of breath he denies any chest pain he denies any nausea vomiting diarrhea.     Review of Systems  Review of Systems   Constitutional:  Positive for activity change and fatigue. Negative for chills and fever.   HENT:  Negative for sinus pressure, sore throat and tinnitus.    Eyes:  Negative for photophobia and visual disturbance.   Respiratory:  Negative for apnea, cough, shortness of breath and wheezing.    Cardiovascular:  Positive for leg swelling (Mainly lymphedema). Negative for chest pain and palpitations.   Gastrointestinal:  Negative for abdominal pain, blood in stool, constipation, diarrhea, nausea, rectal pain and vomiting.   Endocrine: Negative for cold intolerance, heat intolerance, polydipsia, polyphagia and polyuria.   Genitourinary:  Negative for decreased urine volume, dysuria, frequency, hematuria and urgency.   Musculoskeletal:  Negative for arthralgias, back pain, joint swelling, myalgias and neck pain.   Skin:  Negative for color change, " pallor, rash and wound.   Neurological:  Positive for weakness. Negative for dizziness, tremors, seizures, syncope, speech difficulty, light-headedness, numbness and headaches.   Hematological:  Negative for adenopathy. Does not bruise/bleed easily.   Psychiatric/Behavioral:  Negative for confusion, hallucinations, sleep disturbance and suicidal ideas.         Past Medical History  He has a past medical history of Acute cor pulmonale (Multi) (02/20/2024), Anal fissure, Angina pectoris (08/31/2023), Atherosclerotic heart disease of native coronary artery with other forms of angina pectoris (11/2/2021), Atypical chest pain (08/31/2023), Cerebral vascular accident (Multi), Cerebrovascular accident ([facility]) (08/31/2023), CHF (congestive heart failure), Chronic kidney disease, stage 3 (Multi) (08/31/2023), CKD (chronic kidney disease), Clotting disorder (Multi), Deep venous thrombosis of lower extremity (08/31/2023), Facial abscess, Heart failure (08/31/2023), Hematochezia, Lymphedema (05/22/2018), Mixed hyperlipidemia (08/31/2023), Obesity, Palpitations (08/31/2023), Peripheral vascular disease (08/31/2023), Peripheral vascular disease, Presence of coronary angioplasty implant and graft (03/24/2023), Primary hypertension (05/22/2018), Pulmonary embolism (03/24/2023), Sleep apnea, Type 2 diabetes mellitus (05/22/2018), Venous insufficiency, Venous insufficiency (chronic) (peripheral) (03/24/2023), and Ventricular premature complex (08/31/2023).    Surgical History  He has a past surgical history that includes Cardiac catheterization; Carotid stent; and Colonoscopy.     Social History  He reports that he has never smoked. He has never been exposed to tobacco smoke. He has never used smokeless tobacco. He reports that he does not drink alcohol and does not use drugs.    Family History  Family History[1]   Current Medications[2]   Allergies  Patient has no known allergies.         Physical Exam  Physical  "Exam  Constitutional:       General: He is not in acute distress.     Appearance: He is not toxic-appearing.   HENT:      Head: Normocephalic and atraumatic.   Eyes:      Extraocular Movements: Extraocular movements intact.      Pupils: Pupils are equal, round, and reactive to light.   Neck:      Vascular: No carotid bruit.   Cardiovascular:      Rate and Rhythm: Normal rate and regular rhythm.   Pulmonary:      Effort: No respiratory distress.      Breath sounds: No stridor. No wheezing, rhonchi or rales.   Chest:      Chest wall: No tenderness.   Abdominal:      General: There is no distension.      Palpations: There is no mass.      Tenderness: There is no abdominal tenderness. There is no right CVA tenderness, left CVA tenderness or guarding.      Hernia: No hernia is present.   Musculoskeletal:         General: No swelling or tenderness.      Cervical back: No rigidity.      Right lower leg: Edema (Severe lymphedema) present.      Left lower leg: Edema (For your lymphedema so he has severe erythema and tenderness in the left lower extremity up to his left groin) present.   Lymphadenopathy:      Cervical: No cervical adenopathy.   Skin:     General: Skin is warm and dry.      Coloration: Skin is not jaundiced or pale.      Findings: No bruising or erythema.   Neurological:      General: No focal deficit present.      Mental Status: He is alert and oriented to person, place, and time.   Psychiatric:         Mood and Affect: Mood normal.         Behavior: Behavior normal.              I&O 24HR    Intake/Output Summary (Last 24 hours) at 5/22/2025 1123  Last data filed at 5/22/2025 0851  Gross per 24 hour   Intake 4550 ml   Output 950 ml   Net 3600 ml       Vitals 24HR  Heart Rate:  [54-83]   Temp:  [36.9 °C (98.4 °F)-39.3 °C (102.8 °F)]   Resp:  [12-33]   BP: (107-155)/(52-74)   Height:  [167.6 cm (5' 6\")]   Weight:  [136 kg (299 lb 13.2 oz)]   SpO2:  [89 %-99 %]     Relevant Results        Results for orders " placed or performed during the hospital encounter of 05/21/25 (from the past 96 hours)   POCT GLUCOSE   Result Value Ref Range    POCT Glucose 166 (H) 74 - 99 mg/dL   CBC and Auto Differential   Result Value Ref Range    WBC 22.7 (H) 4.4 - 11.3 x10*3/uL    nRBC 0.0 0.0 - 0.0 /100 WBCs    RBC 4.14 (L) 4.50 - 5.90 x10*6/uL    Hemoglobin 12.0 (L) 13.5 - 17.5 g/dL    Hematocrit 37.0 (L) 41.0 - 52.0 %    MCV 89 80 - 100 fL    MCH 29.0 26.0 - 34.0 pg    MCHC 32.4 32.0 - 36.0 g/dL    RDW 14.6 (H) 11.5 - 14.5 %    Platelets 221 150 - 450 x10*3/uL    Neutrophils % 91.3 40.0 - 80.0 %    Immature Granulocytes %, Automated 1.1 (H) 0.0 - 0.9 %    Lymphocytes % 3.3 13.0 - 44.0 %    Monocytes % 3.9 2.0 - 10.0 %    Eosinophils % 0.2 0.0 - 6.0 %    Basophils % 0.2 0.0 - 2.0 %    Neutrophils Absolute 20.73 (H) 1.20 - 7.70 x10*3/uL    Immature Granulocytes Absolute, Automated 0.25 0.00 - 0.70 x10*3/uL    Lymphocytes Absolute 0.74 (L) 1.20 - 4.80 x10*3/uL    Monocytes Absolute 0.89 0.10 - 1.00 x10*3/uL    Eosinophils Absolute 0.04 0.00 - 0.70 x10*3/uL    Basophils Absolute 0.05 0.00 - 0.10 x10*3/uL   Comprehensive metabolic panel   Result Value Ref Range    Glucose 151 (H) 74 - 99 mg/dL    Sodium 136 136 - 145 mmol/L    Potassium 4.5 3.5 - 5.3 mmol/L    Chloride 106 98 - 107 mmol/L    Bicarbonate 20 (L) 21 - 32 mmol/L    Anion Gap 15 10 - 20 mmol/L    Urea Nitrogen 46 (H) 6 - 23 mg/dL    Creatinine 2.64 (H) 0.50 - 1.30 mg/dL    eGFR 27 (L) >60 mL/min/1.73m*2    Calcium 8.9 8.6 - 10.3 mg/dL    Albumin 3.7 3.4 - 5.0 g/dL    Alkaline Phosphatase 71 33 - 136 U/L    Total Protein 7.2 6.4 - 8.2 g/dL    AST 13 9 - 39 U/L    Bilirubin, Total 1.0 0.0 - 1.2 mg/dL    ALT 13 10 - 52 U/L   Troponin I, High Sensitivity   Result Value Ref Range    Troponin I, High Sensitivity 15 0 - 20 ng/L   Protime-INR   Result Value Ref Range    Protime 13.0 (H) 9.3 - 12.7 seconds    INR 1.2 0.9 - 1.2   APTT   Result Value Ref Range    aPTT 33.7 (H) 22.0 - 32.5  seconds   Lactate   Result Value Ref Range    Lactate 2.0 0.4 - 2.0 mmol/L   Blood Culture    Specimen: Peripheral Venipuncture; Blood culture   Result Value Ref Range    Blood Culture Loaded on Instrument - Culture in progress    Blood Culture    Specimen: Peripheral Venipuncture; Blood culture   Result Value Ref Range    Blood Culture Loaded on Instrument - Culture in progress    ECG 12 lead   Result Value Ref Range    Ventricular Rate 73 BPM    Atrial Rate 115 BPM    QRS Duration 76 ms    QT Interval 352 ms    QTC Calculation(Bazett) 387 ms    R Axis -22 degrees    T Axis 61 degrees    QRS Count 12 beats    Q Onset 227 ms    T Offset 403 ms    QTC Fredericia 376 ms   Urinalysis with Reflex Culture and Microscopic   Result Value Ref Range    Color, Urine Light-Yellow Light-Yellow, Yellow, Dark-Yellow    Appearance, Urine Clear Clear    Specific Gravity, Urine 1.015 1.005 - 1.035    pH, Urine 6.5 5.0, 5.5, 6.0, 6.5, 7.0, 7.5, 8.0    Protein, Urine 100 (2+) (A) NEGATIVE, 10 (TRACE), 20 (TRACE) mg/dL    Glucose, Urine 70 (1+) (A) Normal mg/dL    Blood, Urine 0.2 (2+) (A) NEGATIVE mg/dL    Ketones, Urine NEGATIVE NEGATIVE mg/dL    Bilirubin, Urine NEGATIVE NEGATIVE mg/dL    Urobilinogen, Urine Normal Normal mg/dL    Nitrite, Urine NEGATIVE NEGATIVE    Leukocyte Esterase, Urine NEGATIVE NEGATIVE   Extra Urine Gray Tube   Result Value Ref Range    Extra Tube Hold for add-ons.    Urinalysis Microscopic   Result Value Ref Range    WBC, Urine 1-5 1-5, NONE /HPF    RBC, Urine 11-20 (A) NONE, 1-2, 3-5 /HPF    Bacteria, Urine 1+ (A) NONE SEEN /HPF   POCT GLUCOSE   Result Value Ref Range    POCT Glucose 178 (H) 74 - 99 mg/dL   CBC and Auto Differential   Result Value Ref Range    WBC 14.7 (H) 4.4 - 11.3 x10*3/uL    nRBC 0.0 0.0 - 0.0 /100 WBCs    RBC 4.03 (L) 4.50 - 5.90 x10*6/uL    Hemoglobin 11.8 (L) 13.5 - 17.5 g/dL    Hematocrit 36.5 (L) 41.0 - 52.0 %    MCV 91 80 - 100 fL    MCH 29.3 26.0 - 34.0 pg    MCHC 32.3 32.0 -  36.0 g/dL    RDW 14.6 (H) 11.5 - 14.5 %    Platelets 173 150 - 450 x10*3/uL    Neutrophils % 95.7 40.0 - 80.0 %    Immature Granulocytes %, Automated 0.3 0.0 - 0.9 %    Lymphocytes % 2.3 13.0 - 44.0 %    Monocytes % 1.5 2.0 - 10.0 %    Eosinophils % 0.1 0.0 - 6.0 %    Basophils % 0.1 0.0 - 2.0 %    Neutrophils Absolute 14.06 (H) 1.20 - 7.70 x10*3/uL    Immature Granulocytes Absolute, Automated 0.05 0.00 - 0.70 x10*3/uL    Lymphocytes Absolute 0.34 (L) 1.20 - 4.80 x10*3/uL    Monocytes Absolute 0.22 0.10 - 1.00 x10*3/uL    Eosinophils Absolute 0.02 0.00 - 0.70 x10*3/uL    Basophils Absolute 0.02 0.00 - 0.10 x10*3/uL   Vancomycin   Result Value Ref Range    Vancomycin 22.9 (H) 5.0 - 20.0 ug/mL   Creatine Kinase   Result Value Ref Range    Creatine Kinase 89 0 - 325 U/L   Comprehensive metabolic panel   Result Value Ref Range    Glucose 162 (H) 74 - 99 mg/dL    Sodium 137 136 - 145 mmol/L    Potassium 4.1 3.5 - 5.3 mmol/L    Chloride 108 (H) 98 - 107 mmol/L    Bicarbonate 18 (L) 21 - 32 mmol/L    Anion Gap 15 10 - 20 mmol/L    Urea Nitrogen 45 (H) 6 - 23 mg/dL    Creatinine 2.58 (H) 0.50 - 1.30 mg/dL    eGFR 28 (L) >60 mL/min/1.73m*2    Calcium 8.3 (L) 8.6 - 10.3 mg/dL    Albumin 3.2 (L) 3.4 - 5.0 g/dL    Alkaline Phosphatase 60 33 - 136 U/L    Total Protein 6.4 6.4 - 8.2 g/dL    AST 13 9 - 39 U/L    Bilirubin, Total 1.2 0.0 - 1.2 mg/dL    ALT 12 10 - 52 U/L   POCT GLUCOSE   Result Value Ref Range    POCT Glucose 171 (H) 74 - 99 mg/dL          Assessment/Plan     Imaging  XR chest 1 view  Result Date: 5/21/2025  1.  No evidence of acute cardiopulmonary process.     Signed by: Thai Cartagena 5/21/2025 11:24 PM Dictation workstation:   [id number]    XR foot left 3+ views  Result Date: 5/21/2025  No evidence of acute fracture or dislocation. Diffuse lower leg edema. Suspected pes cavus. Degenerative change of the tibiotalar joint.     MACRO: None   Signed by: Thai Cartagena 5/21/2025 11:23 PM Dictation workstation:    [id number]    CT brain attack head wo IV contrast  Result Date: 5/21/2025  No evidence of acute cortical infarct or intracranial hemorrhage.   Senescent changes. No change from prior. If persistent concern, consider MRI   MACRO: Ketan Birch discussed the significance and urgency of this critical finding by epic chat with  KARLOS BERGERON on 5/21/2025 at 10:13 pm. (**-RCF-**) Findings:  See findings.     Signed by: Ketan Birch 5/21/2025 10:13 PM Dictation workstation:   [id number]      Cardiology, Vascular, and Other Imaging  ECG 12 lead  Result Date: 5/22/2025  Sinus tachycardia with 2nd degree AV block (Mobitz I) with Premature supraventricular complexes Septal infarct , age undetermined Abnormal ECG When compared with ECG of 18-JUL-2024 08:46, Premature supraventricular complexes are now Present Sinus rhythm is now with 2nd degree AV block (Mobitz I) Criteria for Inferior infarct are no longer Present    Assessment:  Acute kidney injury superimposed on chronic kidney  secondary to current infection and prerenal etiology  Chronic disease stage IIIb  Cellulitis left lower extremity  Lymphedema in both lower extremities  Mild anemia chronic kidney disease  Obesity  Coronary artery disease  Sleep apnea  Hyperlipidemia  Hypertension  Metabolic acidosis  Secondary hyperparathyroidism    Plan:  I believe the dosage of torsemide the patient is on is adequate at this time I do not believe he needs any further diuresis  I will hold however the angiotensin receptor antagonist at this time until his renal function is better  With IV antibiotic therapy  Continue sodium bicarbonate tablets  There is no indication for dialysis therapy at this time    Thank you very much for your consultation    Hernán Chance, MDConsults       [1]   Family History  Problem Relation Name Age of Onset    Heart disease Father      Other (double bypass with valve replacement) Father     [2]   Current Facility-Administered Medications:      acetaminophen (Tylenol) tablet 650 mg, 650 mg, oral, q4h PRN **OR** acetaminophen (Tylenol) oral liquid 650 mg, 650 mg, nasogastric tube, q4h PRN **OR** acetaminophen (Tylenol) suppository 650 mg, 650 mg, rectal, q4h PRN, Be Zamora DO    allopurinol (Zyloprim) tablet 150 mg, 150 mg, oral, Daily, Be Zamora, DO, 150 mg at 05/22/25 0840    apixaban (Eliquis) tablet 5 mg, 5 mg, oral, BID, Be Zamora, DO, 5 mg at 05/22/25 0840    atorvastatin (Lipitor) tablet 80 mg, 80 mg, oral, Daily, Be Zaomra, DO, 80 mg at 05/22/25 0840    benzocaine-menthol (Cepastat Sore Throat) lozenge 1 lozenge, 1 lozenge, Mouth/Throat, q2h PRN, Be Zamora DO    calcitriol (Rocaltrol) capsule 0.5 mcg, 0.5 mcg, oral, Daily, Be Zamora, DO, 0.5 mcg at 05/22/25 0840    clopidogrel (Plavix) tablet 75 mg, 75 mg, oral, Daily, Be Zamora DO, 75 mg at 05/22/25 0840    DAPTOmycin (Cubicin) 500 mg in sodium chloride 0.9% IV 50 mL, 500 mg, intravenous, q24h, Loly Antonio, APRN-CNP    [START ON 5/23/2025] dilTIAZem CD (Cardizem CD) 24 hr capsule 120 mg, 120 mg, oral, Daily, John Pack MD    insulin glargine (Lantus) injection 110 Units, 110 Units, subcutaneous, Nightly, Be Zamora DO    insulin lispro injection 38 Units, 38 Units, subcutaneous, TID AC, Be Zamora DO, 38 Units at 05/22/25 0840    [START ON 5/23/2025] isosorbide mononitrate ER (Imdur) 24 hr tablet 30 mg, 30 mg, oral, Daily, John Pack MD    losartan (Cozaar) tablet 50 mg, 50 mg, oral, Daily, Be Zamora DO, 50 mg at 05/22/25 0840    melatonin tablet 3 mg, 3 mg, oral, Nightly PRN, Be Zamora DO    meropenem (Merrem) 1 g in sodium chloride 0.9%  mL, 1 g, intravenous, q12h, Loly Antonio, APRN-CNP, Last Rate: 200 mL/hr at 05/22/25 0959, 1 g at 05/22/25 0959    ondansetron ODT (Zofran-ODT) disintegrating tablet 4 mg, 4 mg, oral, q8h PRN **OR** ondansetron (Zofran) injection 4 mg, 4 mg,  intravenous, q8h PRN, Be Zamora DO    propranolol (Inderal) tablet 60 mg, 60 mg, oral, Daily, Be Zamora DO    sodium bicarbonate tablet 650 mg, 650 mg, oral, 4x daily, Be Zamora DO, 650 mg at 05/22/25 0840    torsemide (Demadex) tablet 50 mg, 50 mg, oral, Every other day, Be Zamora DO, 50 mg at 05/22/25 0840

## 2025-05-23 ENCOUNTER — APPOINTMENT (OUTPATIENT)
Dept: CARDIOLOGY | Facility: HOSPITAL | Age: 60
DRG: 871 | End: 2025-05-23
Payer: MEDICARE

## 2025-05-23 LAB
ALBUMIN SERPL BCP-MCNC: 2.9 G/DL (ref 3.4–5)
ANION GAP SERPL CALCULATED.3IONS-SCNC: 12 MMOL/L (ref 10–20)
BUN SERPL-MCNC: 46 MG/DL (ref 6–23)
CALCIUM SERPL-MCNC: 8.4 MG/DL (ref 8.6–10.3)
CHLORIDE SERPL-SCNC: 109 MMOL/L (ref 98–107)
CO2 SERPL-SCNC: 20 MMOL/L (ref 21–32)
CREAT SERPL-MCNC: 2.73 MG/DL (ref 0.5–1.3)
EGFRCR SERPLBLD CKD-EPI 2021: 26 ML/MIN/1.73M*2
ERYTHROCYTE [DISTWIDTH] IN BLOOD BY AUTOMATED COUNT: 14.7 % (ref 11.5–14.5)
GLUCOSE BLD MANUAL STRIP-MCNC: 109 MG/DL (ref 74–99)
GLUCOSE BLD MANUAL STRIP-MCNC: 131 MG/DL (ref 74–99)
GLUCOSE BLD MANUAL STRIP-MCNC: 138 MG/DL (ref 74–99)
GLUCOSE BLD MANUAL STRIP-MCNC: 150 MG/DL (ref 74–99)
GLUCOSE SERPL-MCNC: 97 MG/DL (ref 74–99)
HCT VFR BLD AUTO: 32.3 % (ref 41–52)
HGB BLD-MCNC: 10.6 G/DL (ref 13.5–17.5)
MCH RBC QN AUTO: 29.4 PG (ref 26–34)
MCHC RBC AUTO-ENTMCNC: 32.8 G/DL (ref 32–36)
MCV RBC AUTO: 90 FL (ref 80–100)
NRBC BLD-RTO: 0 /100 WBCS (ref 0–0)
PHOSPHATE SERPL-MCNC: 3.4 MG/DL (ref 2.5–4.9)
PLATELET # BLD AUTO: 157 X10*3/UL (ref 150–450)
POTASSIUM SERPL-SCNC: 3.8 MMOL/L (ref 3.5–5.3)
RBC # BLD AUTO: 3.6 X10*6/UL (ref 4.5–5.9)
SODIUM SERPL-SCNC: 137 MMOL/L (ref 136–145)
WBC # BLD AUTO: 9.8 X10*3/UL (ref 4.4–11.3)

## 2025-05-23 PROCEDURE — 85027 COMPLETE CBC AUTOMATED: CPT | Performed by: INTERNAL MEDICINE

## 2025-05-23 PROCEDURE — 2500000002 HC RX 250 W HCPCS SELF ADMINISTERED DRUGS (ALT 637 FOR MEDICARE OP, ALT 636 FOR OP/ED): Performed by: INTERNAL MEDICINE

## 2025-05-23 PROCEDURE — 2500000001 HC RX 250 WO HCPCS SELF ADMINISTERED DRUGS (ALT 637 FOR MEDICARE OP): Performed by: INTERNAL MEDICINE

## 2025-05-23 PROCEDURE — 2500000004 HC RX 250 GENERAL PHARMACY W/ HCPCS (ALT 636 FOR OP/ED): Performed by: INTERNAL MEDICINE

## 2025-05-23 PROCEDURE — 1200000002 HC GENERAL ROOM WITH TELEMETRY DAILY

## 2025-05-23 PROCEDURE — 97535 SELF CARE MNGMENT TRAINING: CPT | Mod: GO,CO

## 2025-05-23 PROCEDURE — 36415 COLL VENOUS BLD VENIPUNCTURE: CPT | Performed by: NURSE PRACTITIONER

## 2025-05-23 PROCEDURE — 2500000004 HC RX 250 GENERAL PHARMACY W/ HCPCS (ALT 636 FOR OP/ED): Mod: JZ | Performed by: NURSE PRACTITIONER

## 2025-05-23 PROCEDURE — 36415 COLL VENOUS BLD VENIPUNCTURE: CPT | Performed by: INTERNAL MEDICINE

## 2025-05-23 PROCEDURE — 93005 ELECTROCARDIOGRAM TRACING: CPT

## 2025-05-23 PROCEDURE — 93010 ELECTROCARDIOGRAM REPORT: CPT | Performed by: INTERNAL MEDICINE

## 2025-05-23 PROCEDURE — 87040 BLOOD CULTURE FOR BACTERIA: CPT | Mod: WESLAB | Performed by: NURSE PRACTITIONER

## 2025-05-23 PROCEDURE — 82947 ASSAY GLUCOSE BLOOD QUANT: CPT

## 2025-05-23 PROCEDURE — 99232 SBSQ HOSP IP/OBS MODERATE 35: CPT | Performed by: INTERNAL MEDICINE

## 2025-05-23 PROCEDURE — 97116 GAIT TRAINING THERAPY: CPT | Mod: GP,CQ

## 2025-05-23 PROCEDURE — 80069 RENAL FUNCTION PANEL: CPT | Performed by: INTERNAL MEDICINE

## 2025-05-23 RX ORDER — INSULIN LISPRO 100 [IU]/ML
0-15 INJECTION, SOLUTION INTRAVENOUS; SUBCUTANEOUS
Status: ACTIVE | OUTPATIENT
Start: 2025-05-23

## 2025-05-23 RX ORDER — DEXTROSE 50 % IN WATER (D50W) INTRAVENOUS SYRINGE
25
Status: ACTIVE | OUTPATIENT
Start: 2025-05-23

## 2025-05-23 RX ORDER — DAPTOMYCIN IN SODIUM CHLORIDE 350 MG/50ML
4 INJECTION, SOLUTION INTRAVENOUS EVERY 24 HOURS
Status: DISPENSED | OUTPATIENT
Start: 2025-05-24 | End: 2026-05-24

## 2025-05-23 RX ORDER — INSULIN GLARGINE 100 [IU]/ML
20 INJECTION, SOLUTION SUBCUTANEOUS NIGHTLY
Status: DISPENSED | OUTPATIENT
Start: 2025-05-23

## 2025-05-23 RX ORDER — DEXTROSE 50 % IN WATER (D50W) INTRAVENOUS SYRINGE
12.5
Status: ACTIVE | OUTPATIENT
Start: 2025-05-23

## 2025-05-23 RX ORDER — INSULIN GLARGINE 100 [IU]/ML
50 INJECTION, SOLUTION SUBCUTANEOUS NIGHTLY
Status: DISCONTINUED | OUTPATIENT
Start: 2025-05-23 | End: 2025-05-23

## 2025-05-23 RX ADMIN — MEROPENEM 1 G: 1 INJECTION, POWDER, FOR SOLUTION INTRAVENOUS at 09:01

## 2025-05-23 RX ADMIN — DAPTOMYCIN IN SODIUM CHLORIDE 500 MG: 500 INJECTION, SOLUTION INTRAVENOUS at 14:36

## 2025-05-23 RX ADMIN — ALLOPURINOL 150 MG: 300 TABLET ORAL at 09:01

## 2025-05-23 RX ADMIN — SODIUM BICARBONATE 650 MG: 650 TABLET ORAL at 17:25

## 2025-05-23 RX ADMIN — DILTIAZEM HYDROCHLORIDE 120 MG: 120 CAPSULE, EXTENDED RELEASE ORAL at 09:00

## 2025-05-23 RX ADMIN — CLOPIDOGREL 75 MG: 75 TABLET ORAL at 09:01

## 2025-05-23 RX ADMIN — INSULIN GLARGINE 20 UNITS: 100 INJECTION, SOLUTION SUBCUTANEOUS at 21:51

## 2025-05-23 RX ADMIN — ISOSORBIDE MONONITRATE 30 MG: 30 TABLET, EXTENDED RELEASE ORAL at 09:01

## 2025-05-23 RX ADMIN — CALCITRIOL CAPSULES 0.25 MCG 0.5 MCG: 0.25 CAPSULE ORAL at 09:01

## 2025-05-23 RX ADMIN — SODIUM BICARBONATE 650 MG: 650 TABLET ORAL at 21:13

## 2025-05-23 RX ADMIN — APIXABAN 5 MG: 5 TABLET, FILM COATED ORAL at 21:13

## 2025-05-23 RX ADMIN — PROPRANOLOL HYDROCHLORIDE 60 MG: 20 TABLET ORAL at 07:37

## 2025-05-23 RX ADMIN — SODIUM BICARBONATE 650 MG: 650 TABLET ORAL at 14:36

## 2025-05-23 RX ADMIN — SODIUM BICARBONATE 650 MG: 650 TABLET ORAL at 07:38

## 2025-05-23 RX ADMIN — APIXABAN 5 MG: 5 TABLET, FILM COATED ORAL at 09:01

## 2025-05-23 RX ADMIN — ATORVASTATIN CALCIUM 80 MG: 80 TABLET, FILM COATED ORAL at 09:01

## 2025-05-23 RX ADMIN — MEROPENEM 1 G: 1 INJECTION, POWDER, FOR SOLUTION INTRAVENOUS at 21:13

## 2025-05-23 SDOH — SOCIAL STABILITY: SOCIAL INSECURITY: ARE YOU OR HAVE YOU BEEN THREATENED OR ABUSED PHYSICALLY, EMOTIONALLY, OR SEXUALLY BY ANYONE?: NO

## 2025-05-23 SDOH — SOCIAL STABILITY: SOCIAL INSECURITY: DO YOU FEEL ANYONE HAS EXPLOITED OR TAKEN ADVANTAGE OF YOU FINANCIALLY OR OF YOUR PERSONAL PROPERTY?: NO

## 2025-05-23 SDOH — SOCIAL STABILITY: SOCIAL INSECURITY: DOES ANYONE TRY TO KEEP YOU FROM HAVING/CONTACTING OTHER FRIENDS OR DOING THINGS OUTSIDE YOUR HOME?: NO

## 2025-05-23 SDOH — SOCIAL STABILITY: SOCIAL INSECURITY: ABUSE: ADULT

## 2025-05-23 SDOH — SOCIAL STABILITY: SOCIAL INSECURITY: HAVE YOU HAD THOUGHTS OF HARMING ANYONE ELSE?: YES

## 2025-05-23 SDOH — SOCIAL STABILITY: SOCIAL INSECURITY: HAVE YOU HAD ANY THOUGHTS OF HARMING ANYONE ELSE?: NO

## 2025-05-23 SDOH — SOCIAL STABILITY: SOCIAL INSECURITY: HAS ANYONE EVER THREATENED TO HURT YOUR FAMILY OR YOUR PETS?: NO

## 2025-05-23 SDOH — SOCIAL STABILITY: SOCIAL INSECURITY: ARE THERE ANY APPARENT SIGNS OF INJURIES/BEHAVIORS THAT COULD BE RELATED TO ABUSE/NEGLECT?: NO

## 2025-05-23 SDOH — SOCIAL STABILITY: SOCIAL INSECURITY: DO YOU FEEL UNSAFE GOING BACK TO THE PLACE WHERE YOU ARE LIVING?: NO

## 2025-05-23 ASSESSMENT — COGNITIVE AND FUNCTIONAL STATUS - GENERAL
MOBILITY SCORE: 18
DRESSING REGULAR LOWER BODY CLOTHING: A LOT
PERSONAL GROOMING: A LITTLE
TOILETING: A LITTLE
DAILY ACTIVITIY SCORE: 18
TURNING FROM BACK TO SIDE WHILE IN FLAT BAD: A LITTLE
MOBILITY SCORE: 24
DAILY ACTIVITIY SCORE: 24
STANDING UP FROM CHAIR USING ARMS: A LITTLE
DAILY ACTIVITIY SCORE: 24
PATIENT BASELINE BEDBOUND: NO
MOBILITY SCORE: 24
MOVING FROM LYING ON BACK TO SITTING ON SIDE OF FLAT BED WITH BEDRAILS: A LITTLE
CLIMB 3 TO 5 STEPS WITH RAILING: A LITTLE
MOBILITY SCORE: 24
HELP NEEDED FOR BATHING: A LITTLE
MOVING TO AND FROM BED TO CHAIR: A LITTLE
DAILY ACTIVITIY SCORE: 24
WALKING IN HOSPITAL ROOM: A LITTLE
DRESSING REGULAR UPPER BODY CLOTHING: A LITTLE

## 2025-05-23 ASSESSMENT — PATIENT HEALTH QUESTIONNAIRE - PHQ9
SUM OF ALL RESPONSES TO PHQ9 QUESTIONS 1 & 2: 0
2. FEELING DOWN, DEPRESSED OR HOPELESS: NOT AT ALL
1. LITTLE INTEREST OR PLEASURE IN DOING THINGS: NOT AT ALL

## 2025-05-23 ASSESSMENT — LIFESTYLE VARIABLES
PRESCIPTION_ABUSE_PAST_12_MONTHS: NO
AUDIT-C TOTAL SCORE: 0
HOW OFTEN DO YOU HAVE A DRINK CONTAINING ALCOHOL: NEVER
SKIP TO QUESTIONS 9-10: 1
HOW MANY STANDARD DRINKS CONTAINING ALCOHOL DO YOU HAVE ON A TYPICAL DAY: PATIENT DOES NOT DRINK
HOW OFTEN DO YOU HAVE 6 OR MORE DRINKS ON ONE OCCASION: NEVER
SUBSTANCE_ABUSE_PAST_12_MONTHS: NO
AUDIT-C TOTAL SCORE: 0

## 2025-05-23 ASSESSMENT — PAIN - FUNCTIONAL ASSESSMENT
PAIN_FUNCTIONAL_ASSESSMENT: 0-10
PAIN_FUNCTIONAL_ASSESSMENT: 0-10

## 2025-05-23 ASSESSMENT — ACTIVITIES OF DAILY LIVING (ADL)
HEARING - RIGHT EAR: FUNCTIONAL
GROOMING: INDEPENDENT
HEARING - LEFT EAR: FUNCTIONAL
FEEDING YOURSELF: INDEPENDENT
DRESSING YOURSELF: INDEPENDENT
PATIENT'S MEMORY ADEQUATE TO SAFELY COMPLETE DAILY ACTIVITIES?: YES
TOILETING: INDEPENDENT
HOME_MANAGEMENT_TIME_ENTRY: 18
JUDGMENT_ADEQUATE_SAFELY_COMPLETE_DAILY_ACTIVITIES: YES
ADEQUATE_TO_COMPLETE_ADL: YES
WALKS IN HOME: INDEPENDENT
ASSISTIVE_DEVICE: OTHER (COMMENT);WALKER
BATHING: INDEPENDENT

## 2025-05-23 ASSESSMENT — PAIN SCALES - GENERAL
PAINLEVEL_OUTOF10: 0 - NO PAIN

## 2025-05-23 ASSESSMENT — COLUMBIA-SUICIDE SEVERITY RATING SCALE - C-SSRS
6. HAVE YOU EVER DONE ANYTHING, STARTED TO DO ANYTHING, OR PREPARED TO DO ANYTHING TO END YOUR LIFE?: NO
1. IN THE PAST MONTH, HAVE YOU WISHED YOU WERE DEAD OR WISHED YOU COULD GO TO SLEEP AND NOT WAKE UP?: NO
2. HAVE YOU ACTUALLY HAD ANY THOUGHTS OF KILLING YOURSELF?: NO

## 2025-05-23 NOTE — PROGRESS NOTES
"Kevin Dc \"Monalisa" is a 59 y.o. male on day 1 of admission presenting with Cellulitis of left lower extremity.    Subjective   Feeling fine.  Resting comfortably in bed.  Denies constitutional symptoms.       Physical Exam    Objective     REVIEW OF SYSTEMS  GENERAL:  Negative for malaise, significant weight loss, fever  HEENT:  No changes in hearing or vision, no nose bleeds or other nasal problems and Negative for frequent or significant headaches  NECK:  Negative for lumps, goiter, pain and significant neck swelling  RESPIRATORY:  Negative for cough, wheezing and shortness of breath  CARDIOVASCULAR:  Negative for chest pain, leg swelling and palpitations  GI:  Negative for abdominal discomfort, blood in stools or black stools and change in bowel habits  :  Negative for dysuria, frequency and incontinence  MUSCULOSKELETAL:  Negative for joint pain or swelling, back pain, and muscle pain.  SKIN: Positive for cellulitis bilateral lower legs.  Ulcer left plantar foot.  PSYCH:  Negative for sleep disturbance, mood disorder and recent psychosocial stressors  HEMATOLOGY/LYMPHOLOGY:  Negative for prolonged bleeding, bruising easily, and swollen nodes.  ENDOCRINE:  Negative for cold or heat intolerance, polyuria, polydipsia and goiter  NEURO: Negative, denies any burning, tingling or numbness      Objective:   Vasc: DP and PT pulse unable to palpate secondary to edema.  CFT less than 3 seconds.  Massive brawny edema 4+ bilateral lower extremity and foot.     Neuro:  Light touch is intact to the foot bilateral.       Derm: Massive brawny edema to lower extremity bilaterally.  4+ pitting edema.  There is a venous stasis dermatitis and venous stasis changes with hyperkeratotic tissue bilateral lower extremities.  There is evidence of cellulitis and erythema bilateral lower legs from the knee to the ankle.  There is a chronic ulcer deep to fat layer on the plantar lateral forefoot measuring approximately 2.0 x 1.0 cm " "with a depth of 0.2 cm.  Fibrotic base.  There is no surrounding erythema.  No purulence.  No drainage.  Clinically does not appear infected.  It is not connected or associated with the cellulitis to the leg.  No ascending cellulitis at all from here.     Ortho: He does have all 10 toes.      Last Recorded Vitals  Blood pressure 131/52, pulse 51, temperature 36.6 °C (97.9 °F), temperature source Oral, resp. rate 17, height 1.676 m (5' 6\"), weight 136 kg (299 lb 13.2 oz), SpO2 99%.    Intake/Output last 3 Shifts:  I/O last 3 completed shifts:  In: 4900 (36 mL/kg) [P.O.:422; IV Piggyback:4478]  Out: 1875 (13.8 mL/kg) [Urine:1875 (0.4 mL/kg/hr)]  Weight: 136 kg     Relevant Results       Leukocytosis on admission: 22.7     Blood culture 5/21: Positive for gram-negative bacilli  Wound culture 5/21: Positive for gram-positive and gram-negative bacteria-preliminary-preliminary     CT with contrast: No evidence of osteomyelitis, infection deep to the bone.  No evidence of abscess.    Assessment & Plan  Cellulitis of left lower extremity    Sepsis (Multi)    Altered mental status    Type 2 diabetes mellitus, with long-term current use of insulin    1.  Left lower leg cellulitis  2.  Right lower leg cellulitis  3.  Left plantar foot chronic ulcer to fat layer  4.  Bilateral lower leg chronic lymphedema    CT negative for abscess or osteomyelitis.  Will treat conservatively-antibiotics per ID recommendation, compression therapy to the legs.  No further plans for intervention per podiatry.  Would be fine for discharge when medically stable.    Barrett Causey DPM    I spent 35 minutes in the professional and overall care of this patient.  "

## 2025-05-23 NOTE — PROGRESS NOTES
"Kevin Dc \"Monalisa" is a 59 y.o. male on day 1 of admission presenting with Cellulitis of left lower extremity.      Subjective   Treating for left leg cellulitis in the setting of impressive bilateral lymphedema and a significant heel ulcer.  Blood cultures growing gram-negative rods.  Wound culture growing staph.  On appropriate antibiotics.  We are adjusting his meds to try to deal with his combination of kidney dysfunction and fluid retention.  Heel  debrided by podiatry yesterday.  Says he feels much better than yesterday.       Objective   LURD oriented undistressed  Heart regular rate and rhythm  Lungs clear to auscultation  Abdomen soft nontender nondistended  Extremities massive bilateral edema erythema of left leg expect extending all the way up his thigh.  Last Recorded Vitals  /57 (BP Location: Right arm, Patient Position: Lying)   Pulse 76   Temp 36.6 °C (97.9 °F) (Oral)   Resp 18   Wt 136 kg (299 lb 13.2 oz)   SpO2 96%   Intake/Output last 3 Shifts:    Intake/Output Summary (Last 24 hours) at 5/23/2025 1403  Last data filed at 5/23/2025 0933  Gross per 24 hour   Intake 150 ml   Output 925 ml   Net -775 ml       Admission Weight  Weight: 136 kg (299 lb 13.2 oz) (05/21/25 2216)    Daily Weight  05/21/25 : 136 kg (299 lb 13.2 oz)    Image Results  CT foot left wo IV contrast  Narrative: Interpreted By:  Eliu Alex,   STUDY:  CT FOOT LEFT WO IV CONTRAST; ;  5/22/2025 9:22 pm      INDICATION:  Signs/Symptoms:Left foot cellulitis/ ulcer. Assess for plantar foot  abscess/osteomyelitis.          COMPARISON:  Plain film radiographs May 21, 2025 left foot      ACCESSION NUMBER(S):  [id number]      ORDERING CLINICIAN:  CORNEL DANIEL      TECHNIQUE:  Multiple thin-section axial images of the left foot without contrast.      FINDINGS:  There is ankylosis at the subtalar joint which could be postsurgical  or related to underlying arthropathy.      Advanced osteoarthritis of the midfoot and " ankle.      No evidence of fracture.      No acute erosive bony changes.      Within limited CT parameters there is no evidence of osteomyelitis.      There is a suggested skin wound at the lateral forefoot near the 4th  and 5th metatarsophalangeal joints with skin thickening.      No evidence of a discrete focal fluid collection or adjacent osseous  abnormality.      The tibia and fibula distally pole show some thick periosteal type  reaction which favored to be related to vascular changes.      There is extensive subcutaneous edema and skin thickening which is  marked especially prominent at the calf.      No evidence of a soft tissue mass.      Impression: Small plantar skin wound suggested at the lateral forefoot and 4th  and 5th metatarsophalangeal joints with skin thickening edema no  evidence of a discrete fluid collection or adjacent osteomyelitis.      Marked subcutaneous edema about the entirety short structures  particularly in the calf which could be related to cellulitis,  chronic stasis, or lymphedema.      Advanced arthritic changes as detailed above without acute osseous  abnormality.          MACRO:  None      Signed by: Eliu Alex 5/23/2025 6:12 AM  Dictation workstation:   [id number]                     Assessment & Plan  Cellulitis of left lower extremity  As mentioned above he has blood cultures growing gram-negative rods and wound cultures growing staph so I suppose it is a good thing he is on both meropenem and daptomycin.  Sepsis (Multi)  As above  Altered mental status  This is resolved  Type 2 diabetes mellitus, with long-term current use of insulin  We gave him the equivalent of his home insulin regimen and his blood sugars have gone repeatedly low.  Will have to cut back significantly on his insulin while he is here.      Edema and chronic renal insufficiency.-His blood pressure is a bit on the low side.  I do not think the isosorbide mononitrate is serving a purpose right now.  I am  stopping it.             John Pack MD

## 2025-05-23 NOTE — CARE PLAN
The patient's goals for the shift include      The clinical goals for the shift include determine podiatries goals of care and wound care

## 2025-05-23 NOTE — CARE PLAN
"The patient's goals for the shift include  \"follow the orders from the doctor for my medications. Monitor my blood sugar\"    The clinical goals for the shift include antibiotic therapy, ensure pt gets ordered imaging, wound care, podiatry consult     Over the shift, the patient did not make progress toward the following goals.     -pt refused overnight lantus dure to being down at CT during scheduled time. Pt says he takes his lantus before dinner. Lantus timing to be discussed with team today.      Problem: Safety - Adult  Goal: Free from fall injury  Outcome: Progressing     Problem: Chronic Conditions and Co-morbidities  Goal: Patient's chronic conditions and co-morbidity symptoms are monitored and maintained or improved  Outcome: Progressing     Problem: Diabetes  Goal: Achieve decreasing blood glucose levels by end of shift  Outcome: Progressing  Goal: Increase stability of blood glucose readings by end of shift  Outcome: Progressing  Goal: Decrease in ketones present in urine by end of shift  Outcome: Progressing  Goal: Maintain electrolyte levels within acceptable range throughout shift  Outcome: Progressing  Goal: Maintain glucose levels >70mg/dl to <250mg/dl throughout shift  Outcome: Progressing  Goal: No changes in neurological exam by end of shift  Outcome: Progressing  Goal: Learn about and adhere to nutrition recommendations by end of shift  Outcome: Progressing  Goal: Vital signs within normal range for age by end of shift  Outcome: Progressing  Goal: Increase self care and/or family involovement by end of shift  Outcome: Progressing  Goal: Receive DSME education by end of shift  Outcome: Progressing     "

## 2025-05-23 NOTE — PROGRESS NOTES
05/23/25 1547   Discharge Planning   Living Arrangements Alone  (PCP per face sheet. Brother is EC per facesheet but cannot provide consistent oversight. Pt has steps with railing, no use of dme at home.)   Support Systems Friends/neighbors;Family members   Assistance Needed none   Type of Residence Private residence   Number of Stairs to Enter Residence 3   Number of Stairs Within Residence 0   Do you have animals or pets at home? No   Who is requesting discharge planning? Provider   Home or Post Acute Services In home services;Other (Comment)  (Amenable to [facility], UNclear of wound care plan and or need for antibiotics at discharge. CM is not sure patient can reach the wound. Pt remains medically active, Would need Internal referral for home care and specific wound care orders and follow up)   Type of Home Care Services Home nursing visits;Home PT   Expected Discharge Disposition Home  (Home w HC vs alternate plan TBD pending medical plan)   Does the patient need discharge transport arranged? No     Cm met with patient. Pt amenable to HC pending wound care plan and medical plan. Will need internal referral   YASMINE +1

## 2025-05-23 NOTE — ASSESSMENT & PLAN NOTE
We gave him the equivalent of his home insulin regimen and his blood sugars have gone repeatedly low.  Will have to cut back significantly on his insulin while he is here.      Edema and chronic renal insufficiency.-His blood pressure is a bit on the low side.  I do not think the isosorbide mononitrate is serving a purpose right now.  I am stopping it.

## 2025-05-23 NOTE — ASSESSMENT & PLAN NOTE
As mentioned above he has blood cultures growing gram-negative rods and wound cultures growing staph so I suppose it is a good thing he is on both meropenem and daptomycin.

## 2025-05-23 NOTE — PROGRESS NOTES
"Occupational Therapy    OT Treatment    Patient Name: Kevin Dc \"Kofi\"  MRN: [id number]  Department: [facility]  Room: [address] [place] [address]  Today's Date: 5/23/2025  Time Calculation  Start Time: 1320  Stop Time: 1338  Time Calculation (min): 18 min        Assessment:  OT Assessment: Tolerated session fairly, demonstrating progression towards POC with minimal functional tasks completed this date as pt states he is awaiting a dressing change for his LLE. Pt would benefit from continued skilled OT services to improve strength, balance, and functional tolerance to increase independence with ADL tasks  Prognosis: Good  Barriers to Discharge Home: No anticipated barriers  Evaluation/Treatment Tolerance: Patient tolerated treatment well  End of Session Communication: Bedside nurse  End of Session Patient Position: Up in chair, Alarm on  OT Assessment Results: Decreased ADL status, Decreased endurance, Decreased functional mobility, Decreased IADLs  Prognosis: Good  Evaluation/Treatment Tolerance: Patient tolerated treatment well  Plan:  Treatment Interventions: ADL retraining, Functional transfer training, Patient/family training, Equipment evaluation/education, Compensatory technique education  OT Frequency: 3 times per week  OT Discharge Recommendations: Low intensity level of continued care  OT Recommended Transfer Status: Assist of 1  OT - OK to Discharge: Yes  Treatment Interventions: ADL retraining, Functional transfer training, Patient/family training, Equipment evaluation/education, Compensatory technique education    Subjective   OT Visit Info:  OT Received On: 05/23/25  General Visit Info:  General  Reason for Referral: impaired ADLs; admitted for  fall and left lower extremity erythema  Past Medical History Relevant to Rehab: CAD, DVT/PE, obstructive sleep apnea, hyperlipidemia, venous insufficiency, DM-II, heart failure, CKD-3a, coronary artery stent placement, carotid stent placement  Prior to Session Communication: " Bedside nurse  Patient Position Received: Bed, 3 rail up, Alarm off, not on at start of session  General Comment: Cleared for therapy per RN. Pt long seated in bed upon arrival and agreeable to tx, stating that he is awaiting his LLE wound dressing to be changed  Precautions:  Medical Precautions: Fall precautions    Pain:  Pain Assessment  Pain Assessment: 0-10  0-10 (Numeric) Pain Score: 0 - No pain    Objective    Cognition:  Cognition  Orientation Level: Oriented X4  Insight: Mild    Activities of Daily Living:    Grooming  Grooming Level of Assistance: Setup  Grooming Where Assessed: Chair  Grooming Comments: s/u for face washing and oral hygiene tasks    UE Bathing  UE Bathing Level of Assistance: Minimum assistance, Setup, Close supervision  UE Bathing Comments: s/u for UB sponge bath in chair with assist to cleanse back    UE Dressing  UE Dressing Level of Assistance: Setup, Minimum assistance  UE Dressing Where Assessed: Chair  UE Dressing Comments: assist to don/doff gown    Bed Mobility/Transfers: Bed Mobility  Bed Mobility: Yes  Bed Mobility 1  Bed Mobility 1: Scooting  Level of Assistance 1: Contact guard  Bed Mobility Comments 1: pt in supported seated position with cues to bring BLE off EOB with increased time + effort required with CGA for trunk support    Transfers  Transfer: Yes  Transfer 1  Technique 1: Sit to stand, Stand to sit  Transfer Level of Assistance 1: Close supervision  Trials/Comments 1: cues for proper hand placement in sitting and for eccentric lowering, demonstrating decreased control to chair    Functional Mobility:  Functional Mobility  Functional Mobility Performed: Yes  Functional Mobility 1  Assistance 1: Contact guard  Comments 1: tolerated taking steps to chair with CGA for balance with cues for pacing for increased safety awareness, demonstrating fair balance    Standing Balance:  Dynamic Standing Balance  Dynamic Standing-Level of Assistance: Contact guard  Dynamic  Standing-Comments: fair balance during functional mobility to chair    Outcome Measures:[facility] Daily Activity  Putting on and taking off regular lower body clothing: A lot  Bathing (including washing, rinsing, drying): A little  Putting on and taking off regular upper body clothing: A little  Toileting, which includes using toilet, bedpan or urinal: A little  Taking care of personal grooming such as brushing teeth: A little  Eating Meals: None  Daily Activity - Total Score: 18    Education Documentation  Body Mechanics, taught by MIGUEL ANGEL Kim at 5/23/2025  2:02 PM.  Learner: Patient  Readiness: Acceptance  Method: Explanation  Response: Verbalizes Understanding, Needs Reinforcement  Comment: educated on safety awareness    ADL Training, taught by MIGUEL ANGEL Kim at 5/23/2025  2:02 PM.  Learner: Patient  Readiness: Acceptance  Method: Explanation  Response: Verbalizes Understanding, Needs Reinforcement  Comment: educated on safety awareness    Education Comments  No comments found.    Problem: ADLs  Goal: Patient will perform UB and LB bathing with independent level of assistance.  Outcome: Progressing  Goal: Patient with complete upper body dressing with independent level of assistance donning and doffing all UE clothes  Outcome: Progressing  Goal: Patient with complete lower body dressing with modified independent level of assistance donning and doffing all LE clothes  with PRN adaptive equipment  Outcome: Progressing  Goal: Patient will complete daily grooming tasks with independent level of assistance.  Outcome: Progressing  Goal: Patient will complete toileting including hygiene clothing management/hygiene with independent level of assistance.  Outcome: Progressing     Problem: TRANSFERS  Goal: Patient will complete functional transfer with least restrictive device with modified independent level of assistance.  Outcome: Progressing

## 2025-05-23 NOTE — PROGRESS NOTES
"Kevin Dc \"Kofi\" is a 59 y.o. male on day 1 of admission presenting with Cellulitis of left lower extremity.    Subjective   The patient seen for chronic kidney disease stage III with ODILON admitted with severe cellulitis of the left lower extremity he still has noted redness and pain in the left leg and thigh however he otherwise feels okay       Objective     Physical Exam  Neck:      Vascular: No carotid bruit.   Cardiovascular:      Rate and Rhythm: Normal rate and regular rhythm.      Heart sounds: No murmur heard.     No friction rub. No gallop.   Pulmonary:      Breath sounds: No wheezing, rhonchi or rales.   Chest:      Chest wall: No tenderness.   Abdominal:      General: There is no distension.      Tenderness: There is no abdominal tenderness. There is no guarding or rebound.   Musculoskeletal:         General: No swelling or tenderness.      Cervical back: Neck supple.      Right lower leg: No edema.      Left lower leg: No edema.      Comments: He has left lower extremity cellulitis up to the groin area redness and tenderness and erythema has significant lymphedema   Lymphadenopathy:      Cervical: No cervical adenopathy.         Last Recorded Vitals  Blood pressure 116/57, pulse 76, temperature 36.6 °C (97.9 °F), temperature source Oral, resp. rate 18, height 1.676 m (5' 6\"), weight 136 kg (299 lb 13.2 oz), SpO2 96%.    Intake/Output last 3 Shifts:  I/O last 3 completed shifts:  In: 4900 (36 mL/kg) [P.O.:422; IV Piggyback:4478]  Out: 1875 (13.8 mL/kg) [Urine:1875 (0.4 mL/kg/hr)]  Weight: 136 kg   Current Medications[1]   Relevant Results    Results for orders placed or performed during the hospital encounter of 05/21/25 (from the past 96 hours)   POCT GLUCOSE   Result Value Ref Range    POCT Glucose 166 (H) 74 - 99 mg/dL   CBC and Auto Differential   Result Value Ref Range    WBC 22.7 (H) 4.4 - 11.3 x10*3/uL    nRBC 0.0 0.0 - 0.0 /100 WBCs    RBC 4.14 (L) 4.50 - 5.90 x10*6/uL    Hemoglobin 12.0 " (L) 13.5 - 17.5 g/dL    Hematocrit 37.0 (L) 41.0 - 52.0 %    MCV 89 80 - 100 fL    MCH 29.0 26.0 - 34.0 pg    MCHC 32.4 32.0 - 36.0 g/dL    RDW 14.6 (H) 11.5 - 14.5 %    Platelets 221 150 - 450 x10*3/uL    Neutrophils % 91.3 40.0 - 80.0 %    Immature Granulocytes %, Automated 1.1 (H) 0.0 - 0.9 %    Lymphocytes % 3.3 13.0 - 44.0 %    Monocytes % 3.9 2.0 - 10.0 %    Eosinophils % 0.2 0.0 - 6.0 %    Basophils % 0.2 0.0 - 2.0 %    Neutrophils Absolute 20.73 (H) 1.20 - 7.70 x10*3/uL    Immature Granulocytes Absolute, Automated 0.25 0.00 - 0.70 x10*3/uL    Lymphocytes Absolute 0.74 (L) 1.20 - 4.80 x10*3/uL    Monocytes Absolute 0.89 0.10 - 1.00 x10*3/uL    Eosinophils Absolute 0.04 0.00 - 0.70 x10*3/uL    Basophils Absolute 0.05 0.00 - 0.10 x10*3/uL   Comprehensive metabolic panel   Result Value Ref Range    Glucose 151 (H) 74 - 99 mg/dL    Sodium 136 136 - 145 mmol/L    Potassium 4.5 3.5 - 5.3 mmol/L    Chloride 106 98 - 107 mmol/L    Bicarbonate 20 (L) 21 - 32 mmol/L    Anion Gap 15 10 - 20 mmol/L    Urea Nitrogen 46 (H) 6 - 23 mg/dL    Creatinine 2.64 (H) 0.50 - 1.30 mg/dL    eGFR 27 (L) >60 mL/min/1.73m*2    Calcium 8.9 8.6 - 10.3 mg/dL    Albumin 3.7 3.4 - 5.0 g/dL    Alkaline Phosphatase 71 33 - 136 U/L    Total Protein 7.2 6.4 - 8.2 g/dL    AST 13 9 - 39 U/L    Bilirubin, Total 1.0 0.0 - 1.2 mg/dL    ALT 13 10 - 52 U/L   Troponin I, High Sensitivity   Result Value Ref Range    Troponin I, High Sensitivity 15 0 - 20 ng/L   Protime-INR   Result Value Ref Range    Protime 13.0 (H) 9.3 - 12.7 seconds    INR 1.2 0.9 - 1.2   APTT   Result Value Ref Range    aPTT 33.7 (H) 22.0 - 32.5 seconds   Lactate   Result Value Ref Range    Lactate 2.0 0.4 - 2.0 mmol/L   Blood Culture    Specimen: Peripheral Venipuncture; Blood culture   Result Value Ref Range    Blood Culture Morganella morganii (A)     BLOOD CULTURE BOTTLE  Positive Anaerobic Bottle     Gram Stain Gram negative bacilli (AA)    Blood Culture    Specimen: Peripheral  Venipuncture; Blood culture   Result Value Ref Range    Blood Culture No growth at 1 day    ECG 12 lead   Result Value Ref Range    Ventricular Rate 73 BPM    Atrial Rate 115 BPM    QRS Duration 76 ms    QT Interval 352 ms    QTC Calculation(Bazett) 387 ms    R Axis -22 degrees    T Axis 61 degrees    QRS Count 12 beats    Q Onset 227 ms    T Offset 403 ms    QTC Fredericia 376 ms   Tissue/Wound Culture/Smear    Specimen: Skin/Superficial Abscess; Tissue/Biopsy   Result Value Ref Range    Tissue/Wound Culture/Smear (4+) Abundant Staphylococcus aureus (A)     Gram Stain (2+) Few Polymorphonuclear leukocytes (A)     Gram Stain (A)      (4+) Abundant Mixed Gram positive and Gram negative bacteria   Urinalysis with Reflex Culture and Microscopic   Result Value Ref Range    Color, Urine Light-Yellow Light-Yellow, Yellow, Dark-Yellow    Appearance, Urine Clear Clear    Specific Gravity, Urine 1.015 1.005 - 1.035    pH, Urine 6.5 5.0, 5.5, 6.0, 6.5, 7.0, 7.5, 8.0    Protein, Urine 100 (2+) (A) NEGATIVE, 10 (TRACE), 20 (TRACE) mg/dL    Glucose, Urine 70 (1+) (A) Normal mg/dL    Blood, Urine 0.2 (2+) (A) NEGATIVE mg/dL    Ketones, Urine NEGATIVE NEGATIVE mg/dL    Bilirubin, Urine NEGATIVE NEGATIVE mg/dL    Urobilinogen, Urine Normal Normal mg/dL    Nitrite, Urine NEGATIVE NEGATIVE    Leukocyte Esterase, Urine NEGATIVE NEGATIVE   Extra Urine Gray Tube   Result Value Ref Range    Extra Tube Hold for add-ons.    Urinalysis Microscopic   Result Value Ref Range    WBC, Urine 1-5 1-5, NONE /HPF    RBC, Urine 11-20 (A) NONE, 1-2, 3-5 /HPF    Bacteria, Urine 1+ (A) NONE SEEN /HPF   POCT GLUCOSE   Result Value Ref Range    POCT Glucose 178 (H) 74 - 99 mg/dL   CBC and Auto Differential   Result Value Ref Range    WBC 14.7 (H) 4.4 - 11.3 x10*3/uL    nRBC 0.0 0.0 - 0.0 /100 WBCs    RBC 4.03 (L) 4.50 - 5.90 x10*6/uL    Hemoglobin 11.8 (L) 13.5 - 17.5 g/dL    Hematocrit 36.5 (L) 41.0 - 52.0 %    MCV 91 80 - 100 fL    MCH 29.3 26.0 - 34.0  pg    MCHC 32.3 32.0 - 36.0 g/dL    RDW 14.6 (H) 11.5 - 14.5 %    Platelets 173 150 - 450 x10*3/uL    Neutrophils % 95.7 40.0 - 80.0 %    Immature Granulocytes %, Automated 0.3 0.0 - 0.9 %    Lymphocytes % 2.3 13.0 - 44.0 %    Monocytes % 1.5 2.0 - 10.0 %    Eosinophils % 0.1 0.0 - 6.0 %    Basophils % 0.1 0.0 - 2.0 %    Neutrophils Absolute 14.06 (H) 1.20 - 7.70 x10*3/uL    Immature Granulocytes Absolute, Automated 0.05 0.00 - 0.70 x10*3/uL    Lymphocytes Absolute 0.34 (L) 1.20 - 4.80 x10*3/uL    Monocytes Absolute 0.22 0.10 - 1.00 x10*3/uL    Eosinophils Absolute 0.02 0.00 - 0.70 x10*3/uL    Basophils Absolute 0.02 0.00 - 0.10 x10*3/uL   Sedimentation rate, automated   Result Value Ref Range    Sedimentation Rate 56 (H) 0 - 20 mm/h   Vancomycin   Result Value Ref Range    Vancomycin 22.9 (H) 5.0 - 20.0 ug/mL   Creatine Kinase   Result Value Ref Range    Creatine Kinase 89 0 - 325 U/L   Comprehensive metabolic panel   Result Value Ref Range    Glucose 162 (H) 74 - 99 mg/dL    Sodium 137 136 - 145 mmol/L    Potassium 4.1 3.5 - 5.3 mmol/L    Chloride 108 (H) 98 - 107 mmol/L    Bicarbonate 18 (L) 21 - 32 mmol/L    Anion Gap 15 10 - 20 mmol/L    Urea Nitrogen 45 (H) 6 - 23 mg/dL    Creatinine 2.58 (H) 0.50 - 1.30 mg/dL    eGFR 28 (L) >60 mL/min/1.73m*2    Calcium 8.3 (L) 8.6 - 10.3 mg/dL    Albumin 3.2 (L) 3.4 - 5.0 g/dL    Alkaline Phosphatase 60 33 - 136 U/L    Total Protein 6.4 6.4 - 8.2 g/dL    AST 13 9 - 39 U/L    Bilirubin, Total 1.2 0.0 - 1.2 mg/dL    ALT 12 10 - 52 U/L   C-reactive protein   Result Value Ref Range    C-Reactive Protein 9.70 (H) <1.00 mg/dL   POCT GLUCOSE   Result Value Ref Range    POCT Glucose 171 (H) 74 - 99 mg/dL   POCT GLUCOSE   Result Value Ref Range    POCT Glucose 52 (L) 74 - 99 mg/dL   POCT GLUCOSE   Result Value Ref Range    POCT Glucose 101 (H) 74 - 99 mg/dL   POCT GLUCOSE   Result Value Ref Range    POCT Glucose 134 (H) 74 - 99 mg/dL   CBC   Result Value Ref Range    WBC 9.8 4.4 -  11.3 x10*3/uL    nRBC 0.0 0.0 - 0.0 /100 WBCs    RBC 3.60 (L) 4.50 - 5.90 x10*6/uL    Hemoglobin 10.6 (L) 13.5 - 17.5 g/dL    Hematocrit 32.3 (L) 41.0 - 52.0 %    MCV 90 80 - 100 fL    MCH 29.4 26.0 - 34.0 pg    MCHC 32.8 32.0 - 36.0 g/dL    RDW 14.7 (H) 11.5 - 14.5 %    Platelets 157 150 - 450 x10*3/uL   Renal Function Panel   Result Value Ref Range    Glucose 97 74 - 99 mg/dL    Sodium 137 136 - 145 mmol/L    Potassium 3.8 3.5 - 5.3 mmol/L    Chloride 109 (H) 98 - 107 mmol/L    Bicarbonate 20 (L) 21 - 32 mmol/L    Anion Gap 12 10 - 20 mmol/L    Urea Nitrogen 46 (H) 6 - 23 mg/dL    Creatinine 2.73 (H) 0.50 - 1.30 mg/dL    eGFR 26 (L) >60 mL/min/1.73m*2    Calcium 8.4 (L) 8.6 - 10.3 mg/dL    Phosphorus 3.4 2.5 - 4.9 mg/dL    Albumin 2.9 (L) 3.4 - 5.0 g/dL   POCT GLUCOSE   Result Value Ref Range    POCT Glucose 109 (H) 74 - 99 mg/dL   POCT GLUCOSE   Result Value Ref Range    POCT Glucose 138 (H) 74 - 99 mg/dL       Assessment/Plan   Acute kidney injury superimposed on chronic kidney  secondary to current infection and prerenal etiology creatinine level slightly worse comparing to yesterday we will continue to monitor very closely  Chronic disease stage IIIb  Cellulitis left lower extremity continue IV antibiotics  Lymphedema in both lower extremities  Mild anemia chronic kidney disease  Obesity  Coronary artery disease  Sleep apnea  Hyperlipidemia  Hypertension  Metabolic acidosis  Secondary hyperparathyroidism    Hernán Chance MD         [1]   Current Facility-Administered Medications:     acetaminophen (Tylenol) tablet 650 mg, 650 mg, oral, q4h PRN **OR** acetaminophen (Tylenol) oral liquid 650 mg, 650 mg, nasogastric tube, q4h PRN **OR** acetaminophen (Tylenol) suppository 650 mg, 650 mg, rectal, q4h PRN, Be Zamora DO    allopurinol (Zyloprim) tablet 150 mg, 150 mg, oral, Daily, Be Zamora DO, 150 mg at 05/23/25 0901    apixaban (Eliquis) tablet 5 mg, 5 mg, oral, BID, Be Zamora DO, 5  mg at 05/23/25 0901    atorvastatin (Lipitor) tablet 80 mg, 80 mg, oral, Daily, Be Zamora, DO, 80 mg at 05/23/25 0901    benzocaine-menthol (Cepastat Sore Throat) lozenge 1 lozenge, 1 lozenge, Mouth/Throat, q2h PRN, Be Zamora DO    calcitriol (Rocaltrol) capsule 0.5 mcg, 0.5 mcg, oral, Daily, Be Zamora, DO, 0.5 mcg at 05/23/25 0901    clopidogrel (Plavix) tablet 75 mg, 75 mg, oral, Daily, Be Zamora, DO, 75 mg at 05/23/25 0901    DAPTOmycin (Cubicin) 500 mg in sodium chloride 0.9% IV 50 mL, 500 mg, intravenous, q24h, Loly Antonio, APRN-CNP, Stopped at 05/22/25 1558    dilTIAZem CD (Cardizem CD) 24 hr capsule 120 mg, 120 mg, oral, Daily, John Pack MD, 120 mg at 05/23/25 0900    insulin glargine (Lantus) injection 100 Units, 100 Units, subcutaneous, Nightly, John Pack MD    insulin lispro injection 20 Units, 20 Units, subcutaneous, TID AC, John Pack MD    isosorbide mononitrate ER (Imdur) 24 hr tablet 30 mg, 30 mg, oral, Daily, John Pack MD, 30 mg at 05/23/25 0901    melatonin tablet 3 mg, 3 mg, oral, Nightly PRN, Be Zamora DO    meropenem (Merrem) 1 g in sodium chloride 0.9%  mL, 1 g, intravenous, q12h, Loly Antonio, APRN-CNP, Stopped at 05/23/25 0933    ondansetron ODT (Zofran-ODT) disintegrating tablet 4 mg, 4 mg, oral, q8h PRN **OR** ondansetron (Zofran) injection 4 mg, 4 mg, intravenous, q8h PRN, Be Zamora DO    propranolol (Inderal) tablet 60 mg, 60 mg, oral, Daily, Be Zamora DO, 60 mg at 05/23/25 0737    sodium bicarbonate tablet 650 mg, 650 mg, oral, 4x daily, Be Nicolelotte, DO, 650 mg at 05/23/25 0738    torsemide (Demadex) tablet 50 mg, 50 mg, oral, Every other day, Be Nicolelotte, DO, 50 mg at 05/22/25 0840

## 2025-05-23 NOTE — PROGRESS NOTES
"Kevin Dc \"Monalisa" is a 59 y.o. male on day 1 of admission presenting with Cellulitis of left lower extremity.      Subjective  Patient lying in bed in no distress  His foot dressing is intact  Tmax 37 °C  Discussed recent culture results  Also discussed plan to repeat blood cultures this morning  He has no complaints      Objective        Last Recorded Vitals      5/22/2025    11:36 AM 5/22/2025     4:00 PM 5/22/2025     7:15 PM 5/22/2025     7:40 PM 5/23/2025    12:24 AM 5/23/2025     7:25 AM 5/23/2025     7:34 AM   Vitals   Systolic 104 103 117 108 114 112 116   Diastolic 46 43 56 58 56 72 57   BP Location Right arm Right arm Left arm  Left arm  Right arm   Heart Rate 57 68 68 81 91 66 76   Temp 36.6 °C (97.9 °F) 36.6 °C (97.9 °F) 37 °C (98.6 °F)  36.7 °C (98.1 °F)  36.6 °C (97.9 °F)   Resp 20 20 18 18 17  18       Imaging  Imaging  CT foot left wo IV contrast  Result Date: 5/23/2025  Small plantar skin wound suggested at the lateral forefoot and 4th and 5th metatarsophalangeal joints with skin thickening edema no evidence of a discrete fluid collection or adjacent osteomyelitis.   Marked subcutaneous edema about the entirety short structures particularly in the calf which could be related to cellulitis, chronic stasis, or lymphedema.   Advanced arthritic changes as detailed above without acute osseous abnormality.     MACRO: None   Signed by: Eliu Alex 5/23/2025 6:12 AM Dictation workstation:   [id number]    XR chest 1 view  Result Date: 5/21/2025  1.  No evidence of acute cardiopulmonary process.     Signed by: Thai Cartagena 5/21/2025 11:24 PM Dictation workstation:   [id number]    XR foot left 3+ views  Result Date: 5/21/2025  No evidence of acute fracture or dislocation. Diffuse lower leg edema. Suspected pes cavus. Degenerative change of the tibiotalar joint.     MACRO: None   Signed by: Thai Cartagena 5/21/2025 11:23 PM Dictation workstation:   [id number]    CT brain attack head wo IV " contrast  Result Date: 5/21/2025  No evidence of acute cortical infarct or intracranial hemorrhage.   Senescent changes. No change from prior. If persistent concern, consider MRI   MACRO: Ketan Birch discussed the significance and urgency of this critical finding by epic chat with  KARLOS BERGERON on 5/21/2025 at 10:13 pm. (**-RCF-**) Findings:  See findings.     Signed by: Ketan Birch 5/21/2025 10:13 PM Dictation workstation:   [id number]      Cardiology, Vascular, and Other Imaging  ECG 12 lead  Result Date: 5/22/2025  Sinus tachycardia with 2nd degree AV block (Mobitz I) with Premature supraventricular complexes Septal infarct , age undetermined Abnormal ECG When compared with ECG of 18-JUL-2024 08:46, Premature supraventricular complexes are now Present Sinus rhythm is now with 2nd degree AV block (Mobitz I) Criteria for Inferior infarct are no longer Present          Relevant Results  Results for orders placed or performed during the hospital encounter of 05/21/25 (from the past 24 hours)   POCT GLUCOSE   Result Value Ref Range    POCT Glucose 52 (L) 74 - 99 mg/dL   POCT GLUCOSE   Result Value Ref Range    POCT Glucose 101 (H) 74 - 99 mg/dL   POCT GLUCOSE   Result Value Ref Range    POCT Glucose 134 (H) 74 - 99 mg/dL   CBC   Result Value Ref Range    WBC 9.8 4.4 - 11.3 x10*3/uL    nRBC 0.0 0.0 - 0.0 /100 WBCs    RBC 3.60 (L) 4.50 - 5.90 x10*6/uL    Hemoglobin 10.6 (L) 13.5 - 17.5 g/dL    Hematocrit 32.3 (L) 41.0 - 52.0 %    MCV 90 80 - 100 fL    MCH 29.4 26.0 - 34.0 pg    MCHC 32.8 32.0 - 36.0 g/dL    RDW 14.7 (H) 11.5 - 14.5 %    Platelets 157 150 - 450 x10*3/uL   Renal Function Panel   Result Value Ref Range    Glucose 97 74 - 99 mg/dL    Sodium 137 136 - 145 mmol/L    Potassium 3.8 3.5 - 5.3 mmol/L    Chloride 109 (H) 98 - 107 mmol/L    Bicarbonate 20 (L) 21 - 32 mmol/L    Anion Gap 12 10 - 20 mmol/L    Urea Nitrogen 46 (H) 6 - 23 mg/dL    Creatinine 2.73 (H) 0.50 - 1.30 mg/dL    eGFR 26 (L) >60  mL/min/1.73m*2    Calcium 8.4 (L) 8.6 - 10.3 mg/dL    Phosphorus 3.4 2.5 - 4.9 mg/dL    Albumin 2.9 (L) 3.4 - 5.0 g/dL   POCT GLUCOSE   Result Value Ref Range    POCT Glucose 109 (H) 74 - 99 mg/dL     *Note: Due to a large number of results and/or encounters for the requested time period, some results have not been displayed. A complete set of results can be found in Results Review.       Physical Exam  Constitutional:       Appearance: Normal appearance.   HENT:      Head: Normocephalic and atraumatic.   Eyes:      Extraocular Movements: Extraocular movements intact.      Conjunctiva/sclera: Conjunctivae normal.   Cardiovascular:      Rate and Rhythm: Normal rate. Rhythm irregular.   Pulmonary:      Effort: Pulmonary effort is normal.      Breath sounds: Normal breath sounds.   Abdominal:      General: Bowel sounds are normal.      Palpations: Abdomen is soft.   Musculoskeletal:      Cervical back: Normal range of motion.      Right lower leg: Edema present.      Left lower leg: Edema present.   Skin:     General: Skin is warm and dry.      Findings: Erythema present.      Comments: Left plantar foot ulcer with large amount of sonja-ulcer callus  -foot dressing intact.  Lymphedema bilateral lower extremities  Neurological:      General: No focal deficit present.      Mental Status: He is alert and oriented to person, place, and time.   Psychiatric:         Mood and Affect: Mood normal.         Behavior: Behavior normal.        No results found for the last 90 days.  DAPTOmycin - 500 mg/50 mL  meropenem - 1 gram/100 mL      Assessment/Plan  Sepsis-triggered with leukocytosis, fever, tachypnea  Acute on chronic kidney disease  Toxic metabolic encephalopathy-resolved  Left leg cellulitis  Left diabetic foot ulcer infection-suspected Chan grade 2   Lymphedema  History of enterobacter, MSSA, klebsiella oxytoca  Wound culture pending gram-positive and gram-negative bacteria  Blood cultures x 1 pending gram-negative  bacilli     Continue IV meropenem-history of enterobacter  Continue IV daptomycin  CK level  - 89 on 5/22  Follow-up wound culture  Follow-up repeat blood cultures  Monitor WBC and temperature  Monitor renal function, 2.73 on 5/23  Local care  Offloading  Podiatry consult  Further recommendations based on pending work-up and cultures     Discussed with Dr Badillo     This is a complex infectious disease issue and the following was performed today (for more details please see the above note): Management decisions reflecting the added complexity (e.g., changes in antimicrobial therapy, infection control strategies).

## 2025-05-23 NOTE — PROGRESS NOTES
"Physical Therapy    Physical Therapy Treatment    Patient Name: Kevin Dc \"Kofi\"  MRN: [id number]  Department: [facility]  Room: [address] [place] [address]  Today's Date: 5/23/2025  Time Calculation  Start Time: 1233  Stop Time: 1253  Time Calculation (min): 20 min         Assessment/Plan   PT Assessment  Rehab Prognosis: Good  Barriers to Discharge Home: No anticipated barriers  End of Session Communication: Bedside nurse  Assessment Comment: Pt required up to CGA during functional mobility compared to his reported baseline of indep. Pt would benefit from continued skilled PT services for maximizing independence and safety prior to & after discharge (LOW intensity).  End of Session Patient Position: Bed, 3 rail up, Alarm off, not on at start of session  PT Plan  Inpatient/Swing Bed or Outpatient: Inpatient  PT Plan  Treatment/Interventions: Bed mobility, Transfer training, Gait training, Strengthening, Therapeutic exercise, Therapeutic activity, Balance training  PT Plan: Ongoing PT  PT Frequency: 4 times per week  PT Discharge Recommendations: Low intensity level of continued care  Equipment Recommended upon Discharge: Other (comment) (?bariatric FWW)  PT Recommended Transfer Status: Contact guard, Stand by assist, Assistive device  PT - OK to Discharge: Yes    PT Visit Info:  PT Received On: 05/23/25     General Visit Information:   General  Reason for Referral: Impaired functional mobility due to decreased muscular strength. This 59 year old male was admitted for LLE cellulitus, L foot open wound, altered mental status, sepsis with acute renal failure & ODILON.  Referred By: Be Zamora DO  Past Medical History Relevant to Rehab: CAD, DVT/PE, obstructive sleep apnea, hyperlipidemia, venous insufficiency, DM-II, heart failure, CKD-3a, coronary artery stent placement, carotid stent placement  Family/Caregiver Present: No  Prior to Session Communication: Bedside nurse  Patient Position Received: Bed, 2 rail up, Alarm off, " not on at start of session  General Comment: Pt cleared for PT by nursing. Pt agreeable to PT services. Session limited d/t wound care needing to be performed by RN.    Subjective   Precautions:  Precautions  Medical Precautions: Fall precautions            Objective   Pain:  Pain Assessment  Pain Assessment: 0-10  0-10 (Numeric) Pain Score: 0 - No pain  Cognition:  Cognition  Overall Cognitive Status: Within Functional Limits  Orientation Level: Oriented X4  Coordination:     Postural Control:  Static Sitting Balance  Static Sitting-Balance Support: Feet supported, Bilateral upper extremity supported  Static Sitting-Level of Assistance: Distant supervision  Static Sitting-Comment/Number of Minutes: good seated satic balance  Static Standing Balance  Static Standing-Balance Support: Bilateral upper extremity supported  Static Standing-Level of Assistance: Close supervision  Static Standing-Comment/Number of Minutes: good static stand balance    Treatments:  Bed Mobility  Bed Mobility: Yes  Bed Mobility 1  Bed Mobility 1: Supine to sitting, Sitting to supine  Level of Assistance 1: Contact guard  Bed Mobility Comments 1: Increased time and effort to complete. HOB elevated, use of bed rails.    Ambulation/Gait Training  Ambulation/Gait Training Performed: Yes  Ambulation/Gait Training 1  Surface 1: Level tile  Device 1: No device  Assistance 1: Close supervision  Quality of Gait 1: Wide base of support, Decreased step length  Comments/Distance (ft) 1: 15 feet x2. Slow nakia, reciprocal gait. Reaching out for stationary objects for support when navigating small spaces.  Transfers  Transfer: Yes  Transfer 1  Transfer From 1: Stand to, Sit to  Transfer to 1: Sit, Stand  Technique 1: Sit to stand, Stand to sit  Transfer Level of Assistance 1: Close supervision  Trials/Comments 1: Cues for ant scooting and safe hand placement. Fair eccentric control noted.    Outcome Measures:  [facility] Basic Mobility  Turning from your  back to your side while in a flat bed without using bedrails: A little  Moving from lying on your back to sitting on the side of a flat bed without using bedrails: A little  Moving to and from bed to chair (including a wheelchair): A little  Standing up from a chair using your arms (e.g. wheelchair or bedside chair): A little  To walk in hospital room: A little  Climbing 3-5 steps with railing: A little  Basic Mobility - Total Score: 18    Education Documentation  Mobility Training, taught by Catarina Buchanan PTA at 5/23/2025  1:06 PM.  Learner: Patient  Readiness: Acceptance  Method: Explanation, Demonstration  Response: Verbalizes Understanding, Demonstrated Understanding  Comment: POC, fall prevention, calling for assistance    Education Comments  No comments found.        Encounter Problems       Encounter Problems (Active)       PT Problem       Pt will transition supine<>sit with mod I.  (Progressing)       Start:  05/22/25    Expected End:  06/14/25            Pt will transfer sit<>stand with mod I.  (Progressing)       Start:  05/22/25    Expected End:  06/14/25            Pt will ambulate >/=100 ft with FWW & mod I --or-- independently. (Progressing)       Start:  05/22/25    Expected End:  06/14/25

## 2025-05-23 NOTE — PROGRESS NOTES
Spiritual Care Visit  Spiritual Care Request    Reason for Visit:  Routine Visit: Introduction     Request Received From:       Focus of Care:  Visited With: Patient         Refer to :          Spiritual Care Assessment    Spiritual Assessment:                      Care Provided:  Intended Effects: Establish rapport and connectedness, Build relationship of care and support, Convey a calming presence, Demonstrate caring and concern, Lessen someone's feelings of isolation  Methods: Offer emotional support  Interventions: Explain  role    Sense of Community and or Baptism Affiliation:  Faith         Addressed Needs/Concerns and/or Ottoniel Through:          Outcome:  Outcome of Spiritual Care Visit: Identifying spiritual/emotional issues, Comfort/healing presence     Advance Directives:         Spiritual Care Annotation        Annotation:  provided patient support while rounding the Unit.  introduced  services of [facility].  explained the role of the  in providing emotional and spiritual support for patient's and family while in admitted to the hospital.      greeted the patient who was laying flat on his hospital bed, appears comfortable and pain free. Patient states that he has an infection which is requiring antibiotics. Patient shared his career in the [employer] services working for several [employer]. Patient expressed his appreciation for the support today. No specific needs were identified.      No spiritual or Lutheran needs were expressed. Spiritual care will remain available for support as requested.

## 2025-05-24 ENCOUNTER — APPOINTMENT (OUTPATIENT)
Dept: CARDIOLOGY | Facility: HOSPITAL | Age: 60
DRG: 871 | End: 2025-05-24
Payer: MEDICARE

## 2025-05-24 ENCOUNTER — APPOINTMENT (OUTPATIENT)
Dept: CARDIOLOGY | Facility: HOSPITAL | Age: 60
End: 2025-05-24
Payer: MEDICARE

## 2025-05-24 LAB
ANION GAP SERPL CALCULATED.3IONS-SCNC: 11 MMOL/L (ref 10–20)
AORTIC VALVE PEAK VELOCITY: 1.85 M/S
AV PEAK GRADIENT: 14 MMHG
AVA (PEAK VEL): 2.15 CM2
B-LACTAMASE ORGANISM ISLT: NEGATIVE
BACTERIA BLD AEROBE CULT: ABNORMAL
BACTERIA BLD CULT: ABNORMAL
BACTERIA SPEC CULT: ABNORMAL
BUN SERPL-MCNC: 49 MG/DL (ref 6–23)
CALCIUM SERPL-MCNC: 9.1 MG/DL (ref 8.6–10.3)
CHLORIDE SERPL-SCNC: 109 MMOL/L (ref 98–107)
CO2 SERPL-SCNC: 22 MMOL/L (ref 21–32)
CREAT SERPL-MCNC: 2.37 MG/DL (ref 0.5–1.3)
EGFRCR SERPLBLD CKD-EPI 2021: 31 ML/MIN/1.73M*2
EJECTION FRACTION APICAL 4 CHAMBER: 62.4
EJECTION FRACTION: 63 %
ERYTHROCYTE [DISTWIDTH] IN BLOOD BY AUTOMATED COUNT: 14.6 % (ref 11.5–14.5)
GLUCOSE BLD MANUAL STRIP-MCNC: 112 MG/DL (ref 74–99)
GLUCOSE BLD MANUAL STRIP-MCNC: 119 MG/DL (ref 74–99)
GLUCOSE BLD MANUAL STRIP-MCNC: 130 MG/DL (ref 74–99)
GLUCOSE BLD MANUAL STRIP-MCNC: 141 MG/DL (ref 74–99)
GLUCOSE SERPL-MCNC: 119 MG/DL (ref 74–99)
GRAM STN SPEC: ABNORMAL
HCT VFR BLD AUTO: 35.4 % (ref 41–52)
HGB BLD-MCNC: 11.5 G/DL (ref 13.5–17.5)
LEFT ATRIUM VOLUME AREA LENGTH INDEX BSA: 34.8 ML/M2
LEFT VENTRICLE INTERNAL DIMENSION DIASTOLE: 4.92 CM (ref 3.5–6)
LEFT VENTRICULAR OUTFLOW TRACT DIAMETER: 2.1 CM
LV EJECTION FRACTION BIPLANE: 63 %
MAGNESIUM SERPL-MCNC: 1.83 MG/DL (ref 1.6–2.4)
MCH RBC QN AUTO: 28.8 PG (ref 26–34)
MCHC RBC AUTO-ENTMCNC: 32.5 G/DL (ref 32–36)
MCV RBC AUTO: 89 FL (ref 80–100)
MITRAL VALVE E/A RATIO: 1.06
NRBC BLD-RTO: 0 /100 WBCS (ref 0–0)
PLATELET # BLD AUTO: 166 X10*3/UL (ref 150–450)
POTASSIUM SERPL-SCNC: 4.2 MMOL/L (ref 3.5–5.3)
RBC # BLD AUTO: 3.99 X10*6/UL (ref 4.5–5.9)
RIGHT VENTRICLE FREE WALL PEAK S': 13.1 CM/S
SODIUM SERPL-SCNC: 138 MMOL/L (ref 136–145)
T4 FREE SERPL-MCNC: 0.77 NG/DL (ref 0.61–1.12)
TRICUSPID ANNULAR PLANE SYSTOLIC EXCURSION: 2.9 CM
TSH SERPL-ACNC: 4.12 MIU/L (ref 0.44–3.98)
WBC # BLD AUTO: 6.5 X10*3/UL (ref 4.4–11.3)

## 2025-05-24 PROCEDURE — 93005 ELECTROCARDIOGRAM TRACING: CPT

## 2025-05-24 PROCEDURE — 2500000004 HC RX 250 GENERAL PHARMACY W/ HCPCS (ALT 636 FOR OP/ED): Mod: JZ | Performed by: INTERNAL MEDICINE

## 2025-05-24 PROCEDURE — 2500000004 HC RX 250 GENERAL PHARMACY W/ HCPCS (ALT 636 FOR OP/ED): Performed by: INTERNAL MEDICINE

## 2025-05-24 PROCEDURE — 85027 COMPLETE CBC AUTOMATED: CPT | Performed by: INTERNAL MEDICINE

## 2025-05-24 PROCEDURE — 80048 BASIC METABOLIC PNL TOTAL CA: CPT | Performed by: INTERNAL MEDICINE

## 2025-05-24 PROCEDURE — 82947 ASSAY GLUCOSE BLOOD QUANT: CPT

## 2025-05-24 PROCEDURE — 99233 SBSQ HOSP IP/OBS HIGH 50: CPT | Performed by: INTERNAL MEDICINE

## 2025-05-24 PROCEDURE — 83735 ASSAY OF MAGNESIUM: CPT

## 2025-05-24 PROCEDURE — 36415 COLL VENOUS BLD VENIPUNCTURE: CPT | Performed by: INTERNAL MEDICINE

## 2025-05-24 PROCEDURE — 84443 ASSAY THYROID STIM HORMONE: CPT

## 2025-05-24 PROCEDURE — 1200000002 HC GENERAL ROOM WITH TELEMETRY DAILY

## 2025-05-24 PROCEDURE — 99223 1ST HOSP IP/OBS HIGH 75: CPT

## 2025-05-24 PROCEDURE — 93306 TTE W/DOPPLER COMPLETE: CPT

## 2025-05-24 PROCEDURE — 84439 ASSAY OF FREE THYROXINE: CPT

## 2025-05-24 PROCEDURE — 2500000002 HC RX 250 W HCPCS SELF ADMINISTERED DRUGS (ALT 637 FOR MEDICARE OP, ALT 636 FOR OP/ED): Performed by: INTERNAL MEDICINE

## 2025-05-24 PROCEDURE — 93306 TTE W/DOPPLER COMPLETE: CPT | Performed by: INTERNAL MEDICINE

## 2025-05-24 PROCEDURE — 2500000001 HC RX 250 WO HCPCS SELF ADMINISTERED DRUGS (ALT 637 FOR MEDICARE OP): Performed by: INTERNAL MEDICINE

## 2025-05-24 PROCEDURE — 2500000004 HC RX 250 GENERAL PHARMACY W/ HCPCS (ALT 636 FOR OP/ED): Mod: JZ | Performed by: NURSE PRACTITIONER

## 2025-05-24 RX ADMIN — CLOPIDOGREL 75 MG: 75 TABLET ORAL at 10:10

## 2025-05-24 RX ADMIN — TORSEMIDE 50 MG: 100 TABLET ORAL at 10:10

## 2025-05-24 RX ADMIN — MEROPENEM 1 G: 1 INJECTION, POWDER, FOR SOLUTION INTRAVENOUS at 10:09

## 2025-05-24 RX ADMIN — SODIUM BICARBONATE 650 MG: 650 TABLET ORAL at 21:12

## 2025-05-24 RX ADMIN — CALCITRIOL CAPSULES 0.25 MCG 0.5 MCG: 0.25 CAPSULE ORAL at 10:10

## 2025-05-24 RX ADMIN — APIXABAN 5 MG: 5 TABLET, FILM COATED ORAL at 10:09

## 2025-05-24 RX ADMIN — MEROPENEM 1 G: 1 INJECTION, POWDER, FOR SOLUTION INTRAVENOUS at 21:12

## 2025-05-24 RX ADMIN — ALLOPURINOL 150 MG: 300 TABLET ORAL at 10:09

## 2025-05-24 RX ADMIN — INSULIN GLARGINE 20 UNITS: 100 INJECTION, SOLUTION SUBCUTANEOUS at 21:12

## 2025-05-24 RX ADMIN — SODIUM BICARBONATE 650 MG: 650 TABLET ORAL at 08:19

## 2025-05-24 RX ADMIN — APIXABAN 5 MG: 5 TABLET, FILM COATED ORAL at 21:12

## 2025-05-24 RX ADMIN — SODIUM BICARBONATE 650 MG: 650 TABLET ORAL at 14:56

## 2025-05-24 RX ADMIN — DAPTOMYCIN IN SODIUM CHLORIDE 350 MG: 350 INJECTION, SOLUTION INTRAVENOUS at 14:55

## 2025-05-24 ASSESSMENT — PAIN SCALES - GENERAL
PAINLEVEL_OUTOF10: 0 - NO PAIN

## 2025-05-24 ASSESSMENT — PAIN - FUNCTIONAL ASSESSMENT
PAIN_FUNCTIONAL_ASSESSMENT: 0-10

## 2025-05-24 ASSESSMENT — ENCOUNTER SYMPTOMS
POOR WOUND HEALING: 1
FALLS: 1

## 2025-05-24 NOTE — ASSESSMENT & PLAN NOTE
When he was admitted we tried converting his U-500 insulin to varieties that we have here.  This caused some hypoglycemia.  We have cut way back on his insulin dosing.      Heart block.-As I mentioned above I see Mobitz 1 and Mobitz 2 occurring while on diltiazem and propranolol.  Both of these have been stopped.  Cardiology has been consulted.  Will need to see what happens with his A-fib and his heart block off of these medications.  Perhaps he needs a pacemaker?      Edema and chronic renal insufficiency.-Appreciate ongoing input from nephrology.

## 2025-05-24 NOTE — PROGRESS NOTES
"Kevin Dc \"Kofi\" is a 59 y.o. male on day 2 of admission presenting with Cellulitis of left lower extremity.    Subjective   The patient seen for chronic kidney disease stage III with ODILON admitted with severe cellulitis of the left lower extremity events noted apparently the patient did have bradycardia cardiology was consulted He is awake and responsive his pain in the leg is better       Objective     Physical Exam  Neck:      Vascular: No carotid bruit.   Cardiovascular:      Rate and Rhythm: Normal rate and regular rhythm.      Heart sounds: No murmur heard.     No friction rub. No gallop.   Pulmonary:      Breath sounds: No wheezing, rhonchi or rales.   Chest:      Chest wall: No tenderness.   Abdominal:      General: There is no distension.      Tenderness: There is no abdominal tenderness. There is no guarding or rebound.   Musculoskeletal:         General: No swelling or tenderness.      Cervical back: Neck supple.      Right lower leg: No edema.      Left lower leg: No edema.      Comments: He has left lower extremity cellulitis up to the groin area redness and tenderness and erythema has significant lymphedema   Lymphadenopathy:      Cervical: No cervical adenopathy.         Last Recorded Vitals  Blood pressure 150/86, pulse 76, temperature 36.4 °C (97.5 °F), temperature source Oral, resp. rate 17, height 1.676 m (5' 6\"), weight 136 kg (299 lb 13.2 oz), SpO2 96%.    Intake/Output last 3 Shifts:  I/O last 3 completed shifts:  In: 150 (1.1 mL/kg) [IV Piggyback:150]  Out: 925 (6.8 mL/kg) [Urine:925 (0.2 mL/kg/hr)]  Weight: 136 kg   Current Medications[1]   Relevant Results    Results for orders placed or performed during the hospital encounter of 05/21/25 (from the past 96 hours)   POCT GLUCOSE   Result Value Ref Range    POCT Glucose 166 (H) 74 - 99 mg/dL   CBC and Auto Differential   Result Value Ref Range    WBC 22.7 (H) 4.4 - 11.3 x10*3/uL    nRBC 0.0 0.0 - 0.0 /100 WBCs    RBC 4.14 (L) 4.50 - " 5.90 x10*6/uL    Hemoglobin 12.0 (L) 13.5 - 17.5 g/dL    Hematocrit 37.0 (L) 41.0 - 52.0 %    MCV 89 80 - 100 fL    MCH 29.0 26.0 - 34.0 pg    MCHC 32.4 32.0 - 36.0 g/dL    RDW 14.6 (H) 11.5 - 14.5 %    Platelets 221 150 - 450 x10*3/uL    Neutrophils % 91.3 40.0 - 80.0 %    Immature Granulocytes %, Automated 1.1 (H) 0.0 - 0.9 %    Lymphocytes % 3.3 13.0 - 44.0 %    Monocytes % 3.9 2.0 - 10.0 %    Eosinophils % 0.2 0.0 - 6.0 %    Basophils % 0.2 0.0 - 2.0 %    Neutrophils Absolute 20.73 (H) 1.20 - 7.70 x10*3/uL    Immature Granulocytes Absolute, Automated 0.25 0.00 - 0.70 x10*3/uL    Lymphocytes Absolute 0.74 (L) 1.20 - 4.80 x10*3/uL    Monocytes Absolute 0.89 0.10 - 1.00 x10*3/uL    Eosinophils Absolute 0.04 0.00 - 0.70 x10*3/uL    Basophils Absolute 0.05 0.00 - 0.10 x10*3/uL   Comprehensive metabolic panel   Result Value Ref Range    Glucose 151 (H) 74 - 99 mg/dL    Sodium 136 136 - 145 mmol/L    Potassium 4.5 3.5 - 5.3 mmol/L    Chloride 106 98 - 107 mmol/L    Bicarbonate 20 (L) 21 - 32 mmol/L    Anion Gap 15 10 - 20 mmol/L    Urea Nitrogen 46 (H) 6 - 23 mg/dL    Creatinine 2.64 (H) 0.50 - 1.30 mg/dL    eGFR 27 (L) >60 mL/min/1.73m*2    Calcium 8.9 8.6 - 10.3 mg/dL    Albumin 3.7 3.4 - 5.0 g/dL    Alkaline Phosphatase 71 33 - 136 U/L    Total Protein 7.2 6.4 - 8.2 g/dL    AST 13 9 - 39 U/L    Bilirubin, Total 1.0 0.0 - 1.2 mg/dL    ALT 13 10 - 52 U/L   Troponin I, High Sensitivity   Result Value Ref Range    Troponin I, High Sensitivity 15 0 - 20 ng/L   Protime-INR   Result Value Ref Range    Protime 13.0 (H) 9.3 - 12.7 seconds    INR 1.2 0.9 - 1.2   APTT   Result Value Ref Range    aPTT 33.7 (H) 22.0 - 32.5 seconds   Lactate   Result Value Ref Range    Lactate 2.0 0.4 - 2.0 mmol/L   Blood Culture    Specimen: Peripheral Venipuncture; Blood culture   Result Value Ref Range    Blood Culture Morganella morganii (A)     BLOOD CULTURE BOTTLE  Positive Anaerobic Bottle     Gram Stain Gram negative bacilli (AA)    Blood  Culture    Specimen: Peripheral Venipuncture; Blood culture   Result Value Ref Range    Blood Culture No growth at 2 days    ECG 12 lead   Result Value Ref Range    Ventricular Rate 73 BPM    Atrial Rate 115 BPM    QRS Duration 76 ms    QT Interval 352 ms    QTC Calculation(Bazett) 387 ms    R Axis -22 degrees    T Axis 61 degrees    QRS Count 12 beats    Q Onset 227 ms    T Offset 403 ms    QTC Fredericia 376 ms   Tissue/Wound Culture/Smear    Specimen: Skin/Superficial Abscess; Tissue/Biopsy   Result Value Ref Range    Tissue/Wound Culture/Smear (A)      (4+) Abundant Methicillin Susceptible Staphylococcus aureus (MSSA)    Tissue/Wound Culture/Smear       (4+) Abundant Mixed Gram-Positive and Gram-Negative Bacteria    Tissue/Wound Culture/Smear (1+) Rare Mixed Anaerobic Bacteria     Beta Lactamase (Cefinase) Negative     Gram Stain (2+) Few Polymorphonuclear leukocytes (A)     Gram Stain (A)      (4+) Abundant Mixed Gram positive and Gram negative bacteria       Susceptibility    Methicillin Susceptible Staphylococcus aureus (MSSA) - MICROSCAN     Oxacillin  Susceptible ug/ml     Trimethoprim/Sulfamethoxazole  Susceptible ug/ml     Tetracycline  Susceptible ug/ml     Clindamycin  Susceptible ug/ml     Erythromycin  Susceptible ug/ml     Vancomycin  Susceptible ug/ml   Urinalysis with Reflex Culture and Microscopic   Result Value Ref Range    Color, Urine Light-Yellow Light-Yellow, Yellow, Dark-Yellow    Appearance, Urine Clear Clear    Specific Gravity, Urine 1.015 1.005 - 1.035    pH, Urine 6.5 5.0, 5.5, 6.0, 6.5, 7.0, 7.5, 8.0    Protein, Urine 100 (2+) (A) NEGATIVE, 10 (TRACE), 20 (TRACE) mg/dL    Glucose, Urine 70 (1+) (A) Normal mg/dL    Blood, Urine 0.2 (2+) (A) NEGATIVE mg/dL    Ketones, Urine NEGATIVE NEGATIVE mg/dL    Bilirubin, Urine NEGATIVE NEGATIVE mg/dL    Urobilinogen, Urine Normal Normal mg/dL    Nitrite, Urine NEGATIVE NEGATIVE    Leukocyte Esterase, Urine NEGATIVE NEGATIVE   Extra Urine Gray Tube    Result Value Ref Range    Extra Tube Hold for add-ons.    Urinalysis Microscopic   Result Value Ref Range    WBC, Urine 1-5 1-5, NONE /HPF    RBC, Urine 11-20 (A) NONE, 1-2, 3-5 /HPF    Bacteria, Urine 1+ (A) NONE SEEN /HPF   POCT GLUCOSE   Result Value Ref Range    POCT Glucose 178 (H) 74 - 99 mg/dL   CBC and Auto Differential   Result Value Ref Range    WBC 14.7 (H) 4.4 - 11.3 x10*3/uL    nRBC 0.0 0.0 - 0.0 /100 WBCs    RBC 4.03 (L) 4.50 - 5.90 x10*6/uL    Hemoglobin 11.8 (L) 13.5 - 17.5 g/dL    Hematocrit 36.5 (L) 41.0 - 52.0 %    MCV 91 80 - 100 fL    MCH 29.3 26.0 - 34.0 pg    MCHC 32.3 32.0 - 36.0 g/dL    RDW 14.6 (H) 11.5 - 14.5 %    Platelets 173 150 - 450 x10*3/uL    Neutrophils % 95.7 40.0 - 80.0 %    Immature Granulocytes %, Automated 0.3 0.0 - 0.9 %    Lymphocytes % 2.3 13.0 - 44.0 %    Monocytes % 1.5 2.0 - 10.0 %    Eosinophils % 0.1 0.0 - 6.0 %    Basophils % 0.1 0.0 - 2.0 %    Neutrophils Absolute 14.06 (H) 1.20 - 7.70 x10*3/uL    Immature Granulocytes Absolute, Automated 0.05 0.00 - 0.70 x10*3/uL    Lymphocytes Absolute 0.34 (L) 1.20 - 4.80 x10*3/uL    Monocytes Absolute 0.22 0.10 - 1.00 x10*3/uL    Eosinophils Absolute 0.02 0.00 - 0.70 x10*3/uL    Basophils Absolute 0.02 0.00 - 0.10 x10*3/uL   Sedimentation rate, automated   Result Value Ref Range    Sedimentation Rate 56 (H) 0 - 20 mm/h   Vancomycin   Result Value Ref Range    Vancomycin 22.9 (H) 5.0 - 20.0 ug/mL   Creatine Kinase   Result Value Ref Range    Creatine Kinase 89 0 - 325 U/L   Comprehensive metabolic panel   Result Value Ref Range    Glucose 162 (H) 74 - 99 mg/dL    Sodium 137 136 - 145 mmol/L    Potassium 4.1 3.5 - 5.3 mmol/L    Chloride 108 (H) 98 - 107 mmol/L    Bicarbonate 18 (L) 21 - 32 mmol/L    Anion Gap 15 10 - 20 mmol/L    Urea Nitrogen 45 (H) 6 - 23 mg/dL    Creatinine 2.58 (H) 0.50 - 1.30 mg/dL    eGFR 28 (L) >60 mL/min/1.73m*2    Calcium 8.3 (L) 8.6 - 10.3 mg/dL    Albumin 3.2 (L) 3.4 - 5.0 g/dL    Alkaline Phosphatase  60 33 - 136 U/L    Total Protein 6.4 6.4 - 8.2 g/dL    AST 13 9 - 39 U/L    Bilirubin, Total 1.2 0.0 - 1.2 mg/dL    ALT 12 10 - 52 U/L   C-reactive protein   Result Value Ref Range    C-Reactive Protein 9.70 (H) <1.00 mg/dL   POCT GLUCOSE   Result Value Ref Range    POCT Glucose 171 (H) 74 - 99 mg/dL   POCT GLUCOSE   Result Value Ref Range    POCT Glucose 52 (L) 74 - 99 mg/dL   POCT GLUCOSE   Result Value Ref Range    POCT Glucose 101 (H) 74 - 99 mg/dL   POCT GLUCOSE   Result Value Ref Range    POCT Glucose 134 (H) 74 - 99 mg/dL   CBC   Result Value Ref Range    WBC 9.8 4.4 - 11.3 x10*3/uL    nRBC 0.0 0.0 - 0.0 /100 WBCs    RBC 3.60 (L) 4.50 - 5.90 x10*6/uL    Hemoglobin 10.6 (L) 13.5 - 17.5 g/dL    Hematocrit 32.3 (L) 41.0 - 52.0 %    MCV 90 80 - 100 fL    MCH 29.4 26.0 - 34.0 pg    MCHC 32.8 32.0 - 36.0 g/dL    RDW 14.7 (H) 11.5 - 14.5 %    Platelets 157 150 - 450 x10*3/uL   Renal Function Panel   Result Value Ref Range    Glucose 97 74 - 99 mg/dL    Sodium 137 136 - 145 mmol/L    Potassium 3.8 3.5 - 5.3 mmol/L    Chloride 109 (H) 98 - 107 mmol/L    Bicarbonate 20 (L) 21 - 32 mmol/L    Anion Gap 12 10 - 20 mmol/L    Urea Nitrogen 46 (H) 6 - 23 mg/dL    Creatinine 2.73 (H) 0.50 - 1.30 mg/dL    eGFR 26 (L) >60 mL/min/1.73m*2    Calcium 8.4 (L) 8.6 - 10.3 mg/dL    Phosphorus 3.4 2.5 - 4.9 mg/dL    Albumin 2.9 (L) 3.4 - 5.0 g/dL   POCT GLUCOSE   Result Value Ref Range    POCT Glucose 109 (H) 74 - 99 mg/dL   Blood Culture    Specimen: Peripheral Venipuncture; Blood culture   Result Value Ref Range    Blood Culture Loaded on Instrument - Culture in progress    Blood Culture    Specimen: Peripheral Venipuncture; Blood culture   Result Value Ref Range    Blood Culture Loaded on Instrument - Culture in progress    POCT GLUCOSE   Result Value Ref Range    POCT Glucose 138 (H) 74 - 99 mg/dL   POCT GLUCOSE   Result Value Ref Range    POCT Glucose 150 (H) 74 - 99 mg/dL   POCT GLUCOSE   Result Value Ref Range    POCT Glucose  131 (H) 74 - 99 mg/dL   Basic Metabolic Panel   Result Value Ref Range    Glucose 119 (H) 74 - 99 mg/dL    Sodium 138 136 - 145 mmol/L    Potassium 4.2 3.5 - 5.3 mmol/L    Chloride 109 (H) 98 - 107 mmol/L    Bicarbonate 22 21 - 32 mmol/L    Anion Gap 11 10 - 20 mmol/L    Urea Nitrogen 49 (H) 6 - 23 mg/dL    Creatinine 2.37 (H) 0.50 - 1.30 mg/dL    eGFR 31 (L) >60 mL/min/1.73m*2    Calcium 9.1 8.6 - 10.3 mg/dL   CBC   Result Value Ref Range    WBC 6.5 4.4 - 11.3 x10*3/uL    nRBC 0.0 0.0 - 0.0 /100 WBCs    RBC 3.99 (L) 4.50 - 5.90 x10*6/uL    Hemoglobin 11.5 (L) 13.5 - 17.5 g/dL    Hematocrit 35.4 (L) 41.0 - 52.0 %    MCV 89 80 - 100 fL    MCH 28.8 26.0 - 34.0 pg    MCHC 32.5 32.0 - 36.0 g/dL    RDW 14.6 (H) 11.5 - 14.5 %    Platelets 166 150 - 450 x10*3/uL   Magnesium   Result Value Ref Range    Magnesium 1.83 1.60 - 2.40 mg/dL   TSH with reflex to Free T4 if abnormal   Result Value Ref Range    Thyroid Stimulating Hormone 4.12 (H) 0.44 - 3.98 mIU/L   POCT GLUCOSE   Result Value Ref Range    POCT Glucose 112 (H) 74 - 99 mg/dL       Assessment/Plan   Acute kidney injury superimposed on chronic kidney stage III And started to trend down continue with diuretics and IV antibiotics and continue to monitor renal function no indication for dialysis therapy  Chronic disease stage IIIb  Cellulitis left lower extremity continue IV antibiotics  Lymphedema in both lower extremities  Mild anemia chronic kidney disease  Obesity  Coronary artery disease  Sleep apnea  Hyperlipidemia  Hypertension  Metabolic acidosis  Secondary hyperparathyroidism  Pericardia with Mobitz 2 patient is already seen and evaluated by cardiology    Hernán Chance MD         [1]   Current Facility-Administered Medications:     acetaminophen (Tylenol) tablet 650 mg, 650 mg, oral, q4h PRN **OR** acetaminophen (Tylenol) oral liquid 650 mg, 650 mg, nasogastric tube, q4h PRN **OR** acetaminophen (Tylenol) suppository 650 mg, 650 mg, rectal, q4h PRN, Be GARNICA  Geovannyte, DO    allopurinol (Zyloprim) tablet 150 mg, 150 mg, oral, Daily, Be Smallte, DO, 150 mg at 05/24/25 1009    apixaban (Eliquis) tablet 5 mg, 5 mg, oral, BID, Be Smallte, DO, 5 mg at 05/24/25 1009    [Held by provider] atorvastatin (Lipitor) tablet 80 mg, 80 mg, oral, Daily, Be Smallte, DO, 80 mg at 05/23/25 0901    benzocaine-menthol (Cepastat Sore Throat) lozenge 1 lozenge, 1 lozenge, Mouth/Throat, q2h PRN, Be Zamora DO    calcitriol (Rocaltrol) capsule 0.5 mcg, 0.5 mcg, oral, Daily, Be Smallte, DO, 0.5 mcg at 05/24/25 1010    clopidogrel (Plavix) tablet 75 mg, 75 mg, oral, Daily, Be Smallte, DO, 75 mg at 05/24/25 1010    DAPTOmycin (Cubicin) 350 mg/50 mL  mg, 4 mg/kg (Adjusted), intravenous, q24h, Tong Badillo MD    dextrose 50 % injection 12.5 g, 12.5 g, intravenous, q15 min PRN, Jonh Pack MD    dextrose 50 % injection 25 g, 25 g, intravenous, q15 min PRN, John Pack MD    [Held by provider] dilTIAZem CD (Cardizem CD) 24 hr capsule 120 mg, 120 mg, oral, Daily, John Pack MD, 120 mg at 05/23/25 0900    glucagon (Glucagen) injection 1 mg, 1 mg, intramuscular, q15 min PRN, John Pack MD    glucagon (Glucagen) injection 1 mg, 1 mg, intramuscular, q15 min PRN, John Pack MD    insulin glargine (Lantus) injection 20 Units, 20 Units, subcutaneous, Nightly, Be Zamora DO, 20 Units at 05/23/25 2151    insulin lispro injection 0-15 Units, 0-15 Units, subcutaneous, TID AC, John Pack MD    melatonin tablet 3 mg, 3 mg, oral, Nightly PRN, Be Zamora,     meropenem (Merrem) 1 g in sodium chloride 0.9%  mL, 1 g, intravenous, q12h, Loly Antonio, APRN-CNP, Stopped at 05/24/25 1114    ondansetron ODT (Zofran-ODT) disintegrating tablet 4 mg, 4 mg, oral, q8h PRN **OR** ondansetron (Zofran) injection 4 mg, 4 mg, intravenous, q8h PRN, Be Zamora DO    [Held by provider] propranolol (Inderal) tablet 60 mg, 60  mg, oral, Daily, Be Nicolelotte, DO, 60 mg at 05/23/25 0737    sodium bicarbonate tablet 650 mg, 650 mg, oral, 4x daily, Be Smallte, DO, 650 mg at 05/24/25 0819    torsemide (Demadex) tablet 50 mg, 50 mg, oral, Every other day, Be Nicolelotte, DO, 50 mg at 05/24/25 1010

## 2025-05-24 NOTE — CARE PLAN
The patient's goals for the shift include  continue with antibiotic therapy, monitor kidney function      The clinical goals for the shift include iv antibiotics, monitor wounds, monitor heart rhythm (MD notified)\    This shift, there were no barriers to patient progressing through outlined plan of care      Problem: Safety - Adult  Goal: Free from fall injury  Outcome: Progressing     Problem: Discharge Planning  Goal: Discharge to home or other facility with appropriate resources  Outcome: Progressing     Problem: Diabetes  Goal: Achieve decreasing blood glucose levels by end of shift  Outcome: Progressing  Goal: Increase stability of blood glucose readings by end of shift  Outcome: Progressing  Goal: Decrease in ketones present in urine by end of shift  Outcome: Progressing  Goal: Maintain electrolyte levels within acceptable range throughout shift  Outcome: Progressing  Goal: Maintain glucose levels >70mg/dl to <250mg/dl throughout shift  Outcome: Progressing  Goal: No changes in neurological exam by end of shift  Outcome: Progressing  Goal: Learn about and adhere to nutrition recommendations by end of shift  Outcome: Progressing  Goal: Vital signs within normal range for age by end of shift  Outcome: Progressing  Goal: Increase self care and/or family involovement by end of shift  Outcome: Progressing  Goal: Receive DSME education by end of shift  Outcome: Progressing

## 2025-05-24 NOTE — CONSULTS
Inpatient consult to Cardiology  Consult performed by: LIANA Castro-CNP  Consult ordered by: Be Zamora DO  Reason for consult: Second Degree AV Block        History Of Present Illness:    Kofi Dc is a 59 y.o. male presenting with a fall and left lower extremity erythema.  Patient follows Dr. Hendrickson for cardiology.  He has a past medical history of coronary artery disease with stenting x2 to the left circumflex artery (2019), heart failure with preserved ejection fraction, saddle pulmonary embolism treated with TPA (2018), chronic lymphedema, lower extremity wound, hypertensive disorder, and type 2 diabetes mellitus.  This patient reports to me that on Wednesday he was getting out of his car and due to the rain he slipped and fell and was unable to get up.  EMS were called and reportedly at that point the patient had altered mental status with garbled speech.  He denies chest pain, pressure or palpitations.  Denies lightheadedness or dizziness.  Denies syncope.  The patient was brought to the [facility] emergency department at which time he was found to be febrile with left lower extremity cellulitis.  By the time he arrived to the emergency department his altered mental status had resolved.  Of note, the patient previously followed in our wound care center as well as the lymphedema clinic but has been unable to follow-up due to cost.  Lab work the emergency department significant for sodium of 136, potassium 4.5, creatinine 2.64 and hemoglobin of 12.0.  High-sensitivity troponin I level negative at 15.  White blood cell count elevated at 22.7.  X-ray of the left foot with no evidence of acute fracture or dislocation, diffuse lower leg edema.  CT of the head with no evidence of acute cortical infarct or intracranial hemorrhage.  Chest x-ray with no evidence of acute cardiopulmonary process.  In the emergency department he was given Tylenol for his fever, a normal saline bolus and started on  Zosyn and vancomycin and was admitted to the hospital on telemetry for further assessment and management.    Review of Systems   Cardiovascular:  Positive for leg swelling.   Skin:  Positive for poor wound healing.   Musculoskeletal:  Positive for falls.   All other systems reviewed and are negative.        Last Recorded Vitals:  Vitals:    05/23/25 1524 05/23/25 2115 05/24/25 0130 05/24/25 0830   BP: 131/52 132/74 125/66 150/86   BP Location: Right arm Right arm Right arm Left arm   Patient Position: Lying Lying  Sitting   Pulse: 51 (!) 44 62 76   Resp: 17 18 18 17   Temp: 36.6 °C (97.9 °F) 36.7 °C (98.1 °F) 36.7 °C (98 °F) 36.4 °C (97.5 °F)   TempSrc: Oral Oral Oral Oral   SpO2: 99% 96% 98% 96%   Weight:       Height:           Last Labs:  CBC - 5/24/2025:  6:34 AM  6.5 11.5 166    35.4      CMP - 5/24/2025:  6:34 AM  9.1 6.4 13 --- 1.2   3.4 2.9 12 60      PTT - 5/21/2025: 10:05 PM  1.2   13.0 33.7     Troponin I, High Sensitivity   Date/Time Value Ref Range Status   05/21/2025 10:05 PM 15 0 - 20 ng/L Final     BNP   Date/Time Value Ref Range Status   07/18/2024 11:02 AM 69 0 - 99 pg/mL Final     Hemoglobin A1C   Date/Time Value Ref Range Status   11/04/2024 12:25 PM 6.9 (H) See comment % Final   07/29/2024 04:04 AM 6.9 (H) See below % Final     LDL Calculated   Date/Time Value Ref Range Status   11/04/2024 12:25 PM 78 <=99 mg/dL Final     Comment:                                 Near   Borderline      AGE      Desirable  Optimal    High     High     Very High     0-19 Y     0 - 109     ---    110-129   >/= 130     ----    20-24 Y     0 - 119     ---    120-159   >/= 160     ----      >24 Y     0 -  99   100-129  130-159   160-189     >/=190     02/27/2024 02:15 PM 55 (L) 65 - 130 mg/dL Final   11/30/2022 02:35 PM 65 65 - 130 MG/DL Final   10/03/2022 03:21 PM 66 65 - 130 MG/DL Final   05/06/2022 03:05 PM 36 (L) 65 - 130 MG/DL Final     VLDL   Date/Time Value Ref Range Status   11/04/2024 12:25 PM 23 0 - 40 mg/dL  Final      Last I/O:  I/O last 3 completed shifts:  In: 150 (1.1 mL/kg) [IV Piggyback:150]  Out: 925 (6.8 mL/kg) [Urine:925 (0.2 mL/kg/hr)]  Weight: 136 kg     Past Cardiology Tests (Last 3 Years):  EKG:  ECG 12 lead 05/21/2025 (Preliminary)      ECG 12 Lead 07/18/2024    Echo:  Transthoracic Echo (TTE) Complete 07/29/2024    Ejection Fractions:  EF   Date/Time Value Ref Range Status   07/29/2024 03:55 PM 63 %      Cath:  No results found for this or any previous visit from the past 1095 days.    Stress Test:  No results found for this or any previous visit from the past 1095 days.    Cardiac Imaging:  No results found for this or any previous visit from the past 1095 days.      Past Medical History:  He has a past medical history of Acute cor pulmonale (Multi) (02/20/2024), Anal fissure, Angina pectoris (08/31/2023), Atherosclerotic heart disease of native coronary artery with other forms of angina pectoris (11/2/2021), Atypical chest pain (08/31/2023), Cerebral vascular accident (Multi), Cerebrovascular accident (Multi) (08/31/2023), CHF (congestive heart failure), Chronic kidney disease, stage 3 (Multi) (08/31/2023), CKD (chronic kidney disease), Clotting disorder (Multi), Deep venous thrombosis of lower extremity (08/31/2023), Facial abscess, Heart failure (08/31/2023), Hematochezia, Lymphedema (05/22/2018), Mixed hyperlipidemia (08/31/2023), Obesity, Palpitations (08/31/2023), Peripheral vascular disease (08/31/2023), Peripheral vascular disease, Presence of coronary angioplasty implant and graft (03/24/2023), Primary hypertension (05/22/2018), Pulmonary embolism (03/24/2023), Sleep apnea, Type 2 diabetes mellitus (05/22/2018), Venous insufficiency, Venous insufficiency (chronic) (peripheral) (03/24/2023), and Ventricular premature complex (08/31/2023).    Past Surgical History:  He has a past surgical history that includes Cardiac catheterization; Carotid stent; and Colonoscopy.      Social History:  He  "reports that he has never smoked. He has never been exposed to tobacco smoke. He has never used smokeless tobacco. He reports that he does not drink alcohol and does not use drugs.    Family History:  Family History[1]     Allergies:  Patient has no known allergies.    Inpatient Medications:  Scheduled Medications[2]  PRN Medications[3]  Continuous Medications[4]  Outpatient Medications:  Current Outpatient Medications   Medication Instructions    acetaminophen (Tylenol) 500 mg tablet 1 tablet, Every 4 hours PRN    allopurinol (Zyloprim) 100 mg tablet 1.5 tablets, Daily    apixaban (Eliquis) 5 mg tablet 1 tablet, 2 times daily    atorvastatin (LIPITOR) 80 mg, oral, Daily    blood sugar diagnostic strip One Touch Ultra Test strips -  3 times per day sc 3X per meal for 90 days    calcitriol (Rocaltrol) 0.5 mcg capsule 1 capsule, Daily    clopidogrel (PLAVIX) 75 mg, oral, Daily    dilTIAZem ER (TIAZAC) 240 mg, oral, Daily, on an empty stomach in the morning Orally Once a day for 90 days    HumuLIN R U-500 (Conc) Kwikpen 110 Units, 2 times daily    insulin lispro protamin-lispro (HumaLOG Mix 50-50 KwikPen) 100 unit/mL (50-50) injection 2 times daily (morning and late afternoon)    isosorbide mononitrate ER (IMDUR) 60 mg, oral, Daily, For 90    losartan (COZAAR) 50 mg, Daily    nitroglycerin (NITROSTAT) 0.4 mg, sublingual, As needed, For 30 days    pen needle, diabetic 32 gauge x 5/32\" needle 2 times daily    potassium chloride CR 10 mEq ER tablet 1 tablet, Daily    propranolol (INDERAL) 60 mg, oral, Daily (0630)    sodium bicarbonate 650 mg tablet 1 tablet, 4 times daily    torsemide (Demadex) 100 mg tablet 0.5 tablets, Every other day     Physical Exam  Vitals reviewed.   HENT:      Head: Normocephalic and atraumatic.   Cardiovascular:      Rate and Rhythm: Normal rate and regular rhythm.      Heart sounds: No murmur heard.     No friction rub. No gallop.   Pulmonary:      Effort: Pulmonary effort is normal.      " Breath sounds: Normal breath sounds. No wheezing, rhonchi or rales.   Abdominal:      General: Bowel sounds are normal.      Palpations: Abdomen is soft.   Musculoskeletal:      Right lower leg: Edema present.      Left lower leg: Edema present.      Comments: Evidence of significant chronic lower extremity lymphedema.    Skin:     General: Skin is warm and dry.      Comments: Dressing to left lower extremity, clean, dry and intact   Neurological:      Mental Status: He is alert and oriented to person, place, and time.   Psychiatric:         Mood and Affect: Mood normal.         Behavior: Behavior normal.           Assessment/Plan   Second Degree AV Block Mobitz 1 Vs Mobitz 2: EKG completed last evening, on my initial interpretation appears to be a Second Degree AV block Mobitz Type I. There are occasions of 2:1 AV block on telemetry at which point you cannot completely rule out Mobitz Type II; however it does appear that the patient is predominately in Mobitz Type 1. He is asymptomatic of this. Agree with holding AV geeta blocking agents. Will obtain magnesium and TSH levels. Will consult electrophysiology.   Left Lower Extremity Cellulitis: infectious disease services and podiatry on consult.   Heart Failure with Preserved Ejection Fraction: Patient does not appear to be in an acute exacerbation. Will obtain echocardiogram this admission.   Chronic Lymphedema: Management per admitting.   Altered Mental Status: resolved, management per admitting  Type 2 Diabetes Mellitus: Management per admitting  Coronary Artery Disease: With remote stenting. No anginal symptoms  Chronic Kidney Disease: Nephrology on consult     Overall Impression and Plan:    5/24: We were consulted to see this patient as he reportedly became bradycardic with rates in the 40s last evening.  EKG completed last evening on my interpretation does reveal a sinus rhythm with a Second Degree AV Block (Mobitz Type 1), though on my review of telemetry the  patient did have occasional 2-1 AV block at which point you cannot determine between Mobitz I or Mobitz II. The patient does have a history of first degree AV block on previous EKGs, but no history of atrial fibrillation or other arrhythmias. He is completely asymptomatic of this.  Denies lightheadedness or dizziness.  Denies feelings of palpitations.  He denies syncope. Blood pressures have remained stable with last recorded at 150/86.  Agree with holding AV geeta blocking agents at this point including his diltiazem 120 mg as well as propranolol 60 mg.  No indication for temporary pacemaker at this time. I have ordered an echocardiogram to be completed this admission.  Additionally, will place an electrophysiology consultation.  If we are to reinitiate an AV geeta agent, would recommend against utilizing both beta-blockers and calcium channel blockers in this patient moving forward.  We will follow with you.    Peripheral IV 05/21/25 20 G Right Antecubital (Active)   Site Assessment Clean;Dry;Intact 05/23/25 2000   Dressing Type Transparent 05/23/25 2000   Line Status Flushed 05/23/25 2000   Dressing Status Clean;Dry;Occlusive 05/23/25 2000   Number of days: 3       Peripheral IV 05/21/25 20 G Left Antecubital (Active)   Site Assessment Clean;Dry;Intact 05/23/25 2000   Dressing Type Transparent 05/23/25 2000   Line Status Flushed 05/23/25 2000   Dressing Status Clean;Dry;Occlusive 05/23/25 2000   Number of days: 3       Code Status:  Full Code        ULYSSES Castro         [1]   Family History  Problem Relation Name Age of Onset    Heart disease Father      Other (double bypass with valve replacement) Father     [2]   Scheduled medications   Medication Dose Route Frequency    allopurinol  150 mg oral Daily    apixaban  5 mg oral BID    [Held by provider] atorvastatin  80 mg oral Daily    calcitriol  0.5 mcg oral Daily    clopidogrel  75 mg oral Daily    daptomycin  4 mg/kg (Adjusted) intravenous q24h     [Held by provider] dilTIAZem ER  120 mg oral Daily    insulin glargine  20 Units subcutaneous Nightly    insulin lispro  0-15 Units subcutaneous TID AC    meropenem  1 g intravenous q12h    [Held by provider] propranolol  60 mg oral Daily    sodium bicarbonate  650 mg oral 4x daily    torsemide  50 mg oral Every other day   [3]   PRN medications   Medication    acetaminophen    Or    acetaminophen    Or    acetaminophen    benzocaine-menthol    dextrose    dextrose    glucagon    glucagon    melatonin    ondansetron ODT    Or    ondansetron   [4]   Continuous Medications   Medication Dose Last Rate

## 2025-05-24 NOTE — SIGNIFICANT EVENT
Notified of bradycardia.  Reviewed initial EKG did have a questionable second-degree AV block but was tachycardic at that point.  Currently, heart rate in the upper 30s low 40s.  He is asymptomatic.  On telemetry, I am seeing some dropped beats and question of an AV block.  Will discontinue diltiazem and propranolol and consult cardiology.  Patient appears very well clinically and I discussed with his nurse, and feel he is safe to stay in his current room.

## 2025-05-24 NOTE — PROGRESS NOTES
"Kevin Dc \"Monalisa" is a 59 y.o. male on day 2 of admission presenting with Cellulitis of left lower extremity.      Subjective   Here with lymphedema with left leg cellulitis In the setting of underlying kidney failure and history of A-fib.  On appropriate antibiotics.  Had been on fairly brisk doses of diltiazem and propranolol.  Last night had some bradycardia.  I see on a twelve-lead a Mobitz 1 WenkeBach block.  But I also see to at at least 2 instances of Mobitz 2 heart block which I have printed and put on the chart.       Objective LURD oriented undistressed  Heart regular rate and rhythm  Lungs clear to auscultation  Extremities massive bilateral leg edema both legs extensively wrapped.  Abdomen soft nontender nondistended.  Last Recorded Vitals  /66 (BP Location: Right arm)   Pulse 62   Temp 36.7 °C (98 °F) (Oral)   Resp 18   Wt 136 kg (299 lb 13.2 oz)   SpO2 98%   Intake/Output last 3 Shifts:    Intake/Output Summary (Last 24 hours) at 5/24/2025 0839  Last data filed at 5/24/2025 0822  Gross per 24 hour   Intake 150 ml   Output 350 ml   Net -200 ml       Admission Weight  Weight: 136 kg (299 lb 13.2 oz) (05/21/25 2216)    Daily Weight  05/21/25 : 136 kg (299 lb 13.2 oz)    Image Results  CT foot left wo IV contrast  Narrative: Interpreted By:  Eliu Alex,   STUDY:  CT FOOT LEFT WO IV CONTRAST; ;  5/22/2025 9:22 pm      INDICATION:  Signs/Symptoms:Left foot cellulitis/ ulcer. Assess for plantar foot  abscess/osteomyelitis.          COMPARISON:  Plain film radiographs May 21, 2025 left foot      ACCESSION NUMBER(S):  [id number]      ORDERING CLINICIAN:  CORNEL DANIEL      TECHNIQUE:  Multiple thin-section axial images of the left foot without contrast.      FINDINGS:  There is ankylosis at the subtalar joint which could be postsurgical  or related to underlying arthropathy.      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      No evidence of fracture.      No acute erosive bony " changes.      Within limited CT parameters there is no evidence of osteomyelitis.      There is a suggested skin wound at the lateral forefoot near the 4th  and 5th metatarsophalangeal joints with skin thickening.      No evidence of a discrete focal fluid collection or adjacent osseous  abnormality.      The tibia and fibula distally pole show some thick periosteal type  reaction which favored to be related to vascular changes.      There is extensive subcutaneous edema and skin thickening which is  marked especially prominent at the calf.      No evidence of a soft tissue mass.      Impression: Small plantar skin wound suggested at the lateral forefoot and 4th  and 5th metatarsophalangeal joints with skin thickening edema no  evidence of a discrete fluid collection or adjacent osteomyelitis.      Marked subcutaneous edema about the entirety short structures  particularly in the calf which could be related to cellulitis,  chronic stasis, or lymphedema.      Advanced arthritic changes as detailed above without acute osseous  abnormality.          MACRO:  None      Signed by: Eliu Alex 5/23/2025 6:12 AM  Dictation workstation:   [id number]                  Assessment & Plan  Cellulitis of left lower extremity  Blood cultures growing gram-negative rods.  Antibiotics per infectious disease consult service.  Sepsis (Multi)  As above  Altered mental status  This is resolved  Type 2 diabetes mellitus, with long-term current use of insulin  When he was admitted we tried converting his U-500 insulin to varieties that we have here.  This caused some hypoglycemia.  We have cut way back on his insulin dosing.      Heart block.-As I mentioned above I see Mobitz 1 and Mobitz 2 occurring while on diltiazem and propranolol.  Both of these have been stopped.  Cardiology has been consulted.  Will need to see what happens with his A-fib and his heart block off of these medications.  Perhaps he needs a pacemaker?      Edema and  chronic renal insufficiency.-Appreciate ongoing input from nephrology.             John Pack MD

## 2025-05-24 NOTE — PROGRESS NOTES
"Kevin Dc \"Kofi\" is a 59 y.o. male on day 2 of admission presenting with Cellulitis of left lower extremity.    Subjective   Interval History:   Resting comfortable  Discussed with nursing  Afebrile   Episode bradycardia noted overnight   On room air saturating 98%  Culture reviewed    Objective   Range of Vitals (last 24 hours)  Heart Rate:  [44-62]   Temp:  [36.6 °C (97.9 °F)-36.7 °C (98.1 °F)]   Resp:  [17-18]   BP: (125-132)/(52-74)   SpO2:  [96 %-99 %]   Daily Weight  05/21/25 : 136 kg (299 lb 13.2 oz)    Body mass index is 48.39 kg/m².    Physical Exam  Constitutional:       Appearance: Normal appearance.   HENT:      Head: Normocephalic and atraumatic.   Cardiovascular:      Rate and Rhythm: Normal rate. Rhythm irregular.   Pulmonary:      Effort: Pulmonary effort is normal.   Abdominal:      General: Bowel sounds are normal.      Palpations: Abdomen is soft.   Musculoskeletal:         General: Normal range of motion.      Cervical back: Normal range of motion and neck supple.      Comments: Lymphedema bilateral lower extremities   Skin:     General: Skin is warm and dry.      Comments: Left plantar foot ulcer with large amount of sonja-ulcer callus  -foot dressing intact.     Psychiatric:         Mood and Affect: Mood normal.         Behavior: Behavior normal.           Antibiotics  DAPTOmycin - 350 mg/50 mL  meropenem - 1 gram/100 mL    Relevant Results  Labs  Results from last 72 hours   Lab Units 05/24/25  0634 05/23/25  0622 05/22/25  0608 05/21/25  2205   WBC AUTO x10*3/uL 6.5 9.8 14.7* 22.7*   HEMOGLOBIN g/dL 11.5* 10.6* 11.8* 12.0*   HEMATOCRIT % 35.4* 32.3* 36.5* 37.0*   PLATELETS AUTO x10*3/uL 166 157 173 221   NEUTROS PCT AUTO %  --   --  95.7 91.3   LYMPHS PCT AUTO %  --   --  2.3 3.3   MONOS PCT AUTO %  --   --  1.5 3.9   EOS PCT AUTO %  --   --  0.1 0.2     Results from last 72 hours   Lab Units 05/24/25  0634 05/23/25  0622 05/22/25  0612   SODIUM mmol/L 138 137 137   POTASSIUM mmol/L " 4.2 3.8 4.1   CHLORIDE mmol/L 109* 109* 108*   CO2 mmol/L 22 20* 18*   BUN mg/dL 49* 46* 45*   CREATININE mg/dL 2.37* 2.73* 2.58*   GLUCOSE mg/dL 119* 97 162*   CALCIUM mg/dL 9.1 8.4* 8.3*   ANION GAP mmol/L 11 12 15   EGFR mL/min/1.73m*2 31* 26* 28*   PHOSPHORUS mg/dL  --  3.4  --      Results from last 72 hours   Lab Units 05/23/25  0622 05/22/25  0612 05/21/25  2205   ALK PHOS U/L  --  60 71   BILIRUBIN TOTAL mg/dL  --  1.2 1.0   PROTEIN TOTAL g/dL  --  6.4 7.2   ALT U/L  --  12 13   AST U/L  --  13 13   ALBUMIN g/dL 2.9* 3.2* 3.7     Estimated Creatinine Clearance: 43.5 mL/min (A) (by C-G formula based on SCr of 2.37 mg/dL (H)).  C-Reactive Protein   Date Value Ref Range Status   05/22/2025 9.70 (H) <1.00 mg/dL Final     CRP   Date Value Ref Range Status   10/16/2021 3.2 (H) 0 - 2.0 MG/DL Final     Comment:     Performed at [address] [address]     Microbiology  Susceptibility data from last 14 days.  Collected Specimen Info Organism Clindamycin Erythromycin Oxacillin Tetracycline Trimethoprim/Sulfamethoxazole Vancomycin   05/21/25 Tissue/Biopsy from Skin/Superficial Abscess Methicillin Susceptible Staphylococcus aureus (MSSA)  S  S  S  S  S  S     Mixed Gram-Positive and Gram-Negative Bacteria         05/21/25 Blood culture from Peripheral Venipuncture Morganella morganii             Imaging  CT foot left wo IV contrast  Result Date: 5/23/2025  Interpreted By:  Eliu Alex, STUDY: CT FOOT LEFT WO IV CONTRAST; ;  5/22/2025 9:22 pm   INDICATION: Signs/Symptoms:Left foot cellulitis/ ulcer. Assess for plantar foot abscess/osteomyelitis.     COMPARISON: Plain film radiographs May 21, 2025 left foot   ACCESSION NUMBER(S): [id number]   ORDERING CLINICIAN: CORNEL DANIEL   TECHNIQUE: Multiple thin-section axial images of the left foot without contrast.   FINDINGS: There is ankylosis at the subtalar joint which could be postsurgical or related to underlying arthropathy.   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   No evidence of fracture.   No acute erosive bony changes.   Within limited CT parameters there is no evidence of osteomyelitis.   There is a suggested skin wound at the lateral forefoot near the 4th and 5th metatarsophalangeal joints with skin thickening.   No evidence of a discrete focal fluid collection or adjacent osseous abnormality.   The tibia and fibula distally pole show some thick periosteal type reaction which favored to be related to vascular changes.   There is extensive subcutaneous edema and skin thickening which is marked especially prominent at the calf.   No evidence of a soft tissue mass.       Small plantar skin wound suggested at the lateral forefoot and 4th and 5th metatarsophalangeal joints with skin thickening edema no evidence of a discrete fluid collection or adjacent osteomyelitis.   Marked subcutaneous edema about the entirety short structures particularly in the calf which could be related to cellulitis, chronic stasis, or lymphedema.   Advanced arthritic changes as detailed above without acute osseous abnormality.     MACRO: None   Signed by: Eliu Alex 5/23/2025 6:12 AM Dictation workstation:   [id number]      XR chest 1 view  Result Date: 5/21/2025  Interpreted By:  Thai Cartagena, STUDY: XR CHEST 1 VIEW;  5/21/2025 10:51 pm   INDICATION: Signs/Symptoms:lateral plantar foot wound, malodorous, purulent.   COMPARISON: None.   ACCESSION NUMBER(S): [id number]   ORDERING CLINICIAN: KARLOS BERGERON   FINDINGS:   CARDIOMEDIASTINAL SILHOUETTE: Cardiomediastinal silhouette is normal in size and configuration taking into account portable technique.   LUNGS/PLEURA: There are no consolidations.There are no pleural effusions. There is no demonstrated pneumothorax.     BONES: No evidence of acute osseous abnormality.       1.  No evidence of acute cardiopulmonary process.     Signed by: Thai Cartagena 5/21/2025 11:24 PM Dictation workstation:   [id number]    XR foot left  3+ views  Result Date: 5/21/2025  Interpreted By:  Thai Cartagena, STUDY: XR FOOT LEFT 3+ VIEWS; ;  5/21/2025 10:50 pm   INDICATION: Signs/Symptoms:sepsis.     COMPARISON: 07/30/2024.   ACCESSION NUMBER(S): [id number]   ORDERING CLINICIAN: KARLOS BERGERON   FINDINGS: There is partially visualized diffuse edema of the lower leg possibly lymphedema.  No evidence of acute fracture or dislocation. Suspected pes cavus. Partially visualized degenerative change of the tibiotalar joint. No evidence of soft tissue gas or osseous erosion.       No evidence of acute fracture or dislocation. Diffuse lower leg edema. Suspected pes cavus. Degenerative change of the tibiotalar joint.     MACRO: None   Signed by: Thai Cartagena 5/21/2025 11:23 PM Dictation workstation:   [id number]    CT brain attack head wo IV contrast  Result Date: 5/21/2025  Interpreted By:  Ketan Birch, STUDY: CT BRAIN ATTACK HEAD WO IV CONTRAST;  5/21/2025 10:04 pm   INDICATION: Signs/Symptoms:Stroke Evaluation.     COMPARISON: 11/2024   ACCESSION NUMBER(S): [id number]   ORDERING CLINICIAN: KARLOS BERGERON   TECHNIQUE: Noncontrast axial CT scan of head was performed. Angled reformats in brain and bone windows were generated. The images were reviewed in bone, brain, blood and soft tissue windows.   FINDINGS: CSF Spaces: The ventricles, sulci and basal cisterns are within normal limits. There is no extraaxial fluid collection. Mild global volume loss and chronic small vessel ischemic change. The appearance is overall similar   Parenchyma:  The grey-white differentiation is intact. There is no mass effect or midline shift.  There is no intracranial hemorrhage.   Calvarium: The calvarium is unremarkable.   Vascular calcification       No evidence of acute cortical infarct or intracranial hemorrhage.   Senescent changes. No change from prior. If persistent concern, consider MRI   MACRO: Ketan Birch discussed the significance and urgency of this critical finding  by epic chat with  KARLOS BERGERON on 5/21/2025 at 10:13 pm. (**-RCF-**) Findings:  See findings.     Signed by: Ketan Birch 5/21/2025 10:13 PM Dictation workstation:   [id number]            Assessment/Plan   Sepsis-triggered with leukocytosis, fever, tachypnea  Acute on chronic kidney disease  Resolved Toxic metabolic encephalopathy  Morganella Bacteremia   Left leg cellulitis  Left diabetic foot ulcer infection-suspected Chan grade 2 -culture growing MSSA, pending gram-positive and gram-negative bacteria   Lymphedema  History of enterobacter, MSSA, klebsiella oxytoca       Continue IV meropenem-history of enterobacter  Continue IV daptomycin  CK level  - 89 on 5/22  Follow-up wound culture  Follow up blood culture-Morganella -susceptibility pending   Follow-up repeat blood cultures  Monitor WBC and temperature  Monitor renal function  Local care  Offloading  Podiatry consult  Further recommendations based on pending work-up and cultures       Ana Rodriguez, APRN-CNP

## 2025-05-25 VITALS
RESPIRATION RATE: 18 BRPM | OXYGEN SATURATION: 97 % | DIASTOLIC BLOOD PRESSURE: 70 MMHG | WEIGHT: 299.83 LBS | HEIGHT: 66 IN | HEART RATE: 71 BPM | TEMPERATURE: 97.9 F | BODY MASS INDEX: 48.19 KG/M2 | SYSTOLIC BLOOD PRESSURE: 150 MMHG

## 2025-05-25 LAB
ANION GAP SERPL CALCULATED.3IONS-SCNC: 11 MMOL/L (ref 10–20)
ATRIAL RATE: 115 BPM
BACTERIA BLD CULT: NORMAL
BUN SERPL-MCNC: 50 MG/DL (ref 6–23)
CALCIUM SERPL-MCNC: 9.8 MG/DL (ref 8.6–10.3)
CHLORIDE SERPL-SCNC: 104 MMOL/L (ref 98–107)
CO2 SERPL-SCNC: 27 MMOL/L (ref 21–32)
CREAT SERPL-MCNC: 2.19 MG/DL (ref 0.5–1.3)
EGFRCR SERPLBLD CKD-EPI 2021: 34 ML/MIN/1.73M*2
GLUCOSE BLD MANUAL STRIP-MCNC: 108 MG/DL (ref 74–99)
GLUCOSE BLD MANUAL STRIP-MCNC: 112 MG/DL (ref 74–99)
GLUCOSE BLD MANUAL STRIP-MCNC: 129 MG/DL (ref 74–99)
GLUCOSE BLD MANUAL STRIP-MCNC: 134 MG/DL (ref 74–99)
GLUCOSE SERPL-MCNC: 115 MG/DL (ref 74–99)
POTASSIUM SERPL-SCNC: 4 MMOL/L (ref 3.5–5.3)
Q ONSET: 227 MS
QRS COUNT: 12 BEATS
QRS DURATION: 76 MS
QT INTERVAL: 352 MS
QTC CALCULATION(BAZETT): 387 MS
QTC FREDERICIA: 376 MS
R AXIS: -22 DEGREES
SODIUM SERPL-SCNC: 138 MMOL/L (ref 136–145)
T AXIS: 61 DEGREES
T OFFSET: 403 MS
VENTRICULAR RATE: 73 BPM

## 2025-05-25 PROCEDURE — 99232 SBSQ HOSP IP/OBS MODERATE 35: CPT

## 2025-05-25 PROCEDURE — 36415 COLL VENOUS BLD VENIPUNCTURE: CPT | Performed by: INTERNAL MEDICINE

## 2025-05-25 PROCEDURE — 82947 ASSAY GLUCOSE BLOOD QUANT: CPT

## 2025-05-25 PROCEDURE — 2500000001 HC RX 250 WO HCPCS SELF ADMINISTERED DRUGS (ALT 637 FOR MEDICARE OP)

## 2025-05-25 PROCEDURE — 99232 SBSQ HOSP IP/OBS MODERATE 35: CPT | Performed by: INTERNAL MEDICINE

## 2025-05-25 PROCEDURE — 2500000002 HC RX 250 W HCPCS SELF ADMINISTERED DRUGS (ALT 637 FOR MEDICARE OP, ALT 636 FOR OP/ED): Performed by: INTERNAL MEDICINE

## 2025-05-25 PROCEDURE — 1210000001 HC SEMI-PRIVATE ROOM DAILY

## 2025-05-25 PROCEDURE — 2500000004 HC RX 250 GENERAL PHARMACY W/ HCPCS (ALT 636 FOR OP/ED): Mod: JZ | Performed by: INTERNAL MEDICINE

## 2025-05-25 PROCEDURE — 2500000001 HC RX 250 WO HCPCS SELF ADMINISTERED DRUGS (ALT 637 FOR MEDICARE OP): Performed by: INTERNAL MEDICINE

## 2025-05-25 PROCEDURE — 2500000004 HC RX 250 GENERAL PHARMACY W/ HCPCS (ALT 636 FOR OP/ED): Mod: JZ | Performed by: NURSE PRACTITIONER

## 2025-05-25 PROCEDURE — 2500000004 HC RX 250 GENERAL PHARMACY W/ HCPCS (ALT 636 FOR OP/ED): Performed by: INTERNAL MEDICINE

## 2025-05-25 PROCEDURE — 80048 BASIC METABOLIC PNL TOTAL CA: CPT | Performed by: INTERNAL MEDICINE

## 2025-05-25 RX ORDER — AMLODIPINE BESYLATE 5 MG/1
5 TABLET ORAL DAILY
Status: DISPENSED | OUTPATIENT
Start: 2025-05-25

## 2025-05-25 RX ADMIN — CALCITRIOL CAPSULES 0.25 MCG 0.5 MCG: 0.25 CAPSULE ORAL at 09:52

## 2025-05-25 RX ADMIN — DAPTOMYCIN IN SODIUM CHLORIDE 350 MG: 350 INJECTION, SOLUTION INTRAVENOUS at 14:23

## 2025-05-25 RX ADMIN — SODIUM BICARBONATE 650 MG: 650 TABLET ORAL at 14:24

## 2025-05-25 RX ADMIN — SODIUM BICARBONATE 650 MG: 650 TABLET ORAL at 06:23

## 2025-05-25 RX ADMIN — AMLODIPINE BESYLATE 5 MG: 5 TABLET ORAL at 09:53

## 2025-05-25 RX ADMIN — SODIUM BICARBONATE 650 MG: 650 TABLET ORAL at 19:05

## 2025-05-25 RX ADMIN — ALLOPURINOL 150 MG: 300 TABLET ORAL at 09:52

## 2025-05-25 RX ADMIN — INSULIN GLARGINE 20 UNITS: 100 INJECTION, SOLUTION SUBCUTANEOUS at 21:22

## 2025-05-25 RX ADMIN — CLOPIDOGREL 75 MG: 75 TABLET ORAL at 09:53

## 2025-05-25 RX ADMIN — APIXABAN 5 MG: 5 TABLET, FILM COATED ORAL at 09:53

## 2025-05-25 RX ADMIN — MEROPENEM 1 G: 1 INJECTION, POWDER, FOR SOLUTION INTRAVENOUS at 21:22

## 2025-05-25 RX ADMIN — MEROPENEM 1 G: 1 INJECTION, POWDER, FOR SOLUTION INTRAVENOUS at 09:54

## 2025-05-25 RX ADMIN — SODIUM BICARBONATE 650 MG: 650 TABLET ORAL at 21:22

## 2025-05-25 ASSESSMENT — PAIN - FUNCTIONAL ASSESSMENT
PAIN_FUNCTIONAL_ASSESSMENT: 0-10

## 2025-05-25 ASSESSMENT — PAIN SCALES - GENERAL
PAINLEVEL_OUTOF10: 0 - NO PAIN

## 2025-05-25 NOTE — PROGRESS NOTES
"Kevin Dc \"Kofi\" is a 59 y.o. male on day 3 of admission presenting with Cellulitis of left lower extremity.    Subjective   Patient resting comfortably in bed this morning.  Remains in sinus rhythm on telemetry with a first-degree AV block with transient 2-1 AV block.  Patient denies lightheadedness, dizziness or chest pain.       Objective     Physical Exam  Cardiovascular:      Rate and Rhythm: Normal rate and regular rhythm.      Heart sounds: No murmur heard.     No friction rub. No gallop.   Pulmonary:      Effort: Pulmonary effort is normal.      Breath sounds: Normal breath sounds.      Comments: No conversational dyspnea noted.  Abdominal:      General: Bowel sounds are normal.      Palpations: Abdomen is soft.   Musculoskeletal:      Right lower leg: Edema present.      Left lower leg: Edema present.      Comments: Evidence of bilateral lower extremity chronic lymphedema.   Skin:     General: Skin is warm and dry.      Comments: Bilateral lower extremities with ace wraps, clean dry and intact.   Neurological:      Mental Status: He is alert and oriented to person, place, and time.   Psychiatric:         Mood and Affect: Mood normal.         Behavior: Behavior normal.         Last Recorded Vitals  Blood pressure 156/78, pulse 71, temperature 36.5 °C (97.7 °F), temperature source Oral, resp. rate 18, height 1.676 m (5' 6\"), weight 136 kg (299 lb 13.2 oz), SpO2 97%.  Intake/Output last 3 Shifts:  I/O last 3 completed shifts:  In: 1550 (11.4 mL/kg) [P.O.:1550]  Out: 7810 (57.4 mL/kg) [Urine:7810 (1.6 mL/kg/hr)]  Weight: 136 kg     Relevant Results  Results for orders placed or performed during the hospital encounter of 05/21/25 (from the past 24 hours)   POCT GLUCOSE   Result Value Ref Range    POCT Glucose 130 (H) 74 - 99 mg/dL   Transthoracic Echo Complete   Result Value Ref Range    AV pk hansel 1.85 m/s    LVOT diam 2.10 cm    MV E/A ratio 1.06     Tricuspid annular plane systolic excursion 2.9 cm    " LV Biplane EF 63 %    LA vol index A/L 34.8 ml/m2    LV EF 63 %    RV free wall pk S' 13.10 cm/s    LVIDd 4.92 cm    AV pk grad 14 mmHg    Aortic Valve Area by Continuity of Peak Velocity 2.15 cm2    LV A4C EF 62.4    POCT GLUCOSE   Result Value Ref Range    POCT Glucose 119 (H) 74 - 99 mg/dL   POCT GLUCOSE   Result Value Ref Range    POCT Glucose 141 (H) 74 - 99 mg/dL   Basic Metabolic Panel   Result Value Ref Range    Glucose 115 (H) 74 - 99 mg/dL    Sodium 138 136 - 145 mmol/L    Potassium 4.0 3.5 - 5.3 mmol/L    Chloride 104 98 - 107 mmol/L    Bicarbonate 27 21 - 32 mmol/L    Anion Gap 11 10 - 20 mmol/L    Urea Nitrogen 50 (H) 6 - 23 mg/dL    Creatinine 2.19 (H) 0.50 - 1.30 mg/dL    eGFR 34 (L) >60 mL/min/1.73m*2    Calcium 9.8 8.6 - 10.3 mg/dL   POCT GLUCOSE   Result Value Ref Range    POCT Glucose 112 (H) 74 - 99 mg/dL           Echocardiogram 5/20/2025:  CONCLUSIONS:   1. The left ventricular systolic function is normal, with a visually estimated ejection fraction of 60-65%.   2. There is mild concentric left ventricular hypertrophy.   3. Aortic valve sclerosis.   4. The inferior vena cava appears moderately dilated, with IVC inspiratory collapse less than 50%.   5. There is plaque visualized in the ascending aorta.    Assessment & Plan  Cellulitis of left lower extremity    Sepsis (Multi)    Altered mental status    Type 2 diabetes mellitus, with long-term current use of insulin    Second Degree AV Block Mobitz 1 Vs Mobitz 2: Remains predominantly in sinus rhythm with first-degree AV block with transient episodes of 2-1 AV block.  Patient remains asymptomatic.  Continue to hold AV geeta blocking agents and await electrophysiology consultation.  Left Lower Extremity Cellulitis: infectious disease services and podiatry on consult.   Heart Failure with Preserved Ejection Fraction: Echocardiogram results as above.  Chronic Lymphedema: Management per admitting.   Altered Mental Status: resolved, management per  admitting  Type 2 Diabetes Mellitus: Management per admitting  Coronary Artery Disease: With remote stenting. No anginal symptoms  Chronic Kidney Disease: Nephrology on consult   Hypertensive Disorder: Blood pressures are mildly elevated with the elimination of diltiazem and propranolol.  Will initiate amlodipine 5 mg daily for blood pressure management.     Overall Impression and Plan:     5/24: We were consulted to see this patient as he reportedly became bradycardic with rates in the 40s last evening.  EKG completed last evening on my interpretation does reveal a sinus rhythm with a Second Degree AV Block (Mobitz Type 1), though on my review of telemetry the patient did have occasional 2-1 AV block at which point you cannot determine between Mobitz I or Mobitz II. The patient does have a history of first degree AV block on previous EKGs, but no history of atrial fibrillation or other arrhythmias. He is completely asymptomatic of this.  Denies lightheadedness or dizziness.  Denies feelings of palpitations.  He denies syncope. Blood pressures have remained stable with last recorded at 150/86.  Agree with holding AV geeta blocking agents at this point including his diltiazem 120 mg as well as propranolol 60 mg.  No indication for temporary pacemaker at this time. I have ordered an echocardiogram to be completed this admission.  Additionally, will place an electrophysiology consultation.  If we are to reinitiate an AV geeta agent, would recommend against utilizing both beta-blockers and calcium channel blockers in this patient moving forward.  We will follow with you.    5/25: As above. Patient remains in sinus rhythm on telemetry with a 1st degree AV block with transient episodes of 2:1 AV block. He remains asymptomatic and denies lightheadedness or dizziness.  Denies chest pain or anginal type symptoms.  Denies shortness of breath.  Blood pressures are mildly elevated this morning with discontinuation of  propranolol and diltiazem with a last recorded blood pressure of 156/78.  Patient breathing comfortably on room air with pulse oximetry of 97%.  Lab work this morning revealing a sodium of 138, potassium 4.0, creatinine 2.19.  Echocardiogram completed yesterday revealing preserved LV systolic function of 60 to 65%, mild concentric left ventricular hypertrophy, aortic valve sclerosis and a moderately dilated inferior vena cava with IVC inspiratory collapse less than 50%.  I am going to initiate the patient on low dose amlodipine 5 mg daily for improved blood pressure control. Would continue to hold AV geeta blocking agents at this time. Discussed with Dr. Colón from electrophysiology and he will evaluate the patient tomorrow. Will make the patient NPO at midnight at Dr. Colón's request in case permanent pacemaker implantation is indicated. No indication for temporary pacing at this time. Will continue to follow with you.         Sara Epperson, APRN-CNP

## 2025-05-25 NOTE — PROGRESS NOTES
"Kevin Dc \"Kofi\" is a 59 y.o. male on day 3 of admission presenting with Cellulitis of left lower extremity.    Subjective   Interval History:   Afebrile , no chills  No pain   On room air saturating 98%  No shortness of breath or chest pain  Interval evaluation by cardiology -note reviewed     Objective   Range of Vitals (last 24 hours)  Heart Rate:  [71-75]   Temp:  [36.4 °C (97.5 °F)-37 °C (98.6 °F)]   Resp:  [18]   BP: (146-160)/(73-78)   SpO2:  [95 %-97 %]   Daily Weight  05/21/25 : 136 kg (299 lb 13.2 oz)    Body mass index is 48.39 kg/m².    Physical Exam  Constitutional:       Appearance: Normal appearance.   HENT:      Head: Normocephalic and atraumatic.   Cardiovascular:      Rate and Rhythm: Normal rate. Rhythm irregular.   Pulmonary:      Effort: Pulmonary effort is normal.   Abdominal:      General: Bowel sounds are normal.      Palpations: Abdomen is soft.   Musculoskeletal:         General: Normal range of motion.      Cervical back: Normal range of motion and neck supple.      Comments: Lymphedema bilateral lower extremities   Skin:     General: Skin is warm and dry.      Comments: Left plantar foot ulcer with large amount of sonja-ulcer callus  -foot dressing intact.     Psychiatric:         Mood and Affect: Mood normal.         Behavior: Behavior normal.           Antibiotics  DAPTOmycin - 350 mg/50 mL  meropenem - 1 gram/100 mL    Relevant Results  Labs  Results from last 72 hours   Lab Units 05/24/25  0634 05/23/25  0622   WBC AUTO x10*3/uL 6.5 9.8   HEMOGLOBIN g/dL 11.5* 10.6*   HEMATOCRIT % 35.4* 32.3*   PLATELETS AUTO x10*3/uL 166 157     Results from last 72 hours   Lab Units 05/25/25  0600 05/24/25  0634 05/23/25  0622   SODIUM mmol/L 138 138 137   POTASSIUM mmol/L 4.0 4.2 3.8   CHLORIDE mmol/L 104 109* 109*   CO2 mmol/L 27 22 20*   BUN mg/dL 50* 49* 46*   CREATININE mg/dL 2.19* 2.37* 2.73*   GLUCOSE mg/dL 115* 119* 97   CALCIUM mg/dL 9.8 9.1 8.4*   ANION GAP mmol/L 11 11 12   EGFR " mL/min/1.73m*2 34* 31* 26*   PHOSPHORUS mg/dL  --   --  3.4     Results from last 72 hours   Lab Units 05/23/25  0622   ALBUMIN g/dL 2.9*     Estimated Creatinine Clearance: 47 mL/min (A) (by C-G formula based on SCr of 2.19 mg/dL (H)).  C-Reactive Protein   Date Value Ref Range Status   05/22/2025 9.70 (H) <1.00 mg/dL Final     CRP   Date Value Ref Range Status   10/16/2021 3.2 (H) 0 - 2.0 MG/DL Final     Comment:     Performed at [address] [address]     Microbiology  Susceptibility data from last 14 days.  Collected Specimen Info Organism Amoxicillin/Clavulanate Ampicillin Ampicillin/Sulbactam Cefazolin Ceftriaxone Ciprofloxacin Clindamycin Ertapenem Erythromycin Gentamicin Imipenem Levofloxacin Meropenem Oxacillin   05/21/25 Tissue/Biopsy from Skin/Superficial Abscess Methicillin Susceptible Staphylococcus aureus (MSSA)        S   S      S     Mixed Anaerobic Bacteria                   Mixed Gram-Positive and Gram-Negative Bacteria                 05/21/25 Blood culture from Peripheral Venipuncture Morganella morganii  R  R  R  R  S  S   S   S  R  S  S      Collected Specimen Info Organism Piperacillin/Tazobactam Tetracycline Trimethoprim/Sulfamethoxazole Vancomycin   05/21/25 Tissue/Biopsy from Skin/Superficial Abscess Methicillin Susceptible Staphylococcus aureus (MSSA)   S  S  S     Mixed Anaerobic Bacteria         Mixed Gram-Positive and Gram-Negative Bacteria       05/21/25 Blood culture from Peripheral Venipuncture Morganella morganii  S   S        Imaging  CT foot left wo IV contrast  Result Date: 5/23/2025  Interpreted By:  Eliu Alex, STUDY: CT FOOT LEFT WO IV CONTRAST; ;  5/22/2025 9:22 pm   INDICATION: Signs/Symptoms:Left foot cellulitis/ ulcer. Assess for plantar foot abscess/osteomyelitis.     COMPARISON: Plain film radiographs May 21, 2025 left foot   ACCESSION NUMBER(S): [id number]   ORDERING CLINICIAN: CORNEL DANIEL   TECHNIQUE: Multiple thin-section axial images of the  left foot without contrast.   FINDINGS: There is ankylosis at the subtalar joint which could be postsurgical or related to underlying arthropathy.   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   No evidence of fracture.   No acute erosive bony changes.   Within limited CT parameters there is no evidence of osteomyelitis.   There is a suggested skin wound at the lateral forefoot near the 4th and 5th metatarsophalangeal joints with skin thickening.   No evidence of a discrete focal fluid collection or adjacent osseous abnormality.   The tibia and fibula distally pole show some thick periosteal type reaction which favored to be related to vascular changes.   There is extensive subcutaneous edema and skin thickening which is marked especially prominent at the calf.   No evidence of a soft tissue mass.       Small plantar skin wound suggested at the lateral forefoot and 4th and 5th metatarsophalangeal joints with skin thickening edema no evidence of a discrete fluid collection or adjacent osteomyelitis.   Marked subcutaneous edema about the entirety short structures particularly in the calf which could be related to cellulitis, chronic stasis, or lymphedema.   Advanced arthritic changes as detailed above without acute osseous abnormality.     MACRO: None   Signed by: Eliu Alex 5/23/2025 6:12 AM Dictation workstation:   [id number]      XR chest 1 view  Result Date: 5/21/2025  Interpreted By:  Thai Cartagena, STUDY: XR CHEST 1 VIEW;  5/21/2025 10:51 pm   INDICATION: Signs/Symptoms:lateral plantar foot wound, malodorous, purulent.   COMPARISON: None.   ACCESSION NUMBER(S): [id number]   ORDERING CLINICIAN: KARLOS BERGERON   FINDINGS:   CARDIOMEDIASTINAL SILHOUETTE: Cardiomediastinal silhouette is normal in size and configuration taking into account portable technique.   LUNGS/PLEURA: There are no consolidations.There are no pleural effusions. There is no demonstrated pneumothorax.     BONES: No evidence of acute  osseous abnormality.       1.  No evidence of acute cardiopulmonary process.     Signed by: Thai Cartagena 5/21/2025 11:24 PM Dictation workstation:   [id number]    XR foot left 3+ views  Result Date: 5/21/2025  Interpreted By:  Thai Cartagena, STUDY: XR FOOT LEFT 3+ VIEWS; ;  5/21/2025 10:50 pm   INDICATION: Signs/Symptoms:sepsis.     COMPARISON: 07/30/2024.   ACCESSION NUMBER(S): [id number]   ORDERING CLINICIAN: KARLOS BERGERON   FINDINGS: There is partially visualized diffuse edema of the lower leg possibly lymphedema.  No evidence of acute fracture or dislocation. Suspected pes cavus. Partially visualized degenerative change of the tibiotalar joint. No evidence of soft tissue gas or osseous erosion.       No evidence of acute fracture or dislocation. Diffuse lower leg edema. Suspected pes cavus. Degenerative change of the tibiotalar joint.     MACRO: None   Signed by: Thai Cartagena 5/21/2025 11:23 PM Dictation workstation:   [id number]    CT brain attack head wo IV contrast  Result Date: 5/21/2025  Interpreted By:  Ketan Birch, STUDY: CT BRAIN ATTACK HEAD WO IV CONTRAST;  5/21/2025 10:04 pm   INDICATION: Signs/Symptoms:Stroke Evaluation.     COMPARISON: 11/2024   ACCESSION NUMBER(S): [id number]   ORDERING CLINICIAN: KARLOS BERGERON   TECHNIQUE: Noncontrast axial CT scan of head was performed. Angled reformats in brain and bone windows were generated. The images were reviewed in bone, brain, blood and soft tissue windows.   FINDINGS: CSF Spaces: The ventricles, sulci and basal cisterns are within normal limits. There is no extraaxial fluid collection. Mild global volume loss and chronic small vessel ischemic change. The appearance is overall similar   Parenchyma:  The grey-white differentiation is intact. There is no mass effect or midline shift.  There is no intracranial hemorrhage.   Calvarium: The calvarium is unremarkable.   Vascular calcification       No evidence of acute cortical infarct or intracranial  hemorrhage.   Senescent changes. No change from prior. If persistent concern, consider MRI   MACRO: Ketan Birch discussed the significance and urgency of this critical finding by epic chat with  KARLOS BERGERON on 5/21/2025 at 10:13 pm. (**-RCF-**) Findings:  See findings.     Signed by: Ketan Birch 5/21/2025 10:13 PM Dictation workstation:   [id number]            Assessment/Plan   Sepsis-triggered with leukocytosis, fever, tachypnea  Acute on chronic kidney disease  Resolved Toxic metabolic encephalopathy  Morganella Bacteremia   Left leg cellulitis  Left diabetic foot ulcer infection-suspected Chan grade 2 -culture growing MSSA, pending gram-positive and gram-negative bacteria   Lymphedema  History of enterobacter, MSSA, klebsiella oxytoca       Continue IV meropenem-history of enterobacter  Continue IV daptomycin  CK level  - 89 on 5/22  Follow-up wound culture  Follow up blood culture-Morganella -pending   Follow-up repeat blood cultures  Monitor WBC and temperature  Monitor renal function  Local care  Offloading  Podiatry consult  Further recommendations based on pending work-up and cultures       LIANA Ortez-CNP

## 2025-05-25 NOTE — ASSESSMENT & PLAN NOTE
When he was admitted we tried converting his U-500 insulin to varieties that we have here.  This caused some hypoglycemia.  We have cut way back on his insulin dosing.      Heart block.-Still had periods of Mobitz 2 overnight.  He will be seen by electrophysiology tomorrow they recommended making him n.p.o. at midnight.  I have also put a hold on his Eliquis..    Edema and chronic renal insufficiency.-Appreciate ongoing input from nephrology.

## 2025-05-25 NOTE — CARE PLAN
Problem: Safety - Adult  Goal: Free from fall injury  Outcome: Progressing     Problem: Discharge Planning  Goal: Discharge to home or other facility with appropriate resources  Outcome: Progressing     Problem: Chronic Conditions and Co-morbidities  Goal: Patient's chronic conditions and co-morbidity symptoms are monitored and maintained or improved  Outcome: Progressing     Problem: Nutrition  Goal: Nutrient intake appropriate for maintaining nutritional needs  Outcome: Progressing     Problem: Diabetes  Goal: Achieve decreasing blood glucose levels by end of shift  Outcome: Progressing  Goal: Increase stability of blood glucose readings by end of shift  Outcome: Progressing  Goal: Decrease in ketones present in urine by end of shift  Outcome: Progressing  Goal: Maintain electrolyte levels within acceptable range throughout shift  Outcome: Progressing  Goal: Maintain glucose levels >70mg/dl to <250mg/dl throughout shift  Outcome: Progressing  Goal: No changes in neurological exam by end of shift  Outcome: Progressing  Goal: Learn about and adhere to nutrition recommendations by end of shift  Outcome: Progressing  Goal: Vital signs within normal range for age by end of shift  Outcome: Progressing  Goal: Increase self care and/or family involovement by end of shift  Outcome: Progressing  Goal: Receive DSME education by end of shift  Outcome: Progressing     Problem: Fall/Injury  Goal: Not fall by end of shift  Outcome: Progressing  Goal: Be free from injury by end of the shift  Outcome: Progressing  Goal: Verbalize understanding of personal risk factors for fall in the hospital  Outcome: Progressing  Goal: Verbalize understanding of risk factor reduction measures to prevent injury from fall in the home  Outcome: Progressing  Goal: Use assistive devices by end of the shift  Outcome: Progressing  Goal: Pace activities to prevent fatigue by end of the shift  Outcome: Progressing     Problem: Skin  Goal: Decreased  wound size/increased tissue granulation at next dressing change  Outcome: Progressing  Goal: Participates in plan/prevention/treatment measures  Outcome: Progressing  Goal: Prevent/manage excess moisture  Outcome: Progressing  Goal: Prevent/minimize sheer/friction injuries  Outcome: Progressing  Goal: Promote/optimize nutrition  Outcome: Progressing  Goal: Promote skin healing  Outcome: Progressing   The patient's goals for the shift include      The clinical goals for the shift include MAINTAIN SAFETY AND COMFORT, ABSENCE OF FALLS, WOUND CARE

## 2025-05-25 NOTE — CARE PLAN
The patient's goals for the shift include      The clinical goals for the shift include iv antibiotics, monitor wounds, monitor heart rhythm (MD notified)\      Problem: Safety - Adult  Goal: Free from fall injury  5/24/2025 2018 by Claribel Wyman RN  Outcome: Progressing  5/24/2025 2015 by Claribel Wyman RN  Outcome: Progressing  5/24/2025 2015 by Claribel Wyman RN  Outcome: Progressing     Problem: Discharge Planning  Goal: Discharge to home or other facility with appropriate resources  5/24/2025 2018 by Claribel Wyman RN  Outcome: Progressing  5/24/2025 2015 by Claribel Wyman RN  Outcome: Progressing  5/24/2025 2015 by Claribel Wyman RN  Outcome: Progressing     Problem: Chronic Conditions and Co-morbidities  Goal: Patient's chronic conditions and co-morbidity symptoms are monitored and maintained or improved  5/24/2025 2018 by Claribel Wyman RN  Outcome: Progressing  5/24/2025 2015 by Claribel Wyman RN  Outcome: Progressing  5/24/2025 2015 by Claribel Wyman RN  Outcome: Progressing     Problem: Nutrition  Goal: Nutrient intake appropriate for maintaining nutritional needs  5/24/2025 2018 by Claribel Wyman RN  Outcome: Progressing  5/24/2025 2015 by Claribel Wyman RN  Outcome: Progressing  5/24/2025 2015 by Claribel Wyman RN  Outcome: Progressing     Problem: Diabetes  Goal: Achieve decreasing blood glucose levels by end of shift  5/24/2025 2018 by Claribel Wyman RN  Outcome: Progressing  5/24/2025 2015 by Claribel Wyman RN  Outcome: Progressing  5/24/2025 2015 by Claribel Wyman RN  Outcome: Progressing  Goal: Increase stability of blood glucose readings by end of shift  5/24/2025 2018 by Claribel Wyman RN  Outcome: Progressing  5/24/2025 2015 by Claribel Wyman RN  Outcome: Progressing  5/24/2025 2015 by Claribel Wyman RN  Outcome:  Progressing  Goal: Decrease in ketones present in urine by end of shift  5/24/2025 2018 by Claribel Wyman RN  Outcome: Progressing  5/24/2025 2015 by Claribel Wyman RN  Outcome: Progressing  5/24/2025 2015 by Claribel Wyman RN  Outcome: Progressing  Goal: Maintain electrolyte levels within acceptable range throughout shift  5/24/2025 2018 by Claribel Wyman RN  Outcome: Progressing  5/24/2025 2015 by Claribel Wyman RN  Outcome: Progressing  5/24/2025 2015 by Claribel Wyman RN  Outcome: Progressing  Goal: Maintain glucose levels >70mg/dl to <250mg/dl throughout shift  5/24/2025 2018 by Claribel Wyman RN  Outcome: Progressing  5/24/2025 2015 by Claribel Wyman RN  Outcome: Progressing  5/24/2025 2015 by Claribel Wyman RN  Outcome: Progressing  Goal: No changes in neurological exam by end of shift  5/24/2025 2018 by Claribel Wyman RN  Outcome: Progressing  5/24/2025 2015 by Claribel Wyman RN  Outcome: Progressing  5/24/2025 2015 by Claribel Wyman RN  Outcome: Progressing  Goal: Learn about and adhere to nutrition recommendations by end of shift  5/24/2025 2018 by Claribel Wyman RN  Outcome: Progressing  5/24/2025 2015 by Claribel Wyman RN  Outcome: Progressing  5/24/2025 2015 by Claribel Wyman RN  Outcome: Progressing  Goal: Vital signs within normal range for age by end of shift  5/24/2025 2018 by Claribel Wyman RN  Outcome: Progressing  5/24/2025 2015 by Claribel Wyman RN  Outcome: Progressing  5/24/2025 2015 by Claribel Wyman RN  Outcome: Progressing  Goal: Increase self care and/or family involovement by end of shift  5/24/2025 2018 by Claribel Wyman RN  Outcome: Progressing  5/24/2025 2015 by Claribel Wyman RN  Outcome: Progressing  5/24/2025 2015 by Claribel Wyman RN  Outcome: Progressing  Goal: Receive DSME  education by end of shift  5/24/2025 2018 by Clariebl Wyman RN  Outcome: Progressing  5/24/2025 2015 by Claribel Wyman RN  Outcome: Progressing  5/24/2025 2015 by Claribel Wyman RN  Outcome: Progressing     Problem: Fall/Injury  Goal: Not fall by end of shift  5/24/2025 2018 by Claribel Wyman, RN  Outcome: Progressing  5/24/2025 2015 by Claribel Wyman RN  Outcome: Progressing  5/24/2025 2015 by Claribel Wyman RN  Outcome: Progressing  Goal: Be free from injury by end of the shift  5/24/2025 2018 by Claribel Wyman, RN  Outcome: Progressing  5/24/2025 2015 by Claribel Wyman RN  Outcome: Progressing  5/24/2025 2015 by Claribel Wyman, RN  Outcome: Progressing  Goal: Verbalize understanding of personal risk factors for fall in the hospital  5/24/2025 2018 by Claribel Wyman, RN  Outcome: Progressing  5/24/2025 2015 by Claribel Wyman, RN  Outcome: Progressing  5/24/2025 2015 by Claribel Wyman RN  Outcome: Progressing  Goal: Verbalize understanding of risk factor reduction measures to prevent injury from fall in the home  5/24/2025 2018 by Claribel Wyman, RN  Outcome: Progressing  5/24/2025 2015 by Claribel Wyman, RN  Outcome: Progressing  5/24/2025 2015 by Claribel Wyman RN  Outcome: Progressing  Goal: Use assistive devices by end of the shift  5/24/2025 2018 by Claribel Wyman, RN  Outcome: Progressing  5/24/2025 2015 by Claribel Wyman, RN  Outcome: Progressing  5/24/2025 2015 by Claribel Wyman RN  Outcome: Progressing  Goal: Pace activities to prevent fatigue by end of the shift  5/24/2025 2018 by Claribel Wyman, RN  Outcome: Progressing  5/24/2025 2015 by Claribel Wyman RN  Outcome: Progressing  5/24/2025 2015 by Claribel Wyman RN  Outcome: Progressing     Problem: Skin  Goal: Decreased wound size/increased tissue  granulation at next dressing change  5/24/2025 2018 by Claribel Wyman RN  Outcome: Progressing  5/24/2025 2015 by Claribel Wyman RN  Outcome: Progressing  5/24/2025 2015 by Claribel Wyman RN  Outcome: Progressing  Goal: Participates in plan/prevention/treatment measures  5/24/2025 2018 by Claribel Wyman, RN  Outcome: Progressing  5/24/2025 2015 by Claribel Wyman RN  Outcome: Progressing  5/24/2025 2015 by Claribel Wyman RN  Outcome: Progressing  Goal: Prevent/manage excess moisture  5/24/2025 2018 by Claribel Wyman RN  Outcome: Progressing  5/24/2025 2015 by Claribel Wyman RN  Outcome: Progressing  5/24/2025 2015 by Claribel Wyman RN  Outcome: Progressing  Goal: Prevent/minimize sheer/friction injuries  5/24/2025 2018 by Claribel Wyman, RN  Outcome: Progressing  5/24/2025 2015 by Claribel Wyman RN  Outcome: Progressing  5/24/2025 2015 by Claribel Wyman RN  Outcome: Progressing  Goal: Promote/optimize nutrition  5/24/2025 2018 by Claribel Wyman, RN  Outcome: Progressing  5/24/2025 2015 by Claribel Wyman RN  Outcome: Progressing  5/24/2025 2015 by Claribel Wyman RN  Outcome: Progressing  Goal: Promote skin healing  5/24/2025 2018 by Claribel Wyman, RN  Outcome: Progressing  5/24/2025 2015 by Claribel Wyman RN  Outcome: Progressing  5/24/2025 2015 by Claribel Wyman RN  Outcome: Progressing        55 y/o F h/o preDM pw B/L flank pain, mid suprapubic pain, dysuria, nausea and chills since yesterday. Denies known fever, cough, diarrhea, CP, SOB, hematuria, weakness. Appears well. No smoking no ETOH.   PMD- [facility]   Plan: will obtain basic labs, UA, UCX, give fluids, Toradol, ABX and reassess 55 y/o F h/o preDM pw B/L flank pain, mid suprapubic pain, dysuria, nausea and chills since yesterday. Denies known fever, cough, diarrhea, CP, SOB, hematuria, weakness. Appears well. No smoking no ETOH.   [facility]   Plan: will obtain basic labs, UA, UCX, give fluids, Toradol, ABX and reassess    Results WNL. Advise NSAID/Tylenol prn. F/U PCP. Urine neg but pt complaining of dysuria. Will tx per symptomatology. Worsening, continued or ANY new concerning symptoms return to the emergency department.

## 2025-05-25 NOTE — PROGRESS NOTES
"Adrienne Cedillo \"Kofi\" is a 59 y.o. male on day 3 of admission presenting with Cellulitis of left lower extremity.      Subjective   Treating for left leg cellulitis and heel ulcer.  Had been on high doses of propranolol and Cardizem and found to have Mobitz 2 heart block.  Those meds been stopped.  Patient feels reasonably well.       Objective LURD oriented undistressed  Heart regular rate and rhythm  Lungs clear to auscultation  Both legs wrapped from toes to the knees.  Above this wrapping I do not perceive any pitting edema  Soft nontender nondistended    Last Recorded Vitals  /78 (BP Location: Right arm, Patient Position: Lying)   Pulse 71   Temp 36.5 °C (97.7 °F) (Oral)   Resp 18   Wt 136 kg (299 lb 13.2 oz)   SpO2 97%   Intake/Output last 3 Shifts:    Intake/Output Summary (Last 24 hours) at 5/25/2025 1059  Last data filed at 5/25/2025 0913  Gross per 24 hour   Intake 1700 ml   Output 7740 ml   Net -6040 ml       Admission Weight  Weight: 136 kg (299 lb 13.2 oz) (05/21/25 2216)    Daily Weight  05/21/25 : 136 kg (299 lb 13.2 oz)    Image Results  Transthoracic [place] [address]             Phone [phone]    TRANSTHORACIC ECHOCARDIOGRAM REPORT    Patient Name:       ADRIENNE CEDILLO   Reading Physician:    [id number] Herbert Partida MD  Study Date:         5/24/2025            Ordering Provider:    [id number] RODRI CARPENTER  MRN/PID:            [id number]             Fellow:  Accession#:         [id number]         Nurse:  Date of Birth/Age:  1965 / 59 years Sonographer:          Kia SANDERS  Gender Assigned at  M                    Additional Staff:  Birth:  Height:             167.64 cm            Admit Date:           " 5/24/2025  Weight:             135.63 kg            Admission Status:     Inpatient -                                                                 Routine  BSA / BMI:          2.37 m2 / 48.26      Department Location:                      kg/m2  Blood Pressure: 150 /86 mmHg    Study Type:    TRANSTHORACIC ECHO (TTE) COMPLETE  Diagnosis/ICD: Bradycardia, unspecified-R00.1; Dyspnea, unspecified-R06.00  Indication:    Bradycardia  CPT Codes:     Echo Complete w Full [id number]    Patient History:  Pertinent History: CVA HLD HTN PV HA DMII DVT CAD CHF CKD stage3 Acute Cor                     Pulmoale Angina Pectoris ASHD.    Study Detail: The following Echo studies were performed: 2D, M-Mode, Doppler and                color flow. Technically challenging study due to prominent lung                artifact and body habitus. A bubble study was not performed.       PHYSICIAN INTERPRETATION:  Left Ventricle: The left ventricular systolic function is normal, with a visually estimated ejection fraction of 60-65%. There are no regional wall motion abnormalities. The left ventricular cavity size is normal. There is mild increased septal and mildly increased posterior left ventricular wall thickness. There is left ventricular concentric remodeling. Spectral Doppler shows a normal pattern of left ventricular diastolic filling. There is mild concentric left ventricular hypertrophy.  Left Atrium: The left atrial size is mildly dilated. A bubble study using agitated saline was not performed.  Right Ventricle: The right ventricle is normal in size. There is normal right ventriclar wall thickness. There is normal right ventricular global systolic function.  Right Atrium: The right atrial size is normal.  Aortic Valve: The aortic valve is trileaflet. There is minimal aortic valve cusp calcification. There is no evidence of reduced aortic valve leaflet excursion excursion. There is evidence of mildly elevated transaortic gradients  consistent with sclerosis of the aortic valve.  There is no evidence of aortic valve regurgitation. The peak instantaneous gradient of the aortic valve is 14 mmHg.  Mitral Valve: The mitral valve is normal in structure. There is normal mitral valve leaflet mobility. There is mild mitral annular calcification. There is trace mitral valve regurgitation.  Tricuspid Valve: The tricuspid valve is structurally normal. There is normal tricuspid valve leaflet mobility. There is trace tricuspid regurgitation.  Pulmonic Valve: The pulmonic valve is structurally normal. There is trace pulmonic valve regurgitation.  Pericardium: No pericardial effusion noted.  Aorta: The aortic root is normal. There is no dilatation of the aortic arch. There is no dilatation of the ascending aorta. There is no dilatation of the aortic root. There is plaque visualized in the ascending aorta, which is classified as a Grade 2 [mild (focal or diffuse) intimal thickening of 2-3 mm] atherosclerosis.  Pulmonary Artery: The pulmonary artery is normal in size. The estimated pulmonary artery pressure is normal.  Systemic Veins: The inferior vena cava appears moderately dilated, with IVC inspiratory collapse less than 50%.       CONCLUSIONS:   1. The left ventricular systolic function is normal, with a visually estimated ejection fraction of 60-65%.   2. There is mild concentric left ventricular hypertrophy.   3. Aortic valve sclerosis.   4. The inferior vena cava appears moderately dilated, with IVC inspiratory collapse less than 50%.   5. There is plaque visualized in the ascending aorta.    QUANTITATIVE DATA SUMMARY:     2D MEASUREMENTS:             Normal Ranges:  LAs:             5.00 cm     (2.7-4.0cm)  IVSd:            1.13 cm     (0.6-1.1cm)  LVPWd:           1.36 cm     (0.6-1.1cm)  LVIDd:           4.92 cm     (3.9-5.9cm)  LVIDs:           3.46 cm  LV Mass Index:   101.2 g/m2  LVEDV Index:     47.75 ml/m2  LV % FS          29.7 %       LEFT  ATRIUM:                  Normal Ranges:  LA Vol A4C:        82.3 ml    (22+/-6mL/m2)  LA Vol A2C:        82.3 ml  LA Vol BP:         82.6 ml  LA Vol Index A4C:  34.7ml/m2  LA Vol Index A2C:  34.7 ml/m2  LA Vol Index BP:   34.8 ml/m2  LA Area A4C:       26.0 cm2  LA Area A2C:       25.9 cm2  LA Major Axis A4C: 7.0 cm  LA Major Axis A2C: 6.9 cm  LA Volume Index:   32.4 ml/m2  LA Vol A4C:        75.4 ml  LA Vol A2C:        78.9 ml  LA Vol Index BSA:  32.5 ml/m2       RIGHT ATRIUM:                 Normal Ranges:  RA Vol A4C:        45.8 ml    (8.3-19.5ml)  RA Vol Index A4C:  19.3 ml/m2  RA Area A4C:       18.5 cm2  RA Major Axis A4C: 6.4 cm       M-MODE MEASUREMENTS:         Normal Ranges:  Ao Root:             3.50 cm (2.0-3.7cm)  LAs:                 6.00 cm (2.7-4.0cm)       AORTA MEASUREMENTS:         Normal Ranges:  Ao Sinus, d:        3.61 cm (2.1-3.5cm)  Ao STJ, d:          3.26 cm (1.7-3.4cm)  Asc Ao, d:          3.20 cm (2.1-3.4cm)       LV SYSTOLIC FUNCTION:                       Normal Ranges:  EF-A4C View:    62 % (>=55%)  EF-A2C View:    64 %  EF-Biplane:     63 %  EF-Visual:      63 %  LV EF Reported: 63 %       LV DIASTOLIC FUNCTION:             Normal Ranges:  MV Peak E:             1.26 m/s    (0.7-1.2 m/s)  MV Peak A:             1.19 m/s    (0.42-0.7 m/s)  E/A Ratio:             1.06        (1.0-2.2)  MV e'                  0.060 m/s   (>8.0)  MV lateral e'          0.06 m/s  MV medial e'           0.06 m/s  MV A Dur:              164.00 msec  E/e' Ratio:            20.88       (<8.0)  PulmV Sys Cristobal:         33.20 cm/s  PulmV Gold Cristobal:        34.60 cm/s  PulmV S/D Cristobal:         1.00       MITRAL VALVE:          Normal Ranges:  MV DT:        146 msec (150-240msec)       AORTIC VALVE:            Normal Ranges:  AoV Vmax:      1.85 m/s  (<=1.7m/s)  AoV Peak P.7 mmHg (<20mmHg)  LVOT Max Cristobal:  1.15 m/s  (<=1.1m/s)  LVOT VTI:      25.60 cm  LVOT Diameter: 2.10 cm   (1.8-2.4cm)  AoV Area,Vmax: 2.15 cm2   (2.5-4.5cm2)       RIGHT VENTRICLE:  RV Basal 2.93 cm  RV Mid   2.55 cm  RV Major 6.9 cm  TAPSE:   29.0 mm  RV s'    0.13 m/s       TRICUSPID VALVE/RVSP:         Normal Ranges:  IVC Diam:             2.86 cm       PULMONIC VALVE:          Normal Ranges:  PV Accel Time:  103 msec (>120ms)  PV Max Cristobal:     0.9 m/s  (0.6-0.9m/s)  PV Max PG:      3.6 mmHg       PULMONARY VEINS:  PulmV Gold Cristobal: 34.60 cm/s  PulmV S/D Cristobal:  1.00  PulmV Sys Cristobal:  33.20 cm/s       [id number] Herbert Pratida MD  Electronically signed on 5/24/2025 at 2:28:24 PM       ** Final **      Physical Exam    Relevant Results                              Assessment & Plan  Cellulitis of left lower extremity  Blood cultures growing gram-negative rods.  Antibiotics per infectious disease consult service.  Sepsis (Multi)  As above  Altered mental status  This is resolved  Type 2 diabetes mellitus, with long-term current use of insulin  When he was admitted we tried converting his U-500 insulin to varieties that we have here.  This caused some hypoglycemia.  We have cut way back on his insulin dosing.      Heart block.-Still had periods of Mobitz 2 overnight.  He will be seen by electrophysiology tomorrow they recommended making him n.p.o. at midnight.  I have also put a hold on his Eliquis..    Edema and chronic renal insufficiency.-Appreciate ongoing input from nephrology.             John Pack MD

## 2025-05-25 NOTE — CARE PLAN
The patient's goals for the shift include      The clinical goals for the shift include: MAINTAIN COMFORT AND SAFETY, PREVENT FALL, WOUND CARE      Problem: Safety - Adult  Goal: Free from fall injury  5/24/2025 2015 by Claribel Wyman RN  Outcome: Progressing  5/24/2025 2015 by Clarible Wyman RN  Outcome: Progressing     Problem: Discharge Planning  Goal: Discharge to home or other facility with appropriate resources  5/24/2025 2015 by Claribel Wyman RN  Outcome: Progressing  5/24/2025 2015 by Claribel Wyman RN  Outcome: Progressing     Problem: Chronic Conditions and Co-morbidities  Goal: Patient's chronic conditions and co-morbidity symptoms are monitored and maintained or improved  5/24/2025 2015 by Claribel Wyman RN  Outcome: Progressing  5/24/2025 2015 by Claribel Wyman RN  Outcome: Progressing     Problem: Nutrition  Goal: Nutrient intake appropriate for maintaining nutritional needs  5/24/2025 2015 by Claribel Wyman RN  Outcome: Progressing  5/24/2025 2015 by Claribel Wyman RN  Outcome: Progressing     Problem: Diabetes  Goal: Achieve decreasing blood glucose levels by end of shift  5/24/2025 2015 by Claribel Wyman RN  Outcome: Progressing  5/24/2025 2015 by Claribel Wyman RN  Outcome: Progressing  Goal: Increase stability of blood glucose readings by end of shift  5/24/2025 2015 by Claribel Wyman RN  Outcome: Progressing  5/24/2025 2015 by Claribel Wyman RN  Outcome: Progressing  Goal: Decrease in ketones present in urine by end of shift  5/24/2025 2015 by Claribel Wyman RN  Outcome: Progressing  5/24/2025 2015 by Claribel Wyman RN  Outcome: Progressing  Goal: Maintain electrolyte levels within acceptable range throughout shift  5/24/2025 2015 by Claribel Wyman RN  Outcome: Progressing  5/24/2025 2015 by Claribel Wyman RN  Outcome: Progressing  Goal:  Maintain glucose levels >70mg/dl to <250mg/dl throughout shift  5/24/2025 2015 by Claribel Wyman RN  Outcome: Progressing  5/24/2025 2015 by Claribel Wyman RN  Outcome: Progressing  Goal: No changes in neurological exam by end of shift  5/24/2025 2015 by Clariebl Wyman RN  Outcome: Progressing  5/24/2025 2015 by Claribel Wyman RN  Outcome: Progressing  Goal: Learn about and adhere to nutrition recommendations by end of shift  5/24/2025 2015 by Claribel Wyman RN  Outcome: Progressing  5/24/2025 2015 by Claribel Wyman RN  Outcome: Progressing  Goal: Vital signs within normal range for age by end of shift  5/24/2025 2015 by Claribel Wyman RN  Outcome: Progressing  5/24/2025 2015 by Claribel Wyman RN  Outcome: Progressing  Goal: Increase self care and/or family involovement by end of shift  5/24/2025 2015 by Claribel Wyman RN  Outcome: Progressing  5/24/2025 2015 by Claribel Wyman RN  Outcome: Progressing  Goal: Receive DSME education by end of shift  5/24/2025 2015 by Claribel Wyman RN  Outcome: Progressing  5/24/2025 2015 by Claribel Wyman RN  Outcome: Progressing     Problem: Fall/Injury  Goal: Not fall by end of shift  5/24/2025 2015 by Claribel Wyman RN  Outcome: Progressing  5/24/2025 2015 by Claribel Wyamn RN  Outcome: Progressing  Goal: Be free from injury by end of the shift  5/24/2025 2015 by Claribel Wyman RN  Outcome: Progressing  5/24/2025 2015 by Claribel Wyman RN  Outcome: Progressing  Goal: Verbalize understanding of personal risk factors for fall in the hospital  5/24/2025 2015 by Claribel Wyman RN  Outcome: Progressing  5/24/2025 2015 by Claribel Wyman RN  Outcome: Progressing  Goal: Verbalize understanding of risk factor reduction measures to prevent injury from fall in the home  5/24/2025 2015 by Claribel Wyman,  RN  Outcome: Progressing  5/24/2025 2015 by Claribel Wyman RN  Outcome: Progressing  Goal: Use assistive devices by end of the shift  5/24/2025 2015 by Claribel Wyman, SYLWIA  Outcome: Progressing  5/24/2025 2015 by Claribel Wyman RN  Outcome: Progressing  Goal: Pace activities to prevent fatigue by end of the shift  5/24/2025 2015 by Claribel Wyman, RN  Outcome: Progressing  5/24/2025 2015 by Claribel Wyman RN  Outcome: Progressing     Problem: Skin  Goal: Decreased wound size/increased tissue granulation at next dressing change  5/24/2025 2015 by Claribel Wyman RN  Outcome: Progressing  5/24/2025 2015 by Claribel Wyman RN  Outcome: Progressing  Goal: Participates in plan/prevention/treatment measures  5/24/2025 2015 by Claribel Wyman RN  Outcome: Progressing  5/24/2025 2015 by Claribel Wyman RN  Outcome: Progressing  Goal: Prevent/manage excess moisture  5/24/2025 2015 by Claribel Wyman RN  Outcome: Progressing  5/24/2025 2015 by Claribel Wyman RN  Outcome: Progressing  Goal: Prevent/minimize sheer/friction injuries  5/24/2025 2015 by Claribel Wyman RN  Outcome: Progressing  5/24/2025 2015 by Claribel Wyman RN  Outcome: Progressing  Goal: Promote/optimize nutrition  5/24/2025 2015 by Claribel Wyman, RN  Outcome: Progressing  5/24/2025 2015 by Claribel Wyman RN  Outcome: Progressing  Goal: Promote skin healing  5/24/2025 2015 by Claribel Wyman RN  Outcome: Progressing  5/24/2025 2015 by Claribel Wyman RN  Outcome: Progressing

## 2025-05-25 NOTE — PROGRESS NOTES
"Kevin Dc \"Kofi\" is a 59 y.o. male on day 3 of admission presenting with Cellulitis of left lower extremity.    Subjective   The patient seen for chronic kidney disease stage III with ODILON admitted with severe cellulitis of the left lower extremity the patient is resting comfortably he feels no pain in the left leg swelling is down       Objective     Physical Exam  Neck:      Vascular: No carotid bruit.   Cardiovascular:      Rate and Rhythm: Normal rate and regular rhythm.      Heart sounds: No murmur heard.     No friction rub. No gallop.   Pulmonary:      Breath sounds: No wheezing, rhonchi or rales.   Chest:      Chest wall: No tenderness.   Abdominal:      General: There is no distension.      Tenderness: There is no abdominal tenderness. There is no guarding or rebound.   Musculoskeletal:         General: No swelling or tenderness.      Cervical back: Neck supple.      Right lower leg: No edema.      Left lower leg: No edema.      Comments: He has left lower extremity cellulitis up to the groin area redness and tenderness and erythema has significant lymphedema   Lymphadenopathy:      Cervical: No cervical adenopathy.         Last Recorded Vitals  Blood pressure 160/77, pulse 72, temperature 36.4 °C (97.5 °F), temperature source Oral, resp. rate 18, height 1.676 m (5' 6\"), weight 136 kg (299 lb 13.2 oz), SpO2 95%.    Intake/Output last 3 Shifts:  I/O last 3 completed shifts:  In: 1550 (11.4 mL/kg) [P.O.:1550]  Out: 7810 (57.4 mL/kg) [Urine:7810 (1.6 mL/kg/hr)]  Weight: 136 kg   Current Medications[1]   Relevant Results    Results for orders placed or performed during the hospital encounter of 05/21/25 (from the past 96 hours)   POCT GLUCOSE   Result Value Ref Range    POCT Glucose 166 (H) 74 - 99 mg/dL   CBC and Auto Differential   Result Value Ref Range    WBC 22.7 (H) 4.4 - 11.3 x10*3/uL    nRBC 0.0 0.0 - 0.0 /100 WBCs    RBC 4.14 (L) 4.50 - 5.90 x10*6/uL    Hemoglobin 12.0 (L) 13.5 - 17.5 g/dL    " Hematocrit 37.0 (L) 41.0 - 52.0 %    MCV 89 80 - 100 fL    MCH 29.0 26.0 - 34.0 pg    MCHC 32.4 32.0 - 36.0 g/dL    RDW 14.6 (H) 11.5 - 14.5 %    Platelets 221 150 - 450 x10*3/uL    Neutrophils % 91.3 40.0 - 80.0 %    Immature Granulocytes %, Automated 1.1 (H) 0.0 - 0.9 %    Lymphocytes % 3.3 13.0 - 44.0 %    Monocytes % 3.9 2.0 - 10.0 %    Eosinophils % 0.2 0.0 - 6.0 %    Basophils % 0.2 0.0 - 2.0 %    Neutrophils Absolute 20.73 (H) 1.20 - 7.70 x10*3/uL    Immature Granulocytes Absolute, Automated 0.25 0.00 - 0.70 x10*3/uL    Lymphocytes Absolute 0.74 (L) 1.20 - 4.80 x10*3/uL    Monocytes Absolute 0.89 0.10 - 1.00 x10*3/uL    Eosinophils Absolute 0.04 0.00 - 0.70 x10*3/uL    Basophils Absolute 0.05 0.00 - 0.10 x10*3/uL   Comprehensive metabolic panel   Result Value Ref Range    Glucose 151 (H) 74 - 99 mg/dL    Sodium 136 136 - 145 mmol/L    Potassium 4.5 3.5 - 5.3 mmol/L    Chloride 106 98 - 107 mmol/L    Bicarbonate 20 (L) 21 - 32 mmol/L    Anion Gap 15 10 - 20 mmol/L    Urea Nitrogen 46 (H) 6 - 23 mg/dL    Creatinine 2.64 (H) 0.50 - 1.30 mg/dL    eGFR 27 (L) >60 mL/min/1.73m*2    Calcium 8.9 8.6 - 10.3 mg/dL    Albumin 3.7 3.4 - 5.0 g/dL    Alkaline Phosphatase 71 33 - 136 U/L    Total Protein 7.2 6.4 - 8.2 g/dL    AST 13 9 - 39 U/L    Bilirubin, Total 1.0 0.0 - 1.2 mg/dL    ALT 13 10 - 52 U/L   Troponin I, High Sensitivity   Result Value Ref Range    Troponin I, High Sensitivity 15 0 - 20 ng/L   Protime-INR   Result Value Ref Range    Protime 13.0 (H) 9.3 - 12.7 seconds    INR 1.2 0.9 - 1.2   APTT   Result Value Ref Range    aPTT 33.7 (H) 22.0 - 32.5 seconds   Lactate   Result Value Ref Range    Lactate 2.0 0.4 - 2.0 mmol/L   Blood Culture    Specimen: Peripheral Venipuncture; Blood culture   Result Value Ref Range    Blood Culture Morganella morganii (A)     BLOOD CULTURE BOTTLE  Positive Anaerobic Bottle     Gram Stain Gram negative bacilli (AA)        Susceptibility    Morganella morganii - MICROSCAN      Ampicillin  Resistant ug/ml     Amoxicillin/Clavulanate  Resistant ug/ml     Ampicillin/Sulbactam  Resistant ug/ml     Cefazolin  Resistant ug/ml     Ceftriaxone  Susceptible ug/ml     Piperacillin/Tazobactam  Susceptible ug/ml     Ertapenem  Susceptible ug/ml     Imipenem  Resistant ug/ml     Meropenem  Susceptible ug/ml     Trimethoprim/Sulfamethoxazole  Susceptible ug/ml     Ciprofloxacin  Susceptible ug/ml     Levofloxacin  Susceptible ug/ml     Gentamicin  Susceptible ug/ml   Blood Culture    Specimen: Peripheral Venipuncture; Blood culture   Result Value Ref Range    Blood Culture No growth at 3 days    ECG 12 lead   Result Value Ref Range    Ventricular Rate 73 BPM    Atrial Rate 115 BPM    QRS Duration 76 ms    QT Interval 352 ms    QTC Calculation(Bazett) 387 ms    R Axis -22 degrees    T Axis 61 degrees    QRS Count 12 beats    Q Onset 227 ms    T Offset 403 ms    QTC Fredericia 376 ms   Tissue/Wound Culture/Smear    Specimen: Skin/Superficial Abscess; Tissue/Biopsy   Result Value Ref Range    Tissue/Wound Culture/Smear (A)      (4+) Abundant Methicillin Susceptible Staphylococcus aureus (MSSA)    Tissue/Wound Culture/Smear       (4+) Abundant Mixed Gram-Positive and Gram-Negative Bacteria    Tissue/Wound Culture/Smear (1+) Rare Mixed Anaerobic Bacteria     Beta Lactamase (Cefinase) Negative     Gram Stain (2+) Few Polymorphonuclear leukocytes (A)     Gram Stain (A)      (4+) Abundant Mixed Gram positive and Gram negative bacteria       Susceptibility    Methicillin Susceptible Staphylococcus aureus (MSSA) - MICROSCAN     Oxacillin  Susceptible ug/ml     Trimethoprim/Sulfamethoxazole  Susceptible ug/ml     Tetracycline  Susceptible ug/ml     Clindamycin  Susceptible ug/ml     Erythromycin  Susceptible ug/ml     Vancomycin  Susceptible ug/ml   Urinalysis with Reflex Culture and Microscopic   Result Value Ref Range    Color, Urine Light-Yellow Light-Yellow, Yellow, Dark-Yellow    Appearance, Urine Clear  Clear    Specific Gravity, Urine 1.015 1.005 - 1.035    pH, Urine 6.5 5.0, 5.5, 6.0, 6.5, 7.0, 7.5, 8.0    Protein, Urine 100 (2+) (A) NEGATIVE, 10 (TRACE), 20 (TRACE) mg/dL    Glucose, Urine 70 (1+) (A) Normal mg/dL    Blood, Urine 0.2 (2+) (A) NEGATIVE mg/dL    Ketones, Urine NEGATIVE NEGATIVE mg/dL    Bilirubin, Urine NEGATIVE NEGATIVE mg/dL    Urobilinogen, Urine Normal Normal mg/dL    Nitrite, Urine NEGATIVE NEGATIVE    Leukocyte Esterase, Urine NEGATIVE NEGATIVE   Extra Urine Gray Tube   Result Value Ref Range    Extra Tube Hold for add-ons.    Urinalysis Microscopic   Result Value Ref Range    WBC, Urine 1-5 1-5, NONE /HPF    RBC, Urine 11-20 (A) NONE, 1-2, 3-5 /HPF    Bacteria, Urine 1+ (A) NONE SEEN /HPF   POCT GLUCOSE   Result Value Ref Range    POCT Glucose 178 (H) 74 - 99 mg/dL   CBC and Auto Differential   Result Value Ref Range    WBC 14.7 (H) 4.4 - 11.3 x10*3/uL    nRBC 0.0 0.0 - 0.0 /100 WBCs    RBC 4.03 (L) 4.50 - 5.90 x10*6/uL    Hemoglobin 11.8 (L) 13.5 - 17.5 g/dL    Hematocrit 36.5 (L) 41.0 - 52.0 %    MCV 91 80 - 100 fL    MCH 29.3 26.0 - 34.0 pg    MCHC 32.3 32.0 - 36.0 g/dL    RDW 14.6 (H) 11.5 - 14.5 %    Platelets 173 150 - 450 x10*3/uL    Neutrophils % 95.7 40.0 - 80.0 %    Immature Granulocytes %, Automated 0.3 0.0 - 0.9 %    Lymphocytes % 2.3 13.0 - 44.0 %    Monocytes % 1.5 2.0 - 10.0 %    Eosinophils % 0.1 0.0 - 6.0 %    Basophils % 0.1 0.0 - 2.0 %    Neutrophils Absolute 14.06 (H) 1.20 - 7.70 x10*3/uL    Immature Granulocytes Absolute, Automated 0.05 0.00 - 0.70 x10*3/uL    Lymphocytes Absolute 0.34 (L) 1.20 - 4.80 x10*3/uL    Monocytes Absolute 0.22 0.10 - 1.00 x10*3/uL    Eosinophils Absolute 0.02 0.00 - 0.70 x10*3/uL    Basophils Absolute 0.02 0.00 - 0.10 x10*3/uL   Sedimentation rate, automated   Result Value Ref Range    Sedimentation Rate 56 (H) 0 - 20 mm/h   Vancomycin   Result Value Ref Range    Vancomycin 22.9 (H) 5.0 - 20.0 ug/mL   Creatine Kinase   Result Value Ref Range     Creatine Kinase 89 0 - 325 U/L   Comprehensive metabolic panel   Result Value Ref Range    Glucose 162 (H) 74 - 99 mg/dL    Sodium 137 136 - 145 mmol/L    Potassium 4.1 3.5 - 5.3 mmol/L    Chloride 108 (H) 98 - 107 mmol/L    Bicarbonate 18 (L) 21 - 32 mmol/L    Anion Gap 15 10 - 20 mmol/L    Urea Nitrogen 45 (H) 6 - 23 mg/dL    Creatinine 2.58 (H) 0.50 - 1.30 mg/dL    eGFR 28 (L) >60 mL/min/1.73m*2    Calcium 8.3 (L) 8.6 - 10.3 mg/dL    Albumin 3.2 (L) 3.4 - 5.0 g/dL    Alkaline Phosphatase 60 33 - 136 U/L    Total Protein 6.4 6.4 - 8.2 g/dL    AST 13 9 - 39 U/L    Bilirubin, Total 1.2 0.0 - 1.2 mg/dL    ALT 12 10 - 52 U/L   C-reactive protein   Result Value Ref Range    C-Reactive Protein 9.70 (H) <1.00 mg/dL   POCT GLUCOSE   Result Value Ref Range    POCT Glucose 171 (H) 74 - 99 mg/dL   POCT GLUCOSE   Result Value Ref Range    POCT Glucose 52 (L) 74 - 99 mg/dL   POCT GLUCOSE   Result Value Ref Range    POCT Glucose 101 (H) 74 - 99 mg/dL   POCT GLUCOSE   Result Value Ref Range    POCT Glucose 134 (H) 74 - 99 mg/dL   CBC   Result Value Ref Range    WBC 9.8 4.4 - 11.3 x10*3/uL    nRBC 0.0 0.0 - 0.0 /100 WBCs    RBC 3.60 (L) 4.50 - 5.90 x10*6/uL    Hemoglobin 10.6 (L) 13.5 - 17.5 g/dL    Hematocrit 32.3 (L) 41.0 - 52.0 %    MCV 90 80 - 100 fL    MCH 29.4 26.0 - 34.0 pg    MCHC 32.8 32.0 - 36.0 g/dL    RDW 14.7 (H) 11.5 - 14.5 %    Platelets 157 150 - 450 x10*3/uL   Renal Function Panel   Result Value Ref Range    Glucose 97 74 - 99 mg/dL    Sodium 137 136 - 145 mmol/L    Potassium 3.8 3.5 - 5.3 mmol/L    Chloride 109 (H) 98 - 107 mmol/L    Bicarbonate 20 (L) 21 - 32 mmol/L    Anion Gap 12 10 - 20 mmol/L    Urea Nitrogen 46 (H) 6 - 23 mg/dL    Creatinine 2.73 (H) 0.50 - 1.30 mg/dL    eGFR 26 (L) >60 mL/min/1.73m*2    Calcium 8.4 (L) 8.6 - 10.3 mg/dL    Phosphorus 3.4 2.5 - 4.9 mg/dL    Albumin 2.9 (L) 3.4 - 5.0 g/dL   POCT GLUCOSE   Result Value Ref Range    POCT Glucose 109 (H) 74 - 99 mg/dL   Blood Culture     Specimen: Peripheral Venipuncture; Blood culture   Result Value Ref Range    Blood Culture No growth at 1 day    Blood Culture    Specimen: Peripheral Venipuncture; Blood culture   Result Value Ref Range    Blood Culture No growth at 1 day    POCT GLUCOSE   Result Value Ref Range    POCT Glucose 138 (H) 74 - 99 mg/dL   POCT GLUCOSE   Result Value Ref Range    POCT Glucose 150 (H) 74 - 99 mg/dL   POCT GLUCOSE   Result Value Ref Range    POCT Glucose 131 (H) 74 - 99 mg/dL   Basic Metabolic Panel   Result Value Ref Range    Glucose 119 (H) 74 - 99 mg/dL    Sodium 138 136 - 145 mmol/L    Potassium 4.2 3.5 - 5.3 mmol/L    Chloride 109 (H) 98 - 107 mmol/L    Bicarbonate 22 21 - 32 mmol/L    Anion Gap 11 10 - 20 mmol/L    Urea Nitrogen 49 (H) 6 - 23 mg/dL    Creatinine 2.37 (H) 0.50 - 1.30 mg/dL    eGFR 31 (L) >60 mL/min/1.73m*2    Calcium 9.1 8.6 - 10.3 mg/dL   CBC   Result Value Ref Range    WBC 6.5 4.4 - 11.3 x10*3/uL    nRBC 0.0 0.0 - 0.0 /100 WBCs    RBC 3.99 (L) 4.50 - 5.90 x10*6/uL    Hemoglobin 11.5 (L) 13.5 - 17.5 g/dL    Hematocrit 35.4 (L) 41.0 - 52.0 %    MCV 89 80 - 100 fL    MCH 28.8 26.0 - 34.0 pg    MCHC 32.5 32.0 - 36.0 g/dL    RDW 14.6 (H) 11.5 - 14.5 %    Platelets 166 150 - 450 x10*3/uL   Magnesium   Result Value Ref Range    Magnesium 1.83 1.60 - 2.40 mg/dL   TSH with reflex to Free T4 if abnormal   Result Value Ref Range    Thyroid Stimulating Hormone 4.12 (H) 0.44 - 3.98 mIU/L   Thyroxine, Free   Result Value Ref Range    Thyroxine, Free 0.77 0.61 - 1.12 ng/dL   POCT GLUCOSE   Result Value Ref Range    POCT Glucose 112 (H) 74 - 99 mg/dL   POCT GLUCOSE   Result Value Ref Range    POCT Glucose 130 (H) 74 - 99 mg/dL   Transthoracic Echo Complete   Result Value Ref Range    AV pk hansel 1.85 m/s    LVOT diam 2.10 cm    MV E/A ratio 1.06     Tricuspid annular plane systolic excursion 2.9 cm    LV Biplane EF 63 %    LA vol index A/L 34.8 ml/m2    LV EF 63 %    RV free wall pk S' 13.10 cm/s    LVIDd 4.92 cm     AV pk grad 14 mmHg    Aortic Valve Area by Continuity of Peak Velocity 2.15 cm2    LV A4C EF 62.4    POCT GLUCOSE   Result Value Ref Range    POCT Glucose 119 (H) 74 - 99 mg/dL   POCT GLUCOSE   Result Value Ref Range    POCT Glucose 141 (H) 74 - 99 mg/dL   Basic Metabolic Panel   Result Value Ref Range    Glucose 115 (H) 74 - 99 mg/dL    Sodium 138 136 - 145 mmol/L    Potassium 4.0 3.5 - 5.3 mmol/L    Chloride 104 98 - 107 mmol/L    Bicarbonate 27 21 - 32 mmol/L    Anion Gap 11 10 - 20 mmol/L    Urea Nitrogen 50 (H) 6 - 23 mg/dL    Creatinine 2.19 (H) 0.50 - 1.30 mg/dL    eGFR 34 (L) >60 mL/min/1.73m*2    Calcium 9.8 8.6 - 10.3 mg/dL   POCT GLUCOSE   Result Value Ref Range    POCT Glucose 112 (H) 74 - 99 mg/dL   POCT GLUCOSE   Result Value Ref Range    POCT Glucose 108 (H) 74 - 99 mg/dL       Assessment/Plan   Acute kidney injury superimposed on chronic kidney stage III creatinine continue to trend down to 2.1 mg/dL today we will continue with diuretics and with antibiotic therapy  Chronic disease stage IIIb  Cellulitis left lower extremity continue IV antibiotics  Lymphedema in both lower extremities  Mild anemia chronic kidney disease  Obesity  Coronary artery disease  Sleep apnea  Hyperlipidemia  Hypertension  Metabolic acidosis  Secondary hyperparathyroidism  Pericardia with Mobitz 2 patient is already seen and evaluated by cardiology    Hernán Chance MD         [1]   Current Facility-Administered Medications:     acetaminophen (Tylenol) tablet 650 mg, 650 mg, oral, q4h PRN **OR** acetaminophen (Tylenol) oral liquid 650 mg, 650 mg, nasogastric tube, q4h PRN **OR** acetaminophen (Tylenol) suppository 650 mg, 650 mg, rectal, q4h PRN, Be Zamora DO    allopurinol (Zyloprim) tablet 150 mg, 150 mg, oral, Daily, Be Zamora DO, 150 mg at 05/25/25 0952    amLODIPine (Norvasc) tablet 5 mg, 5 mg, oral, Daily, Sara Epperson APRN-CNP, 5 mg at 05/25/25 0953    [Held by provider] apixaban (Eliquis) tablet  5 mg, 5 mg, oral, BID, Be Schreiberrlotte, DO, 5 mg at 05/25/25 0953    [Held by provider] atorvastatin (Lipitor) tablet 80 mg, 80 mg, oral, Daily, Be R Abdoulrlotte, DO, 80 mg at 05/23/25 0901    benzocaine-menthol (Cepastat Sore Throat) lozenge 1 lozenge, 1 lozenge, Mouth/Throat, q2h PRN, Be Schreiberrlotte, DO    calcitriol (Rocaltrol) capsule 0.5 mcg, 0.5 mcg, oral, Daily, Be Schreiberrlotte, DO, 0.5 mcg at 05/25/25 0952    clopidogrel (Plavix) tablet 75 mg, 75 mg, oral, Daily, Be Schreiberrlotte, DO, 75 mg at 05/25/25 0953    DAPTOmycin (Cubicin) 350 mg/50 mL  mg, 4 mg/kg (Adjusted), intravenous, q24h, Tong Badillo MD, Stopped at 05/24/25 1547    dextrose 50 % injection 12.5 g, 12.5 g, intravenous, q15 min PRN, John Pack MD    dextrose 50 % injection 25 g, 25 g, intravenous, q15 min PRN, John Pack MD    [Held by provider] dilTIAZem CD (Cardizem CD) 24 hr capsule 120 mg, 120 mg, oral, Daily, John Pack MD, 120 mg at 05/23/25 0900    glucagon (Glucagen) injection 1 mg, 1 mg, intramuscular, q15 min PRN, John Pack MD    glucagon (Glucagen) injection 1 mg, 1 mg, intramuscular, q15 min PRN, John Pack MD    insulin glargine (Lantus) injection 20 Units, 20 Units, subcutaneous, Nightly, Be Zamora DO, 20 Units at 05/24/25 2112    insulin lispro injection 0-15 Units, 0-15 Units, subcutaneous, TID AC, John Pack MD    melatonin tablet 3 mg, 3 mg, oral, Nightly PRN, Be Nicolelotte, DO    meropenem (Merrem) 1 g in sodium chloride 0.9%  mL, 1 g, intravenous, q12h, Loly Antonio, APRN-CNP, Last Rate: 200 mL/hr at 05/25/25 0954, 1 g at 05/25/25 0954    ondansetron ODT (Zofran-ODT) disintegrating tablet 4 mg, 4 mg, oral, q8h PRN **OR** ondansetron (Zofran) injection 4 mg, 4 mg, intravenous, q8h PRN, Be Zamora DO    [Held by provider] propranolol (Inderal) tablet 60 mg, 60 mg, oral, Daily, Be Zamora DO, 60 mg at 05/23/25 0737    sodium bicarbonate tablet  650 mg, 650 mg, oral, 4x daily, Be Zamora DO, 650 mg at 05/25/25 0623    torsemide (Demadex) tablet 50 mg, 50 mg, oral, Every other day, Be Zamora DO, 50 mg at 05/24/25 1010

## 2025-05-26 LAB
ANION GAP SERPL CALCULATED.3IONS-SCNC: 10 MMOL/L (ref 10–20)
BACTERIA BLD AEROBE CULT: ABNORMAL
BACTERIA BLD CULT: ABNORMAL
BACTERIA BLD CULT: NORMAL
BUN SERPL-MCNC: 51 MG/DL (ref 6–23)
CALCIUM SERPL-MCNC: 9.5 MG/DL (ref 8.6–10.3)
CHLORIDE SERPL-SCNC: 103 MMOL/L (ref 98–107)
CO2 SERPL-SCNC: 29 MMOL/L (ref 21–32)
CREAT SERPL-MCNC: 1.92 MG/DL (ref 0.5–1.3)
EGFRCR SERPLBLD CKD-EPI 2021: 39 ML/MIN/1.73M*2
ERYTHROCYTE [DISTWIDTH] IN BLOOD BY AUTOMATED COUNT: 14 % (ref 11.5–14.5)
GLUCOSE BLD MANUAL STRIP-MCNC: 122 MG/DL (ref 74–99)
GLUCOSE BLD MANUAL STRIP-MCNC: 126 MG/DL (ref 74–99)
GLUCOSE BLD MANUAL STRIP-MCNC: 131 MG/DL (ref 74–99)
GLUCOSE BLD MANUAL STRIP-MCNC: 143 MG/DL (ref 74–99)
GLUCOSE SERPL-MCNC: 110 MG/DL (ref 74–99)
GRAM STN SPEC: ABNORMAL
HCT VFR BLD AUTO: 37.6 % (ref 41–52)
HGB BLD-MCNC: 12.3 G/DL (ref 13.5–17.5)
MCH RBC QN AUTO: 28.3 PG (ref 26–34)
MCHC RBC AUTO-ENTMCNC: 32.7 G/DL (ref 32–36)
MCV RBC AUTO: 87 FL (ref 80–100)
NRBC BLD-RTO: 0 /100 WBCS (ref 0–0)
PLATELET # BLD AUTO: 218 X10*3/UL (ref 150–450)
POTASSIUM SERPL-SCNC: 4 MMOL/L (ref 3.5–5.3)
RBC # BLD AUTO: 4.34 X10*6/UL (ref 4.5–5.9)
SODIUM SERPL-SCNC: 138 MMOL/L (ref 136–145)
WBC # BLD AUTO: 7.3 X10*3/UL (ref 4.4–11.3)

## 2025-05-26 PROCEDURE — 80048 BASIC METABOLIC PNL TOTAL CA: CPT | Performed by: INTERNAL MEDICINE

## 2025-05-26 PROCEDURE — 97116 GAIT TRAINING THERAPY: CPT | Mod: GP,CQ

## 2025-05-26 PROCEDURE — 2500000004 HC RX 250 GENERAL PHARMACY W/ HCPCS (ALT 636 FOR OP/ED): Mod: JZ | Performed by: NURSE PRACTITIONER

## 2025-05-26 PROCEDURE — 82947 ASSAY GLUCOSE BLOOD QUANT: CPT

## 2025-05-26 PROCEDURE — 99221 1ST HOSP IP/OBS SF/LOW 40: CPT | Performed by: INTERNAL MEDICINE

## 2025-05-26 PROCEDURE — 2500000001 HC RX 250 WO HCPCS SELF ADMINISTERED DRUGS (ALT 637 FOR MEDICARE OP)

## 2025-05-26 PROCEDURE — 2500000004 HC RX 250 GENERAL PHARMACY W/ HCPCS (ALT 636 FOR OP/ED): Mod: JZ | Performed by: INTERNAL MEDICINE

## 2025-05-26 PROCEDURE — 1210000001 HC SEMI-PRIVATE ROOM DAILY

## 2025-05-26 PROCEDURE — 2500000004 HC RX 250 GENERAL PHARMACY W/ HCPCS (ALT 636 FOR OP/ED): Performed by: INTERNAL MEDICINE

## 2025-05-26 PROCEDURE — 2500000002 HC RX 250 W HCPCS SELF ADMINISTERED DRUGS (ALT 637 FOR MEDICARE OP, ALT 636 FOR OP/ED): Performed by: INTERNAL MEDICINE

## 2025-05-26 PROCEDURE — 97530 THERAPEUTIC ACTIVITIES: CPT | Mod: GP,CQ

## 2025-05-26 PROCEDURE — 2500000001 HC RX 250 WO HCPCS SELF ADMINISTERED DRUGS (ALT 637 FOR MEDICARE OP): Performed by: INTERNAL MEDICINE

## 2025-05-26 PROCEDURE — 99232 SBSQ HOSP IP/OBS MODERATE 35: CPT | Performed by: INTERNAL MEDICINE

## 2025-05-26 PROCEDURE — 85027 COMPLETE CBC AUTOMATED: CPT | Performed by: INTERNAL MEDICINE

## 2025-05-26 PROCEDURE — 99232 SBSQ HOSP IP/OBS MODERATE 35: CPT

## 2025-05-26 PROCEDURE — 36415 COLL VENOUS BLD VENIPUNCTURE: CPT | Performed by: INTERNAL MEDICINE

## 2025-05-26 RX ORDER — CEPHALEXIN 500 MG/1
500 CAPSULE ORAL EVERY 6 HOURS
Qty: 44 CAPSULE | Refills: 0 | Status: SHIPPED | OUTPATIENT
Start: 2025-05-26 | End: 2025-05-27

## 2025-05-26 RX ORDER — DOXAZOSIN 2 MG/1
1 TABLET ORAL DAILY
Status: DISCONTINUED | OUTPATIENT
Start: 2025-05-26 | End: 2025-05-27 | Stop reason: HOSPADM

## 2025-05-26 RX ORDER — LEVOFLOXACIN 750 MG/1
750 TABLET, FILM COATED ORAL EVERY 24 HOURS
Qty: 11 TABLET | Refills: 0 | Status: SHIPPED | OUTPATIENT
Start: 2025-05-26 | End: 2025-05-27

## 2025-05-26 RX ORDER — METOPROLOL SUCCINATE 25 MG/1
25 TABLET, EXTENDED RELEASE ORAL DAILY
Status: DISCONTINUED | OUTPATIENT
Start: 2025-05-26 | End: 2025-05-27 | Stop reason: HOSPADM

## 2025-05-26 RX ADMIN — SODIUM BICARBONATE 650 MG: 650 TABLET ORAL at 06:55

## 2025-05-26 RX ADMIN — APIXABAN 5 MG: 5 TABLET, FILM COATED ORAL at 09:00

## 2025-05-26 RX ADMIN — DAPTOMYCIN IN SODIUM CHLORIDE 350 MG: 350 INJECTION, SOLUTION INTRAVENOUS at 15:42

## 2025-05-26 RX ADMIN — ALLOPURINOL 150 MG: 300 TABLET ORAL at 09:29

## 2025-05-26 RX ADMIN — AMLODIPINE BESYLATE 5 MG: 5 TABLET ORAL at 09:29

## 2025-05-26 RX ADMIN — METOPROLOL SUCCINATE 25 MG: 25 TABLET, EXTENDED RELEASE ORAL at 09:30

## 2025-05-26 RX ADMIN — TORSEMIDE 50 MG: 100 TABLET ORAL at 09:30

## 2025-05-26 RX ADMIN — MEROPENEM 1 G: 1 INJECTION, POWDER, FOR SOLUTION INTRAVENOUS at 21:40

## 2025-05-26 RX ADMIN — CLOPIDOGREL 75 MG: 75 TABLET ORAL at 09:30

## 2025-05-26 RX ADMIN — CALCITRIOL CAPSULES 0.25 MCG 0.5 MCG: 0.25 CAPSULE ORAL at 09:29

## 2025-05-26 RX ADMIN — DOXAZOSIN 1 MG: 2 TABLET ORAL at 21:40

## 2025-05-26 RX ADMIN — APIXABAN 5 MG: 5 TABLET, FILM COATED ORAL at 21:40

## 2025-05-26 RX ADMIN — MEROPENEM 1 G: 1 INJECTION, POWDER, FOR SOLUTION INTRAVENOUS at 09:30

## 2025-05-26 RX ADMIN — APIXABAN 5 MG: 5 TABLET, FILM COATED ORAL at 12:01

## 2025-05-26 RX ADMIN — INSULIN GLARGINE 20 UNITS: 100 INJECTION, SOLUTION SUBCUTANEOUS at 21:40

## 2025-05-26 ASSESSMENT — PAIN SCALES - GENERAL
PAINLEVEL_OUTOF10: 0 - NO PAIN
PAINLEVEL_OUTOF10: 0 - NO PAIN

## 2025-05-26 ASSESSMENT — COGNITIVE AND FUNCTIONAL STATUS - GENERAL
MOBILITY SCORE: 24
MOVING FROM LYING ON BACK TO SITTING ON SIDE OF FLAT BED WITH BEDRAILS: A LITTLE
MOBILITY SCORE: 18
DAILY ACTIVITIY SCORE: 24
STANDING UP FROM CHAIR USING ARMS: A LITTLE
TURNING FROM BACK TO SIDE WHILE IN FLAT BAD: A LITTLE
CLIMB 3 TO 5 STEPS WITH RAILING: A LITTLE
WALKING IN HOSPITAL ROOM: A LITTLE
MOVING TO AND FROM BED TO CHAIR: A LITTLE

## 2025-05-26 ASSESSMENT — PAIN - FUNCTIONAL ASSESSMENT: PAIN_FUNCTIONAL_ASSESSMENT: 0-10

## 2025-05-26 NOTE — PROGRESS NOTES
"Kevin Dc \"Monalisa" is a 59 y.o. male on day 4 of admission presenting with Cellulitis of left lower extremity.      Subjective   Patient is doing well with no complaints, does have lymphedema however.  His creatinine is stable at baseline.       Objective          Vitals 24HR  Heart Rate:  [71-80]   Temp:  [36.4 °C (97.5 °F)-36.6 °C (97.9 °F)]   Resp:  [17-18]   BP: (150-160)/(70-88)   SpO2:  [95 %-99 %]       Intake/Output last 3 Shifts:    Intake/Output Summary (Last 24 hours) at 5/26/2025 1119  Last data filed at 5/26/2025 0848  Gross per 24 hour   Intake 1147 ml   Output 2550 ml   Net -1403 ml       Physical Exam  Constitutional:       General: He is awake. He is not in acute distress.  Cardiovascular:      Rate and Rhythm: Regular rhythm.      Heart sounds:      No friction rub.   Pulmonary:      Effort: Pulmonary effort is normal.      Breath sounds: Normal breath sounds.   Abdominal:      General: Bowel sounds are normal.      Palpations: Abdomen is soft.      Tenderness: There is no guarding or rebound.   Musculoskeletal:      Right lower leg: Edema present.      Left lower leg: Edema present.      Comments: Extremities are partially wrapped   Neurological:      Mental Status: He is alert.         Relevant Results  Results for orders placed or performed during the hospital encounter of 05/21/25 (from the past 24 hours)   POCT GLUCOSE   Result Value Ref Range    POCT Glucose 108 (H) 74 - 99 mg/dL   POCT GLUCOSE   Result Value Ref Range    POCT Glucose 129 (H) 74 - 99 mg/dL   POCT GLUCOSE   Result Value Ref Range    POCT Glucose 134 (H) 74 - 99 mg/dL   Basic Metabolic Panel   Result Value Ref Range    Glucose 110 (H) 74 - 99 mg/dL    Sodium 138 136 - 145 mmol/L    Potassium 4.0 3.5 - 5.3 mmol/L    Chloride 103 98 - 107 mmol/L    Bicarbonate 29 21 - 32 mmol/L    Anion Gap 10 10 - 20 mmol/L    Urea Nitrogen 51 (H) 6 - 23 mg/dL    Creatinine 1.92 (H) 0.50 - 1.30 mg/dL    eGFR 39 (L) >60 mL/min/1.73m*2    " Calcium 9.5 8.6 - 10.3 mg/dL   CBC   Result Value Ref Range    WBC 7.3 4.4 - 11.3 x10*3/uL    nRBC 0.0 0.0 - 0.0 /100 WBCs    RBC 4.34 (L) 4.50 - 5.90 x10*6/uL    Hemoglobin 12.3 (L) 13.5 - 17.5 g/dL    Hematocrit 37.6 (L) 41.0 - 52.0 %    MCV 87 80 - 100 fL    MCH 28.3 26.0 - 34.0 pg    MCHC 32.7 32.0 - 36.0 g/dL    RDW 14.0 11.5 - 14.5 %    Platelets 218 150 - 450 x10*3/uL   POCT GLUCOSE   Result Value Ref Range    POCT Glucose 131 (H) 74 - 99 mg/dL                       Assessment & Plan  Cellulitis of left lower extremity    Sepsis (Multi)    Altered mental status    Type 2 diabetes mellitus, with long-term current use of insulin    Acute kidney injury superimposed on chronic kidney stage III, resolved and creatinine is back to baseline  Chronic disease stage IIIb  Cellulitis left lower extremity continue IV antibiotics  Lymphedema in both lower extremities  Mild anemia chronic kidney disease  Obesity  Coronary artery disease  Sleep apnea  Hyperlipidemia  Hypertension-given his significant peripheral edema/lymphedema I would avoid calcium channel blocker, switch to Cardura for now and in the near future if his creatinine remains stable would benefit from an ACE or ARB.  Discussed with cardiology NP  Metabolic acidosis, resolved, stop bicarbonate tablets  Secondary hyperparathyroidism  Pericardia with Mobitz 2 patient is already seen and evaluated by cardiology    Vishnu Chance MD

## 2025-05-26 NOTE — PROGRESS NOTES
"Kevin Dc \"Monalisa" is a 59 y.o. male on day 4 of admission presenting with Cellulitis of left lower extremity.    Subjective   Patient resting comfortably in bed this morning. No acute events overnight. Reviewed telemetry, remains in sinus rhythm with occasional second degree atrioventricular block.        Objective     Physical Exam  Vitals reviewed.   Cardiovascular:      Rate and Rhythm: Normal rate and regular rhythm.      Heart sounds: No murmur heard.     No friction rub. No gallop.   Pulmonary:      Effort: Pulmonary effort is normal.      Breath sounds: Normal breath sounds. No wheezing, rhonchi or rales.   Abdominal:      General: Bowel sounds are normal.      Palpations: Abdomen is soft.   Musculoskeletal:      Right lower leg: Edema present.      Left lower leg: Edema present.   Skin:     General: Skin is warm and dry.      Comments: Bilateral lower extremity Ace wraps, clean dry and intact   Neurological:      Mental Status: He is alert and oriented to person, place, and time.   Psychiatric:         Mood and Affect: Mood normal.         Behavior: Behavior normal.         Last Recorded Vitals  Blood pressure 157/88, pulse 80, temperature 36.4 °C (97.5 °F), temperature source Oral, resp. rate 18, height 1.676 m (5' 6\"), weight 136 kg (299 lb 13.2 oz), SpO2 96%.  Intake/Output last 3 Shifts:  I/O last 3 completed shifts:  In: 1500 (11 mL/kg) [P.O.:1500]  Out: 6940 (51 mL/kg) [Urine:6940 (1.4 mL/kg/hr)]  Weight: 136 kg     Relevant Results  Results for orders placed or performed during the hospital encounter of 05/21/25 (from the past 24 hours)   POCT GLUCOSE   Result Value Ref Range    POCT Glucose 108 (H) 74 - 99 mg/dL   POCT GLUCOSE   Result Value Ref Range    POCT Glucose 129 (H) 74 - 99 mg/dL   POCT GLUCOSE   Result Value Ref Range    POCT Glucose 134 (H) 74 - 99 mg/dL   Basic Metabolic Panel   Result Value Ref Range    Glucose 110 (H) 74 - 99 mg/dL    Sodium 138 136 - 145 mmol/L    Potassium 4.0 " 3.5 - 5.3 mmol/L    Chloride 103 98 - 107 mmol/L    Bicarbonate 29 21 - 32 mmol/L    Anion Gap 10 10 - 20 mmol/L    Urea Nitrogen 51 (H) 6 - 23 mg/dL    Creatinine 1.92 (H) 0.50 - 1.30 mg/dL    eGFR 39 (L) >60 mL/min/1.73m*2    Calcium 9.5 8.6 - 10.3 mg/dL   CBC   Result Value Ref Range    WBC 7.3 4.4 - 11.3 x10*3/uL    nRBC 0.0 0.0 - 0.0 /100 WBCs    RBC 4.34 (L) 4.50 - 5.90 x10*6/uL    Hemoglobin 12.3 (L) 13.5 - 17.5 g/dL    Hematocrit 37.6 (L) 41.0 - 52.0 %    MCV 87 80 - 100 fL    MCH 28.3 26.0 - 34.0 pg    MCHC 32.7 32.0 - 36.0 g/dL    RDW 14.0 11.5 - 14.5 %    Platelets 218 150 - 450 x10*3/uL   POCT GLUCOSE   Result Value Ref Range    POCT Glucose 131 (H) 74 - 99 mg/dL             Assessment & Plan  Cellulitis of left lower extremity    Sepsis (Multi)    Altered mental status    Type 2 diabetes mellitus, with long-term current use of insulin    Second Degree AV Block Mobitz 1 Vs Mobitz 2: Patient seen by Dr. Colón from electrophysiology services this morning. No indication for pacemaker implantation at this time.   Left Lower Extremity Cellulitis: infectious disease services and podiatry on consult.   Heart Failure with Preserved Ejection Fraction: Echocardiogram results as above.  Chronic Lymphedema: Management per admitting.   Altered Mental Status: resolved, management per admitting  Type 2 Diabetes Mellitus: Management per admitting  Coronary Artery Disease: With remote stenting. No anginal symptoms  Chronic Kidney Disease: Nephrology on consult   Hypertensive Disorder: Blood pressure this morning at 157/88. Amlodipine has been initiated, will also start metoprolol succinate 25 mg daily.      Overall Impression and Plan:     5/24: We were consulted to see this patient as he reportedly became bradycardic with rates in the 40s last evening.  EKG completed last evening on my interpretation does reveal a sinus rhythm with a Second Degree AV Block (Mobitz Type 1), though on my review of telemetry the  patient did have occasional 2-1 AV block at which point you cannot determine between Mobitz I or Mobitz II. The patient does have a history of first degree AV block on previous EKGs, but no history of atrial fibrillation or other arrhythmias. He is completely asymptomatic of this.  Denies lightheadedness or dizziness.  Denies feelings of palpitations.  He denies syncope. Blood pressures have remained stable with last recorded at 150/86.  Agree with holding AV geeta blocking agents at this point including his diltiazem 120 mg as well as propranolol 60 mg.  No indication for temporary pacemaker at this time. I have ordered an echocardiogram to be completed this admission.  Additionally, will place an electrophysiology consultation.  If we are to reinitiate an AV geeta agent, would recommend against utilizing both beta-blockers and calcium channel blockers in this patient moving forward.  We will follow with you.     5/25: As above. Patient remains in sinus rhythm on telemetry with a 1st degree AV block with transient episodes of 2:1 AV block. He remains asymptomatic and denies lightheadedness or dizziness.  Denies chest pain or anginal type symptoms.  Denies shortness of breath.  Blood pressures are mildly elevated this morning with discontinuation of propranolol and diltiazem with a last recorded blood pressure of 156/78.  Patient breathing comfortably on room air with pulse oximetry of 97%.  Lab work this morning revealing a sodium of 138, potassium 4.0, creatinine 2.19.  Echocardiogram completed yesterday revealing preserved LV systolic function of 60 to 65%, mild concentric left ventricular hypertrophy, aortic valve sclerosis and a moderately dilated inferior vena cava with IVC inspiratory collapse less than 50%.  I am going to initiate the patient on low dose amlodipine 5 mg daily for improved blood pressure control. Would continue to hold AV geeta blocking agents at this time. Discussed with Dr. Colón from  electrophysiology and he will evaluate the patient tomorrow. Will make the patient NPO at midnight at Dr. Colón's request in case permanent pacemaker implantation is indicated. No indication for temporary pacing at this time. Will continue to follow with you.     5/26: As above.  Patient evaluated by electrophysiology this morning, no indication for pacemaker implantation at this time.  Patient resting comfortably bed this morning.  Denies chest pain, pressure or palpitations.  Remains in sinus rhythm on telemetry with episodes of second-degree atrial ventricular block type I.  Again, the patient is asymptomatic of this.  Blood pressures do remain somewhat elevated today with last recorded 157/88. Propanolol and diltiazem have been discontinue. Would continue amlodipine 5 mg, additionally will initiate 25 mg metoprolol succinate daily for improved blood pressure control.  I have resumed his Eliquis. Patient breathing comfortably on room air with pulse oximetry of 96% and remains optimized from a heart failure perspective.  Otherwise no further recommendations from a cardiac perspective and we will sign off.  The patient should follow-up with Dr. Hendrickson in the outpatient setting in 3 to 4 weeks following discharge.        Sara Epperson, APRN-CNP

## 2025-05-26 NOTE — CONSULTS
Inpatient consult to cardiology  Consult performed by: Marc Colón MD  Consult ordered by: LIANA Castro-CNP  Reason for consult: Second-degree atrioventricular block  Assessment/Recommendations: The patient has type I second-degree atrioventricular block (Wenkebach) and is asymptomatic.  As such I would not recommend pacemaker at this time.  Modification of his antihypertensive regimen medications as outlined        History Of Present Illness:    Kofi Dc is a 59 y.o. male with a history of hypertension, diabetes mellitus, coronary artery disease, post PCI and obesity.  He was admitted with cellulitis of his lower extremity after a fall.  While on the monitor he was noted to have episodes of second-degree atrioventricular block.  This is in the setting of significant doses of diltiazem and propranolol.  Both of his medications were put on hold.  The patient is completely asymptomatic.  He has never had lightheadedness, dizziness or syncopal episodes.  He is fairly sedentary.  This is also in the setting of preserved LV systolic function and no significant valvular heart disease     Last Recorded Vitals:  Vitals:    05/25/25 0740 05/25/25 1156 05/25/25 1610 05/26/25 0015   BP: 156/78 160/77 150/70 158/79   BP Location: Right arm Right arm Right arm Right arm   Patient Position: Lying Lying Lying Lying   Pulse: 71 72 71 73   Resp: 18 18 18 17   Temp: 36.5 °C (97.7 °F) 36.4 °C (97.5 °F) 36.6 °C (97.9 °F) 36.5 °C (97.7 °F)   TempSrc: Oral Oral Oral Oral   SpO2: 97% 95% 97% 99%   Weight:       Height:           Last Labs:  CBC - 5/26/2025:  5:21 AM  7.3 12.3 218    37.6      CMP - 5/26/2025:  5:21 AM  9.5 6.4 13 --- 1.2   3.4 2.9 12 60      PTT - 5/21/2025: 10:05 PM  1.2   13.0 33.7     Troponin I, High Sensitivity   Date/Time Value Ref Range Status   05/21/2025 10:05 PM 15 0 - 20 ng/L Final     BNP   Date/Time Value Ref Range Status   07/18/2024 11:02 AM 69 0 - 99 pg/mL Final     Hemoglobin A1C    Date/Time Value Ref Range Status   11/04/2024 12:25 PM 6.9 (H) See comment % Final   07/29/2024 04:04 AM 6.9 (H) See below % Final     LDL Calculated   Date/Time Value Ref Range Status   11/04/2024 12:25 PM 78 <=99 mg/dL Final     Comment:                                 Near   Borderline      AGE      Desirable  Optimal    High     High     Very High     0-19 Y     0 - 109     ---    110-129   >/= 130     ----    20-24 Y     0 - 119     ---    120-159   >/= 160     ----      >24 Y     0 -  99   100-129  130-159   160-189     >/=190     02/27/2024 02:15 PM 55 (L) 65 - 130 mg/dL Final   11/30/2022 02:35 PM 65 65 - 130 MG/DL Final   10/03/2022 03:21 PM 66 65 - 130 MG/DL Final   05/06/2022 03:05 PM 36 (L) 65 - 130 MG/DL Final     VLDL   Date/Time Value Ref Range Status   11/04/2024 12:25 PM 23 0 - 40 mg/dL Final      Last I/O:  I/O last 3 completed shifts:  In: 1500 (11 mL/kg) [P.O.:1500]  Out: 6940 (51 mL/kg) [Urine:6940 (1.4 mL/kg/hr)]  Weight: 136 kg     Past Cardiology Tests (Last 3 Years):  EKG:  ECG 12 lead 05/21/2025      ECG 12 Lead 07/18/2024    Echo:  Transthoracic Echo Complete 05/24/2025      Transthoracic Echo (TTE) Complete 07/29/2024    Ejection Fractions:  EF   Date/Time Value Ref Range Status   05/24/2025 12:02 PM 63 %    07/29/2024 03:55 PM 63 %      Cath:  No results found for this or any previous visit from the past 1095 days.    Stress Test:  No results found for this or any previous visit from the past 1095 days.    Cardiac Imaging:  No results found for this or any previous visit from the past 1095 days.      Past Medical History:  He has a past medical history of Acute cor pulmonale (Multi) (02/20/2024), Anal fissure, Angina pectoris (08/31/2023), Atherosclerotic heart disease of native coronary artery with other forms of angina pectoris (11/2/2021), Atypical chest pain (08/31/2023), Cerebral vascular accident (Multi), Cerebrovascular accident (Multi) (08/31/2023), CHF (congestive heart  failure), Chronic kidney disease, stage 3 (Multi) (08/31/2023), CKD (chronic kidney disease), Clotting disorder (Multi), Deep venous thrombosis of lower extremity (08/31/2023), Facial abscess, Heart failure (08/31/2023), Hematochezia, Lymphedema (05/22/2018), Mixed hyperlipidemia (08/31/2023), Obesity, Palpitations (08/31/2023), Peripheral vascular disease (08/31/2023), Peripheral vascular disease, Presence of coronary angioplasty implant and graft (03/24/2023), Primary hypertension (05/22/2018), Pulmonary embolism (03/24/2023), Sleep apnea, Type 2 diabetes mellitus (05/22/2018), Venous insufficiency, Venous insufficiency (chronic) (peripheral) (03/24/2023), and Ventricular premature complex (08/31/2023).    Past Surgical History:  He has a past surgical history that includes Cardiac catheterization; Carotid stent; and Colonoscopy.      Social History:  He reports that he has never smoked. He has never been exposed to tobacco smoke. He has never used smokeless tobacco. He reports that he does not drink alcohol and does not use drugs.    Family History:  Family History[1]     Allergies:  Patient has no known allergies.    Inpatient Medications:  Scheduled Medications[2]  PRN Medications[3]  Continuous Medications[4]  Outpatient Medications:  Current Outpatient Medications   Medication Instructions    acetaminophen (Tylenol) 500 mg tablet 1 tablet, Every 4 hours PRN    allopurinol (Zyloprim) 100 mg tablet 1.5 tablets, Daily    apixaban (Eliquis) 5 mg tablet 1 tablet, 2 times daily    atorvastatin (LIPITOR) 80 mg, oral, Daily    blood sugar diagnostic strip One Touch Ultra Test strips -  3 times per day sc 3X per meal for 90 days    calcitriol (Rocaltrol) 0.5 mcg capsule 1 capsule, Daily    clopidogrel (PLAVIX) 75 mg, oral, Daily    dilTIAZem ER (TIAZAC) 240 mg, oral, Daily, on an empty stomach in the morning Orally Once a day for 90 days    HumuLIN R U-500 (Conc) Kwikpen 110 Units, 2 times daily    insulin lispro  "protamin-lispro (HumaLOG Mix 50-50 KwikPen) 100 unit/mL (50-50) injection 2 times daily (morning and late afternoon)    isosorbide mononitrate ER (IMDUR) 60 mg, oral, Daily, For 90    losartan (COZAAR) 50 mg, Daily    nitroglycerin (NITROSTAT) 0.4 mg, sublingual, As needed, For 30 days    pen needle, diabetic 32 gauge x 5/32\" needle 2 times daily    potassium chloride CR 10 mEq ER tablet 1 tablet, Daily    propranolol (INDERAL) 60 mg, oral, Daily (0630)    sodium bicarbonate 650 mg tablet 1 tablet, 4 times daily    torsemide (Demadex) 100 mg tablet 0.5 tablets, Every other day       Physical Exam:  General: Alert, oriented to person, place, date/time, obese  HEENT: Normocephalic, eyes normal ears normal  Heart: Normal S1, S2 , rhythm regular, no rubs or gallops   Respiratory: Lungs clear to auscultation bilaterally, no rales, crackles, wheezes or rhonchi  Abdomen: Bowel sounds present in all quadrants  Extremities: No upper or lower extremity edema appreciated  Dermatology: Skin Normal  Genitourinary: Deferred  Neurological: Nonfocal, moves all extremities  Psychology :Appropriate affect and mood      Assessment/Plan   Second-degree atrioventricular block  The patient has predominant Wenkebach and is completely asymptomatic.  As such I would not recommend pacemaker implantation.  I agree with discontinuation of his diltiazem and propranolol.  I would start low-dose metoprolol given his coronary disease and rebound hypertension that he is experiencing.  Peripheral IV 05/21/25 20 G Right Antecubital (Active)   Present on Admission to Healthcare Facility Yes 05/25/25 2123   Site Assessment Clean;Dry;Intact 05/25/25 2123   Dressing Type Transparent 05/25/25 2123   Line Status Flushed 05/25/25 2123   Dressing Status Clean;Dry 05/25/25 2123   Number of days: 5       Peripheral IV 05/21/25 20 G Left Antecubital (Active)   Present on Admission to Healthcare Facility No 05/25/25 2123   Site Assessment Clean;Dry;Intact " 05/25/25 2123   Dressing Type Transparent 05/25/25 2123   Line Status Flushed 05/25/25 2123   Dressing Status Clean;Dry;Occlusive 05/25/25 2123   Number of days: 5       Code Status:  Full Code    I spent 30 minutes in the professional and overall care of this patient.        Marc Colón MD       [1]   Family History  Problem Relation Name Age of Onset    Heart disease Father      Other (double bypass with valve replacement) Father     [2]   Scheduled medications   Medication Dose Route Frequency    allopurinol  150 mg oral Daily    amLODIPine  5 mg oral Daily    [Held by provider] apixaban  5 mg oral BID    [Held by provider] atorvastatin  80 mg oral Daily    calcitriol  0.5 mcg oral Daily    clopidogrel  75 mg oral Daily    daptomycin  4 mg/kg (Adjusted) intravenous q24h    [Held by provider] dilTIAZem ER  120 mg oral Daily    insulin glargine  20 Units subcutaneous Nightly    insulin lispro  0-15 Units subcutaneous TID AC    meropenem  1 g intravenous q12h    [Held by provider] propranolol  60 mg oral Daily    sodium bicarbonate  650 mg oral 4x daily    torsemide  50 mg oral Every other day   [3]   PRN medications   Medication    acetaminophen    Or    acetaminophen    Or    acetaminophen    benzocaine-menthol    dextrose    dextrose    glucagon    glucagon    melatonin    ondansetron ODT    Or    ondansetron   [4]   Continuous Medications   Medication Dose Last Rate

## 2025-05-26 NOTE — NURSING NOTE
Sara Epperson, LIANA-CNP, Cardiology, in with pt at this time, pt being interviewed and assessed, plan of care being discussed, post discharge follow up being discussed as well...

## 2025-05-26 NOTE — PROGRESS NOTES
"Kevin Dc \"Kofi\" is a 59 y.o. male on day 4 of admission presenting with Cellulitis of left lower extremity.    Subjective   Interval History:   Afebrile , no chills  No pain   On room air saturating 96-99%  No shortness of breath or chest pain  Interval evaluation by cardiology-electrophysiology  -note reviewed     Objective   Range of Vitals (last 24 hours)  Heart Rate:  [71-80]   Temp:  [36.4 °C (97.5 °F)-36.6 °C (97.9 °F)]   Resp:  [17-18]   BP: (150-160)/(70-88)   SpO2:  [95 %-99 %]   Daily Weight  05/21/25 : 136 kg (299 lb 13.2 oz)    Body mass index is 48.39 kg/m².    Physical Exam  Constitutional:       Appearance: Normal appearance.   HENT:      Head: Normocephalic and atraumatic.   Cardiovascular:      Rate and Rhythm: Normal rate and regular rhythm.   Pulmonary:      Effort: Pulmonary effort is normal.   Abdominal:      Palpations: Abdomen is soft.   Musculoskeletal:         General: Normal range of motion.      Cervical back: Normal range of motion and neck supple.      Comments: Lymphedema bilateral lower extremities   Skin:     General: Skin is warm and dry.      Comments: Left plantar foot ulcer with large amount of sonja-ulcer callus  -foot dressing intact.     Psychiatric:         Mood and Affect: Mood normal.         Behavior: Behavior normal.           Antibiotics  DAPTOmycin - 350 mg/50 mL  meropenem - 1 gram/100 mL    Relevant Results  Labs  Results from last 72 hours   Lab Units 05/26/25  0521 05/24/25  0634   WBC AUTO x10*3/uL 7.3 6.5   HEMOGLOBIN g/dL 12.3* 11.5*   HEMATOCRIT % 37.6* 35.4*   PLATELETS AUTO x10*3/uL 218 166     Results from last 72 hours   Lab Units 05/26/25  0521 05/25/25  0600 05/24/25  0634   SODIUM mmol/L 138 138 138   POTASSIUM mmol/L 4.0 4.0 4.2   CHLORIDE mmol/L 103 104 109*   CO2 mmol/L 29 27 22   BUN mg/dL 51* 50* 49*   CREATININE mg/dL 1.92* 2.19* 2.37*   GLUCOSE mg/dL 110* 115* 119*   CALCIUM mg/dL 9.5 9.8 9.1   ANION GAP mmol/L 10 11 11   EGFR mL/min/1.73m*2 " 39* 34* 31*           Estimated Creatinine Clearance: 53.6 mL/min (A) (by C-G formula based on SCr of 1.92 mg/dL (H)).  C-Reactive Protein   Date Value Ref Range Status   05/22/2025 9.70 (H) <1.00 mg/dL Final     CRP   Date Value Ref Range Status   10/16/2021 3.2 (H) 0 - 2.0 MG/DL Final     Comment:     Performed at [facility] [id number]     Microbiology  Susceptibility data from last 14 days.  Collected Specimen Info Organism Amoxicillin/Clavulanate Ampicillin Ampicillin/Sulbactam Cefazolin Ceftriaxone Ciprofloxacin Clindamycin Ertapenem Erythromycin Gentamicin Imipenem Levofloxacin Meropenem Oxacillin   05/21/25 Tissue/Biopsy from Skin/Superficial Abscess Methicillin Susceptible Staphylococcus aureus (MSSA)        S   S      S     Mixed Anaerobic Bacteria                   Mixed Gram-Positive and Gram-Negative Bacteria                 05/21/25 Blood culture from Peripheral Venipuncture Morganella morganii  R  R  R  R  S  S   S   S  R  S  S      Collected Specimen Info Organism Piperacillin/Tazobactam Tetracycline Trimethoprim/Sulfamethoxazole Vancomycin   05/21/25 Tissue/Biopsy from Skin/Superficial Abscess Methicillin Susceptible Staphylococcus aureus (MSSA)   S  S  S     Mixed Anaerobic Bacteria         Mixed Gram-Positive and Gram-Negative Bacteria       05/21/25 Blood culture from Peripheral Venipuncture Morganella morganii  S   S        Imaging  CT foot left wo IV contrast  Result Date: 5/23/2025  Interpreted By:  Eliu Alex, STUDY: CT FOOT LEFT WO IV CONTRAST; ;  5/22/2025 9:22 pm   INDICATION: Signs/Symptoms:Left foot cellulitis/ ulcer. Assess for plantar foot abscess/osteomyelitis.     COMPARISON: Plain film radiographs May 21, 2025 left foot   ACCESSION NUMBER(S): [id number]   ORDERING CLINICIAN: CORNEL DANIEL   TECHNIQUE: Multiple thin-section axial images of the left foot without contrast.   FINDINGS: There is ankylosis at the subtalar joint which could be postsurgical or related  to underlying arthropathy.   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   No evidence of fracture.   No acute erosive bony changes.   Within limited CT parameters there is no evidence of osteomyelitis.   There is a suggested skin wound at the lateral forefoot near the 4th and 5th metatarsophalangeal joints with skin thickening.   No evidence of a discrete focal fluid collection or adjacent osseous abnormality.   The tibia and fibula distally pole show some thick periosteal type reaction which favored to be related to vascular changes.   There is extensive subcutaneous edema and skin thickening which is marked especially prominent at the calf.   No evidence of a soft tissue mass.       Small plantar skin wound suggested at the lateral forefoot and 4th and 5th metatarsophalangeal joints with skin thickening edema no evidence of a discrete fluid collection or adjacent osteomyelitis.   Marked subcutaneous edema about the entirety short structures particularly in the calf which could be related to cellulitis, chronic stasis, or lymphedema.   Advanced arthritic changes as detailed above without acute osseous abnormality.     MACRO: None   Signed by: Eliu Alex 5/23/2025 6:12 AM Dictation workstation:   [id number]      XR chest 1 view  Result Date: 5/21/2025  Interpreted By:  Thai Cartagena, STUDY: XR CHEST 1 VIEW;  5/21/2025 10:51 pm   INDICATION: Signs/Symptoms:lateral plantar foot wound, malodorous, purulent.   COMPARISON: None.   ACCESSION NUMBER(S): [id number]   ORDERING CLINICIAN: KARLOS BERGERON   FINDINGS:   CARDIOMEDIASTINAL SILHOUETTE: Cardiomediastinal silhouette is normal in size and configuration taking into account portable technique.   LUNGS/PLEURA: There are no consolidations.There are no pleural effusions. There is no demonstrated pneumothorax.     BONES: No evidence of acute osseous abnormality.       1.  No evidence of acute cardiopulmonary process.     Signed by: Thai Cartagena 5/21/2025 11:24 PM  Dictation workstation:   [id number]    XR foot left 3+ views  Result Date: 5/21/2025  Interpreted By:  Thai Cartagena, STUDY: XR FOOT LEFT 3+ VIEWS; ;  5/21/2025 10:50 pm   INDICATION: Signs/Symptoms:sepsis.     COMPARISON: 07/30/2024.   ACCESSION NUMBER(S): [id number]   ORDERING CLINICIAN: KARLOS BERGERON   FINDINGS: There is partially visualized diffuse edema of the lower leg possibly lymphedema.  No evidence of acute fracture or dislocation. Suspected pes cavus. Partially visualized degenerative change of the tibiotalar joint. No evidence of soft tissue gas or osseous erosion.       No evidence of acute fracture or dislocation. Diffuse lower leg edema. Suspected pes cavus. Degenerative change of the tibiotalar joint.     MACRO: None   Signed by: Thai Cartagena 5/21/2025 11:23 PM Dictation workstation:   [id number]    CT brain attack head wo IV contrast  Result Date: 5/21/2025  Interpreted By:  Ketan Birch, STUDY: CT BRAIN ATTACK HEAD WO IV CONTRAST;  5/21/2025 10:04 pm   INDICATION: Signs/Symptoms:Stroke Evaluation.     COMPARISON: 11/2024   ACCESSION NUMBER(S): [id number]   ORDERING CLINICIAN: KARLOS BERGERON   TECHNIQUE: Noncontrast axial CT scan of head was performed. Angled reformats in brain and bone windows were generated. The images were reviewed in bone, brain, blood and soft tissue windows.   FINDINGS: CSF Spaces: The ventricles, sulci and basal cisterns are within normal limits. There is no extraaxial fluid collection. Mild global volume loss and chronic small vessel ischemic change. The appearance is overall similar   Parenchyma:  The grey-white differentiation is intact. There is no mass effect or midline shift.  There is no intracranial hemorrhage.   Calvarium: The calvarium is unremarkable.   Vascular calcification       No evidence of acute cortical infarct or intracranial hemorrhage.   Senescent changes. No change from prior. If persistent concern, consider MRI   MACRO: Ketan Birch discussed  the significance and urgency of this critical finding by epic chat with  KARLOS BERGERON on 5/21/2025 at 10:13 pm. (**-RCF-**) Findings:  See findings.     Signed by: Ktean Birch 5/21/2025 10:13 PM Dictation workstation:   [id number]            Assessment/Plan   Sepsis-triggered with leukocytosis, fever, tachypnea  Acute on chronic kidney disease  Resolved Toxic metabolic encephalopathy  Morganella Bacteremia   Left leg cellulitis  Left diabetic foot ulcer infection-suspected Chan grade 2 -culture growing MSSA, pending gram-positive and gram-negative bacteria   Lymphedema  History of enterobacter, MSSA, klebsiella oxytoca       Continue IV meropenem-history of enterobacter  Continue IV daptomycin  CK level  - 89 on 5/22  Follow up blood culture  Follow-up repeat blood cultures  Monitor WBC and temperature  Monitor renal function  Local care  Offloading  Podiatry follow up  Discussed with Dr. Badillo  Long term plan po Levaquin 750 mg daily and keflex for 1 day from negative blood culture till [id number]        LIANA Ortez-CNP

## 2025-05-26 NOTE — PROGRESS NOTES
"Kevin Dc \"Kofi\" is a 59 y.o. male on day 4 of admission presenting with Cellulitis of left lower extremity.    Subjective   He presented with a fall and left lower extremity erythema diagnosed of cellulitis.  He had a positive blood culture.  For Morganella morganii on a positive send culture from the left foot positive for MSSA, both on 5/21.  He was started on IV antibiotics.  He has a history of lymphedema of the lower extremities.  He was noted to have episodes of second-degree AV block.  He was seen by Dr. Colón, EP, and it was determined that he was having Mobitz type I (Wenkebach).  He does not need a pacemaker, he has been restarted on metoprolol.  He has no acute complaints.    Objective     Physical Exam  General: awake, alert, oriented, responsive  Cardiovascular: regular, normal S1 and S2  Lungs: good air entry bilaterally, clear to auscultation  Abdomen: soft, nontender, bowel sounds present, normoactive  Extremities: Bilateral nonpitting pedal and leg edema.  There were Ace wraps on both legs.  Neuro: alert, oriented x 3, no focal weakness    Last Recorded Vitals  Blood pressure 157/88, pulse 80, temperature 36.4 °C (97.5 °F), temperature source Oral, resp. rate 18, height 1.676 m (5' 6\"), weight 136 kg (299 lb 13.2 oz), SpO2 96%.  Intake/Output last 3 Shifts:  I/O last 3 completed shifts:  In: 1500 (11 mL/kg) [P.O.:1500]  Out: 6940 (51 mL/kg) [Urine:6940 (1.4 mL/kg/hr)]  Weight: 136 kg     Relevant Results  Lab Results   Component Value Date    WBC 7.3 05/26/2025    HGB 12.3 (L) 05/26/2025    HCT 37.6 (L) 05/26/2025    MCV 87 05/26/2025     05/26/2025     This SmartLink has not been configured with any valid records.     Scheduled medications  Scheduled Medications[1]  Continuous medications  Continuous Medications[2]  PRN medications  PRN Medications[3]      Assessment & Plan    Cellulitis of the left lower extremity  IV antibiotics per ID.  Currently on IV " No daptomycin    Gram-negative sepsis  Blood culture from 5/21 was positive for Morganella morganii  On IV meropenem    MSSA infection of the left foot  On IV daptomycin    Mobitz type I second-degree AV block  Seen by EP.  Gagandeep to restart beta-blocker.  No indication for pacemaker    Acute kidney injury on chronic kidney disease  Creatinine has improved.  Appears to be at or close to baseline    Diabetes mellitus type 2 with hyperglycemia  Currently on Lantus and Humalog in place of Humulin R U-500 which he uses at home.    Lymphedema  He has Ace wraps on both legs.  Will need outpatient follow-up.      Plan  Monitor kidney function  Diuresis per nephrology.  Currently on oral torsemide 50 mg every other day  Antibiotics per ID: On IV daptomycin and meropenem.  Plan is to transition to oral Levaquin and Keflex  Discharge planning: PT/OT recommend low intensity level of continued care after discharge         Xander Badillo MD         [1] allopurinol, 150 mg, oral, Daily  amLODIPine, 5 mg, oral, Daily  apixaban, 5 mg, oral, BID  [Held by provider] atorvastatin, 80 mg, oral, Daily  calcitriol, 0.5 mcg, oral, Daily  clopidogrel, 75 mg, oral, Daily  daptomycin, 4 mg/kg (Adjusted), intravenous, q24h  insulin glargine, 20 Units, subcutaneous, Nightly  insulin lispro, 0-15 Units, subcutaneous, TID AC  meropenem, 1 g, intravenous, q12h  metoprolol succinate XL, 25 mg, oral, Daily  torsemide, 50 mg, oral, Every other day  [2]    [3] PRN medications: acetaminophen **OR** acetaminophen **OR** acetaminophen, benzocaine-menthol, dextrose, dextrose, glucagon, glucagon, melatonin, ondansetron ODT **OR** ondansetron

## 2025-05-26 NOTE — NURSING NOTE
LIANA Ortez-CNP in with pt at this time, pt being interviewed and assessed, plan of care being discussed.

## 2025-05-26 NOTE — PROGRESS NOTES
"Physical Therapy    Physical Therapy Treatment    Patient Name: Kevin Dc \"Kofi\"  MRN: [id number]  Department: [address]  Room: [address] [place] [address]  Today's Date: 5/26/2025  Time Calculation  Start Time: 0952  Stop Time: 1046  Time Calculation (min): 54 min         Assessment/Plan   PT Assessment  Barriers to Discharge Home: No anticipated barriers  End of Session Communication: Bedside nurse  Assessment Comment: Fit with multiple surgical shoes and multiple ambulation to \"test\" which shoe is best for patient. Issued small surgical shoe, RN notified.  End of Session Patient Position: Bed, 2 rail up, Alarm off, not on at start of session  PT Plan  Inpatient/Swing Bed or Outpatient: Inpatient  PT Plan  Treatment/Interventions: Bed mobility, Transfer training, Gait training, Strengthening, Therapeutic exercise, Therapeutic activity, Balance training  PT Plan: Ongoing PT  PT Frequency: 4 times per week  PT Discharge Recommendations: Low intensity level of continued care  Equipment Recommended upon Discharge: Other (comment) (?bariatric FWW)  PT Recommended Transfer Status: Contact guard, Stand by assist, Assistive device  PT - OK to Discharge: Yes    PT Visit Info:  PT Received On: 05/26/25     General Visit Information:   General  Prior to Session Communication: Bedside nurse  Patient Position Received: Bed, 3 rail up, Alarm off, not on at start of session  General Comment: Cleared by nursing to be seen for therapy, pt agreeable with tx, supine in bed upon arrival.    Subjective   Precautions:  Precautions  Medical Precautions: Fall precautions    Objective   Pain:  Pain Assessment  Pain Assessment: 0-10  0-10 (Numeric) Pain Score: 0 - No pain    Cognition:  Cognition  Overall Cognitive Status: Within Functional Limits  Orientation Level: Oriented X4     Postural Control:  Static Sitting Balance  Static Sitting-Balance Support: Feet supported  Static Sitting-Level of Assistance: Independent  Static Sitting-Comment/Number of " "Minutes: Good seated static balance.  Static Standing Balance  Static Standing-Balance Support: No upper extremity supported  Static Standing-Level of Assistance: Close supervision  Static Standing-Comment/Number of Minutes: Fair + static standing balance without UE support       Treatments:  Therapeutic Activity  Therapeutic Activity Performed: No    Bed Mobility  Bed Mobility: Yes  Bed Mobility 1  Bed Mobility 1: Supine to sitting  Level of Assistance 1: Close supervision  Bed Mobility Comments 1: Increased time and effort to complete bed mobility.    Ambulation/Gait Training  Ambulation/Gait Training Performed: Yes  Ambulation/Gait Training 1  Surface 1: Level tile  Device 1: No device  Assistance 1: Close supervision  Quality of Gait 1: Wide base of support, Decreased step length  Comments/Distance (ft) 1: 30' x2 without AD, fit with small and xsmall surgical shoe.  Ambulation/Gait Training 2  Surface 2: Level tile  Device 2: No device  Assistance 2: Close supervision  Quality of Gait 2: Wide base of support, Decreased step length  Comments/Distance (ft) 2: 5' without AD, fit with medium surgical shoe (pt declined issuing of medium shoe due to \"tripping hazard\")    Transfers  Transfer: Yes  Transfer 1  Transfer From 1: Sit to  Transfer to 1: Stand  Technique 1: Sit to stand, Stand to sit  Transfer Level of Assistance 1: Close supervision  Trials/Comments 1: Performed multiple sit to stands for ambulation trials with diffierent size surgical shoes    Outcome Measures:  [facility] Basic Mobility  Turning from your back to your side while in a flat bed without using bedrails: A little  Moving from lying on your back to sitting on the side of a flat bed without using bedrails: A little  Moving to and from bed to chair (including a wheelchair): A little  Standing up from a chair using your arms (e.g. wheelchair or bedside chair): A little  To walk in hospital room: A little  Climbing 3-5 steps with railing: A " debra  Basic Mobility - Total Score: 18    Education Documentation  Mobility Training, taught by Nunu Alves PTA at 5/26/2025 11:07 AM.  Learner: Patient  Readiness: Acceptance  Method: Explanation  Response: Verbalizes Understanding    Education Comments  05/26/25 [id number] Nunu Alves PTA  Patient educated on the importance of mobility and participation with therapy. Educated on safety and use of call light for assistance.         Encounter Problems       Encounter Problems (Active)       PT Problem       Pt will transition supine<>sit with mod I.  (Progressing)       Start:  05/22/25    Expected End:  06/14/25            Pt will transfer sit<>stand with mod I.  (Progressing)       Start:  05/22/25    Expected End:  06/14/25            Pt will ambulate >/=100 ft with FWW & mod I --or-- independently. (Progressing)       Start:  05/22/25    Expected End:  06/14/25

## 2025-05-26 NOTE — CARE PLAN
The patient's goals for the shift include      The clinical goals for the shift include Maintain safety, wound care, IV abx      Problem: Safety - Adult  Goal: Free from fall injury  Outcome: Progressing     Problem: Discharge Planning  Goal: Discharge to home or other facility with appropriate resources  Outcome: Progressing     Problem: Chronic Conditions and Co-morbidities  Goal: Patient's chronic conditions and co-morbidity symptoms are monitored and maintained or improved  Outcome: Progressing     Problem: Nutrition  Goal: Nutrient intake appropriate for maintaining nutritional needs  Outcome: Progressing     Problem: Diabetes  Goal: Achieve decreasing blood glucose levels by end of shift  Outcome: Progressing  Goal: Increase stability of blood glucose readings by end of shift  Outcome: Progressing  Goal: Decrease in ketones present in urine by end of shift  Outcome: Progressing  Goal: Maintain electrolyte levels within acceptable range throughout shift  Outcome: Progressing  Goal: Maintain glucose levels >70mg/dl to <250mg/dl throughout shift  Outcome: Progressing  Goal: No changes in neurological exam by end of shift  Outcome: Progressing  Goal: Learn about and adhere to nutrition recommendations by end of shift  Outcome: Progressing  Goal: Vital signs within normal range for age by end of shift  Outcome: Progressing  Goal: Increase self care and/or family involovement by end of shift  Outcome: Progressing  Goal: Receive DSME education by end of shift  Outcome: Progressing     Problem: Fall/Injury  Goal: Not fall by end of shift  Outcome: Progressing  Goal: Be free from injury by end of the shift  Outcome: Progressing  Goal: Verbalize understanding of personal risk factors for fall in the hospital  Outcome: Progressing  Goal: Verbalize understanding of risk factor reduction measures to prevent injury from fall in the home  Outcome: Progressing  Goal: Use assistive devices by end of the shift  Outcome:  Progressing  Goal: Pace activities to prevent fatigue by end of the shift  Outcome: Progressing     Problem: Skin  Goal: Decreased wound size/increased tissue granulation at next dressing change  Outcome: Progressing  Goal: Participates in plan/prevention/treatment measures  Outcome: Progressing  Goal: Prevent/manage excess moisture  Outcome: Progressing  Goal: Prevent/minimize sheer/friction injuries  Outcome: Progressing  Goal: Promote/optimize nutrition  Outcome: Progressing  Goal: Promote skin healing  Outcome: Progressing

## 2025-05-27 ENCOUNTER — DOCUMENTATION (OUTPATIENT)
Dept: HOME HEALTH SERVICES | Facility: HOME HEALTH | Age: 60
End: 2025-05-27
Payer: MEDICARE

## 2025-05-27 ENCOUNTER — HOME HEALTH ADMISSION (OUTPATIENT)
Dept: HOME HEALTH SERVICES | Facility: HOME HEALTH | Age: 60
End: 2025-05-27
Payer: MEDICARE

## 2025-05-27 ENCOUNTER — APPOINTMENT (OUTPATIENT)
Facility: CLINIC | Age: 60
End: 2025-05-27
Payer: MEDICARE

## 2025-05-27 ENCOUNTER — PHARMACY VISIT (OUTPATIENT)
Dept: PHARMACY | Facility: CLINIC | Age: 60
End: 2025-05-27
Payer: COMMERCIAL

## 2025-05-27 VITALS
TEMPERATURE: 93.2 F | RESPIRATION RATE: 17 BRPM | OXYGEN SATURATION: 99 % | SYSTOLIC BLOOD PRESSURE: 135 MMHG | DIASTOLIC BLOOD PRESSURE: 64 MMHG | HEART RATE: 73 BPM | WEIGHT: 299.83 LBS | HEIGHT: 66 IN | BODY MASS INDEX: 48.19 KG/M2

## 2025-05-27 LAB
ALBUMIN SERPL BCP-MCNC: 3.5 G/DL (ref 3.4–5)
ANION GAP SERPL CALCULATED.3IONS-SCNC: 13 MMOL/L (ref 10–20)
ATRIAL RATE: 80 BPM
BACTERIA BLD CULT: NORMAL
BACTERIA BLD CULT: NORMAL
BUN SERPL-MCNC: 58 MG/DL (ref 6–23)
CALCIUM SERPL-MCNC: 9.9 MG/DL (ref 8.6–10.3)
CHLORIDE SERPL-SCNC: 99 MMOL/L (ref 98–107)
CO2 SERPL-SCNC: 28 MMOL/L (ref 21–32)
CREAT SERPL-MCNC: 2.02 MG/DL (ref 0.5–1.3)
EGFRCR SERPLBLD CKD-EPI 2021: 37 ML/MIN/1.73M*2
ERYTHROCYTE [DISTWIDTH] IN BLOOD BY AUTOMATED COUNT: 14.4 % (ref 11.5–14.5)
GLUCOSE BLD MANUAL STRIP-MCNC: 121 MG/DL (ref 74–99)
GLUCOSE BLD MANUAL STRIP-MCNC: 141 MG/DL (ref 74–99)
GLUCOSE SERPL-MCNC: 116 MG/DL (ref 74–99)
HCT VFR BLD AUTO: 40.5 % (ref 41–52)
HGB BLD-MCNC: 13.5 G/DL (ref 13.5–17.5)
MCH RBC QN AUTO: 29 PG (ref 26–34)
MCHC RBC AUTO-ENTMCNC: 33.3 G/DL (ref 32–36)
MCV RBC AUTO: 87 FL (ref 80–100)
NRBC BLD-RTO: 0 /100 WBCS (ref 0–0)
P AXIS: 54 DEGREES
PHOSPHATE SERPL-MCNC: 5.3 MG/DL (ref 2.5–4.9)
PLATELET # BLD AUTO: 257 X10*3/UL (ref 150–450)
POTASSIUM SERPL-SCNC: 4.2 MMOL/L (ref 3.5–5.3)
Q ONSET: 213 MS
QRS COUNT: 10 BEATS
QRS DURATION: 82 MS
QT INTERVAL: 408 MS
QTC CALCULATION(BAZETT): 408 MS
QTC FREDERICIA: 408 MS
R AXIS: -26 DEGREES
RBC # BLD AUTO: 4.65 X10*6/UL (ref 4.5–5.9)
SODIUM SERPL-SCNC: 136 MMOL/L (ref 136–145)
T AXIS: 53 DEGREES
T OFFSET: 417 MS
VENTRICULAR RATE: 60 BPM
WBC # BLD AUTO: 9.9 X10*3/UL (ref 4.4–11.3)

## 2025-05-27 PROCEDURE — L3260 AMBULATORY SURGICAL BOOT EAC: HCPCS | Performed by: STUDENT IN AN ORGANIZED HEALTH CARE EDUCATION/TRAINING PROGRAM

## 2025-05-27 PROCEDURE — 36415 COLL VENOUS BLD VENIPUNCTURE: CPT | Performed by: INTERNAL MEDICINE

## 2025-05-27 PROCEDURE — 97530 THERAPEUTIC ACTIVITIES: CPT | Mod: GO,CO

## 2025-05-27 PROCEDURE — RXMED WILLOW AMBULATORY MEDICATION CHARGE

## 2025-05-27 PROCEDURE — 85027 COMPLETE CBC AUTOMATED: CPT | Performed by: INTERNAL MEDICINE

## 2025-05-27 PROCEDURE — 2500000004 HC RX 250 GENERAL PHARMACY W/ HCPCS (ALT 636 FOR OP/ED): Mod: JZ | Performed by: NURSE PRACTITIONER

## 2025-05-27 PROCEDURE — 97535 SELF CARE MNGMENT TRAINING: CPT | Mod: GO,CO

## 2025-05-27 PROCEDURE — 82947 ASSAY GLUCOSE BLOOD QUANT: CPT

## 2025-05-27 PROCEDURE — 99239 HOSP IP/OBS DSCHRG MGMT >30: CPT | Performed by: INTERNAL MEDICINE

## 2025-05-27 PROCEDURE — 2500000004 HC RX 250 GENERAL PHARMACY W/ HCPCS (ALT 636 FOR OP/ED): Mod: JZ | Performed by: INTERNAL MEDICINE

## 2025-05-27 PROCEDURE — 2500000001 HC RX 250 WO HCPCS SELF ADMINISTERED DRUGS (ALT 637 FOR MEDICARE OP)

## 2025-05-27 PROCEDURE — 80069 RENAL FUNCTION PANEL: CPT | Performed by: INTERNAL MEDICINE

## 2025-05-27 PROCEDURE — 2500000001 HC RX 250 WO HCPCS SELF ADMINISTERED DRUGS (ALT 637 FOR MEDICARE OP): Performed by: INTERNAL MEDICINE

## 2025-05-27 RX ORDER — DOXAZOSIN 1 MG/1
1 TABLET ORAL DAILY
Qty: 30 TABLET | Refills: 1 | Status: SHIPPED | OUTPATIENT
Start: 2025-05-27

## 2025-05-27 RX ORDER — CEPHALEXIN 500 MG/1
500 CAPSULE ORAL EVERY 6 HOURS
Qty: 40 CAPSULE | Refills: 0 | Status: SHIPPED | OUTPATIENT
Start: 2025-05-27 | End: 2025-06-06

## 2025-05-27 RX ORDER — METOPROLOL SUCCINATE 25 MG/1
25 TABLET, EXTENDED RELEASE ORAL DAILY
Qty: 30 TABLET | Refills: 1 | Status: SHIPPED | OUTPATIENT
Start: 2025-05-28

## 2025-05-27 RX ORDER — LEVOFLOXACIN 750 MG/1
750 TABLET, FILM COATED ORAL EVERY 24 HOURS
Qty: 10 TABLET | Refills: 0 | Status: SHIPPED | OUTPATIENT
Start: 2025-05-27 | End: 2025-06-06

## 2025-05-27 RX ADMIN — CALCITRIOL CAPSULES 0.25 MCG 0.5 MCG: 0.25 CAPSULE ORAL at 08:51

## 2025-05-27 RX ADMIN — APIXABAN 5 MG: 5 TABLET, FILM COATED ORAL at 08:51

## 2025-05-27 RX ADMIN — CLOPIDOGREL 75 MG: 75 TABLET ORAL at 08:51

## 2025-05-27 RX ADMIN — MEROPENEM 1 G: 1 INJECTION, POWDER, FOR SOLUTION INTRAVENOUS at 08:50

## 2025-05-27 RX ADMIN — DAPTOMYCIN IN SODIUM CHLORIDE 350 MG: 350 INJECTION, SOLUTION INTRAVENOUS at 14:57

## 2025-05-27 RX ADMIN — ACETAMINOPHEN 650 MG: 325 TABLET ORAL at 06:20

## 2025-05-27 RX ADMIN — ALLOPURINOL 150 MG: 300 TABLET ORAL at 08:51

## 2025-05-27 RX ADMIN — METOPROLOL SUCCINATE 25 MG: 25 TABLET, EXTENDED RELEASE ORAL at 08:51

## 2025-05-27 ASSESSMENT — ACTIVITIES OF DAILY LIVING (ADL)
BATHING_LEVEL_OF_ASSISTANCE: SETUP;CLOSE SUPERVISION
HOME_MANAGEMENT_TIME_ENTRY: 15

## 2025-05-27 ASSESSMENT — COGNITIVE AND FUNCTIONAL STATUS - GENERAL
DAILY ACTIVITIY SCORE: 17
PERSONAL GROOMING: A LITTLE
HELP NEEDED FOR BATHING: A LITTLE
TOILETING: A LITTLE
DRESSING REGULAR UPPER BODY CLOTHING: A LITTLE
DRESSING REGULAR LOWER BODY CLOTHING: TOTAL

## 2025-05-27 ASSESSMENT — PAIN DESCRIPTION - DESCRIPTORS: DESCRIPTORS: ACHING

## 2025-05-27 ASSESSMENT — PAIN SCALES - GENERAL
PAINLEVEL_OUTOF10: 2
PAINLEVEL_OUTOF10: 0 - NO PAIN
PAINLEVEL_OUTOF10: 4

## 2025-05-27 ASSESSMENT — PAIN DESCRIPTION - LOCATION: LOCATION: FOOT

## 2025-05-27 ASSESSMENT — PAIN DESCRIPTION - ORIENTATION: ORIENTATION: RIGHT

## 2025-05-27 ASSESSMENT — PAIN - FUNCTIONAL ASSESSMENT
PAIN_FUNCTIONAL_ASSESSMENT: 0-10
PAIN_FUNCTIONAL_ASSESSMENT: 0-10
PAIN_FUNCTIONAL_ASSESSMENT: FLACC (FACE, LEGS, ACTIVITY, CRY, CONSOLABILITY)

## 2025-05-27 NOTE — DISCHARGE SUMMARY
"Discharge Diagnosis  Cellulitis of left lower extremity  Gram negative sepsis  MSSA infection of the left foot  Mobitz type 1 ([place]) second degree AV block  Acute kidney injury on chronic kidney disease  Diabetes mellitus type 2 with hyperglycemia  Lymphedema of the lower extremities    Issues Requiring Follow-Up  Outpatient follow up with cardiology, podiatry, nephrology.   Outpatient follow up with his endocrinologist regarding diabetes.     Discharge Meds     Medication List      START taking these medications     cephalexin 500 mg capsule; Commonly known as: Keflex; Take 1 capsule   (500 mg) by mouth every 6 hours for 10 days.   doxazosin 1 mg tablet; Commonly known as: Cardura; Take 1 tablet (1 mg)   by mouth once daily.   levoFLOXacin 750 mg tablet; Commonly known as: Levaquin; Take 1 tablet   (750 mg) by mouth once every 24 hours for 10 days.   metoprolol succinate XL 25 mg 24 hr tablet; Commonly known as:   Toprol-XL; Take 1 tablet (25 mg) by mouth once daily. Do not crush or   chew.; Start taking on: May 28, 2025     CONTINUE taking these medications     allopurinol 100 mg tablet; Commonly known as: Zyloprim   atorvastatin 80 mg tablet; Commonly known as: Lipitor; Take 1 tablet (80   mg) by mouth once daily.   blood sugar diagnostic   calcitriol 0.5 mcg capsule; Commonly known as: Rocaltrol   clopidogrel 75 mg tablet; Commonly known as: Plavix; TAKE 1 TABLET(75   MG) BY MOUTH EVERY DAY   Eliquis 5 mg tablet; Generic drug: apixaban   HumuLIN R U-500 (Conc) Kwikpen 500 unit/mL (3 mL) CONCENTRATED pen;   Generic drug: insulin regular   nitroglycerin 0.4 mg SL tablet; Commonly known as: Nitrostat; Place 1   tablet (0.4 mg) under the tongue if needed for chest pain. For 30 days   pen needle, diabetic 32 gauge x 5/32\" needle   torsemide 100 mg tablet; Commonly known as: Demadex     STOP taking these medications     acetaminophen 500 mg tablet; Commonly known as: Tylenol   dilTIAZem  mg 24 hr capsule; " Commonly known as: Tiazac   HumaLOG Mix 50-50 KwikPen 100 unit/mL (50-50) pen; Generic drug: insulin   lispro protamin-lispro   isosorbide mononitrate ER 60 mg 24 hr tablet; Commonly known as: Imdur   losartan 50 mg tablet; Commonly known as: Cozaar   potassium chloride CR 10 mEq ER tablet; Commonly known as: Klor-Con   propranolol 60 mg tablet; Commonly known as: Inderal   sodium bicarbonate 650 mg tablet       Test Results Pending At Discharge  Pending Labs       Order Current Status    Extra Tubes In process    Extra Tubes In process    Lavender Top In process    Lavender Top In process    Blood Culture Preliminary result    Blood Culture Preliminary result            Hospital Course  59 year old male patient presented to the hospital with redness of the left lower extremity.  He had had a fall shortly before coming to the ED.  He has a history of lymphedema and has been following up with the lymphedema clinic at [facility] but had not done so in about 3 to 4 years for financial reasons.  He was started on IV antibiotic coverage.  A blood culture was positive for Morganella morganii, repeat blood culture was negative.  A wound culture from the left foot was positive for methicillin susceptible Staphylococcus aureus (MSSA).  In the hospital, he was on IV meropenem and daptomycin.  He had acute kidney injury on chronic kidney disease.  His creatinine level improved over the course of his hospital stay.  He was noted to have second degree AV block and his beta-blocker was temporarily discontinued.  He was seen by electrophysiology and it was determined that he had Mobitz type I (Wenckebach) second-degree AV block, no intervention was needed.  The beta-blocker was restarted  He is medically stable for discharge.  He will go home on levofloxacin and Keflex for antibiotic coverage, to be completed on 6/3/2025 he is asked to follow-up with nephrology in the office.  He will also follow-up with cardiology, and also  follow-up with podiatry regarding the wound on the left foot for which they saw him in the hospital  He has a high insulin requirement at home and uses Humulin R U-500.  He was on Lantus and Humalog at a low dose in the hospital.  Upon discussion, he reported that his blood glucose at home while using the U-500 is frequently in the 180s and he had no low blood glucose episodes.  He is asked to monitor the blood glucose closely.  He should follow-up with his endocrinologist, Dr. Faisal Vicente in the office.   He has not been seen in the lymphedema clinic in 3 to 4 years.  Referral has been made to physical therapy for outpatient treatment of lymphedema.  He is being discharged home with home health care.  The outpatient follow-up recommendations indicated above (cardiology, nephrology, podiatry, endocrinology) have been included in his discharge instructions  The time spent arranging and completing and documenting discharge was 35 minutes      Outpatient Follow-Up  Future Appointments   Date Time Provider Department [place]   10/14/2025 11:00 AM Vicki Hendrickson MD [id number] [facility]   11/10/2025  1:00 PM Ketan Lloyd DO [id number] [facility]         Xander Badillo MD

## 2025-05-27 NOTE — PROGRESS NOTES
Spiritual Care Visit  Spiritual Care Request    Reason for Visit:  Routine Visit: Introduction     Request Received From:       Focus of Care:  Visited With: Patient         Refer to :          Spiritual Care Assessment    Spiritual Assessment:                      Care Provided:  Intended Effects: Establish rapport and connectedness, Build relationship of care and support, Convey a calming presence  Methods: Offer emotional support    Sense of Community and or Nondenominational Affiliation:  Episcopalian         Addressed Needs/Concerns and/or Ottoniel Through:          Outcome:        Advance Directives:         Spiritual Care Annotation      Annotation:  provided patient support while rounding the Unit.  introduced  services of [facility].   explained the role of the  in providing emotional and spiritual support for patient's and family while in admitted to the hospital.     Patient was seated upright in the hospital bed leaning forward. Patient states that he is ready for discharge.  Patient appears comfortable and pain free. Patient said that his medical team has a care plan established and he is excited to be discharged. Patient states that his ride was coming this afternoon.   offered words of encouragement and support.     No spiritual or Taoism needs were expressed. Spiritual care will remain available for support as requested.

## 2025-05-27 NOTE — PROGRESS NOTES
"Occupational Therapy    OT Treatment    Patient Name: Kevin Dc \"Kofi\"  MRN: [id number]  Department: [address]  Room: [address] [place] [address]  Today's Date: 5/27/2025  Time Calculation  Start Time: 0901  Stop Time: 0925  Time Calculation (min): 24 min        Assessment:  OT Assessment: Tolerated session well, demonstrating progression towards POC with increased encouragement required for participation in functional tasks. Pt would benefit from continued skilled OT services to improve strength, balance, and functional tolerance to increase independence with ADL tasks  Prognosis: Good  Barriers to Discharge Home: No anticipated barriers  Evaluation/Treatment Tolerance: Patient tolerated treatment well  End of Session Communication: Bedside nurse  End of Session Patient Position: Up in chair, Alarm on  OT Assessment Results: Decreased ADL status, Decreased endurance, Decreased functional mobility, Decreased IADLs  Prognosis: Good  Evaluation/Treatment Tolerance: Patient tolerated treatment well  Plan:  Treatment Interventions: ADL retraining, Functional transfer training, Patient/family training, Equipment evaluation/education, Compensatory technique education  OT Frequency: 3 times per week  OT Discharge Recommendations: Low intensity level of continued care  Equipment Recommended upon Discharge: Other (comment)  OT Recommended Transfer Status: Assist of 1  OT - OK to Discharge: Yes  Treatment Interventions: ADL retraining, Functional transfer training, Patient/family training, Equipment evaluation/education, Compensatory technique education    Subjective   OT Visit Info:  OT Received On: 05/27/25  General Visit Info:  General  Reason for Referral: impaired ADLs; admitted for  fall and left lower extremity erythema  Prior to Session Communication: Bedside nurse  Patient Position Received: Bed, 3 rail up, Alarm off, not on at start of session  General Comment: Cleared for therapy per RN. Pt supine in bed upon arrival and " agreeable to tx with encouragement  Precautions:  LE Weight Bearing Status: Weight Bearing as Tolerated  Medical Precautions: Fall precautions  Precautions Comment: L surgical shoe donned    Pain:  Pain Assessment  Pain Assessment: FLACC (Face, Legs, Activity, Cry, Consolability)  0-10 (Numeric) Pain Score: 2  Pain Type: Chronic pain  Pain Location: Leg  Pain Orientation: Left  Pain Interventions: Ambulation/increased activity, Repositioned  Response to Interventions: Resting quietly    Objective    Cognition:  Cognition  Orientation Level: Oriented X4  Safety/Judgement: Exceptions to WFL  Insight: Mild    Activities of Daily Living:    Grooming  Grooming Level of Assistance: Setup  Grooming Where Assessed: Chair  Grooming Comments: s/u for oral hygiene and face washing task. Pt utilizing deodorant wipes on axillary area    UE Bathing  UE Bathing Level of Assistance: Setup, Close supervision, Minimum assistance  UE Bathing Where Assessed:  (chair)  UE Bathing Comments: increased time required for UB sponge bath with assist to cleanse back    LE Bathing  LE Bathing Level of Assistance: Setup, Close supervision  LE Bathing Where Assessed:  (chair)  LE Bathing Comments: partial LB sponge bath seated in chair with s/u provided    UE Dressing  UE Dressing Level of Assistance: Minimum assistance  UE Dressing Where Assessed: Chair  UE Dressing Comments: assist to don/doff gown    LE Dressing  LE Dressing: Yes  Orthotics Level of Assistance: Dependent  LE Dressing Where Assessed: Edge of bed  LE Dressing Comments: dependent assist to don L surgical shoe seated EOB    Bed Mobility/Transfers: Bed Mobility  Bed Mobility: Yes  Bed Mobility 1  Bed Mobility 1: Supine to sitting  Level of Assistance 1: Close supervision  Bed Mobility Comments 1: increased effort to complete supine>sit with cues to scoot hips to EOB    Transfers  Transfer: Yes  Transfer 1  Technique 1: Stand to sit, Sit to stand  Transfer Device 1: Walker  Transfer  Level of Assistance 1: Contact guard, Close supervision  Trials/Comments 1: assist for trunk elevation with cues for proper hand placement in sitting, demonstrating decreased eccentric lowering to chair    Functional Mobility:  Functional Mobility  Functional Mobility Performed: Yes  Functional Mobility 1  Device 1: Rolling walker  Assistance 1: Contact guard  Comments 1: tolerated taking ~10 steps at bedside at FWW for safety with cues for pacing for increased safety awareness, demonstrating fair- balance throughout task. Encouraged pt to complete ADL tasks in restroom, however declining this date d/t fatigue.    Standing Balance:  Dynamic Standing Balance  Dynamic Standing-Level of Assistance: Contact guard  Dynamic Standing-Comments: fair- balance during functional mobility task    Outcome Measures:[facility] Daily Activity  Putting on and taking off regular lower body clothing: Total  Bathing (including washing, rinsing, drying): A little  Putting on and taking off regular upper body clothing: A little  Toileting, which includes using toilet, bedpan or urinal: A little  Taking care of personal grooming such as brushing teeth: A little  Eating Meals: None  Daily Activity - Total Score: 17    Education Documentation  Body Mechanics, taught by MIGUEL NAGEL Kim at 5/27/2025 10:04 AM.  Learner: Patient  Readiness: Acceptance  Method: Explanation  Response: Verbalizes Understanding, Needs Reinforcement  Comment: educated on importance of engagement in functional tasks    ADL Training, taught by MIGUEL ANGEL Kim at 5/27/2025 10:04 AM.  Learner: Patient  Readiness: Acceptance  Method: Explanation  Response: Verbalizes Understanding, Needs Reinforcement  Comment: educated on importance of engagement in functional tasks    Education Comments  No comments found.    Problem: ADLs  Goal: Patient will perform UB and LB bathing with independent level of assistance.  Outcome: Progressing  Goal: Patient with  complete upper body dressing with independent level of assistance donning and doffing all UE clothes  Outcome: Progressing  Goal: Patient with complete lower body dressing with modified independent level of assistance donning and doffing all LE clothes  with PRN adaptive equipment  Outcome: Progressing  Goal: Patient will complete daily grooming tasks with independent level of assistance.  Outcome: Progressing  Goal: Patient will complete toileting including hygiene clothing management/hygiene with independent level of assistance.  Outcome: Progressing     Problem: TRANSFERS  Goal: Patient will complete functional transfer with least restrictive device with modified independent level of assistance.  Outcome: Progressing

## 2025-05-27 NOTE — CARE PLAN
Problem: Skin  Goal: Decreased wound size/increased tissue granulation at next dressing change  Flowsheets (Taken 5/26/2025 2230)  Decreased wound size/increased tissue granulation at next dressing change:   Promote sleep for wound healing   Protective dressings over bony prominences  Goal: Participates in plan/prevention/treatment measures  Flowsheets (Taken 5/26/2025 2230)  Participates in plan/prevention/treatment measures:   Elevate heels   Discuss with provider PT/OT consult  Goal: Prevent/manage excess moisture  Flowsheets (Taken 5/26/2025 2230)  Prevent/manage excess moisture:   Cleanse incontinence/protect with barrier cream   Monitor for/manage infection if present  Goal: Prevent/minimize sheer/friction injuries  Flowsheets (Taken 5/26/2025 2230)  Prevent/minimize sheer/friction injuries:   HOB 30 degrees or less   Use pull sheet   Complete micro-shifts as needed if patient unable. Adjust patient position to relieve pressure points, not a full turn  Goal: Promote/optimize nutrition  Flowsheets (Taken 5/26/2025 2230)  Promote/optimize nutrition: Monitor/record intake including meals  Goal: Promote skin healing  Flowsheets (Taken 5/26/2025 2230)  Promote skin healing:   Protective dressings over bony prominences   Rotate device position/do not position patient on device   The patient's goals for the shift include      The clinical goals for the shift include pt will be safe throughout shift

## 2025-05-27 NOTE — PROGRESS NOTES
"Kevin Dc \"Monalisa" is a 59 y.o. male on day 5 of admission presenting with Cellulitis of left lower extremity.    Subjective   Interval History:   Afebrile, no chills  No leg pain  No chest pain or shortness of breath  No nausea vomiting or diarrhea     Review of Systems   All other systems reviewed and are negative.      Objective   Range of Vitals (last 24 hours)  Heart Rate:  [71-85]   Temp:  [34 °C (93.2 °F)-36.9 °C (98.4 °F)]   Resp:  [17-18]   BP: (133-148)/(64-83)   SpO2:  [93 %-99 %]   Daily Weight  05/21/25 : 136 kg (299 lb 13.2 oz)    Body mass index is 48.39 kg/m².    Physical Exam  Constitutional:       Appearance: Normal appearance.   HENT:      Head: Normocephalic and atraumatic.   Cardiovascular:      Rate and Rhythm: Normal rate and regular rhythm.   Pulmonary:      Effort: Pulmonary effort is normal.   Abdominal:      Palpations: Abdomen is soft.   Musculoskeletal:         General: Normal range of motion.      Cervical back: Normal range of motion and neck supple.      Comments: Lymphedema bilateral lower extremities   Skin:     General: Skin is warm and dry.      Comments: Bilateral leg dressing     Psychiatric:         Mood and Affect: Mood normal.         Behavior: Behavior normal.        Antibiotics  cephalexin - 500 mg  DAPTOmycin - 350 mg/50 mL  levoFLOXacin - 750 mg  meropenem - 1 gram/100 mL    Relevant Results  Labs  Results from last 72 hours   Lab Units 05/27/25  0545 05/26/25  0521   WBC AUTO x10*3/uL 9.9 7.3   HEMOGLOBIN g/dL 13.5 12.3*   HEMATOCRIT % 40.5* 37.6*   PLATELETS AUTO x10*3/uL 257 218     Results from last 72 hours   Lab Units 05/27/25  0545 05/26/25  0521 05/25/25  0600   SODIUM mmol/L 136 138 138   POTASSIUM mmol/L 4.2 4.0 4.0   CHLORIDE mmol/L 99 103 104   CO2 mmol/L 28 29 27   BUN mg/dL 58* 51* 50*   CREATININE mg/dL 2.02* 1.92* 2.19*   GLUCOSE mg/dL 116* 110* 115*   CALCIUM mg/dL 9.9 9.5 9.8   ANION GAP mmol/L 13 10 11   EGFR mL/min/1.73m*2 37* 39* 34* "   PHOSPHORUS mg/dL 5.3*  --   --      Results from last 72 hours   Lab Units 05/27/25  0545   ALBUMIN g/dL 3.5     Estimated Creatinine Clearance: 51 mL/min (A) (by C-G formula based on SCr of 2.02 mg/dL (H)).  C-Reactive Protein   Date Value Ref Range Status   05/22/2025 9.70 (H) <1.00 mg/dL Final     CRP   Date Value Ref Range Status   10/16/2021 3.2 (H) 0 - 2.0 MG/DL Final     Comment:     Performed at [place] [id number]     Microbiology  Susceptibility data from last 14 days.  Collected Specimen Info Organism Amoxicillin/Clavulanate Ampicillin Ampicillin/Sulbactam Cefazolin Ceftriaxone Ciprofloxacin Clindamycin Ertapenem Erythromycin Gentamicin Imipenem Levofloxacin Meropenem Oxacillin   05/21/25 Tissue/Biopsy from Skin/Superficial Abscess Methicillin Susceptible Staphylococcus aureus (MSSA)        S   S      S     Mixed Anaerobic Bacteria                   Mixed Gram-Positive and Gram-Negative Bacteria                 05/21/25 Blood culture from Peripheral Venipuncture Morganella morganii  R  R  R  R  S  S   S   S  R  S  S      Collected Specimen Info Organism Piperacillin/Tazobactam Tetracycline Trimethoprim/Sulfamethoxazole Vancomycin   05/21/25 Tissue/Biopsy from Skin/Superficial Abscess Methicillin Susceptible Staphylococcus aureus (MSSA)   S  S  S     Mixed Anaerobic Bacteria         Mixed Gram-Positive and Gram-Negative Bacteria       05/21/25 Blood culture from Peripheral Venipuncture Morganella morganii  S   S      Imaging  Electrocardiogram, 12-lead PRN ACS symptoms  Result Date: 5/27/2025  Normal sinus rhythm 2nd degree A-V block (Mobitz 1) Low voltage QRS  Poor R-wave progressionÂ  Abnormal ECG When compared with ECG of 21-MAY-2025 22:09, (unconfirmed) Poor R-wave progression Current undetermined rhythm precludes rhythm comparison, needs review Confirmed by Herbert Partida ([id number]) on 5/27/2025 9:32:38 AM    ECG 12 lead  Result Date: 5/25/2025  Sinus tachycardia with 2nd degree AV  block (Mobitz I) with Premature supraventricular complexes Septal infarct , age undetermined Abnormal ECG When compared with ECG of 18-JUL-2024 08:46, Premature supraventricular complexes are now Present Sinus rhythm is now with 2nd degree AV block (Mobitz I) Criteria for Inferior infarct are no longer Present Confirmed by Tushar Haas ([id number]) on 5/25/2025 1:34:41 PM    Transthoracic Echo Complete  Result Date: 5/24/2025           [place] [id number]            Phone [phone] TRANSTHORACIC ECHOCARDIOGRAM REPORT Patient Name:       ADRIENNE CEDILLO   Reading Physician:    [id number] Herbert Partida MD Study Date:         5/24/2025            Ordering Provider:    [id number] RODRI CARPENTER MRN/PID:            [id number]             Fellow: Accession#:         [id number]         Nurse: Date of Birth/Age:  1965 / 59 years Sonographer:          Kia SANDERS Gender Assigned at  M                    Additional Staff: Birth: Height:             167.64 cm            Admit Date:           5/24/2025 Weight:             135.63 kg            Admission Status:     Inpatient -                                                                Routine BSA / BMI:          2.37 m2 / 48.26      Department Location:                     kg/m2 Blood Pressure: 150 /86 mmHg Study Type:    TRANSTHORACIC ECHO (TTE) COMPLETE Diagnosis/ICD: Bradycardia, unspecified-R00.1; Dyspnea, unspecified-R06.00 Indication:    Bradycardia CPT Codes:     Echo Complete w Full [id number] Patient History: Pertinent History: CVA HLD HTN PV HA DMII DVT CAD CHF CKD stage3 Acute Cor                    Pulmoale Angina Pectoris ASHD. Study Detail: The following Echo studies were performed: 2D, M-Mode, Doppler and                color flow. Technically challenging study due to prominent lung               artifact and body habitus. A bubble study was not performed.  PHYSICIAN INTERPRETATION: Left Ventricle: The left ventricular systolic function is normal, with a visually estimated ejection fraction of 60-65%. There are no regional wall motion abnormalities. The left ventricular cavity size is normal. There is mild increased septal and mildly increased posterior left ventricular wall thickness. There is left ventricular concentric remodeling. Spectral Doppler shows a normal pattern of left ventricular diastolic filling. There is mild concentric left ventricular hypertrophy. Left Atrium: The left atrial size is mildly dilated. A bubble study using agitated saline was not performed. Right Ventricle: The right ventricle is normal in size. There is normal right ventriclar wall thickness. There is normal right ventricular global systolic function. Right Atrium: The right atrial size is normal. Aortic Valve: The aortic valve is trileaflet. There is minimal aortic valve cusp calcification. There is no evidence of reduced aortic valve leaflet excursion excursion. There is evidence of mildly elevated transaortic gradients consistent with sclerosis of the aortic valve. There is no evidence of aortic valve regurgitation. The peak instantaneous gradient of the aortic valve is 14 mmHg. Mitral Valve: The mitral valve is normal in structure. There is normal mitral valve leaflet mobility. There is mild mitral annular calcification. There is trace mitral valve regurgitation. Tricuspid Valve: The tricuspid valve is structurally normal. There is normal tricuspid valve leaflet mobility. There is trace tricuspid regurgitation. Pulmonic Valve: The pulmonic valve is structurally normal. There is trace pulmonic valve regurgitation. Pericardium: No pericardial effusion noted. Aorta: The aortic root is normal. There is no dilatation of the aortic arch. There is no  dilatation of the ascending aorta. There is no dilatation of the aortic root. There is plaque visualized in the ascending aorta, which is classified as a Grade 2 [mild (focal or diffuse) intimal thickening of 2-3 mm] atherosclerosis. Pulmonary Artery: The pulmonary artery is normal in size. The estimated pulmonary artery pressure is normal. Systemic Veins: The inferior vena cava appears moderately dilated, with IVC inspiratory collapse less than 50%.  CONCLUSIONS:  1. The left ventricular systolic function is normal, with a visually estimated ejection fraction of 60-65%.  2. There is mild concentric left ventricular hypertrophy.  3. Aortic valve sclerosis.  4. The inferior vena cava appears moderately dilated, with IVC inspiratory collapse less than 50%.  5. There is plaque visualized in the ascending aorta. QUANTITATIVE DATA SUMMARY:  2D MEASUREMENTS:             Normal Ranges: LAs:             5.00 cm     (2.7-4.0cm) IVSd:            1.13 cm     (0.6-1.1cm) LVPWd:           1.36 cm     (0.6-1.1cm) LVIDd:           4.92 cm     (3.9-5.9cm) LVIDs:           3.46 cm LV Mass Index:   101.2 g/m2 LVEDV Index:     47.75 ml/m2 LV % FS          29.7 %  LEFT ATRIUM:                  Normal Ranges: LA Vol A4C:        82.3 ml    (22+/-6mL/m2) LA Vol A2C:        82.3 ml LA Vol BP:         82.6 ml LA Vol Index A4C:  34.7ml/m2 LA Vol Index A2C:  34.7 ml/m2 LA Vol Index BP:   34.8 ml/m2 LA Area A4C:       26.0 cm2 LA Area A2C:       25.9 cm2 LA Major Axis A4C: 7.0 cm LA Major Axis A2C: 6.9 cm LA Volume Index:   32.4 ml/m2 LA Vol A4C:        75.4 ml LA Vol A2C:        78.9 ml LA Vol Index BSA:  32.5 ml/m2  RIGHT ATRIUM:                 Normal Ranges: RA Vol A4C:        45.8 ml    (8.3-19.5ml) RA Vol Index A4C:  19.3 ml/m2 RA Area A4C:       18.5 cm2 RA Major Axis A4C: 6.4 cm  M-MODE MEASUREMENTS:         Normal Ranges: Ao Root:             3.50 cm (2.0-3.7cm) LAs:                 6.00 cm (2.7-4.0cm)  AORTA MEASUREMENTS:          Normal Ranges: Ao Sinus, d:        3.61 cm (2.1-3.5cm) Ao STJ, d:          3.26 cm (1.7-3.4cm) Asc Ao, d:          3.20 cm (2.1-3.4cm)  LV SYSTOLIC FUNCTION:                      Normal Ranges: EF-A4C View:    62 % (>=55%) EF-A2C View:    64 % EF-Biplane:     63 % EF-Visual:      63 % LV EF Reported: 63 %  LV DIASTOLIC FUNCTION:             Normal Ranges: MV Peak E:             1.26 m/s    (0.7-1.2 m/s) MV Peak A:             1.19 m/s    (0.42-0.7 m/s) E/A Ratio:             1.06        (1.0-2.2) MV e'                  0.060 m/s   (>8.0) MV lateral e'          0.06 m/s MV medial e'           0.06 m/s MV A Dur:              164.00 msec E/e' Ratio:            20.88       (<8.0) PulmV Sys Cristobal:         33.20 cm/s PulmV Gold Cristobal:        34.60 cm/s PulmV S/D Cristobal:         1.00  MITRAL VALVE:          Normal Ranges: MV DT:        146 msec (150-240msec)  AORTIC VALVE:            Normal Ranges: AoV Vmax:      1.85 m/s  (<=1.7m/s) AoV Peak P.7 mmHg (<20mmHg) LVOT Max Cristobal:  1.15 m/s  (<=1.1m/s) LVOT VTI:      25.60 cm LVOT Diameter: 2.10 cm   (1.8-2.4cm) AoV Area,Vmax: 2.15 cm2  (2.5-4.5cm2)  RIGHT VENTRICLE: RV Basal 2.93 cm RV Mid   2.55 cm RV Major 6.9 cm TAPSE:   29.0 mm RV s'    0.13 m/s  TRICUSPID VALVE/RVSP:         Normal Ranges: IVC Diam:             2.86 cm  PULMONIC VALVE:          Normal Ranges: PV Accel Time:  103 msec (>120ms) PV Max Cristobal:     0.9 m/s  (0.6-0.9m/s) PV Max PG:      3.6 mmHg  PULMONARY VEINS: PulmV Gold Cristobal: 34.60 cm/s PulmV S/D Cristobal:  1.00 PulmV Sys Cristobal:  33.20 cm/s  [id number] Herbert Partida MD Electronically signed on 2025 at 2:28:24 PM  ** Final **     CT foot left wo IV contrast  Result Date: 2025  Interpreted By:  Eliu Alex, STUDY: CT FOOT LEFT WO IV CONTRAST; ;  2025 9:22 pm   INDICATION: Signs/Symptoms:Left foot cellulitis/ ulcer. Assess for plantar foot abscess/osteomyelitis.     COMPARISON: Plain film radiographs May 21, 2025 left foot   ACCESSION NUMBER(S): [id number]    ORDERING CLINICIAN: CORNEL DANIEL   TECHNIQUE: Multiple thin-section axial images of the left foot without contrast.   FINDINGS: There is ankylosis at the subtalar joint which could be postsurgical or related to underlying arthropathy.   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   No evidence of fracture.   No acute erosive bony changes.   Within limited CT parameters there is no evidence of osteomyelitis.   There is a suggested skin wound at the lateral forefoot near the 4th and 5th metatarsophalangeal joints with skin thickening.   No evidence of a discrete focal fluid collection or adjacent osseous abnormality.   The tibia and fibula distally pole show some thick periosteal type reaction which favored to be related to vascular changes.   There is extensive subcutaneous edema and skin thickening which is marked especially prominent at the calf.   No evidence of a soft tissue mass.       Small plantar skin wound suggested at the lateral forefoot and 4th and 5th metatarsophalangeal joints with skin thickening edema no evidence of a discrete fluid collection or adjacent osteomyelitis.   Marked subcutaneous edema about the entirety short structures particularly in the calf which could be related to cellulitis, chronic stasis, or lymphedema.   Advanced arthritic changes as detailed above without acute osseous abnormality.     MACRO: None   Signed by: Eliu Alex 5/23/2025 6:12 AM Dictation workstation:   [id number]    XR chest 1 view  Result Date: 5/21/2025  Interpreted By:  Thai Cartagena, STUDY: XR CHEST 1 VIEW;  5/21/2025 10:51 pm   INDICATION: Signs/Symptoms:lateral plantar foot wound, malodorous, purulent.   COMPARISON: None.   ACCESSION NUMBER(S): [id number]   ORDERING CLINICIAN: KARLOS BERGERON   FINDINGS:   CARDIOMEDIASTINAL SILHOUETTE: Cardiomediastinal silhouette is normal in size and configuration taking into account portable technique.   LUNGS/PLEURA: There are no consolidations.There are no pleural  effusions. There is no demonstrated pneumothorax.     BONES: No evidence of acute osseous abnormality.       1.  No evidence of acute cardiopulmonary process.     Signed by: Thai Cartagena 5/21/2025 11:24 PM Dictation workstation:   [id number]    XR foot left 3+ views  Result Date: 5/21/2025  Interpreted By:  Thai Cartagena, STUDY: XR FOOT LEFT 3+ VIEWS; ;  5/21/2025 10:50 pm   INDICATION: Signs/Symptoms:sepsis.     COMPARISON: 07/30/2024.   ACCESSION NUMBER(S): [id number]   ORDERING CLINICIAN: KARLOS BERGERON   FINDINGS: There is partially visualized diffuse edema of the lower leg possibly lymphedema.  No evidence of acute fracture or dislocation. Suspected pes cavus. Partially visualized degenerative change of the tibiotalar joint. No evidence of soft tissue gas or osseous erosion.       No evidence of acute fracture or dislocation. Diffuse lower leg edema. Suspected pes cavus. Degenerative change of the tibiotalar joint.     MACRO: None   Signed by: Thai Cartagena 5/21/2025 11:23 PM Dictation workstation:   [id number]    CT brain attack head wo IV contrast  Result Date: 5/21/2025  Interpreted By:  Ketan Birch, STUDY: CT BRAIN ATTACK HEAD WO IV CONTRAST;  5/21/2025 10:04 pm   INDICATION: Signs/Symptoms:Stroke Evaluation.     COMPARISON: 11/2024   ACCESSION NUMBER(S): [id number]   ORDERING CLINICIAN: KARLOS BERGERON   TECHNIQUE: Noncontrast axial CT scan of head was performed. Angled reformats in brain and bone windows were generated. The images were reviewed in bone, brain, blood and soft tissue windows.   FINDINGS: CSF Spaces: The ventricles, sulci and basal cisterns are within normal limits. There is no extraaxial fluid collection. Mild global volume loss and chronic small vessel ischemic change. The appearance is overall similar   Parenchyma:  The grey-white differentiation is intact. There is no mass effect or midline shift.  There is no intracranial hemorrhage.   Calvarium: The calvarium is unremarkable.    Vascular calcification       No evidence of acute cortical infarct or intracranial hemorrhage.   Senescent changes. No change from prior. If persistent concern, consider MRI   MACRO: Ketan Birch discussed the significance and urgency of this critical finding by epic chat with  KARLOS BERGERON on 5/21/2025 at 10:13 pm. (**-RCF-**) Findings:  See findings.     Signed by: Ketan Birch 5/21/2025 10:13 PM Dictation workstation:   [id number]    Assessment/Plan   Sepsis-triggered with leukocytosis, fever, tachypnea, resolved   Acute on chronic kidney disease, creatinine stable  Resolved Toxic metabolic encephalopathy  Morganella Bacteremia   Left leg cellulitis, secondary to MSSA  Left diabetic foot ulcer infection-suspected Chan grade 2 -culture growing MSSA  Lymphedema  History of enterobacter, MSSA, klebsiella oxytoca        Continue IV meropenem-, while inpatient  Bilateral leg compression  Monitor WBC and temperature  Monitor renal function  Local care  Offloading  Podiatry follow up  Long term plan po Levaquin 750 mg daily and keflex for 1 day from negative blood culture till 6/6/[id number]   Patient advised to follow-up with podiatrist or wound healing center for comprehensive wound care to ensure healing    This is a complex infectious disease issue and the following was performed today (for more details please see the above note): Management decisions reflecting the added complexity (e.g., changes in antimicrobial therapy, infection control strategies).      Tong Badillo MD

## 2025-05-27 NOTE — PROGRESS NOTES
05/27/25 1354   Discharge Planning   Expected Discharge Disposition Home H  ([facility] SOC 5/29)     NO BARRIERS TO DISCHARGE FROM CARE TRANSITIONS

## 2025-05-27 NOTE — HH CARE COORDINATION
Home Care received a Referral for Physical Therapy and Occupational Therapy. We have processed the referral for a Start of Care on 5/29/25.     If you have any questions or concerns, please feel free to contact us at [phone]. Follow the prompts, enter your five digit zip code, and you will be directed to your care team on [facility].

## 2025-05-27 NOTE — CARE PLAN
The patient's goals for the shift include      The clinical goals for the shift include maintain safety    Problem: Safety - Adult  Goal: Free from fall injury  Outcome: Progressing     Problem: Discharge Planning  Goal: Discharge to home or other facility with appropriate resources  Outcome: Progressing     Problem: Chronic Conditions and Co-morbidities  Goal: Patient's chronic conditions and co-morbidity symptoms are monitored and maintained or improved  Outcome: Progressing     Problem: Nutrition  Goal: Nutrient intake appropriate for maintaining nutritional needs  Outcome: Progressing     Problem: Diabetes  Goal: Achieve decreasing blood glucose levels by end of shift  Outcome: Progressing  Goal: Increase stability of blood glucose readings by end of shift  Outcome: Progressing  Goal: Decrease in ketones present in urine by end of shift  Outcome: Progressing  Goal: Maintain electrolyte levels within acceptable range throughout shift  Outcome: Progressing  Goal: Maintain glucose levels >70mg/dl to <250mg/dl throughout shift  Outcome: Progressing  Goal: No changes in neurological exam by end of shift  Outcome: Progressing  Goal: Learn about and adhere to nutrition recommendations by end of shift  Outcome: Progressing  Goal: Vital signs within normal range for age by end of shift  Outcome: Progressing  Goal: Increase self care and/or family involovement by end of shift  Outcome: Progressing  Goal: Receive DSME education by end of shift  Outcome: Progressing     Problem: Fall/Injury  Goal: Not fall by end of shift  Outcome: Progressing  Goal: Be free from injury by end of the shift  Outcome: Progressing  Goal: Verbalize understanding of personal risk factors for fall in the hospital  Outcome: Progressing  Goal: Verbalize understanding of risk factor reduction measures to prevent injury from fall in the home  Outcome: Progressing  Goal: Use assistive devices by end of the shift  Outcome: Progressing  Goal: Pace  activities to prevent fatigue by end of the shift  Outcome: Progressing     Problem: Skin  Goal: Decreased wound size/increased tissue granulation at next dressing change  Outcome: Progressing  Flowsheets (Taken 5/27/2025 1059)  Decreased wound size/increased tissue granulation at next dressing change:   Protective dressings over bony prominences   Promote sleep for wound healing  Goal: Participates in plan/prevention/treatment measures  Outcome: Progressing  Flowsheets (Taken 5/27/2025 1059)  Participates in plan/prevention/treatment measures:   Elevate heels   Discuss with provider PT/OT consult   Increase activity/out of bed for meals  Goal: Prevent/manage excess moisture  Outcome: Progressing  Flowsheets (Taken 5/27/2025 1059)  Prevent/manage excess moisture:   Cleanse incontinence/protect with barrier cream   Monitor for/manage infection if present   Follow provider orders for dressing changes  Goal: Prevent/minimize sheer/friction injuries  Outcome: Progressing  Flowsheets (Taken 5/27/2025 1059)  Prevent/minimize sheer/friction injuries:   Use pull sheet   Increase activity/out of bed for meals  Goal: Promote/optimize nutrition  Outcome: Progressing  Flowsheets (Taken 5/27/2025 1059)  Promote/optimize nutrition:   Monitor/record intake including meals   Offer water/supplements/favorite foods  Goal: Promote skin healing  Outcome: Progressing  Flowsheets (Taken 5/27/2025 1059)  Promote skin healing:   Protective dressings over bony prominences   Assess skin/pad under line(s)/device(s)   Rotate device position/do not position patient on device

## 2025-05-27 NOTE — PROGRESS NOTES
"Kevin Dc \"Kofi\" is a 59 y.o. male on day 5 of admission presenting with Cellulitis of left lower extremity.    Subjective   He presented with a fall and left lower extremity erythema diagnosed of cellulitis.  He had a positive blood culture.  For Morganella morganii on a positive send culture from the left foot positive for MSSA, both on 5/21.  He was started on IV antibiotics.  He has a history of lymphedema of the lower extremities.  He was noted to have episodes of second-degree AV block.  He was seen by Dr. Colón, EP, and it was determined that he was having Mobitz type I (Wenkebach).  He does not need a pacemaker, he has been restarted on metoprolol.  He has no acute complaints.    Objective     Physical Exam  General: awake, alert, oriented, responsive  Cardiovascular: regular, normal S1 and S2  Lungs: good air entry bilaterally, clear to auscultation  Abdomen: soft, nontender, bowel sounds present, normoactive  Extremities: Bilateral nonpitting pedal and leg edema.  There were Ace wraps on both legs.  Neuro: alert, oriented x 3, no focal weakness    Last Recorded Vitals  Blood pressure 135/64, pulse 73, temperature 34 °C (93.2 °F), temperature source Axillary, resp. rate 17, height 1.676 m (5' 6\"), weight 136 kg (299 lb 13.2 oz), SpO2 99%.  Intake/Output last 3 Shifts:  I/O last 3 completed shifts:  In: 372 (2.7 mL/kg) [P.O.:222; IV Piggyback:150]  Out: 3100 (22.8 mL/kg) [Urine:3100 (0.6 mL/kg/hr)]  Weight: 136 kg     Relevant Results  Lab Results   Component Value Date    WBC 9.9 05/27/2025    HGB 13.5 05/27/2025    HCT 40.5 (L) 05/27/2025    MCV 87 05/27/2025     05/27/2025     This SmartLink has not been configured with any valid records.     Scheduled medications  Scheduled Medications[1]  Continuous medications  Continuous Medications[2]  PRN medications  PRN Medications[3]      Assessment & Plan    Cellulitis of the left lower extremity  IV antibiotics per ID.  Currently on IV " daptomycin    Gram-negative sepsis  Blood culture from 5/21 was positive for Morganella morganii  On IV meropenem    MSSA infection of the left foot  On IV daptomycin    Mobitz type I second-degree AV block  Seen by EP.  Gagandeep to restart beta-blocker.  No indication for pacemaker    Acute kidney injury on chronic kidney disease  Creatinine has improved.  Appears to be at or close to baseline    Diabetes mellitus type 2 with hyperglycemia  Currently on Lantus and Humalog in place of Humulin R U-500 which he uses at home.    Lymphedema  He has Ace wraps on both legs.  Will need outpatient follow-up.      Plan  He is medically stable for discharge.   Home on levaquin and keflex until 6/6 per ID recommendation.   Home with home health care.   There is a difference between his insulin regimen at home (Humulin R U-500, taking 110 units with lunch and 115 units with dinner) and his regimen in the hospital (Lantus 20 units at bedtime plus Humalog sliding scale 0 to 15 units).  I discussed this with him.  He stated that when he takes the U-500 at home, his blood glucose runs in the 180s and he does not have low blood glucose levels.  I have recommended continued blood glucose monitoring.  He should follow-up with his endocrinologist, Dr. Faisal Vicente, who he said he has not seen in a while.  This has been noted in the discharge/follow-up instructions.    Xander Badillo MD         [1] allopurinol, 150 mg, oral, Daily  apixaban, 5 mg, oral, BID  [Held by provider] atorvastatin, 80 mg, oral, Daily  calcitriol, 0.5 mcg, oral, Daily  clopidogrel, 75 mg, oral, Daily  daptomycin, 4 mg/kg (Adjusted), intravenous, q24h  doxazosin, 1 mg, oral, Daily  insulin glargine, 20 Units, subcutaneous, Nightly  insulin lispro, 0-15 Units, subcutaneous, TID AC  meropenem, 1 g, intravenous, q12h  metoprolol succinate XL, 25 mg, oral, Daily  torsemide, 50 mg, oral, Every other day     [2]    [3] PRN medications: acetaminophen **OR**  acetaminophen **OR** acetaminophen, benzocaine-menthol, dextrose, dextrose, glucagon, glucagon, melatonin, ondansetron ODT **OR** ondansetron

## 2025-05-27 NOTE — PROGRESS NOTES
Creatinine remains stable at baseline, okay for discharge from renal standpoint on the current antihypertensive and diuretic regimen, can follow-up with us in 2 weeks with renal function panel 1 week after discharge.    Vishnu Chance MD   left normal/right normal

## 2025-05-27 NOTE — PROGRESS NOTES
"Kevin Dc \"Kofi\" is a 59 y.o. male on day 4 of admission presenting with Cellulitis of left lower extremity.    Subjective   Feels fine.  He states swelling has gone down dramatically.  Denies constitutional symptoms.       Physical Exam    Objective     REVIEW OF SYSTEMS  GENERAL:  Negative for malaise, significant weight loss, fever  HEENT:  No changes in hearing or vision, no nose bleeds or other nasal problems and Negative for frequent or significant headaches  NECK:  Negative for lumps, goiter, pain and significant neck swelling  RESPIRATORY:  Negative for cough, wheezing and shortness of breath  CARDIOVASCULAR:  Negative for chest pain, leg swelling and palpitations  GI:  Negative for abdominal discomfort, blood in stools or black stools and change in bowel habits  :  Negative for dysuria, frequency and incontinence  MUSCULOSKELETAL:  Negative for joint pain or swelling, back pain, and muscle pain.  SKIN: Positive for cellulitis bilateral lower legs.  Ulcer left plantar foot.  PSYCH:  Negative for sleep disturbance, mood disorder and recent psychosocial stressors  HEMATOLOGY/LYMPHOLOGY:  Negative for prolonged bleeding, bruising easily, and swollen nodes.  ENDOCRINE:  Negative for cold or heat intolerance, polyuria, polydipsia and goiter  NEURO: Negative, denies any burning, tingling or numbness      Objective:   Legs wrapped today-I left this intact.  However I can clearly tell that his edema to bilateral lower extremities has dramatically decreased.    Last Recorded Vitals  Blood pressure 146/74, pulse 80, temperature 36.9 °C (98.4 °F), temperature source Oral, resp. rate 18, height 1.676 m (5' 6\"), weight 136 kg (299 lb 13.2 oz), SpO2 95%.    Intake/Output last 3 Shifts:  I/O last 3 completed shifts:  In: 1722 (12.7 mL/kg) [P.O.:1722]  Out: 3830 (28.2 mL/kg) [Urine:3830 (0.8 mL/kg/hr)]  Weight: 136 kg     Relevant Results       Leukocytosis on admission: 22.7     Blood culture 5/21: Juanjoseella "   wound culture 5/21: MSSA    CT with contrast: No evidence of osteomyelitis, infection deep to the bone.  No evidence of abscess.    Assessment & Plan  Cellulitis of left lower extremity    Sepsis (Multi)    Altered mental status    Type 2 diabetes mellitus, with long-term current use of insulin    1.  Left lower leg cellulitis  2.  Right lower leg cellulitis  3.  Left plantar foot chronic ulcer to fat layer  4.  Bilateral lower leg chronic lymphedema    CT negative for abscess or osteomyelitis.  Continued conservative treatment with antibiotics-p.o. at discharge.  He is okay for discharge from a podiatry standpoint when medically stable.    Barrett Causey DPM    I spent 25 minutes in the professional and overall care of this patient.

## 2025-06-03 ENCOUNTER — HOME CARE VISIT (OUTPATIENT)
Dept: HOME HEALTH SERVICES | Facility: HOME HEALTH | Age: 60
End: 2025-06-03

## 2025-06-04 ENCOUNTER — HOME CARE VISIT (OUTPATIENT)
Dept: HOME HEALTH SERVICES | Facility: HOME HEALTH | Age: 60
End: 2025-06-04

## 2025-06-04 ASSESSMENT — ENCOUNTER SYMPTOMS
PAIN: 1
PERSON REPORTING PAIN: PATIENT

## 2025-06-04 ASSESSMENT — ACTIVITIES OF DAILY LIVING (ADL)
AMBULATION ASSISTANCE: 1
AMBULATION ASSISTANCE: INDEPENDENT

## 2025-06-04 NOTE — HOME HEALTH
5 21 25 to 5 27 25 Hospitalized for Lyphedema.  I had a blood infection and a wound infection.  Lymphedema identified about 10 years ago.  I cannot afford to go to the Lymphedema clinic at [facility].  Co-pay is $40 and I cannot afford it.    Elevating

## 2025-06-04 NOTE — Clinical Note
Visit attempted,  quickly learned that patient was made a non admit prior to my visit.  Visit will be missed, non-billable.

## 2025-10-14 ENCOUNTER — APPOINTMENT (OUTPATIENT)
Dept: CARDIOLOGY | Facility: CLINIC | Age: 60
End: 2025-10-14
Payer: MEDICARE

## 2025-11-10 ENCOUNTER — APPOINTMENT (OUTPATIENT)
Dept: PRIMARY CARE | Facility: CLINIC | Age: 60
End: 2025-11-10
Payer: MEDICARE